# Patient Record
Sex: FEMALE | Race: WHITE | NOT HISPANIC OR LATINO | Employment: UNEMPLOYED | URBAN - METROPOLITAN AREA
[De-identification: names, ages, dates, MRNs, and addresses within clinical notes are randomized per-mention and may not be internally consistent; named-entity substitution may affect disease eponyms.]

---

## 2017-02-16 ENCOUNTER — HOSPITAL ENCOUNTER (EMERGENCY)
Facility: HOSPITAL | Age: 52
Discharge: HOME | End: 2017-02-16
Attending: EMERGENCY MEDICINE
Payer: MEDICAID

## 2017-02-16 VITALS
RESPIRATION RATE: 16 BRPM | WEIGHT: 224 LBS | HEART RATE: 97 BPM | HEIGHT: 61 IN | DIASTOLIC BLOOD PRESSURE: 70 MMHG | SYSTOLIC BLOOD PRESSURE: 141 MMHG | OXYGEN SATURATION: 99 % | TEMPERATURE: 98 F | BODY MASS INDEX: 42.29 KG/M2

## 2017-02-16 DIAGNOSIS — B18.2 CHRONIC HEPATITIS C WITH CIRRHOSIS: Primary | ICD-10-CM

## 2017-02-16 DIAGNOSIS — K76.9 LIVER DISEASE: ICD-10-CM

## 2017-02-16 DIAGNOSIS — K74.60 CHRONIC HEPATITIS C WITH CIRRHOSIS: Primary | ICD-10-CM

## 2017-02-16 DIAGNOSIS — I10 ESSENTIAL HYPERTENSION: ICD-10-CM

## 2017-02-16 PROBLEM — E66.01 MORBID OBESITY: Status: ACTIVE | Noted: 2017-02-16

## 2017-02-16 LAB
ALBUMIN SERPL BCP-MCNC: 2.5 G/DL
ALP SERPL-CCNC: 190 U/L
ALT SERPL W/O P-5'-P-CCNC: 22 U/L
AMORPH CRY URNS QL MICRO: ABNORMAL
ANION GAP SERPL CALC-SCNC: 10 MMOL/L
APTT BLDCRRT: 26.4 SEC
AST SERPL-CCNC: 44 U/L
BACTERIA #/AREA URNS HPF: ABNORMAL /HPF
BASOPHILS # BLD AUTO: 0 K/UL
BASOPHILS NFR BLD: 0.6 %
BILIRUB SERPL-MCNC: 1.3 MG/DL
BILIRUB UR QL STRIP: ABNORMAL
BNP SERPL-MCNC: 39 PG/ML
BUN SERPL-MCNC: 8 MG/DL
CALCIUM SERPL-MCNC: 7.9 MG/DL
CHLORIDE SERPL-SCNC: 104 MMOL/L
CLARITY UR: ABNORMAL
CO2 SERPL-SCNC: 23 MMOL/L
COLOR UR: YELLOW
CREAT SERPL-MCNC: 0.8 MG/DL
DIFFERENTIAL METHOD: ABNORMAL
EOSINOPHIL # BLD AUTO: 0 K/UL
EOSINOPHIL NFR BLD: 0.5 %
ERYTHROCYTE [DISTWIDTH] IN BLOOD BY AUTOMATED COUNT: 17.5 %
EST. GFR  (AFRICAN AMERICAN): >60 ML/MIN/1.73 M^2
EST. GFR  (NON AFRICAN AMERICAN): >60 ML/MIN/1.73 M^2
GLUCOSE SERPL-MCNC: 124 MG/DL
GLUCOSE UR QL STRIP: NEGATIVE
HCT VFR BLD AUTO: 28.4 %
HGB BLD-MCNC: 9 G/DL
HGB UR QL STRIP: ABNORMAL
HYALINE CASTS #/AREA URNS LPF: 0 /LPF
INR PPP: 1.2
KETONES UR QL STRIP: NEGATIVE
LEUKOCYTE ESTERASE UR QL STRIP: NEGATIVE
LIPASE SERPL-CCNC: 60 U/L
LYMPHOCYTES # BLD AUTO: 0.7 K/UL
LYMPHOCYTES NFR BLD: 10.8 %
MCH RBC QN AUTO: 30.3 PG
MCHC RBC AUTO-ENTMCNC: 31.6 %
MCV RBC AUTO: 96 FL
MICROSCOPIC COMMENT: ABNORMAL
MONOCYTES # BLD AUTO: 0.4 K/UL
MONOCYTES NFR BLD: 7.2 %
NEUTROPHILS # BLD AUTO: 5 K/UL
NEUTROPHILS NFR BLD: 80.9 %
NITRITE UR QL STRIP: NEGATIVE
PH UR STRIP: 6 [PH] (ref 5–8)
PLATELET # BLD AUTO: 96 K/UL
PMV BLD AUTO: 9.2 FL
POTASSIUM SERPL-SCNC: 3.2 MMOL/L
PROT SERPL-MCNC: 6.1 G/DL
PROT UR QL STRIP: ABNORMAL
PROTHROMBIN TIME: 12.2 SEC
RBC # BLD AUTO: 2.95 M/UL
RBC #/AREA URNS HPF: 10 /HPF (ref 0–4)
SODIUM SERPL-SCNC: 137 MMOL/L
SP GR UR STRIP: >=1.03 (ref 1–1.03)
SQUAMOUS #/AREA URNS HPF: 5 /HPF
URN SPEC COLLECT METH UR: ABNORMAL
UROBILINOGEN UR STRIP-ACNC: NEGATIVE EU/DL
WBC # BLD AUTO: 6.2 K/UL
WBC #/AREA URNS HPF: 0 /HPF (ref 0–5)

## 2017-02-16 PROCEDURE — 85610 PROTHROMBIN TIME: CPT

## 2017-02-16 PROCEDURE — 36415 COLL VENOUS BLD VENIPUNCTURE: CPT

## 2017-02-16 PROCEDURE — 25000003 PHARM REV CODE 250: Performed by: EMERGENCY MEDICINE

## 2017-02-16 PROCEDURE — 99285 EMERGENCY DEPT VISIT HI MDM: CPT | Mod: 25

## 2017-02-16 PROCEDURE — 85025 COMPLETE CBC W/AUTO DIFF WBC: CPT

## 2017-02-16 PROCEDURE — 83880 ASSAY OF NATRIURETIC PEPTIDE: CPT

## 2017-02-16 PROCEDURE — 96374 THER/PROPH/DIAG INJ IV PUSH: CPT

## 2017-02-16 PROCEDURE — 85730 THROMBOPLASTIN TIME PARTIAL: CPT

## 2017-02-16 PROCEDURE — 63600175 PHARM REV CODE 636 W HCPCS: Performed by: EMERGENCY MEDICINE

## 2017-02-16 PROCEDURE — 83690 ASSAY OF LIPASE: CPT

## 2017-02-16 PROCEDURE — 80053 COMPREHEN METABOLIC PANEL: CPT

## 2017-02-16 PROCEDURE — 81000 URINALYSIS NONAUTO W/SCOPE: CPT

## 2017-02-16 RX ORDER — SPIRONOLACTONE 50 MG/1
50 TABLET, FILM COATED ORAL DAILY
Qty: 20 TABLET | Refills: 0 | Status: SHIPPED | OUTPATIENT
Start: 2017-02-16 | End: 2017-04-19 | Stop reason: DRUGHIGH

## 2017-02-16 RX ORDER — MORPHINE SULFATE 4 MG/ML
4 INJECTION, SOLUTION INTRAMUSCULAR; INTRAVENOUS
Status: COMPLETED | OUTPATIENT
Start: 2017-02-16 | End: 2017-02-16

## 2017-02-16 RX ORDER — PANTOPRAZOLE SODIUM 40 MG/1
40 TABLET, DELAYED RELEASE ORAL DAILY
Qty: 30 TABLET | Refills: 0 | Status: SHIPPED | OUTPATIENT
Start: 2017-02-16 | End: 2017-07-19

## 2017-02-16 RX ORDER — ALUMINUM HYDROXIDE, MAGNESIUM HYDROXIDE, AND SIMETHICONE 2400; 240; 2400 MG/30ML; MG/30ML; MG/30ML
15 SUSPENSION ORAL EVERY 6 HOURS PRN
Qty: 335 ML | Refills: 0 | Status: SHIPPED | OUTPATIENT
Start: 2017-02-16 | End: 2018-02-16

## 2017-02-16 RX ORDER — POTASSIUM CHLORIDE 20 MEQ/1
40 TABLET, EXTENDED RELEASE ORAL
Status: COMPLETED | OUTPATIENT
Start: 2017-02-16 | End: 2017-02-16

## 2017-02-16 RX ADMIN — POTASSIUM CHLORIDE 40 MEQ: 20 TABLET, EXTENDED RELEASE ORAL at 06:02

## 2017-02-16 RX ADMIN — MORPHINE SULFATE 4 MG: 4 INJECTION INTRAVENOUS at 06:02

## 2017-02-16 NOTE — ED NOTES
"S/P meds - "feeling some better...they just gave me some morphine". Stable. Will continue to monitor  "

## 2017-02-16 NOTE — DISCHARGE INSTRUCTIONS
Understanding Hepatitis C (HCV)  Hepatitis means inflammation of the liver. There are many kinds of hepatitis. Some can be spread. Others are not. Hepatitis C virus (HCV) can be spread to others. It can lead to lifelong liver disease. This includes chronic hepatitis, cirrhosis, liver failure, and liver cancer.  Symptoms of hepatitis C  Most people notice no problems until they develop liver disease years later. Symptoms include the following:  · Flulike problems (fatigue, nausea, vomiting, diarrhea, and sore muscles and joints)  · Tenderness in the upper right abdomen  · Jaundice (yellowing skin)  · Swelling in the belly  · Itching  · Dark urine  How HCV spreads  HCV spreads through exposure to an infected persons blood. This is most likely to happen if:  · You used an infected needle (IV drug needles, tattoos, acupuncture needles, and body piercing)  · You had a needlestick injury in the hospital  · You shared personal care items such as razors  · You had sex without a condom with an infected person (a less common cause)  · You had a blood transfusion several years ago (blood is now screened for HCV)  Many people do not know how they were exposed to HCV.  Prevent the spread  No vaccine can prevent the spread of HCV and hepatitis C. Its up to you to keep others safe.  Do:  · Cover all skin breaks and sores yourself. If you need help, the person treating you should wear latex gloves.  · Use condoms during sex.  Dont:  · Dont donate blood, plasma, body organs, other body tissue, or sperm.  · Dont share needles.  · Dont share razors, toothbrushes, manicure tools, or other personal items.   Date Last Reviewed: 7/1/2016 © 2000-2016 [employer]. [address] [place] [address], [place] [address]. All rights reserved. This information is not intended as a substitute for professional medical care. Always follow your healthcare professional's instructions.

## 2017-02-16 NOTE — ED PROVIDER NOTES
Encounter Date: 2/16/2017       History     Chief Complaint   Patient presents with    Constipation    Fluid retention     Review of patient's allergies indicates:  No Known Allergies  HPI Comments: This patient is a 51-year-old female with a past history of GERD and hepatitis C presenting to the emergency department with complaints of worsening abdominal distention over the past week.  She additionally reports some difficulty urinating and constipation over the last few days as well.  She denies ever seeing a gastroenterologist or DrMckenna regarding her liver disease or a past history of ascitic fluid accumulation.  She currently denies associated chest pain, abdominal rigidity, fever, exquisite tenderness, nausea, vomiting, new rashes, past history of SBO or ileus.    The history is provided by the patient.     Past Medical History   Diagnosis Date    Acid reflux     Hepatitis C     Hiatal hernia      No past medical history pertinent negatives.  Past Surgical History   Procedure Laterality Date    Appendectomy      Ankle surgery       History reviewed. No pertinent family history.  Social History   Substance Use Topics    Smoking status: Current Some Day Smoker     Types: Cigarettes    Smokeless tobacco: None    Alcohol use No     Review of Systems   Constitutional: Negative for fever.   HENT: Negative for congestion.    Respiratory: Negative for shortness of breath.    Cardiovascular: Negative for chest pain.   Gastrointestinal: Positive for abdominal distention.   Genitourinary: Positive for difficulty urinating.   Musculoskeletal: Negative for joint swelling.   Skin: Negative for rash.   Neurological: Negative for weakness.   Hematological: Bruises/bleeds easily.   Psychiatric/Behavioral: Negative for confusion.       Physical Exam   Initial Vitals   BP Pulse Resp Temp SpO2   02/16/17 0408 02/16/17 0408 02/16/17 0408 02/16/17 0408 02/16/17 0408   148/70 92 20 97.5 °F (36.4 °C) 100 %     Physical  Exam    Nursing note and vitals reviewed.  Constitutional: She appears well-developed and well-nourished. No distress.   HENT:   Head: Atraumatic.   Mouth/Throat: Oropharynx is clear and moist.   Eyes: Conjunctivae and EOM are normal.   Neck: Normal range of motion. Neck supple. No JVD present.   Cardiovascular: Normal rate and regular rhythm.   No murmur heard.  Pulmonary/Chest: Breath sounds normal. No respiratory distress. She has no wheezes. She has no rales.   Abdominal: Soft. She exhibits distension. There is no tenderness. There is no rebound.   Abdomen nonrigid, nondistended, positive fluid wave, normal bowel sounds   Musculoskeletal: Normal range of motion. She exhibits no edema or tenderness.   Neurological: She is alert and oriented to person, place, and time. No cranial nerve deficit.   Skin: Skin is warm and dry. No rash noted.   Psychiatric: She has a normal mood and affect. Her behavior is normal. Thought content normal.         ED Course   Procedures  Labs Reviewed - No data to display          Medical Decision Making:   Initial Assessment:   Patient and examined found to be in no acute distress. No signs or symptoms of progressing infection, inflammation. Worsening  abdominal distension most probably related to new accumulation of ascites from chronic liver failure. Us and labs obtained. Anemia, thrombocytopenia, mild hypokalemia, hyperbilirubinemia.    Differential Diagnosis:   DDx include, but are not limited to, ascites, SBO, ileus, constipation, UTi, ARF, cirrhosis, bowel perforation  ED Management:  Patient warrants therapeutic and diagnostic paracentesis. She was educated on the POC. Case d/w Dr. Oh who recommended in house procedure with DC and follow up afterward. Case was additionally d/w Dr. Boyd at sign out.                    ED Course   Comment By Time   Case discussed with Dr. contreras of radiology.  Not enough fluid.  Paracentesis canceled. Justin Boyd MD 02/16 4918   Seen  in the emergency room by Dr. Oh of [facility] medicine.  All follow-up and discharge instructions and medications according to them. Justin Boyd MD 02/16 1023     Clinical Impression:   The primary encounter diagnosis was Chronic hepatitis C with cirrhosis. Diagnoses of Essential hypertension and Liver disease were also pertinent to this visit.    Disposition:   Disposition: Discharged  Condition: Stable       Remy Gonzales MD  02/16/17 9197

## 2017-02-16 NOTE — PROVIDER PROGRESS NOTES - EMERGENCY DEPT.
Encounter Date: 2/16/2017    ED Physician Progress Notes        Physician Note:   Signed over from Dr. Gonzales pending radiology ultrasound guided paracentesis.  Case discussed with Dr. Schwartz of [facility] medicine who will see the patient in the emergency department.    Discharged after examined by Dr Schwartz in ED. Prescriptions and f/u per dr schwartz

## 2017-02-16 NOTE — CONSULTS
Consult Note  Hospital Medicine    Consult Requested By: Justin Boyd MD    Reason for Consult: Med management    SUBJECTIVE:     History of Present Illness: This patient is a 51-year-old female with a past history of GERD and hepatitis C presenting to the emergency department with complaints of worsening abdominal distention over the past week. She additionally reports some difficulty urinating and constipation over the last few days as well. She denies ever seeing a gastroenterologist or Dr. regarding her liver disease or a past history of ascitic fluid accumulation. She currently denies associated chest pain, abdominal rigidity, fever, exquisite tenderness, nausea, vomiting, new rashes, past history of SBO or ileus.    Patient was thought to have ascites but was taken to IR and found to have no fluids in her abdomen- not enough to do paracentesis.       Past Medical History   Diagnosis Date    Acid reflux     Hepatitis C     Hiatal hernia      Past Surgical History   Procedure Laterality Date    Appendectomy      Ankle surgery       History reviewed. No pertinent family history.  Social History   Substance Use Topics    Smoking status: Current Some Day Smoker     Types: Cigarettes    Smokeless tobacco: None    Alcohol use No       Review of patient's allergies indicates:  No Known Allergies     Review of Systems   Constitutional: Negative for chills and fever.   HENT: Negative for tinnitus.    Eyes: Negative for blurred vision and photophobia.   Respiratory: Negative for cough and sputum production.    Cardiovascular: Negative for chest pain and orthopnea.   Gastrointestinal: Positive for abdominal pain, nausea and vomiting. Negative for diarrhea.   Genitourinary: Negative for frequency and urgency.   Musculoskeletal: Negative for myalgias and neck pain.   Skin: Negative for itching and rash.   Neurological: Positive for weakness. Negative for tingling and headaches.   Endo/Heme/Allergies: Negative  for environmental allergies.           OBJECTIVE:     Vital Signs Range (Last 24H):  Temp:  [97.5 °F (36.4 °C)]   Pulse:  [90-99]   Resp:  [16-20]   BP: (147-188)/(59-91)   SpO2:  [97 %-100 %]     Physical Exam   Constitutional: She is oriented to person, place, and time and well-developed, well-nourished, and in no distress. No distress.   HENT:   Head: Normocephalic and atraumatic.   Mouth/Throat: No oropharyngeal exudate.   Eyes: Pupils are equal, round, and reactive to light. Right eye exhibits no discharge. Left eye exhibits no discharge. No scleral icterus.   Neck: Normal range of motion.   Cardiovascular: Normal rate, regular rhythm and intact distal pulses.  Exam reveals no gallop and no friction rub.    No murmur heard.  Pulmonary/Chest: Effort normal and breath sounds normal. No stridor. No respiratory distress. She has no wheezes.   Abdominal: Soft. She exhibits distension. She exhibits no mass. There is tenderness. There is no rebound and no guarding.   Diminished BS   Musculoskeletal: Normal range of motion. She exhibits edema. She exhibits no tenderness.   Lymphadenopathy:     She has no cervical adenopathy.   Neurological: She is alert and oriented to person, place, and time. She has normal reflexes. No cranial nerve deficit. She exhibits normal muscle tone. Gait normal.   Skin: No rash noted. She is not diaphoretic. No erythema. No pallor.   Psychiatric: Affect and judgment normal.   Nursing note and vitals reviewed.      Body mass index is 42.32 kg/(m^2).    Laboratory:  CBC:   Recent Labs  Lab 02/16/17  0506   WBC 6.20   RBC 2.95*   HGB 9.0*   HCT 28.4*   PLT 96*   MCV 96   MCH 30.3   MCHC 31.6*     CMP:   Recent Labs  Lab 02/16/17  0506   *   CALCIUM 7.9*   ALBUMIN 2.5*   PROT 6.1      K 3.2*   CO2 23      BUN 8   CREATININE 0.8   ALKPHOS 190*   ALT 22   AST 44*   BILITOT 1.3*       Diagnostic Results:  Labs: Reviewed  ECG: Reviewed  US: Reviewed    EKG: NSR    ASSESSMENT/PLAN:      Active Hospital Problems    Diagnosis  POA    *Chronic hepatitis C with cirrhosis [B18.2, K74.60]-  Patient with cirrhosis, + edema likely d/t volume overload and hypoalbuminemia. Will give aldactone. She needs F/U with Hepatology and PCP as outpatient- discussed with case management.  Yes    Morbid obesity [E66.01]- Body mass index is 42.32 kg/(m^2). Morbid obesity complicates all aspects of disease management from diagnostic modalities to treatment. Weight loss encouraged and health benefits explained to patient.  Yes    Portal gastropathy-  Likely cause of her abdominal pain and nausea. Patient started on maalox and PPI and told to F/U with PCP as outpatient. Encourage fluids. Avoid NSAIDS.  Yes      Resolved Hospital Problems    Diagnosis Date Resolved POA   No resolved problems to display.     Thank you for allowing us to participate in the care of your patient. We will follow the patient and provide recommendations as needed.      Time spent seeing patient( greater than 1/2 spent in direct contact) : 38 min

## 2017-02-16 NOTE — PROCEDURE NOTE ADDENDUM
1020am Dr. Oh asked CM to speak to patient regarding followup appts with primary care and Hepatatologist in [place], [place]. After speaking with patient and verifying her address, patient stated that she is staying in [place] and plans to move here. She verified her current address as [address] [place] [address] and phone as [phone].  I explained to her that I could get an appointment in [place] sooner than [place] at the [facility] and she was fine with that.     1030am Medicaid counselor to speak to patient and file medicaid application.     1037am Appointment made at [facility] [address], [place], [place]   Phone [phone] on Tuesday 2/21 @ 930 am.    1050 CM left message for [facility] [phone] to call back regarding appointment.     1100am Patient has already left and I was not able to inform her of her appt date and time. I tried to call patient and no answer at her number or relatives number. Dr. Oh notified. I will continue to try to reach patient to inform her of appt dates, time and location.     1123am Received call from Janell at [facility] [phone]. She asked for patient information and also requested that information be faxed to [phone]. They will call patient and set up appt. Patient FS faxed to [phone]. I also spoke with Karenalejandro and she said that the medicaid [place] was filed prior to patient leaving.     740pm  CM was able to reach patient @ [phone] and informed her of location , time and date of appointment to see primary care MD @ [facility].  CM instructed patient to bring proof of income, picture ID, current meds, any government assistance income documentation,  bank statement or letter from person supporting her if she does not have proof of income.  Patient able to verbalize understanding of information given.

## 2017-02-16 NOTE — NURSING
Received call from Dr. Contreras that a paracentesis was ordered on pt in ER to be done as soon as possible. Labs today PTT 26.4, PT/INR 12.2/1.2, PLT 96, no H/P available, only meds listed are ER meds given. Called spoke to Hans no specimen orders entered, per Hans GIMENEZ notified. 0900 AM discussed patient with Dr. contreras and not a large fluid collection and he will discuss case with ER MD. Received call back from Dr. Contreras paracentesis will be cancelled on this patient will notify if any other testing is warranted. AR

## 2017-02-16 NOTE — ED AVS SNAPSHOT
[facility] [address]  [place] [address]               Karen Villarreal   2017  4:07 AM   ED    Description:  Female : 1965   Department:  [facility]           Your Care was Coordinated By:     Provider Role From To    Remy Gonzales MD Attending Provider 17 0416 17 0801    Justin Boyd MD Attending Provider 17 0801 17 1026    Justin Boyd MD ED Temporary Attending 17 0732 --      Reason for Visit     Constipation     Fluid retention           Diagnoses this Visit        Comments    Chronic hepatitis C with cirrhosis    -  Primary     Essential hypertension         Liver disease           ED Disposition     ED Disposition Condition Comment    Discharge             To Do List           Follow-up Information     Follow up with [facility].    Why:  Follow-up with primary care and hepatology.  Case management will assist you with this.    Contact information:    [address] [place] [address] [phone]         These Medications        Disp Refills Start End    aluminum & magnesium hydroxide-simethicone (MAALOX MAXIMUM STRENGTH) 400-400-40 mg/5 mL suspension 335 mL 0 2017    Take 15 mLs by mouth every 6 (six) hours as needed for Indigestion. - Oral    pantoprazole (PROTONIX) 40 MG tablet 30 tablet 0 2017    Take 1 tablet (40 mg total) by mouth once daily. - Oral    spironolactone (ALDACTONE) 50 MG tablet 20 tablet 0 2017    Take 1 tablet (50 mg total) by mouth once daily. - Oral      [facility]     [facility] On Call Nurse Care Line -  Assistance  Registered nurses in the [facility] provide clinical advisement, health education, appointment booking, and other advisory services.  Call for this free service at [phone].             Medications           START taking these NEW  "medications        Refills    aluminum & magnesium hydroxide-simethicone (MAALOX MAXIMUM STRENGTH) 400-400-40 mg/5 mL suspension 0    Sig: Take 15 mLs by mouth every 6 (six) hours as needed for Indigestion.    Class: Print    Route: Oral    pantoprazole (PROTONIX) 40 MG tablet 0    Sig: Take 1 tablet (40 mg total) by mouth once daily.    Class: Print    Route: Oral    spironolactone (ALDACTONE) 50 MG tablet 0    Sig: Take 1 tablet (50 mg total) by mouth once daily.    Class: Print    Route: Oral      These medications were administered today        Dose Freq    potassium chloride SA CR tablet 40 mEq 40 mEq ED 1 Time    Sig: Take 2 tablets (40 mEq total) by mouth ED 1 Time.    Class: Normal    Route: Oral    morphine injection 4 mg 4 mg ED 1 Time    Sig: Inject 1 mL (4 mg total) into the vein ED 1 Time.    Class: Normal    Route: Intravenous           Verify that the below list of medications is an accurate representation of the medications you are currently taking.  If none reported, the list may be blank. If incorrect, please contact your healthcare provider. Carry this list with you in case of emergency.           Current Medications     aluminum & magnesium hydroxide-simethicone (MAALOX MAXIMUM STRENGTH) 400-400-40 mg/5 mL suspension Take 15 mLs by mouth every 6 (six) hours as needed for Indigestion.    pantoprazole (PROTONIX) 40 MG tablet Take 1 tablet (40 mg total) by mouth once daily.    spironolactone (ALDACTONE) 50 MG tablet Take 1 tablet (50 mg total) by mouth once daily.           Clinical Reference Information           Your Vitals Were     BP Pulse Temp Resp Height Weight    141/70 97 97.5 °F (36.4 °C) ([place]) 16 5' 1" (1.549 m) 101.6 kg (224 lb)    SpO2 BMI             99% 42.32 kg/m2         Allergies as of 2/16/2017     No Known Allergies      Immunizations Administered on Date of Encounter - 2/16/2017     None      ED Micro, Lab, POCT     Start Ordered       Status Ordering Provider    02/16/17 0753 " 02/16/17 0752    STAT,   Status:  Canceled      Canceled     02/16/17 0753 02/16/17 0752    STAT,   Status:  Canceled      Canceled     02/16/17 0425 02/16/17 0424  Brain natriuretic peptide  STAT      Final result     02/16/17 0424 02/16/17 0424  CBC W/ AUTO DIFFERENTIAL  Once      Final result     02/16/17 0424 02/16/17 0424  Comp. Metabolic Panel  STAT      Final result     02/16/17 0424 02/16/17 0424  Lipase  STAT      Final result     02/16/17 0424 02/16/17 0424  Urinalysis - Clean Catch  STAT      Final result     02/16/17 0424 02/16/17 0424  Protime-INR  STAT      Final result     02/16/17 0424 02/16/17 0424  PTT  STAT      Final result     02/16/17 0424 02/16/17 0424  Urinalysis Microscopic  Once      Final result       ED Imaging Orders     Start Ordered       Status Ordering Provider    02/16/17 0730 02/16/17 0730  US Guided Paracentesis  1 time imaging      In process     02/16/17 0424 02/16/17 0424  X-ray chest PA and lateral  1 time imaging     Comments:  Abdominal pain    Final result     02/16/17 0424 02/16/17 0424  X-Ray Abdomen Flat And Erect  1 time imaging      Final result     02/16/17 0424 02/16/17 0424  US Abdomen Complete  1 time imaging      Final result         Discharge Instructions         Understanding Hepatitis C (HCV)  Hepatitis means inflammation of the liver. There are many kinds of hepatitis. Some can be spread. Others are not. Hepatitis C virus (HCV) can be spread to others. It can lead to lifelong liver disease. This includes chronic hepatitis, cirrhosis, liver failure, and liver cancer.  Symptoms of hepatitis C  Most people notice no problems until they develop liver disease years later. Symptoms include the following:  · Flulike problems (fatigue, nausea, vomiting, diarrhea, and sore muscles and joints)  · Tenderness in the upper right abdomen  · Jaundice (yellowing skin)  · Swelling in the belly  · Itching  · Dark urine  How HCV spreads  HCV spreads through exposure to an  infected persons blood. This is most likely to happen if:  · You used an infected needle (IV drug needles, tattoos, acupuncture needles, and body piercing)  · You had a needlestick injury in the hospital  · You shared personal care items such as razors  · You had sex without a condom with an infected person (a less common cause)  · You had a blood transfusion several years ago (blood is now screened for HCV)  Many people do not know how they were exposed to HCV.  Prevent the spread  No vaccine can prevent the spread of HCV and hepatitis C. Its up to you to keep others safe.  Do:  · Cover all skin breaks and sores yourself. If you need help, the person treating you should wear latex gloves.  · Use condoms during sex.  Dont:  · Dont donate blood, plasma, body organs, other body tissue, or sperm.  · Dont share needles.  · Dont share razors, toothbrushes, manicure tools, or other personal items.   Date Last Reviewed: 7/1/2016  © 3441-7234 [employer]. [address] [place] [address]. All rights reserved. This information is not intended as a substitute for professional medical care. Always follow your healthcare professional's instructions.          Discharge References/Attachments     HEPATITIS C (HCV), TREATING (ENGLISH)    HEPATITIS C (HCV), UNDERSTANDING (ENGLISH)    CIRRHOSIS OF THE LIVER, DISCHARGE INSTRUCTIONS FOR (ENGLISH)    CIRRHOSIS, TREATING (ENGLISH)      [facility] Sign-Up     Activating your [facility] account is as easy as 1-2-3!     1) Visit [email or web address], select Sign Up Now, enter this activation code and your date of birth, then select Next.  [email or web address]  Expires: 4/2/2017 10:52 AM      2) Create a username and password to use when you visit [facility] in the future and select a security question in case you lose your password and select Next.    3) Enter your e-mail address and click Sign Up!    Additional Information  If you have questions, please e-mail  [email or web address] or call [phone] to talk to our [facility] staff. Remember, [facility] is NOT to be used for urgent needs. For medical emergencies, dial 911.         Smoking Cessation     If you would like to quit smoking:   You may be eligible for free services if you are a [place] resident and started smoking cigarettes before September 1, 1988.  Call the Smoking Cessation Trust (SCT) toll free at (840) 166-6350 or (437) 124-2657.   Call 1-800-QUIT-NOW if you do not meet the above criteria.             [facility] complies with applicable Federal civil rights laws and does not discriminate on the basis of race, color, national origin, age, disability, or sex.        Language Assistance Services     ATTENTION: Language assistance services are available, free of charge. Please call [phone].      ATENCIÓN: Si habla español, tiene a grant disposición servicios gratuitos de asistencia lingüística. Llame al [phone].     CHÚ Ý: N?u b?n nói Ti?ng Vi?t, có các d?ch v? h? tr? ngôn ng? mi?n phí dành cho b?n. G?i s? [phone].

## 2017-02-17 NOTE — PROGRESS NOTES
1050am 2/17/2017  CM received call from [facility] from [facility] requesting clinicals on patient. Clinicals faxed to [facility] @ [phone] per her request.

## 2017-03-28 ENCOUNTER — TELEPHONE (OUTPATIENT)
Dept: HEPATOLOGY | Facility: CLINIC | Age: 52
End: 2017-03-28

## 2017-03-28 NOTE — TELEPHONE ENCOUNTER
----- Message from iLsbeth Cooney sent at 3/28/2017  7:30 AM CDT -----  Contact: self  Patient states has family emergency unable to make appointment schedule today.    Patient would like to reschedule.  please call pt at [phone]

## 2017-04-19 ENCOUNTER — OFFICE VISIT (OUTPATIENT)
Dept: HEPATOLOGY | Facility: CLINIC | Age: 52
End: 2017-04-19
Payer: MEDICAID

## 2017-04-19 ENCOUNTER — LAB VISIT (OUTPATIENT)
Dept: LAB | Facility: HOSPITAL | Age: 52
End: 2017-04-19
Payer: MEDICAID

## 2017-04-19 VITALS
SYSTOLIC BLOOD PRESSURE: 137 MMHG | TEMPERATURE: 98 F | HEART RATE: 112 BPM | RESPIRATION RATE: 18 BRPM | DIASTOLIC BLOOD PRESSURE: 66 MMHG | HEIGHT: 61 IN | OXYGEN SATURATION: 98 % | WEIGHT: 234.56 LBS | BODY MASS INDEX: 44.28 KG/M2

## 2017-04-19 DIAGNOSIS — K74.60 CHRONIC HEPATITIS C WITH CIRRHOSIS: Primary | ICD-10-CM

## 2017-04-19 DIAGNOSIS — K74.60 CHRONIC HEPATITIS C WITH CIRRHOSIS: ICD-10-CM

## 2017-04-19 DIAGNOSIS — R18.8 OTHER ASCITES: ICD-10-CM

## 2017-04-19 DIAGNOSIS — B18.2 CHRONIC HEPATITIS C WITH CIRRHOSIS: ICD-10-CM

## 2017-04-19 DIAGNOSIS — B18.2 CHRONIC HEPATITIS C WITH CIRRHOSIS: Primary | ICD-10-CM

## 2017-04-19 LAB
AFP SERPL-MCNC: 4.7 NG/ML
CERULOPLASMIN SERPL-MCNC: 26 MG/DL
FERRITIN SERPL-MCNC: 21 NG/ML
HBV CORE AB SERPL QL IA: NEGATIVE
HBV SURFACE AB SER-ACNC: NEGATIVE M[IU]/ML
HBV SURFACE AG SERPL QL IA: NEGATIVE
HEPATITIS A ANTIBODY, IGG: POSITIVE

## 2017-04-19 PROCEDURE — 99999 PR PBB SHADOW E&M-EST. PATIENT-LVL III: CPT | Mod: PBBFAC,,, | Performed by: NURSE PRACTITIONER

## 2017-04-19 PROCEDURE — 82390 ASSAY OF CERULOPLASMIN: CPT

## 2017-04-19 PROCEDURE — 86706 HEP B SURFACE ANTIBODY: CPT

## 2017-04-19 PROCEDURE — 82104 ALPHA-1-ANTITRYPSIN PHENO: CPT

## 2017-04-19 PROCEDURE — 87340 HEPATITIS B SURFACE AG IA: CPT

## 2017-04-19 PROCEDURE — 86790 VIRUS ANTIBODY NOS: CPT

## 2017-04-19 PROCEDURE — 86704 HEP B CORE ANTIBODY TOTAL: CPT

## 2017-04-19 PROCEDURE — 82728 ASSAY OF FERRITIN: CPT

## 2017-04-19 PROCEDURE — 99205 OFFICE O/P NEW HI 60 MIN: CPT | Mod: S$PBB,,, | Performed by: NURSE PRACTITIONER

## 2017-04-19 PROCEDURE — 82105 ALPHA-FETOPROTEIN SERUM: CPT

## 2017-04-19 PROCEDURE — 36415 COLL VENOUS BLD VENIPUNCTURE: CPT

## 2017-04-19 RX ORDER — SPIRONOLACTONE 100 MG/1
150 TABLET, FILM COATED ORAL DAILY
Refills: 3 | COMMUNITY
Start: 2017-04-01 | End: 2019-01-01

## 2017-04-19 RX ORDER — FUROSEMIDE 40 MG/1
60 TABLET ORAL DAILY
Refills: 3 | COMMUNITY
Start: 2017-04-01 | End: 2019-01-01

## 2017-04-19 NOTE — MR AVS SNAPSHOT
"    [facility] - Hepatology  [address]  [facility] [address]  Phone: [phone]  Fax: [phone]                  Karen Villarreal   2017 10:40 AM   Office Visit    Description:  Female : 1965   Provider:  Suzette Ruiz NP   Department:  [facility] - Hepatology           Reason for Visit     Cirrhosis           Diagnoses this Visit        Comments    Chronic hepatitis C with cirrhosis    -  Primary     Other ascites                To Do List           Goals (5 Years of Data)     None      [facility]     [facility] Nurse Care Line -  Assistance  Unless otherwise directed by your provider, please contact [facility], our nurse care line that is available for  assistance.     Registered nurses in the [facility] Center provide: appointment scheduling, clinical advisement, health education, and other advisory services.  Call: [phone] (toll free)               Medications                Verify that the below list of medications is an accurate representation of the medications you are currently taking.  If none reported, the list may be blank. If incorrect, please contact your healthcare provider. Carry this list with you in case of emergency.           Current Medications     furosemide (LASIX) 40 MG tablet Take 40 mg by mouth once daily.    spironolactone (ALDACTONE) 100 MG tablet Take 100 mg by mouth once daily.    aluminum & magnesium hydroxide-simethicone (MAALOX MAXIMUM STRENGTH) 400-400-40 mg/5 mL suspension Take 15 mLs by mouth every 6 (six) hours as needed for Indigestion.    pantoprazole (PROTONIX) 40 MG tablet Take 1 tablet (40 mg total) by mouth once daily.           Clinical Reference Information           Your Vitals Were     BP Pulse Temp Resp Height Weight    137/66 (BP Location: Left arm, Patient Position: Sitting) 112 97.8 °F (36.6 °C) (Oral) 18 5' 1" (1.549 m) 106.4 kg (234 lb 9.1 oz)    SpO2 BMI             98% 44.32 kg/m2       "   Blood Pressure          Most Recent Value    BP  137/66      Allergies as of 4/19/2017     No Known Allergies      Immunizations Administered on Date of Encounter - 4/19/2017     None      Orders Placed During Today's Visit      Normal Orders This Visit    Case request GI: ESOPHAGOGASTRODUODENOSCOPY (EGD)     Future Labs/Procedures Expected by Expires    Alpha 1 Antitrypsin Phenotype  4/19/2017 6/18/2018    Ceruloplasmin  4/19/2017 6/18/2018    Ferritin  4/19/2017 6/18/2018    Hepatitis A antibody, IgG  4/19/2017 6/18/2018    Hepatitis B core antibody, total  4/19/2017 4/19/2018    Hepatitis B surface antibody  4/19/2017 6/18/2018    Hepatitis B surface antigen  4/19/2017 6/18/2018    Recurring Lab Work Interval Expires    AFP tumor marker   4/20/2018    CBC auto differential   4/20/2018    Comprehensive metabolic panel   4/20/2018    Protime-INR   4/20/2018    US Abdomen Complete   4/20/2018      [facility] Sign-Up     Activating your [facility] account is as easy as 1-2-3!     1) Visit [email or web address], select Sign Up Now, enter this activation code and your date of birth, then select Next.  [email or web address]  Expires: 6/3/2017 11:18 AM      2) Create a username and password to use when you visit [facility] in the future and select a security question in case you lose your password and select Next.    3) Enter your e-mail address and click Sign Up!    Additional Information  If you have questions, please e-mail [email or web address] or call [phone] to talk to our [facility] staff. Remember, [facility] is NOT to be used for urgent needs. For medical emergencies, dial 911.         Smoking Cessation     If you would like to quit smoking:   You may be eligible for free services if you are a [place] resident and started smoking cigarettes before September 1, 1988.  Call the Smoking Cessation Trust (SCT) toll free at (063) 418-4619 or (824) 311-2447.   Call 9-800-QUIT-NOW if you do not meet the above  criteria.   Contact us via email: [email or web address]   View our website for more information: [email or web address]        Language Assistance Services     ATTENTION: Language assistance services are available, free of charge. Please call [phone].      ATENCIÓN: Si habla josue, tiene a grant disposición servicios gratuitos de asistencia lingüística. Llame al [phone].     CHÚ Ý: N?u b?n nói Ti?ng Vi?t, có các d?ch v? h? tr? ngôn ng? mi?n phí dành cho b?n. G?i s? [phone].         [facility] complies with applicable Federal civil rights laws and does not discriminate on the basis of race, color, national origin, age, disability, or sex.

## 2017-04-19 NOTE — PROGRESS NOTES
"HEPATOLOGY CONSULTATION    Referring Physician:Shama Mccoy MD   Current Corresponding Physician: Shama Mcocy MD     Reason for Consultation: Consultation for evaluation of Cirrhosis    History of Present Illness: Karen Villarreal is a 51 y.o. femalewho presents for evaluation of   Chief Complaint   Patient presents with    Cirrhosis     Per patient dx w/ HCV in 1999, d/t lack of insurance did not have proper f/u.  Treatment experience, INF/RBV, no response  HCV quant ~6900 (outside 3/2017)   Source is unknown. Denies IVDU, intranasal drug use, tattoos, blood transfusions  Presented with vomiting "black liquid" and abdominal distension to the ER ~ 2 months ago.  Paracentesis was considered but not performed.  Ascites improved w/ diuretics which were started at that time 2 months ago w/ at least 20# weight loss  No c/o jaundice, confusion or slowed mentation, hematemesis, melena  Only complaint is of fatigue and fluid retention.  Currently on furosemide 40 mg qd and spironolactone 100 mg qd    Alcohol use, "a few beers" a couple of times per week,   Denies family hx of liver disease     Renal cyst seen on initial imaging , per patient had f/u CT renal study in [place] no remarkable findings    Review of available records reveal:  Mildly elevated LFTs w/ thrombocytopenia, albuminemia    3/2017  Normal Ferritin   HCV quant 6900, GT 1a    US 2/16: 1.  Cirrhosis.  2.  Mild splenomegaly.  3.  Small volume ascites.  4.  Cholelithiasis.  5.  Small complex cyst left kidney    Other noted hx:  Past Medical History:   Diagnosis Date    Acid reflux     Hepatitis C     Hiatal hernia      Outpatient Encounter Prescriptions as of 4/19/2017   Medication Sig Dispense Refill    furosemide (LASIX) 40 MG tablet Take 40 mg by mouth once daily.  3    spironolactone (ALDACTONE) 100 MG tablet Take 100 mg by mouth once daily.  3    aluminum & magnesium hydroxide-simethicone (MAALOX MAXIMUM STRENGTH) " 400-400-40 mg/5 mL suspension Take 15 mLs by mouth every 6 (six) hours as needed for Indigestion. 335 mL 0    pantoprazole (PROTONIX) 40 MG tablet Take 1 tablet (40 mg total) by mouth once daily. 30 tablet 0    [DISCONTINUED] spironolactone (ALDACTONE) 50 MG tablet Take 1 tablet (50 mg total) by mouth once daily. 20 tablet 0     No facility-administered encounter medications on file as of 4/19/2017.      Review of patient's allergies indicates:  No Known Allergies  No family history on file.    Social History     Social History    Marital status: Single     Spouse name: N/A    Number of children: N/A    Years of education: N/A     Occupational History    Not on file.     Social History Main Topics    Smoking status: Current Some Day Smoker     Types: Cigarettes    Smokeless tobacco: Not on file    Alcohol use No    Drug use: No    Sexual activity: Not on file     Other Topics Concern    Not on file     Social History Narrative    No narrative on file     Review of Systems   Constitutional: Negative for activity change, appetite change, chills, diaphoresis, fatigue, fever and unexpected weight change.   HENT: Negative for facial swelling and nosebleeds.    Respiratory: Negative for cough, chest tightness and shortness of breath.    Cardiovascular: Positive for leg swelling. Negative for chest pain and palpitations.   Gastrointestinal: Positive for abdominal distention. Negative for abdominal pain, blood in stool, constipation, diarrhea, nausea and vomiting.   Musculoskeletal: Negative for neck pain and neck stiffness.   Skin: Negative for color change, pallor and rash.   Neurological: Negative for dizziness, tremors, syncope, weakness, light-headedness and headaches.   Hematological: Negative for adenopathy. Does not bruise/bleed easily.   Psychiatric/Behavioral: Negative for agitation, behavioral problems, confusion and decreased concentration.     Vitals:    04/19/17 1029   BP: 137/66   Pulse: (!) 112    Resp: 18   Temp: 97.8 °F (36.6 °C)       Physical Exam   Constitutional: She is oriented to person, place, and time. She appears well-developed and well-nourished. No distress.   HENT:   Head: Normocephalic and atraumatic.   Eyes: Conjunctivae are normal. Pupils are equal, round, and reactive to light. No scleral icterus.   Neck: Normal range of motion. Neck supple.   Cardiovascular: Normal rate.    Pulmonary/Chest: Effort normal.   Abdominal: Soft. She exhibits distension. She exhibits no mass. There is no tenderness. There is no rebound and no guarding.   Musculoskeletal: Normal range of motion.   2+ pitting edema   Neurological: She is alert and oriented to person, place, and time. No cranial nerve deficit. She exhibits normal muscle tone. Coordination normal.   No asterixis   Skin: Skin is warm and dry. No rash noted. She is not diaphoretic. No erythema. No pallor.   Psychiatric: She has a normal mood and affect. Her behavior is normal. Judgment and thought content normal.       MELD-Na score: 9 at 2/16/2017  5:06 AM  MELD score: 9 at 2/16/2017  5:06 AM  Calculated from:  Serum Creatinine: 0.8 mg/dL (Rounded to 1) at 2/16/2017  5:06 AM  Serum Sodium: 137 mmol/L at 2/16/2017  5:06 AM  Total Bilirubin: 1.3 mg/dL at 2/16/2017  5:06 AM  INR(ratio): 1.2 at 2/16/2017  5:06 AM  Age: 51 years    Lab Results   Component Value Date     (H) 02/16/2017    BUN 8 02/16/2017    CREATININE 0.8 02/16/2017    CALCIUM 7.9 (L) 02/16/2017     02/16/2017    K 3.2 (L) 02/16/2017     02/16/2017    PROT 6.1 02/16/2017    CO2 23 02/16/2017    ANIONGAP 10 02/16/2017    WBC 6.20 02/16/2017    RBC 2.95 (L) 02/16/2017    HGB 9.0 (L) 02/16/2017    HCT 28.4 (L) 02/16/2017    MCV 96 02/16/2017    MCH 30.3 02/16/2017    MCHC 31.6 (L) 02/16/2017     Lab Results   Component Value Date    RDW 17.5 (H) 02/16/2017    PLT 96 (L) 02/16/2017    MPV 9.2 02/16/2017    GRAN 5.0 02/16/2017    GRAN 80.9 (H) 02/16/2017    LYMPH 0.7 (L)  02/16/2017    LYMPH 10.8 (L) 02/16/2017    MONO 0.4 02/16/2017    MONO 7.2 02/16/2017    EOSINOPHIL 0.5 02/16/2017    BASOPHIL 0.6 02/16/2017    EOS 0.0 02/16/2017    BASO 0.00 02/16/2017    APTT 26.4 02/16/2017    BNP 39 02/16/2017    ALBUMIN 2.5 (L) 02/16/2017    AST 44 (H) 02/16/2017    ALT 22 02/16/2017    ALKPHOS 190 (H) 02/16/2017    LABPROT 12.2 02/16/2017    INR 1.2 02/16/2017       Assessment and Plan:  Patient seen in collaboration with Dr. Webster  Cirrhosis : likely 2/2 chronic hepatitis C  -mildly decompensated , MELD 9 which does not pose any immediate survival benefit to transplant  -splenomegaly w/ thrombocytopenia, ascites  Chronic HCV: treatment experienced, non responder.    -Treatment will be on hold for now in the event that patient progresses and need for transplant arises.  -recommend Hep A/B vaccine, pending labs  EV screeing: will schedule EGD in [place]  HCC surveillance: AFP today and then repeat AFP/US in October  Ascites: increase lasix and spironolactone to bid  -instructed low Na + diet, handout provided    Total duration of visit = 40 min, with > 50% spent counseling  RTC 4-6 weeks w/ pre visit labs    Thank you for allowing me participate in this patient's care.     Patient Active Problem List   Diagnosis    Morbid obesity    Chronic hepatitis C with cirrhosis    Essential hypertension    Ascites     Karenyaron Villarreal is a 51 y.o. female withCirrhosis

## 2017-04-19 NOTE — PROGRESS NOTES
I have personally performed a face to face diagnostic evaluation on this patient. I have reviewed and agree with today's findings and the care plan outlined by Suzette Ruiz NP      My findings are as follows:  Patient presents with mildly decompensated HCV- ascites    - failure to respond to Peg IFN    MELD-Na score: 9 at 2/16/2017  5:06 AM  MELD score: 9 at 2/16/2017  5:06 AM  Calculated from:  Serum Creatinine: 0.8 mg/dL (Rounded to 1) at 2/16/2017  5:06 AM  Serum Sodium: 137 mmol/L at 2/16/2017  5:06 AM  Total Bilirubin: 1.3 mg/dL at 2/16/2017  5:06 AM  INR(ratio): 1.2 at 2/16/2017  5:06 AM  Age: 51 years     Needs:  EGD  CT renal protocol  Increase diuretics      she will return to Suzette Ruiz NP for follow-up.

## 2017-04-19 NOTE — LETTER
April 19, 2017      Shama Mccoy MD  [address]  [address]  [facility] [address]           [facility]  [address]  [facility] [address]  Phone: [phone]  Fax: [phone]          Patient: Karen Villarreal   MR Number: [id number]   YOB: 1965   Date of Visit: 4/19/2017       Dear Dr. Shama Mccoy:    Thank you for referring Karen Villarreal to me for evaluation. Attached you will find relevant portions of my assessment and plan of care.    If you have questions, please do not hesitate to call me. I look forward to following Karen Villarreal along with you.    Sincerely,    Suzette Ruiz, MICKY    Enclosure  CC:  No Recipients    If you would like to receive this communication electronically, please contact [email or web address] or [phone] to request more information on [employer] Link access.    For providers and/or their staff who would like to refer a patient to [facility], please contact us through our one-stop-shop provider referral line, [facility] [place], at [phone].    If you feel you have received this communication in error or would no longer like to receive these types of communications, please e-mail [email or web address]

## 2017-04-21 LAB
A1AT PHENOTYP SERPL-IMP: NORMAL BANDS
A1AT SERPL NEPH-MCNC: 153 MG/DL

## 2017-04-26 ENCOUNTER — TELEPHONE (OUTPATIENT)
Dept: HEPATOLOGY | Facility: CLINIC | Age: 52
End: 2017-04-26

## 2017-04-26 NOTE — TELEPHONE ENCOUNTER
----- Message from Suzette Ruiz NP sent at 4/26/2017  8:03 AM CDT -----  Notify labs look fine, can discuss in more detail at next clinic visit

## 2017-07-19 ENCOUNTER — OFFICE VISIT (OUTPATIENT)
Dept: HEPATOLOGY | Facility: CLINIC | Age: 52
End: 2017-07-19
Payer: MEDICAID

## 2017-07-19 ENCOUNTER — LAB VISIT (OUTPATIENT)
Dept: LAB | Facility: HOSPITAL | Age: 52
End: 2017-07-19
Payer: MEDICAID

## 2017-07-19 VITALS
RESPIRATION RATE: 18 BRPM | HEIGHT: 61 IN | WEIGHT: 224.88 LBS | SYSTOLIC BLOOD PRESSURE: 147 MMHG | DIASTOLIC BLOOD PRESSURE: 70 MMHG | HEART RATE: 111 BPM | BODY MASS INDEX: 42.46 KG/M2 | TEMPERATURE: 98 F | OXYGEN SATURATION: 97 %

## 2017-07-19 DIAGNOSIS — R18.8 OTHER ASCITES: ICD-10-CM

## 2017-07-19 DIAGNOSIS — K21.9 GASTROESOPHAGEAL REFLUX DISEASE WITHOUT ESOPHAGITIS: ICD-10-CM

## 2017-07-19 DIAGNOSIS — K74.60 CHRONIC HEPATITIS C WITH CIRRHOSIS: ICD-10-CM

## 2017-07-19 DIAGNOSIS — B18.2 CHRONIC HEPATITIS C WITH CIRRHOSIS: Primary | ICD-10-CM

## 2017-07-19 DIAGNOSIS — K74.60 CHRONIC HEPATITIS C WITH CIRRHOSIS: Primary | ICD-10-CM

## 2017-07-19 DIAGNOSIS — B18.2 CHRONIC HEPATITIS C WITH CIRRHOSIS: ICD-10-CM

## 2017-07-19 LAB
AFP SERPL-MCNC: 5.1 NG/ML
ALBUMIN SERPL BCP-MCNC: 2.8 G/DL
ALP SERPL-CCNC: 190 U/L
ALT SERPL W/O P-5'-P-CCNC: 20 U/L
ANION GAP SERPL CALC-SCNC: 5 MMOL/L
AST SERPL-CCNC: 34 U/L
BILIRUB SERPL-MCNC: 1.3 MG/DL
BUN SERPL-MCNC: 6 MG/DL
CALCIUM SERPL-MCNC: 8.6 MG/DL
CHLORIDE SERPL-SCNC: 106 MMOL/L
CO2 SERPL-SCNC: 24 MMOL/L
CREAT SERPL-MCNC: 0.8 MG/DL
EST. GFR  (AFRICAN AMERICAN): >60 ML/MIN/1.73 M^2
EST. GFR  (NON AFRICAN AMERICAN): >60 ML/MIN/1.73 M^2
GLUCOSE SERPL-MCNC: 120 MG/DL
INR PPP: 1.1
POTASSIUM SERPL-SCNC: 3.5 MMOL/L
PROT SERPL-MCNC: 6.4 G/DL
PROTHROMBIN TIME: 11.3 SEC
SODIUM SERPL-SCNC: 135 MMOL/L

## 2017-07-19 PROCEDURE — 82105 ALPHA-FETOPROTEIN SERUM: CPT

## 2017-07-19 PROCEDURE — 99213 OFFICE O/P EST LOW 20 MIN: CPT | Mod: S$PBB,,, | Performed by: NURSE PRACTITIONER

## 2017-07-19 PROCEDURE — 80053 COMPREHEN METABOLIC PANEL: CPT

## 2017-07-19 PROCEDURE — 87902 NFCT AGT GNTYP ALYS HEP C: CPT

## 2017-07-19 PROCEDURE — 99999 PR PBB SHADOW E&M-EST. PATIENT-LVL IV: CPT | Mod: PBBFAC,,, | Performed by: NURSE PRACTITIONER

## 2017-07-19 PROCEDURE — 36415 COLL VENOUS BLD VENIPUNCTURE: CPT

## 2017-07-19 PROCEDURE — 85610 PROTHROMBIN TIME: CPT

## 2017-07-19 PROCEDURE — 87522 HEPATITIS C REVRS TRNSCRPJ: CPT

## 2017-07-19 RX ORDER — PANTOPRAZOLE SODIUM 40 MG/1
40 TABLET, DELAYED RELEASE ORAL DAILY
Qty: 30 TABLET | Refills: 2 | Status: SHIPPED | OUTPATIENT
Start: 2017-07-19 | End: 2017-07-24 | Stop reason: SDUPTHER

## 2017-07-21 LAB
HCV GENTYP SERPL NAA+PROBE: ABNORMAL
HCV QUALITATIVE RESULT: DETECTED
HCV QUANTITATIVE LOG: 3.37 LOG (10) IU/ML
HCV RNA SPEC NAA+PROBE-ACNC: ABNORMAL IU/ML

## 2017-07-24 ENCOUNTER — HOSPITAL ENCOUNTER (OUTPATIENT)
Dept: RADIOLOGY | Facility: CLINIC | Age: 52
Discharge: HOME | End: 2017-07-24
Attending: NURSE PRACTITIONER
Payer: MEDICAID

## 2017-07-24 ENCOUNTER — TELEPHONE (OUTPATIENT)
Dept: HEPATOLOGY | Facility: CLINIC | Age: 52
End: 2017-07-24

## 2017-07-24 DIAGNOSIS — K21.9 GASTROESOPHAGEAL REFLUX DISEASE WITHOUT ESOPHAGITIS: ICD-10-CM

## 2017-07-24 DIAGNOSIS — B18.2 CHRONIC HEPATITIS C WITH CIRRHOSIS: ICD-10-CM

## 2017-07-24 DIAGNOSIS — K74.60 CHRONIC HEPATITIS C WITH CIRRHOSIS: ICD-10-CM

## 2017-07-24 PROCEDURE — 76705 ECHO EXAM OF ABDOMEN: CPT | Mod: 26,,, | Performed by: RADIOLOGY

## 2017-07-24 PROCEDURE — 76705 ECHO EXAM OF ABDOMEN: CPT | Mod: TC,PO

## 2017-07-24 RX ORDER — PANTOPRAZOLE SODIUM 40 MG/1
40 TABLET, DELAYED RELEASE ORAL DAILY
Qty: 30 TABLET | Refills: 2 | Status: ON HOLD | OUTPATIENT
Start: 2017-07-24 | End: 2019-02-07 | Stop reason: HOSPADM

## 2017-07-24 NOTE — TELEPHONE ENCOUNTER
Received call from patient, patient says she did not receive a prescription for Protonix in clinic. And would like to have prescription called into [facility] in  [place].  Informed patient that I will send a message to MICKY Garcia to inform her of patient's request.  Patient verbalizes understanding.

## 2017-07-24 NOTE — TELEPHONE ENCOUNTER
Attempted to return patient's call regarding her Protonix prescription,to inform her that in clinic she was provided a written prescription to bring to pharmacy. Per JoseNP NOTE ON 7/19/17. Spoke with patient's mother Soraya, message given. Soraya verbalizes understanding says she will give her daughter the message.    Said she saw Suzette last week and she ordered pantoprazole (PROTONIX) 40 MG tablet for her but her pharm said they did not get it [facility] in [place] please call pt @ # [phone].

## 2017-07-24 NOTE — TELEPHONE ENCOUNTER
----- Message from Lupe Camara sent at 7/24/2017 10:28 AM CDT -----  Contact: pt  Said she saw Suzette last week and she ordered pantoprazole (PROTONIX) 40 MG tablet for her but her pharm said they did not get it [facility] in [place] please call pt @ # [phone].

## 2017-07-26 ENCOUNTER — TELEPHONE (OUTPATIENT)
Dept: HEPATOLOGY | Facility: CLINIC | Age: 52
End: 2017-07-26

## 2017-07-26 NOTE — TELEPHONE ENCOUNTER
Pt returned call. States that she prefers to have her EGD done and f/u with PCP locally before returning for HCV clinic appt. Pt will call back once she is ready to schedule.

## 2017-07-26 NOTE — TELEPHONE ENCOUNTER
----- Message from Suzette Ruiz NP sent at 7/26/2017  8:02 AM CDT -----  Notify patient will be referred to Hep C clinic for evaluation

## 2017-07-26 NOTE — TELEPHONE ENCOUNTER
----- Message from Cindy Garcia RN sent at 7/26/2017  9:14 AM CDT -----      ----- Message -----  From: Suzette Ruiz NP  Sent: 7/26/2017   8:02 AM  To: [facility] Hepatitis C Staff    Medicaid  HCV w/ cirrhosis well compensated  Treatment experienced

## 2017-07-26 NOTE — TELEPHONE ENCOUNTER
Chart reviewed. HCV RNA and genotype 1a noted along with recent labs and imaging. Pt has cirrhosis. Last seen by Suzette on 7/19. Needs appt in HCV clinic.    Attempted to speak with pt. Left message asking that she call back to schedule appt. Given direct number.

## 2017-07-26 NOTE — TELEPHONE ENCOUNTER
----- Message from Suzette Ruiz NP sent at 7/26/2017  8:53 AM CDT -----  Notify no cancer seen in imaging

## 2017-11-07 ENCOUNTER — HOSPITAL ENCOUNTER (EMERGENCY)
Facility: HOSPITAL | Age: 52
Discharge: HOME | End: 2017-11-07
Attending: EMERGENCY MEDICINE
Payer: MEDICAID

## 2017-11-07 VITALS
SYSTOLIC BLOOD PRESSURE: 161 MMHG | DIASTOLIC BLOOD PRESSURE: 76 MMHG | HEIGHT: 61 IN | OXYGEN SATURATION: 97 % | BODY MASS INDEX: 45.31 KG/M2 | WEIGHT: 240 LBS | TEMPERATURE: 99 F | HEART RATE: 114 BPM | RESPIRATION RATE: 16 BRPM

## 2017-11-07 DIAGNOSIS — K74.60 CIRRHOSIS OF LIVER WITHOUT ASCITES, UNSPECIFIED HEPATIC CIRRHOSIS TYPE: ICD-10-CM

## 2017-11-07 DIAGNOSIS — D69.6 THROMBOCYTOPENIA: Primary | ICD-10-CM

## 2017-11-07 DIAGNOSIS — R16.1 SPLENOMEGALY: ICD-10-CM

## 2017-11-07 LAB
ALBUMIN SERPL BCP-MCNC: 3 G/DL
ALP SERPL-CCNC: 199 U/L
ALT SERPL W/O P-5'-P-CCNC: 18 U/L
ANION GAP SERPL CALC-SCNC: 9 MMOL/L
APTT BLDCRRT: 28.2 SEC
AST SERPL-CCNC: 36 U/L
BASOPHILS # BLD AUTO: 0 K/UL
BASOPHILS NFR BLD: 0.3 %
BILIRUB SERPL-MCNC: 1.7 MG/DL
BUN SERPL-MCNC: 6 MG/DL
CALCIUM SERPL-MCNC: 9 MG/DL
CHLORIDE SERPL-SCNC: 102 MMOL/L
CO2 SERPL-SCNC: 28 MMOL/L
CREAT SERPL-MCNC: 0.7 MG/DL
DIFFERENTIAL METHOD: ABNORMAL
EOSINOPHIL # BLD AUTO: 0.1 K/UL
EOSINOPHIL NFR BLD: 2.2 %
ERYTHROCYTE [DISTWIDTH] IN BLOOD BY AUTOMATED COUNT: 18.7 %
EST. GFR  (AFRICAN AMERICAN): >60 ML/MIN/1.73 M^2
EST. GFR  (NON AFRICAN AMERICAN): >60 ML/MIN/1.73 M^2
FIBRINOGEN PPP-MCNC: 327 MG/DL
GLUCOSE SERPL-MCNC: 83 MG/DL
HCT VFR BLD AUTO: 40.5 %
HGB BLD-MCNC: 13.5 G/DL
INR PPP: 1.1
LDH SERPL L TO P-CCNC: 208 U/L
LYMPHOCYTES # BLD AUTO: 1.1 K/UL
LYMPHOCYTES NFR BLD: 18.1 %
MCH RBC QN AUTO: 31.5 PG
MCHC RBC AUTO-ENTMCNC: 33.5 G/DL
MCV RBC AUTO: 94 FL
MONOCYTES # BLD AUTO: 0.4 K/UL
MONOCYTES NFR BLD: 6.6 %
NEUTROPHILS # BLD AUTO: 4.3 K/UL
NEUTROPHILS NFR BLD: 72.8 %
PLATELET # BLD AUTO: 53 K/UL
PMV BLD AUTO: 9.5 FL
POTASSIUM SERPL-SCNC: 3.8 MMOL/L
PROT SERPL-MCNC: 7.4 G/DL
PROTHROMBIN TIME: 11.2 SEC
RBC # BLD AUTO: 4.3 M/UL
SODIUM SERPL-SCNC: 139 MMOL/L
WBC # BLD AUTO: 5.9 K/UL

## 2017-11-07 PROCEDURE — 85730 THROMBOPLASTIN TIME PARTIAL: CPT

## 2017-11-07 PROCEDURE — 80053 COMPREHEN METABOLIC PANEL: CPT

## 2017-11-07 PROCEDURE — 85384 FIBRINOGEN ACTIVITY: CPT

## 2017-11-07 PROCEDURE — 83615 LACTATE (LD) (LDH) ENZYME: CPT

## 2017-11-07 PROCEDURE — 99284 EMERGENCY DEPT VISIT MOD MDM: CPT

## 2017-11-07 PROCEDURE — 85610 PROTHROMBIN TIME: CPT

## 2017-11-07 PROCEDURE — 36415 COLL VENOUS BLD VENIPUNCTURE: CPT

## 2017-11-07 PROCEDURE — 85025 COMPLETE CBC W/AUTO DIFF WBC: CPT

## 2017-11-07 RX ORDER — GABAPENTIN 300 MG/1
300 CAPSULE ORAL 3 TIMES DAILY
COMMUNITY

## 2017-11-07 RX ORDER — FERROUS SULFATE 325(65) MG
325 TABLET, DELAYED RELEASE (ENTERIC COATED) ORAL DAILY
COMMUNITY

## 2017-11-08 NOTE — ED PROVIDER NOTES
Encounter Date: 11/7/2017    SCRIBE #1 NOTE: I, Drea Jacobson, am scribing for, and in the presence of, Dr. Gentile.       History     Chief Complaint   Patient presents with    Abnormal Lab     low platelets       11/07/2017 7:40 PM     Chief complaint: Abnormal lab      Karen Villarreal is a 52 y.o. female with PMHx of cirrhosis who presents to the ED per instructions from her PCP when labs revealed yesterday that she has a platelet count of 51. She states that her platelets have never been low before, and were within normal range when she had blood work done a few weeks ago. She has not started taking any new medications recently. The patient denies fever, rash, melena, gingival bleeding, and all other symptoms at this time. There is no pertinent SHx noted.       The history is provided by the patient.     Review of patient's allergies indicates:  No Known Allergies  Past Medical History:   Diagnosis Date    Acid reflux     Cirrhosis     Hepatitis C     Hiatal hernia      Past Surgical History:   Procedure Laterality Date    ANKLE SURGERY      APPENDECTOMY       History reviewed. No pertinent family history.  Social History   Substance Use Topics    Smoking status: Current Some Day Smoker     Types: Cigarettes    Smokeless tobacco: Not on file    Alcohol use No     Review of Systems   Constitutional: Negative for fever.   HENT: Negative for sore throat.         Negative for gingival bleeding   Respiratory: Negative for shortness of breath.    Cardiovascular: Negative for chest pain.   Gastrointestinal: Negative for nausea.        Negative for melena   Genitourinary: Negative for dysuria.   Musculoskeletal: Negative for back pain.   Skin: Negative for rash.   Neurological: Negative for weakness.   Hematological: Does not bruise/bleed easily.       Physical Exam     Initial Vitals [11/07/17 1808]   BP Pulse Resp Temp SpO2   (!) 161/76 (!) 114 16 98.8 °F (37.1 °C) 97 %      MAP       104.33         Physical  Exam    Constitutional: She appears well-developed and well-nourished. She is Obese .  Non-toxic appearance. No distress.   HENT:   Head: Normocephalic and atraumatic.   No gingival bleeding. No epistaxis.   Eyes: EOM are normal. Pupils are equal, round, and reactive to light.   Neck: Normal range of motion. Neck supple. No neck rigidity. No JVD present.   Cardiovascular: Normal rate, regular rhythm, normal heart sounds and intact distal pulses.   Abdominal: Soft. Bowel sounds are normal. She exhibits no distension. There is no tenderness. There is no rigidity, no rebound and no guarding.   Abdomen obese but non-tender.  Unable to palpate liver or spleen due to obesity.   Musculoskeletal: Normal range of motion.   Bilateral LE edema.   Neurological: She is alert and oriented to person, place, and time. She has normal strength and normal reflexes. No cranial nerve deficit or sensory deficit. She exhibits normal muscle tone. Coordination normal. GCS eye subscore is 4. GCS verbal subscore is 5. GCS motor subscore is 6.   Skin: Skin is warm and dry.   Scattered spider nevi on chest. No bruising. No petechiae.    Psychiatric: She has a normal mood and affect. Her speech is normal and behavior is normal. She is not actively hallucinating.         ED Course   Procedures  Labs Reviewed   CBC W/ AUTO DIFFERENTIAL - Abnormal; Notable for the following:        Result Value    MCH 31.5 (*)     RDW 18.7 (*)     Platelets 53 (*)     All other components within normal limits   COMPREHENSIVE METABOLIC PANEL - Abnormal; Notable for the following:     Albumin 3.0 (*)     Total Bilirubin 1.7 (*)     Alkaline Phosphatase 199 (*)     All other components within normal limits   PROTIME-INR   APTT   FIBRINOGEN   LACTATE DEHYDROGENASE             Medical Decision Making:   History:   Old Medical Records: I decided to obtain old medical records.  Initial Assessment:   Patient is a very pleasant 52-year-old woman with a history of hepatitis  C with cirrhosis who presents the ED for evaluation of thrombocytopenia.  She had outpatient labs drawn by her primary care physician and was told to come to the ER for evaluation of low platelets.  She denies any bleeding.  No epistaxis, no bleeding from her gums, no hematochezia or melena.  She does not exhibit any petechia.  Denies starting any new medications.  No fever.  She feels well and has no complaints.  I doubt sepsis or DIC.  CBC performed and she has no evidence of anemia.  Her platelet count is 53K.  I am unable to obtain a peripheral smear at this facility. her fibrinogen is 327 and LDH is 208.  I believe she likely has low platelets secondary to splenic sequestration given her history of hepatitis C with cirrhosis.  CT of her abdomen and pelvis was obtained since I could not properly identify hepatosplenomegaly secondary to obesity.  This did show a cirrhotic liver and enlarged spleen.  ITP is also a possibility given her history of hepatitis C.  I do not think the patient has TTP OR HUS.  She currently has moderate thrombocytopenia without bleeding.  I do not think that she warrants inpatient admission at this time.  I believe she can be followed up by hematology on an outpatient basis for further evaluation of her thrombocytopenia.  She can also follow up with her primary care physician for surveillance.  Return precautions and bleeding precautions discussed.  She is provided with follow-up with Dr. Valencia. She is discharged in no acute distress.  Clinical Tests:   Lab Tests: Ordered and Reviewed            Scribe Attestation:   Scribe #1: I performed the above scribed service and the documentation accurately describes the services I performed. I attest to the accuracy of the note.    I, Seferino Caceres, personally performed the services described in this documentation. All medical record entries made by the scribe were at my direction and in my presence.  I have reviewed the chart and agree  that the record reflects my personal performance and is accurate and complete. Wood Gentile MD.  1:44 AM 11/08/2017          ED Course      Clinical Impression:   The primary encounter diagnosis was Thrombocytopenia. Diagnoses of Splenomegaly and Cirrhosis of liver without ascites, unspecified hepatic cirrhosis type were also pertinent to this visit.    Disposition:   Disposition: Discharged  Condition: Stable                        Wood Gentile MD  11/08/17 0144

## 2017-11-08 NOTE — ED NOTES
Pt presents with complaints of low platelet count. Pt reports having blood work done on Friday and got a call today telling here to come to ED due to platelet count in the 50's. Pt denies any bloody emesis or bloody stool. Pt has no complaints. Alert and oriented. Moves all extremities well. History of Hepatitis C and cirrhosis.

## 2017-11-10 ENCOUNTER — TELEPHONE (OUTPATIENT)
Dept: HEMATOLOGY/ONCOLOGY | Facility: CLINIC | Age: 52
End: 2017-11-10

## 2017-11-10 NOTE — TELEPHONE ENCOUNTER
----- Message from Lucia Garrett sent at 11/10/2017  2:33 PM CST -----  Contact: Dr. Cathi Prather  Good afternoon, Dr. Prather would like to refer the following patient to Dr. Valencia in the hematology department. The patients diagnosis is thrombocytopenic disorder. I have scanned the patients referral . If there are any further questions in regards to the patient, please contact Dr. Prather's office at, [phone]. Also, my extension is [phone].   Please let me know if I can help schedule in any way.  Thank you,   Lucia

## 2017-11-17 ENCOUNTER — OFFICE VISIT (OUTPATIENT)
Dept: HEMATOLOGY/ONCOLOGY | Facility: CLINIC | Age: 52
End: 2017-11-17
Payer: MEDICAID

## 2017-11-17 DIAGNOSIS — K74.60 CIRRHOSIS OF LIVER WITHOUT ASCITES, UNSPECIFIED HEPATIC CIRRHOSIS TYPE: ICD-10-CM

## 2017-11-17 DIAGNOSIS — D69.6 THROMBOCYTOPENIA: Primary | ICD-10-CM

## 2017-11-17 DIAGNOSIS — K74.60 CHRONIC HEPATITIS C WITH CIRRHOSIS: ICD-10-CM

## 2017-11-17 DIAGNOSIS — B18.2 CHRONIC HEPATITIS C WITH CIRRHOSIS: ICD-10-CM

## 2017-11-17 PROCEDURE — 99212 OFFICE O/P EST SF 10 MIN: CPT | Mod: PBBFAC,PO | Performed by: INTERNAL MEDICINE

## 2017-11-17 PROCEDURE — 99999 PR PBB SHADOW E&M-EST. PATIENT-LVL II: CPT | Mod: PBBFAC,,, | Performed by: INTERNAL MEDICINE

## 2017-11-17 PROCEDURE — 99204 OFFICE O/P NEW MOD 45 MIN: CPT | Mod: S$PBB,,, | Performed by: INTERNAL MEDICINE

## 2017-11-17 RX ORDER — CYCLOBENZAPRINE HCL 10 MG
TABLET ORAL
COMMUNITY
End: 2019-02-14 | Stop reason: CLARIF

## 2017-11-17 NOTE — LETTER
November 26, 2017      Cathi Prather MD  [address] [facility]  [address]  [facility] [address]           [facility]  [address], [address]  [facility] [address]  Phone: [phone]          Patient: Karen Villarreal   MR Number: [id number]   YOB: 1965   Date of Visit: 11/17/2017       Dear Dr. Cathi Prather:    Thank you for referring Karen Villarreal to me for evaluation. Attached you will find relevant portions of my assessment and plan of care.    If you have questions, please do not hesitate to call me. I look forward to following Karen Villarreal along with you.    Sincerely,    Elizabet Valencia MD    Enclosure  CC:  No Recipients    If you would like to receive this communication electronically, please contact [email or web address] or [phone] to request more information on [employer] Link access.    For providers and/or their staff who would like to refer a patient to [facility], please contact us through our one-stop-shop provider referral line, [facility] , at [phone].    If you feel you have received this communication in error or would no longer like to receive these types of communications, please e-mail [email or web address]

## 2017-11-17 NOTE — PROGRESS NOTES
Consult (Dr. Zavala (Thrombocytopenic Disorder))      Karen Villarreal is a 52 y.o.  Pt is here for evaluation of thrombocytopenia  SHe has documented cirrhosis and splenomegaly per imaging studies. She was anemia 9 months ago when she was treated for ascites. SHe has been recently found to have hepatitis C   Pt is due to have cataract surgery early December      CT Abdomen Pelvis  Without Contrast   Order: [id number]   Status:  Final result   Visible to patient:  Yes (Patient Portal) Next appt:  None   Details     Reading Physician Reading Date Result Priority   Jim Boone Jr., MD 11/8/2017    Narrative     Axial images are obtained from the dome of the diaphragm to the floor of the pelvis.  The patient did not receive oral or IV contrast.    The liver is small and has a nodular contour suggesting cirrhosis. There is mild splenomegaly. Ascites is not seen but there is increased density of the mesenteric fat possibly due to edema. The gallbladder is of normal size and contains multiple gallstones. The pancreas is of normal contour and CT density without mass, edema or pseudocyst.        The adrenal glands are not enlarged.  The kidneys are of normal size, contour and CT density for a noncontrast study.  No mass, cyst or hydronephrosis is noted. No stones are identified.     The abdominal aorta and inferior vena cava are of normal caliber.  No sushila-aortic or retroperitoneal adenopathy or masses are seen. The gastrointestinal organs appear within normal limits without dilation, air-fluid levels or masses seen.A surgical clip in the right lower quadrant is consistent with prior appendectomy.    Within the pelvis, the bladder is without mass or asymmetry.The uterus is not enlarged.    The patient is seen to have a very small umbilical hernia containing fluid and an even smaller hernia just above the umbilicus at the midline containing fluid. Bowel loops in the hernia are not seen      Impression      Cirrhosis  and mild splenomegaly without ascites seen today. Small amount of mesenteric edema. Small umbilical hernia and smaller supra-umbilical hernia containing fluid at the midline. Prior appendectomy. Cholelithiasis.      Electronically signed by: Jim Boone MD  Date: 11/08/17  Time: 09:03                Past Medical History:   Diagnosis Date    Acid reflux     Ascites 03/08/2017    Cirrhosis     Hepatitis C     Hiatal hernia     Renal cyst 03/08/2017    Thrombocytopenia      Past Surgical History:   Procedure Laterality Date    ANKLE SURGERY      APPENDECTOMY         Current Outpatient Prescriptions:     aluminum & magnesium hydroxide-simethicone (MAALOX MAXIMUM STRENGTH) 400-400-40 mg/5 mL suspension, Take 15 mLs by mouth every 6 (six) hours as needed for Indigestion., Disp: 335 mL, Rfl: 0    cyclobenzaprine (FLEXERIL) 10 MG tablet, Take 1 tablet(s) 3 TIMES A DAY by oral route as needed for muscle spasm. May make you drowsy. Do not drive while on medication., Disp: , Rfl:     ferrous sulfate 325 (65 FE) MG EC tablet, Take 325 mg by mouth 3 (three) times daily with meals., Disp: , Rfl:     furosemide (LASIX) 40 MG tablet, Take 40 mg by mouth once daily., Disp: , Rfl: 3    gabapentin (NEURONTIN) 300 MG capsule, Take 300 mg by mouth 3 (three) times daily., Disp: , Rfl:     pantoprazole (PROTONIX) 40 MG tablet, Take 1 tablet (40 mg total) by mouth once daily., Disp: 30 tablet, Rfl: 2    spironolactone (ALDACTONE) 100 MG tablet, Take 100 mg by mouth once daily., Disp: , Rfl: 3  Review of patient's allergies indicates:  No Known Allergies  Social History   Substance Use Topics    Smoking status: Current Some Day Smoker     Types: Cigarettes    Smokeless tobacco: Not on file    Alcohol use No     Family History   Problem Relation Age of Onset    Heart failure Mother     COPD Mother     Arthritis Mother     Diabetes Father     Cancer Father        CONSTITUTIONAL: No fevers, chills, night sweats, wt.  loss, appetite changes  SKIN: no rashes or itching  ENT: No headaches, head trauma, vision changes, or eye pain  LYMPH NODES: None noticed   CV: No chest pain, palpitations.   RESP: No dyspnea on exertion, cough, wheezing, or hemoptysis  GI: No nausea, emesis, diarrhea, constipation, melena, hematochezia, pain.   : No dysuria, hematuria, urgency, or frequency   HEME: No easy bruising, bleeding problems  PSYCHIATRIC: No depression, anxiety, psychosis, hallucinations.  NEURO: No seizures, memory loss, dizziness or difficulty sleeping  MSK: No arthralgias or joint swelling         LMP 08/06/2017 (Approximate)   Gen: NAD, A and O x3,   Psych: pleasant affect, normal thought process  Eyes: Pupils round and non dilated, EOM intact  Nose: Nares patent  OP clear, mucosa patent  Neck: suppple, no JVD, trachea midline, no palpable mass, no adenopathy  Lungs: CTAB, no wheezes, no use of accessory muscles  CV: S1S2 with RRR, No mrg  Abd: soft, NTND, + BS, No HSM, no ascites  Extr: No CCE, SHANT, strength normal, good capillary refill  Neuro: steady gait, CNs grossly intact  Skin: intact, no lesions noted  Rheum: No joint swelling    Lab Results   Component Value Date    WBC 5.90 11/07/2017    HGB 13.5 11/07/2017    HCT 40.5 11/07/2017    MCV 94 11/07/2017    PLT 53 (L) 11/07/2017     CMP  Sodium   Date Value Ref Range Status   11/07/2017 139 136 - 145 mmol/L Final     Potassium   Date Value Ref Range Status   11/07/2017 3.8 3.5 - 5.1 mmol/L Final     Chloride   Date Value Ref Range Status   11/07/2017 102 95 - 110 mmol/L Final     CO2   Date Value Ref Range Status   11/07/2017 28 23 - 29 mmol/L Final     Glucose   Date Value Ref Range Status   11/07/2017 83 70 - 110 mg/dL Final     BUN, Bld   Date Value Ref Range Status   11/07/2017 6 6 - 20 mg/dL Final     Creatinine   Date Value Ref Range Status   11/07/2017 0.7 0.5 - 1.4 mg/dL Final     Calcium   Date Value Ref Range Status   11/07/2017 9.0 8.7 - 10.5 mg/dL Final     Total  Protein   Date Value Ref Range Status   11/07/2017 7.4 6.0 - 8.4 g/dL Final     Albumin   Date Value Ref Range Status   11/07/2017 3.0 (L) 3.5 - 5.2 g/dL Final     Total Bilirubin   Date Value Ref Range Status   11/07/2017 1.7 (H) 0.1 - 1.0 mg/dL Final     Comment:     For infants and newborns, interpretation of results should be based  on gestational age, weight and in agreement with clinical  observations.  Premature Infant recommended reference ranges:  Up to 24 hours.............<8.0 mg/dL  Up to 48 hours............<12.0 mg/dL  3-5 days..................<15.0 mg/dL  6-29 days.................<15.0 mg/dL       Alkaline Phosphatase   Date Value Ref Range Status   11/07/2017 199 (H) 55 - 135 U/L Final     AST   Date Value Ref Range Status   11/07/2017 36 10 - 40 U/L Final     ALT   Date Value Ref Range Status   11/07/2017 18 10 - 44 U/L Final     Anion Gap   Date Value Ref Range Status   11/07/2017 9 8 - 16 mmol/L Final     eGFR if    Date Value Ref Range Status   11/07/2017 >60 >60 mL/min/1.73 m^2 Final     eGFR if non    Date Value Ref Range Status   11/07/2017 >60 >60 mL/min/1.73 m^2 Final     Comment:     Calculation used to obtain the estimated glomerular filtration  rate (eGFR) is the CKD-EPI equation.          Thrombocytopenia    Cirrhosis of liver without ascites, unspecified hepatic cirrhosis type    Chronic hepatitis C with cirrhosis        monitor platelet count for now  No intervention needed  Platelets are low due to cirrhosis and splenomegaly   Transfusion is not necessary at this time  She is due for cataract surgery December 1  Would recommend rechecking her counts a few days before surgery  Eye care [facility]  RTC in a few months with repeat cbc   To DR Paul for eval and treatment recs for Hep C    Thank you for allowing me to evaluate and participate in the care of this pleasant patient. Please fell free to call me with any questions or concerns.    Elizabet Goel  MD Erik    This note was dictated with Irison and briefly proofread. Please excuse any sentences that may be unclear or words misspelled

## 2017-11-22 NOTE — ED NOTES
"Pt presents to ED via EMS with c/o fluid retention and constipation that began a few days ago. Pt reports that her abdomen is "more swollen than usual."  Pt is AAOx4. Skin warm, dry to touch. Respirations even, nonlabored. NAD noted. VSS. Pt reports that her LBM was a few days ago.  " Recommended observation follow as scheduled with DWS.

## 2017-11-27 ENCOUNTER — TELEPHONE (OUTPATIENT)
Dept: HEMATOLOGY/ONCOLOGY | Facility: CLINIC | Age: 52
End: 2017-11-27

## 2017-11-27 NOTE — TELEPHONE ENCOUNTER
----- Message from Avril Sheikh sent at 11/27/2017  9:45 AM CST -----  Contact: self   Patient is needing a call back to check on to see if office called her eye dr   Needing some clarification   Please call back [phone]

## 2017-11-29 ENCOUNTER — TELEPHONE (OUTPATIENT)
Dept: HEMATOLOGY/ONCOLOGY | Facility: CLINIC | Age: 52
End: 2017-11-29

## 2017-11-29 NOTE — TELEPHONE ENCOUNTER
----- Message from Jacob Sharma sent at 11/29/2017  2:40 PM CST -----  Contact: patient  Patient called requesting medical clearance for eye surgery be sent to  office fax# [phone],ph# [phone].if any questions call back at [phone]. Thanks,

## 2017-11-30 ENCOUNTER — ANESTHESIA EVENT (OUTPATIENT)
Dept: SURGERY | Facility: AMBULARY SURGERY CENTER | Age: 52
End: 2017-11-30
Payer: MEDICAID

## 2017-12-01 ENCOUNTER — ANESTHESIA (OUTPATIENT)
Dept: SURGERY | Facility: AMBULARY SURGERY CENTER | Age: 52
End: 2017-12-01
Payer: MEDICAID

## 2017-12-01 ENCOUNTER — HOSPITAL ENCOUNTER (OUTPATIENT)
Facility: AMBULARY SURGERY CENTER | Age: 52
Discharge: HOME | End: 2017-12-01
Attending: OPHTHALMOLOGY | Admitting: OPHTHALMOLOGY
Payer: MEDICAID

## 2017-12-01 VITALS
TEMPERATURE: 98 F | DIASTOLIC BLOOD PRESSURE: 67 MMHG | RESPIRATION RATE: 20 BRPM | OXYGEN SATURATION: 98 % | HEIGHT: 61 IN | SYSTOLIC BLOOD PRESSURE: 143 MMHG | WEIGHT: 224 LBS | BODY MASS INDEX: 42.29 KG/M2 | HEART RATE: 88 BPM

## 2017-12-01 DIAGNOSIS — H26.9 CATARACT, LEFT EYE: ICD-10-CM

## 2017-12-01 PROCEDURE — D9220A PRA ANESTHESIA: Mod: CRNA,,, | Performed by: NURSE ANESTHETIST, CERTIFIED REGISTERED

## 2017-12-01 PROCEDURE — 66982 XCAPSL CTRC RMVL CPLX WO ECP: CPT | Performed by: OPHTHALMOLOGY

## 2017-12-01 PROCEDURE — 66984 XCAPSL CTRC RMVL W/O ECP: CPT | Performed by: OPHTHALMOLOGY

## 2017-12-01 PROCEDURE — D9220A PRA ANESTHESIA: Mod: ANES,,, | Performed by: ANESTHESIOLOGY

## 2017-12-01 DEVICE — LENS 21.5 ACRYSOF: Type: IMPLANTABLE DEVICE | Site: EYE | Status: FUNCTIONAL

## 2017-12-01 RX ORDER — MIDAZOLAM HYDROCHLORIDE 1 MG/ML
INJECTION INTRAMUSCULAR; INTRAVENOUS
Status: COMPLETED
Start: 2017-12-01 | End: 2017-12-01

## 2017-12-01 RX ORDER — ONDANSETRON 2 MG/ML
INJECTION INTRAMUSCULAR; INTRAVENOUS
Status: COMPLETED
Start: 2017-12-01 | End: 2017-12-01

## 2017-12-01 RX ORDER — WATER FOR INJECTION,STERILE
VIAL (ML) INJECTION
Status: DISCONTINUED | OUTPATIENT
Start: 2017-12-01 | End: 2017-12-01 | Stop reason: HOSPADM

## 2017-12-01 RX ORDER — PHENYLEPHRINE HYDROCHLORIDE 25 MG/ML
1 SOLUTION/ DROPS OPHTHALMIC
Status: DISCONTINUED | OUTPATIENT
Start: 2017-12-01 | End: 2017-12-01 | Stop reason: HOSPADM

## 2017-12-01 RX ORDER — LIDOCAINE HYDROCHLORIDE 10 MG/ML
1 INJECTION, SOLUTION EPIDURAL; INFILTRATION; INTRACAUDAL; PERINEURAL ONCE
Status: COMPLETED | OUTPATIENT
Start: 2017-12-01 | End: 2017-12-01

## 2017-12-01 RX ORDER — EPINEPHRINE 1 MG/ML
INJECTION, SOLUTION INTRACARDIAC; INTRAMUSCULAR; INTRAVENOUS; SUBCUTANEOUS
Status: DISCONTINUED
Start: 2017-12-01 | End: 2017-12-01 | Stop reason: HOSPADM

## 2017-12-01 RX ORDER — EPINEPHRINE 1 MG/ML
INJECTION, SOLUTION INTRACARDIAC; INTRAMUSCULAR; INTRAVENOUS; SUBCUTANEOUS
Status: DISCONTINUED | OUTPATIENT
Start: 2017-12-01 | End: 2017-12-01 | Stop reason: HOSPADM

## 2017-12-01 RX ORDER — SODIUM CHLORIDE, SODIUM LACTATE, POTASSIUM CHLORIDE, CALCIUM CHLORIDE 600; 310; 30; 20 MG/100ML; MG/100ML; MG/100ML; MG/100ML
INJECTION, SOLUTION INTRAVENOUS CONTINUOUS
Status: DISCONTINUED | OUTPATIENT
Start: 2017-12-01 | End: 2017-12-01 | Stop reason: HOSPADM

## 2017-12-01 RX ORDER — SODIUM CHLORIDE, SODIUM LACTATE, POTASSIUM CHLORIDE, CALCIUM CHLORIDE 600; 310; 30; 20 MG/100ML; MG/100ML; MG/100ML; MG/100ML
125 INJECTION, SOLUTION INTRAVENOUS CONTINUOUS
Status: DISCONTINUED | OUTPATIENT
Start: 2017-12-01 | End: 2017-12-01 | Stop reason: HOSPADM

## 2017-12-01 RX ORDER — CYCLOPENTOLATE HYDROCHLORIDE 10 MG/ML
1 SOLUTION/ DROPS OPHTHALMIC
Status: DISCONTINUED | OUTPATIENT
Start: 2017-12-01 | End: 2017-12-01 | Stop reason: HOSPADM

## 2017-12-01 RX ORDER — SODIUM CHLORIDE 0.9 % (FLUSH) 0.9 %
3 SYRINGE (ML) INJECTION
Status: DISCONTINUED | OUTPATIENT
Start: 2017-12-01 | End: 2017-12-01 | Stop reason: HOSPADM

## 2017-12-01 RX ORDER — LIDOCAINE HYDROCHLORIDE 40 MG/ML
INJECTION, SOLUTION RETROBULBAR
Status: DISCONTINUED | OUTPATIENT
Start: 2017-12-01 | End: 2017-12-01 | Stop reason: HOSPADM

## 2017-12-01 RX ORDER — PROPARACAINE HYDROCHLORIDE 5 MG/ML
SOLUTION/ DROPS OPHTHALMIC
Status: DISCONTINUED | OUTPATIENT
Start: 2017-12-01 | End: 2017-12-01 | Stop reason: HOSPADM

## 2017-12-01 RX ORDER — CYCLOPENTOLATE HYDROCHLORIDE 10 MG/ML
1 SOLUTION/ DROPS OPHTHALMIC
Status: COMPLETED | OUTPATIENT
Start: 2017-12-01 | End: 2017-12-01

## 2017-12-01 RX ORDER — MOXIFLOXACIN 5 MG/ML
SOLUTION/ DROPS OPHTHALMIC
Status: DISCONTINUED | OUTPATIENT
Start: 2017-12-01 | End: 2017-12-01 | Stop reason: HOSPADM

## 2017-12-01 RX ORDER — MIDAZOLAM HYDROCHLORIDE 1 MG/ML
INJECTION, SOLUTION INTRAMUSCULAR; INTRAVENOUS
Status: DISCONTINUED | OUTPATIENT
Start: 2017-12-01 | End: 2017-12-01

## 2017-12-01 RX ORDER — VANCOMYCIN HYDROCHLORIDE 1 G/20ML
INJECTION, POWDER, LYOPHILIZED, FOR SOLUTION INTRAVENOUS
Status: DISCONTINUED | OUTPATIENT
Start: 2017-12-01 | End: 2017-12-01 | Stop reason: HOSPADM

## 2017-12-01 RX ORDER — PHENYLEPHRINE HYDROCHLORIDE 25 MG/ML
1 SOLUTION/ DROPS OPHTHALMIC
Status: COMPLETED | OUTPATIENT
Start: 2017-12-01 | End: 2017-12-01

## 2017-12-01 RX ORDER — PROPARACAINE HYDROCHLORIDE 5 MG/ML
1 SOLUTION/ DROPS OPHTHALMIC
Status: COMPLETED | OUTPATIENT
Start: 2017-12-01 | End: 2017-12-01

## 2017-12-01 RX ORDER — ONDANSETRON 2 MG/ML
INJECTION INTRAMUSCULAR; INTRAVENOUS
Status: DISCONTINUED | OUTPATIENT
Start: 2017-12-01 | End: 2017-12-01

## 2017-12-01 RX ADMIN — CYCLOPENTOLATE HYDROCHLORIDE 1 DROP: 10 SOLUTION/ DROPS OPHTHALMIC at 07:12

## 2017-12-01 RX ADMIN — PROPARACAINE HYDROCHLORIDE 1 DROP: 5 SOLUTION/ DROPS OPHTHALMIC at 06:12

## 2017-12-01 RX ADMIN — ONDANSETRON 4 MG: 2 INJECTION INTRAMUSCULAR; INTRAVENOUS at 07:12

## 2017-12-01 RX ADMIN — PROPARACAINE HYDROCHLORIDE 1 DROP: 5 SOLUTION/ DROPS OPHTHALMIC at 07:12

## 2017-12-01 RX ADMIN — MIDAZOLAM HYDROCHLORIDE 1 MG: 1 INJECTION, SOLUTION INTRAMUSCULAR; INTRAVENOUS at 07:12

## 2017-12-01 RX ADMIN — CYCLOPENTOLATE HYDROCHLORIDE 1 DROP: 10 SOLUTION/ DROPS OPHTHALMIC at 06:12

## 2017-12-01 RX ADMIN — PHENYLEPHRINE HYDROCHLORIDE 1 DROP: 25 SOLUTION/ DROPS OPHTHALMIC at 07:12

## 2017-12-01 RX ADMIN — PHENYLEPHRINE HYDROCHLORIDE 1 DROP: 25 SOLUTION/ DROPS OPHTHALMIC at 06:12

## 2017-12-01 RX ADMIN — LIDOCAINE HYDROCHLORIDE 0.2 ML: 10 INJECTION, SOLUTION EPIDURAL; INFILTRATION; INTRACAUDAL; PERINEURAL at 07:12

## 2017-12-01 RX ADMIN — SODIUM CHLORIDE, SODIUM LACTATE, POTASSIUM CHLORIDE, CALCIUM CHLORIDE: 600; 310; 30; 20 INJECTION, SOLUTION INTRAVENOUS at 07:12

## 2017-12-01 RX ADMIN — MIDAZOLAM HYDROCHLORIDE 2 MG: 1 INJECTION, SOLUTION INTRAMUSCULAR; INTRAVENOUS at 07:12

## 2017-12-01 NOTE — DISCHARGE SUMMARY
Surgeon:  Dg Garcia M.D.    Pre-op Diagnosis: Nuclear Cataract Left Eye    Post-op Diagnosis: Nuclear Cataract Left Eye    Procedure: Cataract Extraction Left Eye    Op Note:     Patient was taken to the operating room and prepped and draped in a sterile fashion. A lid speculum was placed in the eye. Lidocaine gel was place on the cornea. A clear corneal wound was created with a keratome blade at the temporal quadrant. A side-port was created with a #15 blade 2 clock hours temporal to the corneal entrance incision. Viscoelastic was placed in the anterior chamber. Four self-sealing corneal incisions, made with a #15 blade,  at the limbus were placed to allow the placement of iris hooks to expand the pupil due to poor dilation and limited view. Once placed the vie of the cataract was improved.  A sharp bent needle was used to create a capsular opening and forceps completed a circular capsulorhexis. Balanced saline solution was placed under the anterior lip of the open capsule and hydro-disection and hydro-dilineation was performed. Phakoemulsification was used to remove the cataract without complication. Irrigation and aspiration (I/A) was used to remove cortical material without complication. The capsule bag was then filled with viscoelastic and the intra-ocular lens implant was inserted into the capsular bag. Viscoelastic was removed with I/A and the corneal wound was closed with hydration. The wound was tested and found to be watertight. The lid speculum was removed and the patient left the operating room in good condition.

## 2017-12-01 NOTE — ANESTHESIA PREPROCEDURE EVALUATION
12/01/2017  Karen Villarreal is a 52 y.o., female.    Anesthesia Evaluation    I have reviewed the Patient Summary Reports.    I have reviewed the Nursing Notes.   I have reviewed the Medications.     Review of Systems  Social:  Smoker    Hematology/Oncology:        Hematology Comments: Thrombocytopenia 2/2 splenic sequestration. Last PLT count: 51. Asymptomatic.  Followed closely by heme/onc     Cardiovascular:   Hypertension    Hepatic/GI:   Hiatal Hernia, GERD Liver Disease, Hepatitis, C Cirrhosis and mild splenomegaly   Neurological:  Neurology Normal        Physical Exam  General:  Morbid Obesity    Airway/Jaw/Neck:  Airway Findings: Mouth Opening: Small, but > 3cm General Airway Assessment: Possible difficult intubation, Possible difficult mask airway  Mallampati: IV  Improves to III with phonation.  TM Distance: 4 - 6 cm  Jaw/Neck Findings:  Neck ROM: Extension Decreased, Mild       Chest/Lungs:  Chest/Lungs Findings: Clear to auscultation, Normal Respiratory Rate         Mental Status:  Mental Status Findings:  Cooperative, Alert and Oriented         Anesthesia Plan  Type of Anesthesia, risks & benefits discussed:  Anesthesia Type:  MAC  Patient's Preference:   Intra-op Monitoring Plan: standard ASA monitors  Intra-op Monitoring Plan Comments:   Post Op Pain Control Plan:   Post Op Pain Control Plan Comments:   Induction:   IV  Beta Blocker:  Patient is not currently on a Beta-Blocker (No further documentation required).       Informed Consent: Patient understands risks and agrees with Anesthesia plan.  Questions answered. Anesthesia consent signed with patient.  ASA Score: 3     Day of Surgery Review of History & Physical: I have interviewed and examined the patient. I have reviewed the patient's H&P dated:  There are no significant changes.          Ready For Surgery From Anesthesia Perspective.

## 2017-12-01 NOTE — BRIEF OP NOTE
[facility]  Brief Operative Note     SUMMARY     Surgery Date: 12/1/2017     Surgeon(s) and Role:     * Dg Garcia MD - Primary    Assisting Surgeon: None    Pre-op Diagnosis:  Age-related nuclear cataract of left eye [H25.12]    Post-op Diagnosis:  Post-Op Diagnosis Codes:     * Age-related nuclear cataract of left eye [H25.12]    Procedure(s) (LRB):  EXTRACTION-CATARACT-IOL (Left)    Anesthesia: Monitor Anesthesia Care    Description of the findings of the procedure: Cataract Left Eye    Findings/Key Components: Cataract Left Eye    Estimated Blood Loss: * No values recorded between 12/1/2017  7:41 AM and 12/1/2017  7:57 AM *         Specimens:   Specimen (12h ago through future)    None          Discharge Note    SUMMARY     Admit Date: 12/1/2017    Discharge Date and Time: No discharge date for patient encounter.    Hospital Course (synopsis of major diagnoses, care, treatment, and services provided during the course of the hospital stay): Uncomplicated     Final Diagnosis: Post-Op Diagnosis Codes:     * Age-related nuclear cataract of left eye [H25.12]    Disposition: Home or Self Care    Follow Up/Patient Instructions:     Medications:  Reconciled Home Medications:   Current Discharge Medication List      CONTINUE these medications which have NOT CHANGED    Details   aluminum & magnesium hydroxide-simethicone (MAALOX MAXIMUM STRENGTH) 400-400-40 mg/5 mL suspension Take 15 mLs by mouth every 6 (six) hours as needed for Indigestion.  Qty: 335 mL, Refills: 0      cyclobenzaprine (FLEXERIL) 10 MG tablet Take 1 tablet(s) 3 TIMES A DAY by oral route as needed for muscle spasm. May make you drowsy. Do not drive while on medication.      ferrous sulfate 325 (65 FE) MG EC tablet Take 325 mg by mouth 3 (three) times daily with meals.      furosemide (LASIX) 40 MG tablet Take 40 mg by mouth once daily.  Refills: 3      gabapentin (NEURONTIN) 300 MG capsule Take 300 mg by mouth 3 (three)  times daily.      pantoprazole (PROTONIX) 40 MG tablet Take 1 tablet (40 mg total) by mouth once daily.  Qty: 30 tablet, Refills: 2    Associated Diagnoses: Gastroesophageal reflux disease without esophagitis      spironolactone (ALDACTONE) 100 MG tablet Take 100 mg by mouth once daily.  Refills: 3           No discharge procedures on file.     Patient may resume normal diet.  Patient is limited to light activity without heavy lifting (less than 10 pounds) and no bending past heart level for 1 week.   Follow up today (day of surgery) at clinic.  BHARATI

## 2017-12-01 NOTE — PLAN OF CARE
Stable, states ready to go home, jovon po fluids, denies pain, ambulated to car with RN and sister

## 2017-12-01 NOTE — ANESTHESIA POSTPROCEDURE EVALUATION
"Anesthesia Post Evaluation    Patient: Karen Villarreal    Procedure(s) Performed: Procedure(s) (LRB):  EXTRACTION-CATARACT-IOL (Left)    Final Anesthesia Type: MAC  Patient location during evaluation: PACU  Patient participation: Yes- Able to Participate  Level of consciousness: awake and alert and oriented  Post-procedure vital signs: reviewed and stable  Pain management: adequate  Airway patency: patent  PONV status at discharge: No PONV  Anesthetic complications: no      Cardiovascular status: hemodynamically stable  Respiratory status: unassisted, spontaneous ventilation and room air  Hydration status: euvolemic  Follow-up not needed.        Visit Vitals  /64   Pulse 96   Temp 36.7 °C (98 °F) (Oral)   Resp 20   Ht 5' 1" (1.549 m)   Wt 101.6 kg (224 lb)   LMP 08/06/2017 (Approximate)   SpO2 99%   Breastfeeding? No   BMI 42.32 kg/m²       Pain/Kang Score: Pain Assessment Performed: Yes (12/1/2017  8:01 AM)  Presence of Pain: denies (12/1/2017  8:01 AM)  Kang Score: 10 (12/1/2017  8:01 AM)      "

## 2017-12-01 NOTE — DISCHARGE INSTRUCTIONS
Anesthesia information    Anesthesia Safety      You have been given medicine  to sedate you during your procedure today. This may have included both a pain medicine and sleeping medicine. Most of the effects have worn off; however, you may continue to have some drowsiness for the next  24 hours. Anesthesia and pain medicines can cause nausea, sleepiness, dizziness and  constipation.    HOME CARE:  1) For the next EIGHT HOURS, you should be watched by a responsible adult to look for any worsening of your condition.  2) DO NOT DRINK any ALCOHOL for the next 24 HOURS.  3) DO NOT DRIVE or operate dangerous machinery during the next 24 HOURS.  FOLLOW UP with your doctor or this facility if you are not alert and back to your usual level of activity within 24 hrs.  GET PROMPT MEDICAL ATTENTION if any of the following occur:  -- Increased drowsiness  -- Increased weakness or dizziness  -- Repeated vomiting  -- If you cannot be awakened    Wear the eye shield today.   Begin eye drops in the surgical eye: about noon  Ofloxacin one drop four times a day  Prednisolone Acetate 1% one drop four times a day  Wait 5 minutes between each drop.  Sleep with the eye shield on.  Bring all eye drops and post op kit with you to every visit.

## 2017-12-01 NOTE — TRANSFER OF CARE
"Anesthesia Transfer of Care Note    Patient: Karen Villarreal    Procedure(s) Performed: Procedure(s) (LRB):  EXTRACTION-CATARACT-IOL (Left)    Patient location: PACU    Anesthesia Type: MAC    Transport from OR: Transported from OR on room air with adequate spontaneous ventilation    Post pain: adequate analgesia    Post assessment: no apparent anesthetic complications and tolerated procedure well    Post vital signs: stable    Level of consciousness: awake, alert and oriented    Nausea/Vomiting: no nausea/vomiting    Complications: none    Transfer of care protocol was followed      Last vitals:   Visit Vitals  /64 (BP Location: Right arm, Patient Position: Lying)   Pulse 98   Temp 36.7 °C (98 °F) (Oral)   Resp 18   Ht 5' 1" (1.549 m)   Wt 101.6 kg (224 lb)   LMP 08/06/2017 (Approximate)   SpO2 (!) 94%   Breastfeeding? No   BMI 42.32 kg/m²     "

## 2018-01-04 ENCOUNTER — ANESTHESIA EVENT (OUTPATIENT)
Dept: SURGERY | Facility: AMBULARY SURGERY CENTER | Age: 53
End: 2018-01-04
Payer: MEDICAID

## 2018-01-05 ENCOUNTER — SURGERY (OUTPATIENT)
Age: 53
End: 2018-01-05

## 2018-01-05 ENCOUNTER — HOSPITAL ENCOUNTER (OUTPATIENT)
Facility: AMBULARY SURGERY CENTER | Age: 53
Discharge: HOME | End: 2018-01-05
Attending: OPHTHALMOLOGY | Admitting: OPHTHALMOLOGY
Payer: MEDICAID

## 2018-01-05 ENCOUNTER — ANESTHESIA (OUTPATIENT)
Dept: SURGERY | Facility: AMBULARY SURGERY CENTER | Age: 53
End: 2018-01-05
Payer: MEDICAID

## 2018-01-05 DIAGNOSIS — H25.11 AGE-RELATED NUCLEAR CATARACT, RIGHT EYE: Primary | ICD-10-CM

## 2018-01-05 PROCEDURE — 66984 XCAPSL CTRC RMVL W/O ECP: CPT | Performed by: OPHTHALMOLOGY

## 2018-01-05 PROCEDURE — D9220A PRA ANESTHESIA: Mod: ANES,,, | Performed by: ANESTHESIOLOGY

## 2018-01-05 PROCEDURE — D9220A PRA ANESTHESIA: Mod: CRNA,,,

## 2018-01-05 DEVICE — LENS 22.5 ACRYSOF: Type: IMPLANTABLE DEVICE | Site: EYE | Status: FUNCTIONAL

## 2018-01-05 RX ORDER — VANCOMYCIN HYDROCHLORIDE 1 G/20ML
INJECTION, POWDER, LYOPHILIZED, FOR SOLUTION INTRAVENOUS
Status: DISCONTINUED | OUTPATIENT
Start: 2018-01-05 | End: 2018-01-05 | Stop reason: HOSPADM

## 2018-01-05 RX ORDER — PHENYLEPHRINE HYDROCHLORIDE 25 MG/ML
1 SOLUTION/ DROPS OPHTHALMIC
Status: COMPLETED | OUTPATIENT
Start: 2018-01-05 | End: 2018-01-05

## 2018-01-05 RX ORDER — LIDOCAINE HYDROCHLORIDE 10 MG/ML
0.5 INJECTION, SOLUTION EPIDURAL; INFILTRATION; INTRACAUDAL; PERINEURAL ONCE
Status: COMPLETED | OUTPATIENT
Start: 2018-01-05 | End: 2018-01-05

## 2018-01-05 RX ORDER — MOXIFLOXACIN 5 MG/ML
SOLUTION/ DROPS OPHTHALMIC
Status: DISCONTINUED
Start: 2018-01-05 | End: 2018-01-05 | Stop reason: HOSPADM

## 2018-01-05 RX ORDER — CYCLOPENTOLATE HYDROCHLORIDE 10 MG/ML
1 SOLUTION/ DROPS OPHTHALMIC
Status: COMPLETED | OUTPATIENT
Start: 2018-01-05 | End: 2018-01-05

## 2018-01-05 RX ORDER — LIDOCAINE HYDROCHLORIDE 40 MG/ML
INJECTION, SOLUTION RETROBULBAR
Status: DISCONTINUED | OUTPATIENT
Start: 2018-01-05 | End: 2018-01-05 | Stop reason: HOSPADM

## 2018-01-05 RX ORDER — LIDOCAINE HYDROCHLORIDE 40 MG/ML
INJECTION, SOLUTION RETROBULBAR
Status: DISCONTINUED
Start: 2018-01-05 | End: 2018-01-05 | Stop reason: HOSPADM

## 2018-01-05 RX ORDER — WATER FOR INJECTION,STERILE
VIAL (ML) INJECTION
Status: DISCONTINUED | OUTPATIENT
Start: 2018-01-05 | End: 2018-01-05 | Stop reason: HOSPADM

## 2018-01-05 RX ORDER — MOXIFLOXACIN 5 MG/ML
SOLUTION/ DROPS OPHTHALMIC
Status: DISCONTINUED | OUTPATIENT
Start: 2018-01-05 | End: 2018-01-05 | Stop reason: HOSPADM

## 2018-01-05 RX ORDER — PROPARACAINE HYDROCHLORIDE 5 MG/ML
1 SOLUTION/ DROPS OPHTHALMIC
Status: COMPLETED | OUTPATIENT
Start: 2018-01-05 | End: 2018-01-05

## 2018-01-05 RX ORDER — SCOLOPAMINE TRANSDERMAL SYSTEM 1 MG/1
1 PATCH, EXTENDED RELEASE TRANSDERMAL
Status: DISCONTINUED | OUTPATIENT
Start: 2018-01-05 | End: 2018-01-05 | Stop reason: HOSPADM

## 2018-01-05 RX ORDER — MIDAZOLAM HYDROCHLORIDE 1 MG/ML
INJECTION INTRAMUSCULAR; INTRAVENOUS
Status: COMPLETED
Start: 2018-01-05 | End: 2018-01-05

## 2018-01-05 RX ORDER — PROPARACAINE HYDROCHLORIDE 5 MG/ML
SOLUTION/ DROPS OPHTHALMIC
Status: DISCONTINUED | OUTPATIENT
Start: 2018-01-05 | End: 2018-01-05 | Stop reason: HOSPADM

## 2018-01-05 RX ORDER — EPINEPHRINE 1 MG/ML
INJECTION, SOLUTION INTRACARDIAC; INTRAMUSCULAR; INTRAVENOUS; SUBCUTANEOUS
Status: DISCONTINUED
Start: 2018-01-05 | End: 2018-01-05 | Stop reason: HOSPADM

## 2018-01-05 RX ORDER — MIDAZOLAM HYDROCHLORIDE 1 MG/ML
INJECTION, SOLUTION INTRAMUSCULAR; INTRAVENOUS
Status: DISCONTINUED | OUTPATIENT
Start: 2018-01-05 | End: 2018-01-05

## 2018-01-05 RX ORDER — VANCOMYCIN HYDROCHLORIDE 1 G/20ML
INJECTION, POWDER, LYOPHILIZED, FOR SOLUTION INTRAVENOUS
Status: DISCONTINUED
Start: 2018-01-05 | End: 2018-01-05 | Stop reason: HOSPADM

## 2018-01-05 RX ORDER — ONDANSETRON 2 MG/ML
INJECTION INTRAMUSCULAR; INTRAVENOUS
Status: COMPLETED
Start: 2018-01-05 | End: 2018-01-05

## 2018-01-05 RX ORDER — SODIUM CHLORIDE 0.9 % (FLUSH) 0.9 %
3 SYRINGE (ML) INJECTION
Status: DISCONTINUED | OUTPATIENT
Start: 2018-01-05 | End: 2018-01-05 | Stop reason: HOSPADM

## 2018-01-05 RX ORDER — SODIUM CHLORIDE, SODIUM LACTATE, POTASSIUM CHLORIDE, CALCIUM CHLORIDE 600; 310; 30; 20 MG/100ML; MG/100ML; MG/100ML; MG/100ML
INJECTION, SOLUTION INTRAVENOUS CONTINUOUS
Status: DISCONTINUED | OUTPATIENT
Start: 2018-01-05 | End: 2018-01-05 | Stop reason: HOSPADM

## 2018-01-05 RX ORDER — EPINEPHRINE 1 MG/ML
INJECTION, SOLUTION INTRACARDIAC; INTRAMUSCULAR; INTRAVENOUS; SUBCUTANEOUS
Status: DISCONTINUED | OUTPATIENT
Start: 2018-01-05 | End: 2018-01-05 | Stop reason: HOSPADM

## 2018-01-05 RX ORDER — PROPARACAINE HYDROCHLORIDE 5 MG/ML
SOLUTION/ DROPS OPHTHALMIC
Status: DISCONTINUED
Start: 2018-01-05 | End: 2018-01-05 | Stop reason: HOSPADM

## 2018-01-05 RX ORDER — ONDANSETRON 2 MG/ML
INJECTION INTRAMUSCULAR; INTRAVENOUS
Status: DISCONTINUED | OUTPATIENT
Start: 2018-01-05 | End: 2018-01-05

## 2018-01-05 RX ORDER — ONDANSETRON 2 MG/ML
4 INJECTION INTRAMUSCULAR; INTRAVENOUS ONCE AS NEEDED
Status: DISCONTINUED | OUTPATIENT
Start: 2018-01-05 | End: 2018-01-05 | Stop reason: HOSPADM

## 2018-01-05 RX ADMIN — LIDOCAINE HYDROCHLORIDE 5 MG: 10 INJECTION, SOLUTION EPIDURAL; INFILTRATION; INTRACAUDAL; PERINEURAL at 07:01

## 2018-01-05 RX ADMIN — MIDAZOLAM HYDROCHLORIDE 2 MG: 1 INJECTION, SOLUTION INTRAMUSCULAR; INTRAVENOUS at 07:01

## 2018-01-05 RX ADMIN — EPINEPHRINE 0.3 MG: 1 INJECTION, SOLUTION INTRACARDIAC; INTRAMUSCULAR; INTRAVENOUS; SUBCUTANEOUS at 07:01

## 2018-01-05 RX ADMIN — PROPARACAINE HYDROCHLORIDE 1 DROP: 5 SOLUTION/ DROPS OPHTHALMIC at 06:01

## 2018-01-05 RX ADMIN — EPINEPHRINE 1 ML: 1 INJECTION, SOLUTION INTRACARDIAC; INTRAMUSCULAR; INTRAVENOUS; SUBCUTANEOUS at 07:01

## 2018-01-05 RX ADMIN — VANCOMYCIN HYDROCHLORIDE 10 MG: 1 INJECTION, POWDER, LYOPHILIZED, FOR SOLUTION INTRAVENOUS at 07:01

## 2018-01-05 RX ADMIN — CYCLOPENTOLATE HYDROCHLORIDE 1 DROP: 10 SOLUTION/ DROPS OPHTHALMIC at 06:01

## 2018-01-05 RX ADMIN — CYCLOPENTOLATE HYDROCHLORIDE 1 DROP: 10 SOLUTION/ DROPS OPHTHALMIC at 07:01

## 2018-01-05 RX ADMIN — SODIUM CHLORIDE, SODIUM LACTATE, POTASSIUM CHLORIDE, CALCIUM CHLORIDE: 600; 310; 30; 20 INJECTION, SOLUTION INTRAVENOUS at 07:01

## 2018-01-05 RX ADMIN — Medication 20 ML: at 07:01

## 2018-01-05 RX ADMIN — PROPARACAINE HYDROCHLORIDE 1 DROP: 5 SOLUTION/ DROPS OPHTHALMIC at 07:01

## 2018-01-05 RX ADMIN — ONDANSETRON 4 MG: 2 INJECTION INTRAMUSCULAR; INTRAVENOUS at 07:01

## 2018-01-05 RX ADMIN — LIDOCAINE HYDROCHLORIDE 1 ML: 40 INJECTION, SOLUTION RETROBULBAR at 07:01

## 2018-01-05 RX ADMIN — MOXIFLOXACIN 1 DROP: 5 SOLUTION/ DROPS OPHTHALMIC at 07:01

## 2018-01-05 RX ADMIN — PHENYLEPHRINE HYDROCHLORIDE 1 DROP: 25 SOLUTION/ DROPS OPHTHALMIC at 06:01

## 2018-01-05 RX ADMIN — PROPARACAINE HYDROCHLORIDE 2 DROP: 5 SOLUTION/ DROPS OPHTHALMIC at 07:01

## 2018-01-05 RX ADMIN — PHENYLEPHRINE HYDROCHLORIDE 1 DROP: 25 SOLUTION/ DROPS OPHTHALMIC at 07:01

## 2018-01-05 NOTE — OP NOTE
Surgeon:  Dg Gacria M.D.    Pre-op Diagnosis: Nuclear Cataract Right Eye    Post-op Diagnosis: Nuclear Cataract Right Eye    Procedure: Cataract Extraction Right Eye    Op Note:     Patient was taken to the operating room and prepped and draped in a sterile fashion. A lid speculum was placed in the eye. Lidocaine gel was place on the cornea. A clear corneal wound was created with a keratome blade at the temporal quadrant. A side-port was created with a #15 blade 2 clock hours temporal to the corneal entrance incision. Viscoelastic was placed in the anterior chamber. A sharp bent needle was used to create a capsular opening and forceps completed a circular capsulorhexis. Balanced saline solution was placed under the anterior lip of the open capsule and hydro-disection and hydro-dilineation was performed. Phakoemulsification was used to remove the cataract without complication. Irrigation and aspiration (I/A) was used to remove cortical material without complication. The capsule bag was then filled with viscoelastic and the intra-ocular lens implant was inserted into the capsular bag. Viscoelastic was removed with I/A and the corneal wound was closed with hydration. The wound was tested and found to be watertight. The lid speculum was removed and the patient left the operating room in good condition.

## 2018-01-05 NOTE — ANESTHESIA POSTPROCEDURE EVALUATION
"Anesthesia Post Evaluation    Patient: Karen Villarreal    Procedure(s) Performed: Procedure(s) (LRB):  EXTRACTION-CATARACT-IOL (Right)    Final Anesthesia Type: general  Patient location during evaluation: PACU  Patient participation: Yes- Able to Participate  Level of consciousness: awake and alert and oriented  Post-procedure vital signs: reviewed and stable  Pain management: adequate  Airway patency: patent  PONV status at discharge: No PONV  Anesthetic complications: no      Cardiovascular status: blood pressure returned to baseline  Respiratory status: unassisted, spontaneous ventilation and room air  Hydration status: euvolemic  Follow-up not needed.        Visit Vitals  BP (!) 140/75   Pulse 95   Temp 36.8 °C (98.2 °F) (Oral)   Resp 20   Ht 5' 1" (1.549 m)   Wt 101.6 kg (224 lb)   SpO2 99%   Breastfeeding? No   BMI 42.32 kg/m²       Pain/Kang Score: Pain Assessment Performed: Yes (1/5/2018  6:45 AM)  Presence of Pain: denies (1/5/2018  6:45 AM)      "

## 2018-01-05 NOTE — DISCHARGE INSTRUCTIONS
Anesthesia information    Anesthesia Safety      You have been given medicine  to sedate you during your procedure today. This may have included both a pain medicine and sleeping medicine. Most of the effects have worn off; however, you may continue to have some drowsiness for the next  24 hours. Anesthesia and pain medicines can cause nausea, sleepiness, dizziness and  constipation.    HOME CARE:  1) For the next EIGHT HOURS, you should be watched by a responsible adult to look for any worsening of your condition.  2) DO NOT DRINK any ALCOHOL for the next 24 HOURS.  3) DO NOT DRIVE or operate dangerous machinery during the next 24 HOURS.  FOLLOW UP with your doctor or this facility if you are not alert and back to your usual level of activity within 24 hrs.  GET PROMPT MEDICAL ATTENTION if any of the following occur:  -- Increased drowsiness  -- Increased weakness or dizziness  -- Repeated vomiting  -- If you cannot be awakened    After Surgery Instructions    Wear the eye shield today. Sleep with the eye shield on.   Begin eye drops in the surgical eye:  Ofloxacin one drop four times a day  Prednisolone Acetate 1% one drop four times a day  Ilevro one drop one time a day  Wait 5 minutes between each drop.  Bring all eye drops and post op kit with you to every visit.  Take Tylenol for mild discomfort and call MD for increasing pain.

## 2018-01-05 NOTE — TRANSFER OF CARE
"Anesthesia Transfer of Care Note    Patient: Karen Villarreal    Procedure(s) Performed: Procedure(s) (LRB):  EXTRACTION-CATARACT-IOL (Right)    Patient location: PACU    Anesthesia Type: general    Transport from OR: Transported from OR on room air with adequate spontaneous ventilation    Post pain: adequate analgesia    Post assessment: no apparent anesthetic complications    Post vital signs: stable    Level of consciousness: awake    Nausea/Vomiting: no nausea/vomiting    Complications: none    Transfer of care protocol was followed      Last vitals:   Visit Vitals  BP (!) 144/66 (BP Location: Right arm, Patient Position: Lying)   Pulse 100   Temp 36.8 °C (98.2 °F) (Oral)   Resp 20   Ht 5' 1" (1.549 m)   Wt 101.6 kg (224 lb)   SpO2 (!) 94%   Breastfeeding? No   BMI 42.32 kg/m²     "

## 2018-01-05 NOTE — BRIEF OP NOTE
[facility]  Brief Operative Note     SUMMARY     Surgery Date: 1/5/2018     Surgeon(s) and Role:     * Dg Garcia MD - Primary    Assisting Surgeon: None    Pre-op Diagnosis:  Age-related nuclear cataract of right eye [H25.11]    Post-op Diagnosis:  Post-Op Diagnosis Codes:     * Age-related nuclear cataract of right eye [H25.11]    Procedure(s) (LRB):  EXTRACTION-CATARACT-IOL (Right)    Anesthesia: Monitor Anesthesia Care    Description of the findings of the procedure: Nuclear Cataract Right Eye    Findings/Key Components: Nuclear Cataract Right Eye    Estimated Blood Loss: 0 mL         Specimens:   Specimen (12h ago through future)    None          Discharge Note    SUMMARY     Admit Date: 1/5/2018    Discharge Date and Time:  01/05/2018 8:06 AM    Hospital Course (synopsis of major diagnoses, care, treatment, and services provided during the course of the hospital stay): Uneventful and uncomplicated     Final Diagnosis: Post-Op Diagnosis Codes:     * Age-related nuclear cataract of right eye [H25.11]    Disposition: Home or Self Care    Follow Up/Patient Instructions:     Medications:  Reconciled Home Medications:   Current Discharge Medication List      CONTINUE these medications which have NOT CHANGED    Details   pantoprazole (PROTONIX) 40 MG tablet Take 1 tablet (40 mg total) by mouth once daily.  Qty: 30 tablet, Refills: 2    Associated Diagnoses: Gastroesophageal reflux disease without esophagitis      aluminum & magnesium hydroxide-simethicone (MAALOX MAXIMUM STRENGTH) 400-400-40 mg/5 mL suspension Take 15 mLs by mouth every 6 (six) hours as needed for Indigestion.  Qty: 335 mL, Refills: 0      cyclobenzaprine (FLEXERIL) 10 MG tablet Take 1 tablet(s) 3 TIMES A DAY by oral route as needed for muscle spasm. May make you drowsy. Do not drive while on medication.      ferrous sulfate 325 (65 FE) MG EC tablet Take 325 mg by mouth 3 (three) times daily with meals.      furosemide  (LASIX) 40 MG tablet Take 40 mg by mouth once daily.  Refills: 3      gabapentin (NEURONTIN) 300 MG capsule Take 300 mg by mouth 3 (three) times daily.      spironolactone (ALDACTONE) 100 MG tablet Take 100 mg by mouth once daily.  Refills: 3             Discharge Procedure Orders  Leave dressing on - Keep it clean, dry, and intact until clinic visit       Follow-up Information     Dg Garcia MD Today.    Specialty:  Ophthalmology  Contact information:  [address] [facility] [address]  [phone]

## 2018-01-05 NOTE — ANESTHESIA PREPROCEDURE EVALUATION
01/05/2018  Karen Villarreal is a 52 y.o., female.    Pre-op Assessment    I have reviewed the Patient Summary Reports.     I have reviewed the Nursing Notes.   I have reviewed the Medications.     Review of Systems  Anesthesia Hx:  No problems with previous Anesthesia    Social:  Smoker    Hematology/Oncology:        Hematology Comments: Thrombocytopenia 2/2 splenic sequestration. Last PLT count: 51. Asymptomatic.  Followed closely by heme/onc     Cardiovascular:   Hypertension    Renal/:   Denies Chronic Renal Disease.  Renal cyst   Hepatic/GI:   Hiatal Hernia, GERD, poorly controlled Liver Disease, Hepatitis, C Cirrhosis and mild splenomegaly   Neurological:   Neuromuscular Disease,        Physical Exam  General:  Morbid Obesity    Airway/Jaw/Neck:  Airway Findings: Mouth Opening: Small, but > 3cm General Airway Assessment: Possible difficult intubation, Possible difficult mask airway  Mallampati: IV  Improves to III with phonation.  TM Distance: 4 - 6 cm  Jaw/Neck Findings:  Neck ROM: Extension Decreased, Mild       Chest/Lungs:  Chest/Lungs Findings: Clear to auscultation, Normal Respiratory Rate         Mental Status:  Mental Status Findings:  Cooperative, Alert and Oriented         Anesthesia Plan  Type of Anesthesia, risks & benefits discussed:  Anesthesia Type:  MAC  Patient's Preference:   Intra-op Monitoring Plan: standard ASA monitors  Intra-op Monitoring Plan Comments:   Post Op Pain Control Plan:   Post Op Pain Control Plan Comments:   Induction:   IV  Beta Blocker:  Patient is not currently on a Beta-Blocker (No further documentation required).       Informed Consent: Patient understands risks and agrees with Anesthesia plan.  Questions answered. Anesthesia consent signed with patient.  ASA Score: 3     Day of Surgery Review of History & Physical: I have interviewed and examined the  patient. I have reviewed the patient's H&P dated:  There are no significant changes.          Ready For Surgery From Anesthesia Perspective.

## 2018-01-10 VITALS
HEIGHT: 61 IN | DIASTOLIC BLOOD PRESSURE: 56 MMHG | WEIGHT: 224 LBS | HEART RATE: 91 BPM | RESPIRATION RATE: 20 BRPM | TEMPERATURE: 98 F | OXYGEN SATURATION: 98 % | BODY MASS INDEX: 42.29 KG/M2 | SYSTOLIC BLOOD PRESSURE: 119 MMHG

## 2018-01-17 ENCOUNTER — PATIENT MESSAGE (OUTPATIENT)
Dept: HEMATOLOGY/ONCOLOGY | Facility: CLINIC | Age: 53
End: 2018-01-17

## 2018-01-22 ENCOUNTER — TELEPHONE (OUTPATIENT)
Dept: HEMATOLOGY/ONCOLOGY | Facility: CLINIC | Age: 53
End: 2018-01-22

## 2018-01-22 DIAGNOSIS — D69.6 THROMBOCYTOPENIA: Primary | ICD-10-CM

## 2018-01-22 NOTE — TELEPHONE ENCOUNTER
----- Message from Sola Mayfield sent at 1/22/2018  1:36 PM CST -----  Contact: pt  Pt calling needs to reschedule her 1/17 labs and appointment with the Dr ,please...[phone] (home)

## 2018-01-26 ENCOUNTER — LAB VISIT (OUTPATIENT)
Dept: LAB | Facility: HOSPITAL | Age: 53
End: 2018-01-26
Attending: INTERNAL MEDICINE
Payer: MEDICAID

## 2018-01-26 ENCOUNTER — OFFICE VISIT (OUTPATIENT)
Dept: HEMATOLOGY/ONCOLOGY | Facility: CLINIC | Age: 53
End: 2018-01-26
Payer: MEDICAID

## 2018-01-26 VITALS
WEIGHT: 253.75 LBS | SYSTOLIC BLOOD PRESSURE: 138 MMHG | HEART RATE: 109 BPM | BODY MASS INDEX: 47.91 KG/M2 | DIASTOLIC BLOOD PRESSURE: 63 MMHG | RESPIRATION RATE: 20 BRPM | HEIGHT: 61 IN | TEMPERATURE: 98 F

## 2018-01-26 DIAGNOSIS — B18.2 CHRONIC HEPATITIS C WITH CIRRHOSIS: ICD-10-CM

## 2018-01-26 DIAGNOSIS — K92.1 BLOOD IN STOOL: Primary | ICD-10-CM

## 2018-01-26 DIAGNOSIS — D69.6 THROMBOCYTOPENIA: Primary | ICD-10-CM

## 2018-01-26 DIAGNOSIS — D69.6 THROMBOCYTOPENIA: ICD-10-CM

## 2018-01-26 DIAGNOSIS — D50.0 IRON DEFICIENCY ANEMIA DUE TO CHRONIC BLOOD LOSS: ICD-10-CM

## 2018-01-26 DIAGNOSIS — K74.60 CHRONIC HEPATITIS C WITH CIRRHOSIS: ICD-10-CM

## 2018-01-26 DIAGNOSIS — D64.9 ANEMIA, UNSPECIFIED TYPE: ICD-10-CM

## 2018-01-26 DIAGNOSIS — K92.1 MELENA: ICD-10-CM

## 2018-01-26 PROBLEM — D50.9 IRON DEFICIENCY ANEMIA: Status: ACTIVE | Noted: 2018-01-26

## 2018-01-26 LAB
BASOPHILS # BLD AUTO: 0 K/UL
BASOPHILS NFR BLD: 0 %
DIFFERENTIAL METHOD: ABNORMAL
EOSINOPHIL # BLD AUTO: 0.2 K/UL
EOSINOPHIL NFR BLD: 2.5 %
ERYTHROCYTE [DISTWIDTH] IN BLOOD BY AUTOMATED COUNT: 17.3 %
HCT VFR BLD AUTO: 27.2 %
HGB BLD-MCNC: 8.9 G/DL
LYMPHOCYTES # BLD AUTO: 1.1 K/UL
LYMPHOCYTES NFR BLD: 15.1 %
MCH RBC QN AUTO: 29.5 PG
MCHC RBC AUTO-ENTMCNC: 32.7 G/DL
MCV RBC AUTO: 90 FL
MONOCYTES # BLD AUTO: 0.8 K/UL
MONOCYTES NFR BLD: 10.6 %
NEUTROPHILS # BLD AUTO: 5.2 K/UL
NEUTROPHILS NFR BLD: 71.8 %
PLATELET # BLD AUTO: 77 K/UL
PMV BLD AUTO: 9.4 FL
RBC # BLD AUTO: 3.02 M/UL
WBC # BLD AUTO: 7.3 K/UL

## 2018-01-26 PROCEDURE — 36415 COLL VENOUS BLD VENIPUNCTURE: CPT

## 2018-01-26 PROCEDURE — 99999 PR PBB SHADOW E&M-EST. PATIENT-LVL III: CPT | Mod: PBBFAC,,, | Performed by: INTERNAL MEDICINE

## 2018-01-26 PROCEDURE — 85025 COMPLETE CBC W/AUTO DIFF WBC: CPT

## 2018-01-26 PROCEDURE — 99213 OFFICE O/P EST LOW 20 MIN: CPT | Mod: PBBFAC,PO | Performed by: INTERNAL MEDICINE

## 2018-01-26 PROCEDURE — 3008F BODY MASS INDEX DOCD: CPT | Mod: ,,, | Performed by: INTERNAL MEDICINE

## 2018-01-26 PROCEDURE — 99215 OFFICE O/P EST HI 40 MIN: CPT | Mod: S$PBB,,, | Performed by: INTERNAL MEDICINE

## 2018-01-26 RX ORDER — OFLOXACIN 3 MG/ML
SOLUTION/ DROPS OPHTHALMIC
COMMUNITY
Start: 2018-01-03 | End: 2018-03-23

## 2018-01-26 RX ORDER — SODIUM CHLORIDE 9 MG/ML
INJECTION, SOLUTION INTRAVENOUS CONTINUOUS
Status: CANCELLED | OUTPATIENT
Start: 2018-01-31

## 2018-01-26 RX ORDER — HEPARIN 100 UNIT/ML
5 SYRINGE INTRAVENOUS
Status: CANCELLED | OUTPATIENT
Start: 2018-01-31

## 2018-01-26 RX ORDER — SODIUM CHLORIDE 0.9 % (FLUSH) 0.9 %
10 SYRINGE (ML) INJECTION
Status: CANCELLED | OUTPATIENT
Start: 2018-01-31

## 2018-01-26 RX ORDER — PREDNISOLONE ACETATE 10 MG/ML
SUSPENSION/ DROPS OPHTHALMIC
COMMUNITY
Start: 2018-01-03 | End: 2018-03-23

## 2018-01-26 NOTE — PROGRESS NOTES
Follow-up (2 month follow up with labs)      Karen Villarreal is a 52 y.o.  Pt is here for evaluation of thrombocytopenia  SHe has documented cirrhosis and splenomegaly per imaging studies. She was anemia 9 months ago when she was treated for ascites. SHe has been recently found to have hepatitis C   Her Hb has decreased by about 4 units. She was having dark sticky stool and did not go to hospital   Pt had cataract surgery and had no complications  Pt is not having worsening SOB and no extreme fatigue    CT Abdomen Pelvis  Without Contrast   Order: [id number]   Status:  Final result   Visible to patient:  Yes (Patient Portal) Next appt:  None   Details     Reading Physician Reading Date Result Priority   Jim Boone Jr., MD 11/8/2017    Narrative     Axial images are obtained from the dome of the diaphragm to the floor of the pelvis.  The patient did not receive oral or IV contrast.    The liver is small and has a nodular contour suggesting cirrhosis. There is mild splenomegaly. Ascites is not seen but there is increased density of the mesenteric fat possibly due to edema. The gallbladder is of normal size and contains multiple gallstones. The pancreas is of normal contour and CT density without mass, edema or pseudocyst.        The adrenal glands are not enlarged.  The kidneys are of normal size, contour and CT density for a noncontrast study.  No mass, cyst or hydronephrosis is noted. No stones are identified.     The abdominal aorta and inferior vena cava are of normal caliber.  No sushila-aortic or retroperitoneal adenopathy or masses are seen. The gastrointestinal organs appear within normal limits without dilation, air-fluid levels or masses seen.A surgical clip in the right lower quadrant is consistent with prior appendectomy.    Within the pelvis, the bladder is without mass or asymmetry.The uterus is not enlarged.    The patient is seen to have a very small umbilical hernia containing fluid and an even  smaller hernia just above the umbilicus at the midline containing fluid. Bowel loops in the hernia are not seen      Impression      Cirrhosis and mild splenomegaly without ascites seen today. Small amount of mesenteric edema. Small umbilical hernia and smaller supra-umbilical hernia containing fluid at the midline. Prior appendectomy. Cholelithiasis.      Electronically signed by: Jim Boone MD  Date: 11/08/17  Time: 09:03                Past Medical History:   Diagnosis Date    Acid reflux     Ascites 03/08/2017    Cirrhosis     Hepatitis C     Hiatal hernia     Renal cyst 03/08/2017    Thrombocytopenia      Past Surgical History:   Procedure Laterality Date    ANKLE SURGERY      APPENDECTOMY      CATARACT EXTRACTION         Current Outpatient Prescriptions:     aluminum & magnesium hydroxide-simethicone (MAALOX MAXIMUM STRENGTH) 400-400-40 mg/5 mL suspension, Take 15 mLs by mouth every 6 (six) hours as needed for Indigestion., Disp: 335 mL, Rfl: 0    cyclobenzaprine (FLEXERIL) 10 MG tablet, Take 1 tablet(s) 3 TIMES A DAY by oral route as needed for muscle spasm. May make you drowsy. Do not drive while on medication., Disp: , Rfl:     ferrous sulfate 325 (65 FE) MG EC tablet, Take 325 mg by mouth 3 (three) times daily with meals., Disp: , Rfl:     furosemide (LASIX) 40 MG tablet, Take 40 mg by mouth once daily., Disp: , Rfl: 3    gabapentin (NEURONTIN) 300 MG capsule, Take 300 mg by mouth 3 (three) times daily., Disp: , Rfl:     ofloxacin (OCUFLOX) 0.3 % ophthalmic solution, , Disp: , Rfl:     pantoprazole (PROTONIX) 40 MG tablet, Take 1 tablet (40 mg total) by mouth once daily., Disp: 30 tablet, Rfl: 2    prednisoLONE acetate (PRED FORTE) 1 % DrpS, , Disp: , Rfl:     spironolactone (ALDACTONE) 100 MG tablet, Take 100 mg by mouth once daily., Disp: , Rfl: 3  Review of patient's allergies indicates:  No Known Allergies  Social History   Substance Use Topics    Smoking status: Current Some  "Day Smoker     Packs/day: 0.25     Years: 33.00     Types: Cigarettes    Smokeless tobacco: Never Used    Alcohol use No         CONSTITUTIONAL: No fevers, chills, night sweats, wt. loss, appetite changes  SKIN: no rashes or itching  ENT: No headaches, head trauma, vision changes, or eye pain  LYMPH NODES: None noticed   CV: No chest pain, palpitations.   RESP: No dyspnea on exertion, cough, wheezing, or hemoptysis  GI: No nausea, emesis, diarrhea, constipation, melena, hematochezia, pain.   : No dysuria, hematuria, urgency, or frequency   HEME: No easy bruising, bleeding problems  PSYCHIATRIC: No depression, anxiety, psychosis, hallucinations.  NEURO: No seizures, memory loss, dizziness or difficulty sleeping  MSK: No arthralgias or joint swelling         /63 (BP Location: Right arm, Patient Position: Sitting, BP Method: Medium (Automatic))   Pulse 109   Temp 98.3 °F (36.8 °C) (Oral)   Resp 20   Ht 5' 1" (1.549 m)   Wt 115.1 kg (253 lb 12 oz)   BMI 47.95 kg/m²   Gen: NAD, A and O x3,   Psych: pleasant affect, normal thought process  Eyes: Pupils round and non dilated, EOM intact  Nose: Nares patent  OP clear, mucosa patent  Neck: suppple, no JVD, trachea midline, no palpable mass, no adenopathy  Lungs: CTAB, no wheezes, no use of accessory muscles  CV: S1S2 with RRR, No mrg  Abd: soft, NTND, + BS, No HSM, no ascites  Extr: No CCE, SHANT, strength normal, good capillary refill  Neuro: steady gait, CNs grossly intact  Skin: intact, no lesions noted  Rheum: No joint swelling    Lab Results   Component Value Date    WBC 7.30 01/26/2018    HGB 8.9 (L) 01/26/2018    HCT 27.2 (L) 01/26/2018    MCV 90 01/26/2018    PLT 77 (L) 01/26/2018     CMP  Sodium   Date Value Ref Range Status   11/07/2017 139 136 - 145 mmol/L Final     Potassium   Date Value Ref Range Status   11/07/2017 3.8 3.5 - 5.1 mmol/L Final     Chloride   Date Value Ref Range Status   11/07/2017 102 95 - 110 mmol/L Final     CO2   Date Value Ref " Range Status   11/07/2017 28 23 - 29 mmol/L Final     Glucose   Date Value Ref Range Status   11/07/2017 83 70 - 110 mg/dL Final     BUN, Bld   Date Value Ref Range Status   11/07/2017 6 6 - 20 mg/dL Final     Creatinine   Date Value Ref Range Status   11/07/2017 0.7 0.5 - 1.4 mg/dL Final     Calcium   Date Value Ref Range Status   11/07/2017 9.0 8.7 - 10.5 mg/dL Final     Total Protein   Date Value Ref Range Status   11/07/2017 7.4 6.0 - 8.4 g/dL Final     Albumin   Date Value Ref Range Status   11/07/2017 3.0 (L) 3.5 - 5.2 g/dL Final     Total Bilirubin   Date Value Ref Range Status   11/07/2017 1.7 (H) 0.1 - 1.0 mg/dL Final     Comment:     For infants and newborns, interpretation of results should be based  on gestational age, weight and in agreement with clinical  observations.  Premature Infant recommended reference ranges:  Up to 24 hours.............<8.0 mg/dL  Up to 48 hours............<12.0 mg/dL  3-5 days..................<15.0 mg/dL  6-29 days.................<15.0 mg/dL       Alkaline Phosphatase   Date Value Ref Range Status   11/07/2017 199 (H) 55 - 135 U/L Final     AST   Date Value Ref Range Status   11/07/2017 36 10 - 40 U/L Final     ALT   Date Value Ref Range Status   11/07/2017 18 10 - 44 U/L Final     Anion Gap   Date Value Ref Range Status   11/07/2017 9 8 - 16 mmol/L Final     eGFR if    Date Value Ref Range Status   11/07/2017 >60 >60 mL/min/1.73 m^2 Final     eGFR if non    Date Value Ref Range Status   11/07/2017 >60 >60 mL/min/1.73 m^2 Final     Comment:     Calculation used to obtain the estimated glomerular filtration  rate (eGFR) is the CKD-EPI equation.          Thrombocytopenia    Anemia, unspecified type  -     POCT Hemocult Stool X3  -     Iron and TIBC; Future; Expected date: 01/26/2018  -     FERRITIN; Future; Expected date: 01/26/2018    Chronic hepatitis C with cirrhosis    Melena  -     POCT Hemocult Stool X3  -     Iron and TIBC; Future;  Expected date: 01/26/2018  -     FERRITIN; Future; Expected date: 01/26/2018    Iron deficiency anemia due to chronic blood loss    Other orders  -     ferric carboxymaltose (INJECTAFER) 750 mg in sodium chloride 0.9% 250 mL infusion; Inject 750 mg into the vein once.  -     0.9%  NaCl infusion; Inject into the vein continuous.  -     sodium chloride 0.9% flush 10 mL; Inject 10 mLs into the vein as needed for Line Care.  -     heparin, porcine (PF) 100 unit/mL injection flush 500 Units; Inject 5 mLs (500 Units total) into the vein as needed (Flush lines as needed).  -     sodium chloride 0.9% 100 mL flush bag; Inject into the vein as needed for flush bag.      History of interferon use   monitor platelet count for now  Does need intervention for anemia at the moment  Check iron stores   Check stool for blood to make sure she is not bleeding   Will call her with iron studies and likely set her up for iron  Platelets are low due to cirrhosis and splenomegaly   Transfusion is not necessary at this time  Will seek treatment for hep C recurrence  Explained type 1 a Hep c and likely to benefit from harvoni  Refer to GI or ID for treatment      Thank you for allowing me to evaluate and participate in the care of this pleasant patient. Please fell free to call me with any questions or concerns.    Warmly,  Elizabet Valencia MD    This note was dictated with Dragon and briefly proofread. Please excuse any sentences that may be unclear or words misspelled    ADDENDUM    Start iV iron times 2

## 2018-01-30 DIAGNOSIS — B19.20 HEPATITIS C VIRUS INFECTION WITHOUT HEPATIC COMA, UNSPECIFIED CHRONICITY: Primary | ICD-10-CM

## 2018-02-02 ENCOUNTER — TELEPHONE (OUTPATIENT)
Dept: HEMATOLOGY/ONCOLOGY | Facility: CLINIC | Age: 53
End: 2018-02-02

## 2018-02-07 ENCOUNTER — TELEPHONE (OUTPATIENT)
Dept: HEMATOLOGY/ONCOLOGY | Facility: CLINIC | Age: 53
End: 2018-02-07

## 2018-02-07 DIAGNOSIS — D50.9 IRON DEFICIENCY ANEMIA, UNSPECIFIED IRON DEFICIENCY ANEMIA TYPE: Primary | ICD-10-CM

## 2018-02-07 DIAGNOSIS — D64.9 ANEMIA, UNSPECIFIED TYPE: Primary | ICD-10-CM

## 2018-02-07 NOTE — TELEPHONE ENCOUNTER
----- Message from João Park sent at 2/7/2018 12:15 PM CST -----  Contact: Nely  Wants to discuss referral and stool sample. Please call [phone] (home)   thanks

## 2018-02-07 NOTE — TELEPHONE ENCOUNTER
Returned pts call and explained the stool specimen procedure.  Also, explained that I left the supplies and orders at the  for her to .  Pt verbalizes understanding.

## 2018-02-14 ENCOUNTER — TELEPHONE (OUTPATIENT)
Dept: HEMATOLOGY/ONCOLOGY | Facility: CLINIC | Age: 53
End: 2018-02-14

## 2018-02-14 NOTE — TELEPHONE ENCOUNTER
Message left instructing patient to call [phone] ext [phone] to r/s f/u appointment after Injectafer infusions.

## 2018-02-21 ENCOUNTER — TELEPHONE (OUTPATIENT)
Dept: HEMATOLOGY/ONCOLOGY | Facility: CLINIC | Age: 53
End: 2018-02-21

## 2018-02-21 NOTE — TELEPHONE ENCOUNTER
----- Message from Bonny Herbert sent at 2/21/2018 11:07 AM CST -----  Patient is calling to talk to office concerning stool samples. She needs some more kits. Please call to advise at [phone].

## 2018-02-23 RX ORDER — SODIUM CHLORIDE 0.9 % (FLUSH) 0.9 %
10 SYRINGE (ML) INJECTION
Status: CANCELLED | OUTPATIENT
Start: 2018-02-23

## 2018-02-23 RX ORDER — SODIUM CHLORIDE 9 MG/ML
INJECTION, SOLUTION INTRAVENOUS CONTINUOUS
Status: CANCELLED | OUTPATIENT
Start: 2018-02-23

## 2018-02-23 RX ORDER — HEPARIN 100 UNIT/ML
5 SYRINGE INTRAVENOUS
Status: CANCELLED | OUTPATIENT
Start: 2018-02-23

## 2018-03-02 RX ORDER — HEPARIN 100 UNIT/ML
5 SYRINGE INTRAVENOUS
Status: CANCELLED | OUTPATIENT
Start: 2018-03-02

## 2018-03-02 RX ORDER — SODIUM CHLORIDE 0.9 % (FLUSH) 0.9 %
10 SYRINGE (ML) INJECTION
Status: CANCELLED | OUTPATIENT
Start: 2018-03-02

## 2018-03-02 RX ORDER — SODIUM CHLORIDE 9 MG/ML
INJECTION, SOLUTION INTRAVENOUS CONTINUOUS
Status: CANCELLED | OUTPATIENT
Start: 2018-03-02

## 2018-03-23 ENCOUNTER — OFFICE VISIT (OUTPATIENT)
Dept: HEMATOLOGY/ONCOLOGY | Facility: CLINIC | Age: 53
End: 2018-03-23
Payer: MEDICAID

## 2018-03-23 ENCOUNTER — TELEPHONE (OUTPATIENT)
Dept: HEMATOLOGY/ONCOLOGY | Facility: CLINIC | Age: 53
End: 2018-03-23

## 2018-03-23 ENCOUNTER — LAB VISIT (OUTPATIENT)
Dept: LAB | Facility: HOSPITAL | Age: 53
End: 2018-03-23
Attending: INTERNAL MEDICINE
Payer: MEDICAID

## 2018-03-23 VITALS
BODY MASS INDEX: 48.57 KG/M2 | RESPIRATION RATE: 17 BRPM | HEIGHT: 61 IN | SYSTOLIC BLOOD PRESSURE: 122 MMHG | WEIGHT: 257.25 LBS | HEART RATE: 102 BPM | TEMPERATURE: 98 F | DIASTOLIC BLOOD PRESSURE: 62 MMHG

## 2018-03-23 DIAGNOSIS — D50.9 IRON DEFICIENCY ANEMIA, UNSPECIFIED IRON DEFICIENCY ANEMIA TYPE: ICD-10-CM

## 2018-03-23 DIAGNOSIS — Z86.2 HISTORY OF ANEMIA: Primary | ICD-10-CM

## 2018-03-23 DIAGNOSIS — Z86.39 HISTORY OF IRON DEFICIENCY: ICD-10-CM

## 2018-03-23 DIAGNOSIS — D69.6 THROMBOCYTOPENIA: ICD-10-CM

## 2018-03-23 LAB
ALBUMIN SERPL BCP-MCNC: 2.8 G/DL
ALP SERPL-CCNC: 210 U/L
ALT SERPL W/O P-5'-P-CCNC: 25 U/L
ANION GAP SERPL CALC-SCNC: 6 MMOL/L
ANISOCYTOSIS BLD QL SMEAR: SLIGHT
AST SERPL-CCNC: 36 U/L
BASOPHILS # BLD AUTO: 0 K/UL
BASOPHILS NFR BLD: 0 %
BILIRUB SERPL-MCNC: 1.6 MG/DL
BUN SERPL-MCNC: 4 MG/DL
CALCIUM SERPL-MCNC: 8.2 MG/DL
CHLORIDE SERPL-SCNC: 103 MMOL/L
CO2 SERPL-SCNC: 26 MMOL/L
CREAT SERPL-MCNC: 0.7 MG/DL
DIFFERENTIAL METHOD: ABNORMAL
EOSINOPHIL # BLD AUTO: 0.2 K/UL
EOSINOPHIL NFR BLD: 2.7 %
ERYTHROCYTE [DISTWIDTH] IN BLOOD BY AUTOMATED COUNT: 23.1 %
EST. GFR  (AFRICAN AMERICAN): >60 ML/MIN/1.73 M^2
EST. GFR  (NON AFRICAN AMERICAN): >60 ML/MIN/1.73 M^2
FERRITIN SERPL-MCNC: 325 NG/ML
GLUCOSE SERPL-MCNC: 171 MG/DL
HCT VFR BLD AUTO: 41.4 %
HGB BLD-MCNC: 13.4 G/DL
IRON SERPL-MCNC: 72 UG/DL
LYMPHOCYTES # BLD AUTO: 0.8 K/UL
LYMPHOCYTES NFR BLD: 11.3 %
MCH RBC QN AUTO: 30.1 PG
MCHC RBC AUTO-ENTMCNC: 32.4 G/DL
MCV RBC AUTO: 93 FL
MONOCYTES # BLD AUTO: 0.4 K/UL
MONOCYTES NFR BLD: 5.7 %
NEUTROPHILS # BLD AUTO: 5.5 K/UL
NEUTROPHILS NFR BLD: 80.3 %
PLATELET # BLD AUTO: 65 K/UL
PLATELET BLD QL SMEAR: ABNORMAL
PMV BLD AUTO: 9.2 FL
POTASSIUM SERPL-SCNC: 3.6 MMOL/L
PROT SERPL-MCNC: 6.5 G/DL
RBC # BLD AUTO: 4.46 M/UL
SATURATED IRON: 28 %
SODIUM SERPL-SCNC: 135 MMOL/L
TOTAL IRON BINDING CAPACITY: 260 UG/DL
TRANSFERRIN SERPL-MCNC: 176 MG/DL
WBC # BLD AUTO: 6.9 K/UL

## 2018-03-23 PROCEDURE — 99213 OFFICE O/P EST LOW 20 MIN: CPT | Mod: PBBFAC,PO | Performed by: INTERNAL MEDICINE

## 2018-03-23 PROCEDURE — 85025 COMPLETE CBC W/AUTO DIFF WBC: CPT

## 2018-03-23 PROCEDURE — 80053 COMPREHEN METABOLIC PANEL: CPT

## 2018-03-23 PROCEDURE — 99999 PR PBB SHADOW E&M-EST. PATIENT-LVL III: CPT | Mod: PBBFAC,,, | Performed by: INTERNAL MEDICINE

## 2018-03-23 PROCEDURE — 36415 COLL VENOUS BLD VENIPUNCTURE: CPT

## 2018-03-23 PROCEDURE — 99213 OFFICE O/P EST LOW 20 MIN: CPT | Mod: S$PBB,,, | Performed by: INTERNAL MEDICINE

## 2018-03-23 PROCEDURE — 83540 ASSAY OF IRON: CPT

## 2018-03-23 PROCEDURE — 82728 ASSAY OF FERRITIN: CPT

## 2018-03-23 NOTE — PROGRESS NOTES
Karen Villarreal is a 52 y.o.  Pt is here for evaluation of thrombocytopenia and iron deficiency   SHe has documented cirrhosis and splenomegaly per imaging studies.SHe has been recently found to have hepatitis C and has appt to see Dr Whitehead  Pt is not having worsening SOB and no extreme fatigue  Pt had 2 doses of IV iron and Hb 8.9 increase to above 13  Ferritin was 15 and now above 300     She submitted stool samples in past     Past Medical History:   Diagnosis Date    Acid reflux     Ascites 03/08/2017    Cirrhosis     Hepatitis C     Hiatal hernia     Renal cyst 03/08/2017    Thrombocytopenia        Current Outpatient Prescriptions:     cyclobenzaprine (FLEXERIL) 10 MG tablet, Take 1 tablet(s) 3 TIMES A DAY by oral route as needed for muscle spasm. May make you drowsy. Do not drive while on medication., Disp: , Rfl:     ferrous sulfate 325 (65 FE) MG EC tablet, Take 325 mg by mouth 3 (three) times daily with meals., Disp: , Rfl:     furosemide (LASIX) 40 MG tablet, Take 40 mg by mouth once daily., Disp: , Rfl: 3    gabapentin (NEURONTIN) 300 MG capsule, Take 300 mg by mouth 3 (three) times daily., Disp: , Rfl:     pantoprazole (PROTONIX) 40 MG tablet, Take 1 tablet (40 mg total) by mouth once daily., Disp: 30 tablet, Rfl: 2    spironolactone (ALDACTONE) 100 MG tablet, Take 100 mg by mouth once daily., Disp: , Rfl: 3    aluminum & magnesium hydroxide-simethicone (MAALOX MAXIMUM STRENGTH) 400-400-40 mg/5 mL suspension, Take 15 mLs by mouth every 6 (six) hours as needed for Indigestion., Disp: 335 mL, Rfl: 0    CONSTITUTIONAL: No fevers, chills, night sweats, wt. loss, appetite changes  SKIN: no rashes or itching  ENT: No headaches, head trauma, vision changes, or eye pain  LYMPH NODES: None noticed   CV: No chest pain, palpitations.   RESP: No dyspnea on exertion, cough, wheezing, or hemoptysis  GI: No nausea, emesis, diarrhea, constipation, melena, hematochezia, pain.   : No dysuria,  "hematuria, urgency, or frequency   HEME: No easy bruising, bleeding problems  PSYCHIATRIC: No depression, anxiety, psychosis, hallucinations.  NEURO: No seizures, memory loss, dizziness or difficulty sleeping  MSK: No arthralgias or joint swelling         /62   Pulse 102   Temp 98.3 °F (36.8 °C) (Oral)   Resp 17   Ht 5' 1" (1.549 m)   Wt 116.7 kg (257 lb 4.4 oz)   BMI 48.61 kg/m²   Gen: NAD, A and O x3,   Psych: pleasant affect, normal thought process  Eyes: Pupils round and non dilated, EOM intact  Nose: Nares patent  OP clear, mucosa patent  Neck: suppple, no JVD, trachea midline, no palpable mass, no adenopathy  Lungs: CTAB, no wheezes, no use of accessory muscles  CV: S1S2 with RRR, No mrg  Abd: soft, NTND, + BS, No HSM, no ascites  Extr: No CCE, SHANT, strength normal, good capillary refill  Neuro: steady gait, CNs grossly intact  Skin: intact, no lesions noted  Rheum: No joint swelling    Lab Results   Component Value Date    WBC 6.90 03/23/2018    HGB 13.4 03/23/2018    HCT 41.4 03/23/2018    MCV 93 03/23/2018    PLT 65 (L) 03/23/2018     CMP  Sodium   Date Value Ref Range Status   03/23/2018 135 (L) 136 - 145 mmol/L Final     Potassium   Date Value Ref Range Status   03/23/2018 3.6 3.5 - 5.1 mmol/L Final     Chloride   Date Value Ref Range Status   03/23/2018 103 95 - 110 mmol/L Final     CO2   Date Value Ref Range Status   03/23/2018 26 23 - 29 mmol/L Final     Glucose   Date Value Ref Range Status   03/23/2018 171 (H) 70 - 110 mg/dL Final     BUN, Bld   Date Value Ref Range Status   03/23/2018 4 (L) 6 - 20 mg/dL Final     Creatinine   Date Value Ref Range Status   03/23/2018 0.7 0.5 - 1.4 mg/dL Final     Calcium   Date Value Ref Range Status   03/23/2018 8.2 (L) 8.7 - 10.5 mg/dL Final     Total Protein   Date Value Ref Range Status   03/23/2018 6.5 6.0 - 8.4 g/dL Final     Albumin   Date Value Ref Range Status   03/23/2018 2.8 (L) 3.5 - 5.2 g/dL Final     Total Bilirubin   Date Value Ref Range " Status   03/23/2018 1.6 (H) 0.1 - 1.0 mg/dL Final     Comment:     For infants and newborns, interpretation of results should be based  on gestational age, weight and in agreement with clinical  observations.  Premature Infant recommended reference ranges:  Up to 24 hours.............<8.0 mg/dL  Up to 48 hours............<12.0 mg/dL  3-5 days..................<15.0 mg/dL  6-29 days.................<15.0 mg/dL       Alkaline Phosphatase   Date Value Ref Range Status   03/23/2018 210 (H) 55 - 135 U/L Final     AST   Date Value Ref Range Status   03/23/2018 36 10 - 40 U/L Final     ALT   Date Value Ref Range Status   03/23/2018 25 10 - 44 U/L Final     Anion Gap   Date Value Ref Range Status   03/23/2018 6 (L) 8 - 16 mmol/L Final     eGFR if    Date Value Ref Range Status   03/23/2018 >60 >60 mL/min/1.73 m^2 Final     eGFR if non    Date Value Ref Range Status   03/23/2018 >60 >60 mL/min/1.73 m^2 Final     Comment:     Calculation used to obtain the estimated glomerular filtration  rate (eGFR) is the CKD-EPI equation.      platelet  trend   96 to 53 to 77 to 65      1.  Cirrhosis  2.  Splenomegaly  3.  Hepatitis C to see GI soon  4.  Thrombocytopenia with progression of counts  5.  Granulocytosis with no evidence of infection  6.  Hyperbilirubinemia  7.  Hypoalbuminemia  8.  Hyperglycemia adhere to a low glucose diet  9.  Iron deficiency resolved after 2 doses of iron    Patient needs to decrease intake of sugar and should increase her protein  Return to clinic in 2 months just for routine evaluation of counts to make sure that her platelets have not dropped further and to make sure that she does not develop recurrent anemia      Thank you for allowing me to evaluate and participate in the care of this pleasant patient. Please fell free to call me with any questions or concerns.    Elizabet Goel MD    This note was dictated with Dragon and briefly proofread. Please excuse any  sentences that may be unclear or words misspelled    ADDENDUM    Start iV iron times 2

## 2018-03-23 NOTE — TELEPHONE ENCOUNTER
----- Message from Elizabet Valencia MD sent at 3/23/2018  1:27 PM CDT -----  plesae fax this manually   Her OV note to her PCP :)

## 2018-03-28 ENCOUNTER — PATIENT MESSAGE (OUTPATIENT)
Dept: ENDOSCOPY | Facility: HOSPITAL | Age: 53
End: 2018-03-28

## 2018-05-16 ENCOUNTER — TELEPHONE (OUTPATIENT)
Dept: HEMATOLOGY/ONCOLOGY | Facility: CLINIC | Age: 53
End: 2018-05-16

## 2018-05-16 ENCOUNTER — LAB VISIT (OUTPATIENT)
Dept: LAB | Facility: HOSPITAL | Age: 53
End: 2018-05-16
Attending: INTERNAL MEDICINE
Payer: MEDICAID

## 2018-05-16 DIAGNOSIS — D69.6 THROMBOCYTOPENIA: ICD-10-CM

## 2018-05-16 DIAGNOSIS — Z86.2 HISTORY OF ANEMIA: ICD-10-CM

## 2018-05-16 DIAGNOSIS — Z86.39 HISTORY OF IRON DEFICIENCY: ICD-10-CM

## 2018-05-16 LAB
ALBUMIN SERPL BCP-MCNC: 3 G/DL
ALP SERPL-CCNC: 209 U/L
ALT SERPL W/O P-5'-P-CCNC: 24 U/L
ANION GAP SERPL CALC-SCNC: 10 MMOL/L
AST SERPL-CCNC: 33 U/L
BASOPHILS # BLD AUTO: 0.1 K/UL
BASOPHILS NFR BLD: 0.8 %
BILIRUB SERPL-MCNC: 1.7 MG/DL
BUN SERPL-MCNC: 6 MG/DL
CALCIUM SERPL-MCNC: 8.7 MG/DL
CHLORIDE SERPL-SCNC: 104 MMOL/L
CO2 SERPL-SCNC: 23 MMOL/L
CREAT SERPL-MCNC: 0.8 MG/DL
DIFFERENTIAL METHOD: ABNORMAL
EOSINOPHIL # BLD AUTO: 0.4 K/UL
EOSINOPHIL NFR BLD: 5.1 %
ERYTHROCYTE [DISTWIDTH] IN BLOOD BY AUTOMATED COUNT: 15.5 %
EST. GFR  (AFRICAN AMERICAN): >60 ML/MIN/1.73 M^2
EST. GFR  (NON AFRICAN AMERICAN): >60 ML/MIN/1.73 M^2
FERRITIN SERPL-MCNC: 168 NG/ML
GLUCOSE SERPL-MCNC: 138 MG/DL
HCT VFR BLD AUTO: 43 %
HGB BLD-MCNC: 14.3 G/DL
IRON SERPL-MCNC: 55 UG/DL
LYMPHOCYTES # BLD AUTO: 1 K/UL
LYMPHOCYTES NFR BLD: 15 %
MCH RBC QN AUTO: 32 PG
MCHC RBC AUTO-ENTMCNC: 33.2 G/DL
MCV RBC AUTO: 96 FL
MONOCYTES # BLD AUTO: 0.4 K/UL
MONOCYTES NFR BLD: 6 %
NEUTROPHILS # BLD AUTO: 5.1 K/UL
NEUTROPHILS NFR BLD: 73.1 %
PLATELET # BLD AUTO: 68 K/UL
PMV BLD AUTO: 9.7 FL
POTASSIUM SERPL-SCNC: 3.7 MMOL/L
PROT SERPL-MCNC: 6.6 G/DL
RBC # BLD AUTO: 4.46 M/UL
SATURATED IRON: 19 %
SODIUM SERPL-SCNC: 137 MMOL/L
TOTAL IRON BINDING CAPACITY: 290 UG/DL
TRANSFERRIN SERPL-MCNC: 196 MG/DL
WBC # BLD AUTO: 6.9 K/UL

## 2018-05-16 PROCEDURE — 82728 ASSAY OF FERRITIN: CPT

## 2018-05-16 PROCEDURE — 85025 COMPLETE CBC W/AUTO DIFF WBC: CPT

## 2018-05-16 PROCEDURE — 80053 COMPREHEN METABOLIC PANEL: CPT

## 2018-05-16 PROCEDURE — 36415 COLL VENOUS BLD VENIPUNCTURE: CPT

## 2018-05-16 PROCEDURE — 83540 ASSAY OF IRON: CPT

## 2018-05-16 NOTE — TELEPHONE ENCOUNTER
Message left instructing patient to call [phone] ext [phone] or [phone] to r/s appointment on 5/23/18

## 2018-05-17 ENCOUNTER — OFFICE VISIT (OUTPATIENT)
Dept: GASTROENTEROLOGY | Facility: CLINIC | Age: 53
End: 2018-05-17
Payer: MEDICAID

## 2018-05-17 VITALS
SYSTOLIC BLOOD PRESSURE: 124 MMHG | HEART RATE: 102 BPM | TEMPERATURE: 98 F | DIASTOLIC BLOOD PRESSURE: 66 MMHG | WEIGHT: 258.63 LBS | HEIGHT: 61 IN | BODY MASS INDEX: 48.83 KG/M2

## 2018-05-17 DIAGNOSIS — E66.01 MORBID OBESITY: ICD-10-CM

## 2018-05-17 DIAGNOSIS — R18.8 CIRRHOSIS OF LIVER WITH ASCITES, UNSPECIFIED HEPATIC CIRRHOSIS TYPE: Primary | ICD-10-CM

## 2018-05-17 DIAGNOSIS — M19.90 ARTHRITIS: ICD-10-CM

## 2018-05-17 DIAGNOSIS — D50.0 IRON DEFICIENCY ANEMIA DUE TO CHRONIC BLOOD LOSS: ICD-10-CM

## 2018-05-17 DIAGNOSIS — K21.9 GASTROESOPHAGEAL REFLUX DISEASE, ESOPHAGITIS PRESENCE NOT SPECIFIED: ICD-10-CM

## 2018-05-17 DIAGNOSIS — B19.20 HEPATITIS C VIRUS INFECTION WITHOUT HEPATIC COMA, UNSPECIFIED CHRONICITY: ICD-10-CM

## 2018-05-17 DIAGNOSIS — K74.60 CIRRHOSIS OF LIVER WITH ASCITES, UNSPECIFIED HEPATIC CIRRHOSIS TYPE: Primary | ICD-10-CM

## 2018-05-17 DIAGNOSIS — H25.013 CORTICAL AGE-RELATED CATARACT OF BOTH EYES: ICD-10-CM

## 2018-05-17 PROCEDURE — 99205 OFFICE O/P NEW HI 60 MIN: CPT | Mod: ,,, | Performed by: INTERNAL MEDICINE

## 2018-05-17 RX ORDER — ALUMINUM HYDROXIDE, MAGNESIUM HYDROXIDE, AND SIMETHICONE 2400; 240; 2400 MG/30ML; MG/30ML; MG/30ML
SUSPENSION ORAL EVERY 6 HOURS PRN
COMMUNITY

## 2018-05-24 ENCOUNTER — OFFICE VISIT (OUTPATIENT)
Dept: HEMATOLOGY/ONCOLOGY | Facility: CLINIC | Age: 53
End: 2018-05-24
Payer: MEDICAID

## 2018-05-24 VITALS
HEART RATE: 111 BPM | TEMPERATURE: 98 F | BODY MASS INDEX: 48.32 KG/M2 | WEIGHT: 255.94 LBS | HEIGHT: 61 IN | RESPIRATION RATE: 20 BRPM | DIASTOLIC BLOOD PRESSURE: 83 MMHG | SYSTOLIC BLOOD PRESSURE: 126 MMHG

## 2018-05-24 DIAGNOSIS — D64.9 ANEMIA, UNSPECIFIED TYPE: Primary | ICD-10-CM

## 2018-05-24 PROCEDURE — 99213 OFFICE O/P EST LOW 20 MIN: CPT | Mod: PBBFAC,PO | Performed by: INTERNAL MEDICINE

## 2018-05-24 PROCEDURE — 99999 PR PBB SHADOW E&M-EST. PATIENT-LVL III: CPT | Mod: PBBFAC,,, | Performed by: INTERNAL MEDICINE

## 2018-05-24 PROCEDURE — 99213 OFFICE O/P EST LOW 20 MIN: CPT | Mod: S$PBB,,, | Performed by: INTERNAL MEDICINE

## 2018-05-24 NOTE — PROGRESS NOTES
I am having a period    Karen Villarreal is a 53 y.o.  Pt is here for evaluation of thrombocytopenia and iron deficiency   SHe has documented cirrhosis and splenomegaly per imaging studies.SHe has been recently found to have hepatitis C and has appt to see Dr Whitehead    Pt had 2 doses of IV iron and Hb 8.9 increase to above 13  Pt had not had a period in almost a year and now has profuse bleeding lasting 16 days  She has seen GYN and has an appt to follow up with such       Past Medical History:   Diagnosis Date    Acid reflux     Anemia     Arthritis     Ascites 03/08/2017    Cataract     Cirrhosis     Depression     Hepatitis C     Hiatal hernia     Renal cyst 03/08/2017    Thrombocytopenia        Current Outpatient Prescriptions:     aluminum & magnesium hydroxide-simethicone (MAALOX MAXIMUM STRENGTH) 400-400-40 mg/5 mL suspension, Take by mouth every 6 (six) hours as needed for Indigestion., Disp: , Rfl:     cyclobenzaprine (FLEXERIL) 10 MG tablet, Take 1 tablet(s) 3 TIMES A DAY by oral route as needed for muscle spasm. May make you drowsy. Do not drive while on medication., Disp: , Rfl:     ferrous sulfate 325 (65 FE) MG EC tablet, Take 325 mg by mouth once daily. , Disp: , Rfl:     furosemide (LASIX) 40 MG tablet, Take 40 mg by mouth once daily., Disp: , Rfl: 3    gabapentin (NEURONTIN) 300 MG capsule, Take 300 mg by mouth 3 (three) times daily., Disp: , Rfl:     pantoprazole (PROTONIX) 40 MG tablet, Take 1 tablet (40 mg total) by mouth once daily., Disp: 30 tablet, Rfl: 2    spironolactone (ALDACTONE) 100 MG tablet, Take 100 mg by mouth once daily., Disp: , Rfl: 3    CONSTITUTIONAL: No fevers, chills, night sweats, wt. loss, appetite changes  SKIN: no rashes or itching  ENT: No headaches, head trauma, vision changes, or eye pain  LYMPH NODES: None noticed   CV: No chest pain, palpitations.   RESP: No dyspnea on exertion, cough, wheezing, or hemoptysis  GI: No nausea, emesis, diarrhea,  "constipation, melena, hematochezia, pain.   : No dysuria, hematuria, urgency, or frequency   HEME: No easy bruising, bleeding problems  PSYCHIATRIC: No depression, anxiety, psychosis, hallucinations.  NEURO: No seizures, memory loss, dizziness or difficulty sleeping  MSK: No arthralgias or joint swelling         /83 (BP Location: Right arm, Patient Position: Sitting, BP Method: Medium (Automatic))   Pulse (!) 111   Temp 98.4 °F (36.9 °C) (Oral)   Resp 20   Ht 5' 1" (1.549 m)   Wt 116.1 kg (255 lb 15.3 oz)   BMI 48.36 kg/m²   Gen: NAD, A and O x3,   Psych: pleasant affect, normal thought process  Eyes: Pupils round and non dilated, EOM intact  Nose: Nares patent  OP clear, mucosa patent  Neck: suppple, no JVD, trachea midline, no palpable mass, no adenopathy  Lungs: CTAB, no wheezes, no use of accessory muscles  CV: S1S2 with RRR, No mrg  Abd: soft, NTND, + BS, No HSM, no ascites  Extr: No CCE, SHANT, strength normal, good capillary refill  Neuro: steady gait, CNs grossly intact  Skin: intact, no lesions noted  Rheum: No joint swelling    Lab Results   Component Value Date    WBC 6.90 05/16/2018    HGB 14.3 05/16/2018    HCT 43.0 05/16/2018    MCV 96 05/16/2018    PLT 68 (L) 05/16/2018     CMP  Sodium   Date Value Ref Range Status   05/16/2018 137 136 - 145 mmol/L Final     Potassium   Date Value Ref Range Status   05/16/2018 3.7 3.5 - 5.1 mmol/L Final     Chloride   Date Value Ref Range Status   05/16/2018 104 95 - 110 mmol/L Final     CO2   Date Value Ref Range Status   05/16/2018 23 23 - 29 mmol/L Final     Glucose   Date Value Ref Range Status   05/16/2018 138 (H) 70 - 110 mg/dL Final     BUN, Bld   Date Value Ref Range Status   05/16/2018 6 6 - 20 mg/dL Final     Creatinine   Date Value Ref Range Status   05/16/2018 0.8 0.5 - 1.4 mg/dL Final     Calcium   Date Value Ref Range Status   05/16/2018 8.7 8.7 - 10.5 mg/dL Final     Total Protein   Date Value Ref Range Status   05/16/2018 6.6 6.0 - 8.4 " g/dL Final     Albumin   Date Value Ref Range Status   05/16/2018 3.0 (L) 3.5 - 5.2 g/dL Final     Total Bilirubin   Date Value Ref Range Status   05/16/2018 1.7 (H) 0.1 - 1.0 mg/dL Final     Comment:     For infants and newborns, interpretation of results should be based  on gestational age, weight and in agreement with clinical  observations.  Premature Infant recommended reference ranges:  Up to 24 hours.............<8.0 mg/dL  Up to 48 hours............<12.0 mg/dL  3-5 days..................<15.0 mg/dL  6-29 days.................<15.0 mg/dL       Alkaline Phosphatase   Date Value Ref Range Status   05/16/2018 209 (H) 55 - 135 U/L Final     AST   Date Value Ref Range Status   05/16/2018 33 10 - 40 U/L Final     ALT   Date Value Ref Range Status   05/16/2018 24 10 - 44 U/L Final     Anion Gap   Date Value Ref Range Status   05/16/2018 10 8 - 16 mmol/L Final     eGFR if    Date Value Ref Range Status   05/16/2018 >60 >60 mL/min/1.73 m^2 Final     eGFR if non    Date Value Ref Range Status   05/16/2018 >60 >60 mL/min/1.73 m^2 Final     Comment:     Calculation used to obtain the estimated glomerular filtration  rate (eGFR) is the CKD-EPI equation.      platelet  trend   96 to 53 to 77 to 65      1.  Cirrhosis  2.  Splenomegaly  3.  Hepatitis C to see GI soon  4.  Thrombocytopenia with progression of counts  5.  Granulocytosis with no evidence of infection  6.  Hyperbilirubinemia  7.  Hypoalbuminemia  8.  Hyperglycemia adhere to a low glucose diet  9.  Iron deficiency resolved after 2 doses of iron    Diet discussed to intake platelets for over 15 minutes  If her period continues by Monday she will phone me and I will transfuse one unit of platelets   RTC ]with cbc and diff  Proceed with workup with dr Whitehead and start HCV treatment  She may need uterine ablation to prevent further bleeding from thrombocytopenia related to her cirrhosis           Thank you for allowing me to evaluate  and participate in the care of this pleasant patient. Please fell free to call me with any questions or concerns.    Warmly,  Elizabet Valencia MD    This note was dictated with Dragon and briefly proofread. Please excuse any sentences that may be unclear or words misspelled    ADDENDUM    Start iV iron times 2

## 2018-05-25 DIAGNOSIS — D50.9 IRON DEFICIENCY ANEMIA, UNSPECIFIED IRON DEFICIENCY ANEMIA TYPE: Primary | ICD-10-CM

## 2018-05-27 NOTE — PROGRESS NOTES
[facility] New Patient Visit    Subjective:           PCP: Shama Mccoy    Referring Provider: No ref. provider found    Chief Complaint: Hepatitis C       HPI:  Karen Villarreal is a 53 y.o. female here for her Hep C. Patient has morbid obesity, htn, age-related nuclear cataract, melena, miranda, chronic Hep C with cirrhosis, ascites, and thrombocytopenia. Lab work and X-Rays have been ordered. An US and CT scan have been ordered.     ROS:   Constitutional: No fevers, chills, weight loss  ENT: No allergies, sore throat, rhinorrhea  CV: No chest pain, palpitations, edema  Pulm: No cough, shortness of breath, wheezing  Ophtho: No vision changes  GI: No blood in stools, change in bowel habits, nausea/vomiting  Denies alternating diarrhea/constipation.   Derm: No rash  Heme: No easy bruising or lymphadenopathy  MSK: No arthralgias or myalgias  : No dysuria, hematuria, frequency, polyuria, or flank tenderness  Endo: No hot or cold intolerance  Neuro: No syncope or seizure, or dizziness  Psych: No hallucination, depression or suicidal ideation      Medical History:  has a past medical history of Acid reflux; Anemia; Arthritis; Ascites (03/08/2017); Cataract; Cirrhosis; Depression; Hepatitis C; Hiatal hernia; Renal cyst (03/08/2017); and Thrombocytopenia.      Surgical History:  has a past surgical history that includes Appendectomy; Ankle surgery; and Cataract extraction.    Family History:   Family History   Problem Relation Age of Onset    Heart failure Mother     COPD Mother     Arthritis Mother     Vision loss Mother     Diabetes Father     Cancer Father     Brain cancer Father     Diabetes type II Father        Social History:   Social History   Substance Use Topics    Smoking status: Current Some Day Smoker     Packs/day: 0.50     Years: 33.00     Types: Cigarettes    Smokeless tobacco: Never Used    Alcohol use Yes      Comment: several times a week (beer)       The patient's  "social and family histories were reviewed by me and updated in the appropriate section of the electronic medical record.    Allergies: Review of patient's allergies indicates:  No Known Allergies    Medications:   Current Outpatient Prescriptions   Medication Sig Dispense Refill    aluminum & magnesium hydroxide-simethicone (MAALOX MAXIMUM STRENGTH) 400-400-40 mg/5 mL suspension Take by mouth every 6 (six) hours as needed for Indigestion.      cyclobenzaprine (FLEXERIL) 10 MG tablet Take 1 tablet(s) 3 TIMES A DAY by oral route as needed for muscle spasm. May make you drowsy. Do not drive while on medication.      ferrous sulfate 325 (65 FE) MG EC tablet Take 325 mg by mouth once daily.       furosemide (LASIX) 40 MG tablet Take 40 mg by mouth once daily.  3    gabapentin (NEURONTIN) 300 MG capsule Take 300 mg by mouth 3 (three) times daily.      pantoprazole (PROTONIX) 40 MG tablet Take 1 tablet (40 mg total) by mouth once daily. 30 tablet 2    spironolactone (ALDACTONE) 100 MG tablet Take 100 mg by mouth once daily.  3     No current facility-administered medications for this visit.      All medications and past history have been reviewed.        Objective:        Vital Signs:  Blood pressure 124/66, pulse 102, temperature 97.9 °F (36.6 °C), height 5' 1" (1.549 m), weight 117.3 kg (258 lb 9.6 oz). Body mass index is 48.86 kg/m².    Physical Exam:   General Appearance: Well appearing in no acute distress, well developed, well                 nourished  Head: Normocephalic, without obvious abnormality  Eyes:  Pupils equal, round and reactive to light  Throat: Lips, mucosa, and tongue normal; teeth and gums normal  Lungs: CTA bilaterally in anterior and posterior fields, no wheezes, no crackles  Heart:  Regular rate and rhythm, no murmurs heard  Abdomen: Soft, non tender, non distended with positive bowel sounds in all four quadrants. No hepatosplenomegaly, ascites, or mass. Negative for succusion splash  : " female no hernia  Extremities: No cyanosis, edema or deformity  Skin: No rash  Neurologic: Normal exam      Labs/Imaging:  All lab results and imaging results have been reviewed and discussed with the patient.    Assessment/Plan:    Assessment:       1. Cirrhosis of liver with ascites, unspecified hepatic cirrhosis type    2. Hepatitis C virus infection without hepatic coma, unspecified chronicity    3. Arthritis    4. Iron deficiency anemia due to chronic blood loss    5. Gastroesophageal reflux disease, esophagitis presence not specified    6. Morbid obesity    7. Cortical age-related cataract of both eyes        Plan:       Karen was seen today for hepatitis c.    Diagnoses and all orders for this visit:    Cirrhosis of liver with ascites, unspecified hepatic cirrhosis type  -     Sedimentation rate, manual; Future  -     Magnesium; Future  -     Phosphorus; Future  -     C-reactive protein; Future  -     TSH; Future  -     T4, free; Future  -     T3, free; Future  -     Hepatitis B Surface Antibody, Qual/Quant; Future  -     Hepatitis C RNA, quantitative, PCR; Future  -     Hepatitis C genotype; Future  -     Hepatitis B core antibody, total; Future  -     Hepatitis A antibody, total; Future  -     CT Abdomen Pelvis With Contrast  -     US Abdomen Limited  -     CEA; Future  -     Cancer antigen 19-9; Future  -     AFP tumor marker; Future  -     Sedimentation rate, manual  -     Magnesium  -     Phosphorus  -     C-reactive protein  -     TSH  -     T4, free  -     T3, free  -     Hepatitis B Surface Antibody, Qual/Quant  -     Hepatitis C RNA, quantitative, PCR  -     Hepatitis C genotype  -     Hepatitis B core antibody, total  -     Hepatitis A antibody, total  -     CEA  -     Cancer antigen 19-9  -     AFP tumor marker    Hepatitis C virus infection without hepatic coma, unspecified chronicity  -     Sedimentation rate, manual; Future  -     Magnesium; Future  -     Phosphorus; Future  -     C-reactive  protein; Future  -     TSH; Future  -     T4, free; Future  -     T3, free; Future  -     Hepatitis B Surface Antibody, Qual/Quant; Future  -     Hepatitis C RNA, quantitative, PCR; Future  -     Hepatitis C genotype; Future  -     Hepatitis B core antibody, total; Future  -     Hepatitis A antibody, total; Future  -     CT Abdomen Pelvis With Contrast  -     US Abdomen Limited  -     CEA; Future  -     Cancer antigen 19-9; Future  -     AFP tumor marker; Future  -     Sedimentation rate, manual  -     Magnesium  -     Phosphorus  -     C-reactive protein  -     TSH  -     T4, free  -     T3, free  -     Hepatitis B Surface Antibody, Qual/Quant  -     Hepatitis C RNA, quantitative, PCR  -     Hepatitis C genotype  -     Hepatitis B core antibody, total  -     Hepatitis A antibody, total  -     CEA  -     Cancer antigen 19-9  -     AFP tumor marker    Arthritis  -     Sedimentation rate, manual; Future  -     Magnesium; Future  -     Phosphorus; Future  -     C-reactive protein; Future  -     TSH; Future  -     T4, free; Future  -     T3, free; Future  -     Hepatitis B Surface Antibody, Qual/Quant; Future  -     Hepatitis C RNA, quantitative, PCR; Future  -     Hepatitis C genotype; Future  -     Hepatitis B core antibody, total; Future  -     Hepatitis A antibody, total; Future  -     CT Abdomen Pelvis With Contrast  -     US Abdomen Limited  -     CEA; Future  -     Cancer antigen 19-9; Future  -     AFP tumor marker; Future  -     Sedimentation rate, manual  -     Magnesium  -     Phosphorus  -     C-reactive protein  -     TSH  -     T4, free  -     T3, free  -     Hepatitis B Surface Antibody, Qual/Quant  -     Hepatitis C RNA, quantitative, PCR  -     Hepatitis C genotype  -     Hepatitis B core antibody, total  -     Hepatitis A antibody, total  -     CEA  -     Cancer antigen 19-9  -     AFP tumor marker    Iron deficiency anemia due to chronic blood loss  -     Sedimentation rate, manual; Future  -      Magnesium; Future  -     Phosphorus; Future  -     C-reactive protein; Future  -     TSH; Future  -     T4, free; Future  -     T3, free; Future  -     Hepatitis B Surface Antibody, Qual/Quant; Future  -     Hepatitis C RNA, quantitative, PCR; Future  -     Hepatitis C genotype; Future  -     Hepatitis B core antibody, total; Future  -     Hepatitis A antibody, total; Future  -     CT Abdomen Pelvis With Contrast  -     US Abdomen Limited  -     CEA; Future  -     Cancer antigen 19-9; Future  -     AFP tumor marker; Future  -     Sedimentation rate, manual  -     Magnesium  -     Phosphorus  -     C-reactive protein  -     TSH  -     T4, free  -     T3, free  -     Hepatitis B Surface Antibody, Qual/Quant  -     Hepatitis C RNA, quantitative, PCR  -     Hepatitis C genotype  -     Hepatitis B core antibody, total  -     Hepatitis A antibody, total  -     CEA  -     Cancer antigen 19-9  -     AFP tumor marker    Gastroesophageal reflux disease, esophagitis presence not specified  -     Sedimentation rate, manual; Future  -     Magnesium; Future  -     Phosphorus; Future  -     C-reactive protein; Future  -     TSH; Future  -     T4, free; Future  -     T3, free; Future  -     Hepatitis B Surface Antibody, Qual/Quant; Future  -     Hepatitis C RNA, quantitative, PCR; Future  -     Hepatitis C genotype; Future  -     Hepatitis B core antibody, total; Future  -     Hepatitis A antibody, total; Future  -     CT Abdomen Pelvis With Contrast  -     US Abdomen Limited  -     CEA; Future  -     Cancer antigen 19-9; Future  -     AFP tumor marker; Future  -     Sedimentation rate, manual  -     Magnesium  -     Phosphorus  -     C-reactive protein  -     TSH  -     T4, free  -     T3, free  -     Hepatitis B Surface Antibody, Qual/Quant  -     Hepatitis C RNA, quantitative, PCR  -     Hepatitis C genotype  -     Hepatitis B core antibody, total  -     Hepatitis A antibody, total  -     CEA  -     Cancer antigen 19-9  -      AFP tumor marker    Morbid obesity  -     Sedimentation rate, manual; Future  -     Magnesium; Future  -     Phosphorus; Future  -     C-reactive protein; Future  -     TSH; Future  -     T4, free; Future  -     T3, free; Future  -     Hepatitis B Surface Antibody, Qual/Quant; Future  -     Hepatitis C RNA, quantitative, PCR; Future  -     Hepatitis C genotype; Future  -     Hepatitis B core antibody, total; Future  -     Hepatitis A antibody, total; Future  -     CT Abdomen Pelvis With Contrast  -     US Abdomen Limited  -     CEA; Future  -     Cancer antigen 19-9; Future  -     AFP tumor marker; Future  -     Sedimentation rate, manual  -     Magnesium  -     Phosphorus  -     C-reactive protein  -     TSH  -     T4, free  -     T3, free  -     Hepatitis B Surface Antibody, Qual/Quant  -     Hepatitis C RNA, quantitative, PCR  -     Hepatitis C genotype  -     Hepatitis B core antibody, total  -     Hepatitis A antibody, total  -     CEA  -     Cancer antigen 19-9  -     AFP tumor marker    Cortical age-related cataract of both eyes  -     Sedimentation rate, manual; Future  -     Magnesium; Future  -     Phosphorus; Future  -     C-reactive protein; Future  -     TSH; Future  -     T4, free; Future  -     T3, free; Future  -     Hepatitis B Surface Antibody, Qual/Quant; Future  -     Hepatitis C RNA, quantitative, PCR; Future  -     Hepatitis C genotype; Future  -     Hepatitis B core antibody, total; Future  -     Hepatitis A antibody, total; Future  -     CT Abdomen Pelvis With Contrast  -     US Abdomen Limited  -     CEA; Future  -     Cancer antigen 19-9; Future  -     AFP tumor marker; Future  -     Sedimentation rate, manual  -     Magnesium  -     Phosphorus  -     C-reactive protein  -     TSH  -     T4, free  -     T3, free  -     Hepatitis B Surface Antibody, Qual/Quant  -     Hepatitis C RNA, quantitative, PCR  -     Hepatitis C genotype  -     Hepatitis B core antibody, total  -     Hepatitis A  antibody, total  -     CEA  -     Cancer antigen 19-9  -     AFP tumor marker        See HPI    Follow-up in about 2 months (around 7/17/2018).    The plan was discussed with the patient and all questions/concerns have been answered to the patient's satisfaction.    CC: No ref. provider found    Electronically signed by Leigha Whitehead MD    This note was dictated using voice recognition software and may contain grammatical errors.

## 2018-05-30 ENCOUNTER — TELEPHONE (OUTPATIENT)
Dept: HEMATOLOGY/ONCOLOGY | Facility: CLINIC | Age: 53
End: 2018-05-30

## 2018-05-30 NOTE — TELEPHONE ENCOUNTER
----- Message from RT Marin sent at 5/30/2018  2:01 PM CDT -----  Contact: pt    pt , requesting a call back soon she is seeking medical advise for her vaginal bleeding, thanks.

## 2018-05-30 NOTE — TELEPHONE ENCOUNTER
Patient scheduled for next day appointment per Dr. Valencia. Patient verbalized understanding of place and time.

## 2018-05-31 ENCOUNTER — LAB VISIT (OUTPATIENT)
Dept: LAB | Facility: HOSPITAL | Age: 53
End: 2018-05-31
Attending: INTERNAL MEDICINE
Payer: MEDICAID

## 2018-05-31 ENCOUNTER — OFFICE VISIT (OUTPATIENT)
Dept: HEMATOLOGY/ONCOLOGY | Facility: CLINIC | Age: 53
End: 2018-05-31
Payer: MEDICAID

## 2018-05-31 VITALS
BODY MASS INDEX: 43.44 KG/M2 | RESPIRATION RATE: 19 BRPM | HEIGHT: 64 IN | WEIGHT: 254.44 LBS | DIASTOLIC BLOOD PRESSURE: 76 MMHG | SYSTOLIC BLOOD PRESSURE: 120 MMHG | HEART RATE: 111 BPM | TEMPERATURE: 99 F

## 2018-05-31 DIAGNOSIS — D64.9 ANEMIA, UNSPECIFIED TYPE: ICD-10-CM

## 2018-05-31 DIAGNOSIS — D64.9 ANEMIA, UNSPECIFIED TYPE: Primary | ICD-10-CM

## 2018-05-31 DIAGNOSIS — N93.9 VAGINA BLEEDING: Primary | ICD-10-CM

## 2018-05-31 LAB
ALBUMIN SERPL BCP-MCNC: 3 G/DL
ALP SERPL-CCNC: 211 U/L
ALT SERPL W/O P-5'-P-CCNC: 24 U/L
ANION GAP SERPL CALC-SCNC: 8 MMOL/L
AST SERPL-CCNC: 35 U/L
BASOPHILS # BLD AUTO: 0 K/UL
BASOPHILS NFR BLD: 0.6 %
BILIRUB SERPL-MCNC: 1.7 MG/DL
BUN SERPL-MCNC: 6 MG/DL
CALCIUM SERPL-MCNC: 8.8 MG/DL
CHLORIDE SERPL-SCNC: 104 MMOL/L
CO2 SERPL-SCNC: 23 MMOL/L
CREAT SERPL-MCNC: 0.8 MG/DL
DIFFERENTIAL METHOD: ABNORMAL
EOSINOPHIL # BLD AUTO: 0.2 K/UL
EOSINOPHIL NFR BLD: 2.7 %
ERYTHROCYTE [DISTWIDTH] IN BLOOD BY AUTOMATED COUNT: 15 %
EST. GFR  (AFRICAN AMERICAN): >60 ML/MIN/1.73 M^2
EST. GFR  (NON AFRICAN AMERICAN): >60 ML/MIN/1.73 M^2
GLUCOSE SERPL-MCNC: 136 MG/DL
HCT VFR BLD AUTO: 41.1 %
HGB BLD-MCNC: 13.8 G/DL
LYMPHOCYTES # BLD AUTO: 1 K/UL
LYMPHOCYTES NFR BLD: 13.8 %
MCH RBC QN AUTO: 32.3 PG
MCHC RBC AUTO-ENTMCNC: 33.7 G/DL
MCV RBC AUTO: 96 FL
MONOCYTES # BLD AUTO: 0.4 K/UL
MONOCYTES NFR BLD: 5.7 %
NEUTROPHILS # BLD AUTO: 5.4 K/UL
NEUTROPHILS NFR BLD: 77.2 %
PLATELET # BLD AUTO: 74 K/UL
PMV BLD AUTO: 9.4 FL
POTASSIUM SERPL-SCNC: 3.8 MMOL/L
PROT SERPL-MCNC: 6.9 G/DL
RBC # BLD AUTO: 4.29 M/UL
SODIUM SERPL-SCNC: 135 MMOL/L
WBC # BLD AUTO: 7 K/UL

## 2018-05-31 PROCEDURE — 99213 OFFICE O/P EST LOW 20 MIN: CPT | Mod: PBBFAC,PO | Performed by: INTERNAL MEDICINE

## 2018-05-31 PROCEDURE — 99214 OFFICE O/P EST MOD 30 MIN: CPT | Mod: S$PBB,,, | Performed by: INTERNAL MEDICINE

## 2018-05-31 PROCEDURE — 85025 COMPLETE CBC W/AUTO DIFF WBC: CPT

## 2018-05-31 PROCEDURE — 99999 PR PBB SHADOW E&M-EST. PATIENT-LVL III: CPT | Mod: PBBFAC,,, | Performed by: INTERNAL MEDICINE

## 2018-05-31 PROCEDURE — 36415 COLL VENOUS BLD VENIPUNCTURE: CPT

## 2018-05-31 PROCEDURE — 80053 COMPREHEN METABOLIC PANEL: CPT

## 2018-05-31 RX ORDER — ALBUTEROL SULFATE 90 UG/1
AEROSOL, METERED RESPIRATORY (INHALATION)
COMMUNITY

## 2018-05-31 NOTE — PROGRESS NOTES
I continue to bleed    Karen Villarreal is a 53 y.o.  Pt is here for evaluation of thrombocytopenia and iron deficiency with vaginal bleeding  SHe has documented cirrhosis and splenomegaly per imaging studies.SHe has been recently found to have hepatitis C and has appt to see Dr Whitehead    Pt continues to bleed vaginally and now  passing clots   Pt has required IV iron in past for symptomatic anemia  Pt had not had a period in almost a year and now has profuse bleeding lasting 16 days  Pt has not called gyn as instructed to last visit and her vaginal bleeding has increased     Her Cell phone [phone]    Past Medical History:   Diagnosis Date    Acid reflux     Anemia     Arthritis     Ascites 03/08/2017    Cataract     Cirrhosis     Depression     Hepatitis C     Hiatal hernia     Renal cyst 03/08/2017    Thrombocytopenia        Current Outpatient Prescriptions:     albuterol (VENTOLIN HFA) 90 mcg/actuation inhaler, Ventolin HFA 90 mcg/actuation aerosol inhaler  take 2 puffs every 4hrs as needed for cough, Disp: , Rfl:     aluminum & magnesium hydroxide-simethicone (MAALOX MAXIMUM STRENGTH) 400-400-40 mg/5 mL suspension, Take by mouth every 6 (six) hours as needed for Indigestion., Disp: , Rfl:     cyclobenzaprine (FLEXERIL) 10 MG tablet, Take 1 tablet(s) 3 TIMES A DAY by oral route as needed for muscle spasm. May make you drowsy. Do not drive while on medication., Disp: , Rfl:     ferrous sulfate 325 (65 FE) MG EC tablet, Take 325 mg by mouth once daily. , Disp: , Rfl:     furosemide (LASIX) 40 MG tablet, Take 40 mg by mouth once daily., Disp: , Rfl: 3    gabapentin (NEURONTIN) 300 MG capsule, Take 300 mg by mouth 3 (three) times daily., Disp: , Rfl:     pantoprazole (PROTONIX) 40 MG tablet, Take 1 tablet (40 mg total) by mouth once daily., Disp: 30 tablet, Rfl: 2    spironolactone (ALDACTONE) 100 MG tablet, Take 100 mg by mouth once daily., Disp: , Rfl: 3    CONSTITUTIONAL: No fevers,  "chills, night sweats, wt. loss, appetite changes  SKIN: no rashes or itching  ENT: No headaches, head trauma, vision changes, or eye pain  LYMPH NODES: None noticed   CV: No chest pain, palpitations.   RESP: No dyspnea on exertion, cough, wheezing, or hemoptysis  GI: No nausea, emesis, diarrhea, constipation, melena, hematochezia, pain.   : No dysuria, hematuria, urgency, or frequency   HEME: ++ easy bruising, ++ bleeding problems  PSYCHIATRIC: No depression, anxiety, psychosis, hallucinations.  NEURO: No seizures, memory loss, dizziness or difficulty sleeping  MSK: No arthralgias or joint swelling         /76   Pulse (!) 111   Temp 98.9 °F (37.2 °C) (Oral)   Resp 19   Ht 5' 4" (1.626 m)   Wt 115.4 kg (254 lb 6.6 oz)   BMI 43.67 kg/m²   Gen: NAD, A and O x3,   Psych: pleasant affect, normal thought process  Eyes: Pupils round and non dilated, EOM intact  Nose: Nares patent  OP clear, mucosa patent  Neck: suppple, no JVD, trachea midline, no palpable mass, no adenopathy  Lungs: CTAB, no wheezes, no use of accessory muscles  CV: S1S2 with RRR, No mrg  Abd: soft, NTND, + BS, No HSM, no ascites  Extr: No CCE, SHANT, strength normal, good capillary refill  Neuro: steady gait, CNs grossly intact  Skin: intact, no lesions noted  Rheum: No joint swelling    Lab Results   Component Value Date    WBC 6.90 05/16/2018    HGB 14.3 05/16/2018    HCT 43.0 05/16/2018    MCV 96 05/16/2018    PLT 68 (L) 05/16/2018     CMP  Sodium   Date Value Ref Range Status   05/16/2018 137 136 - 145 mmol/L Final     Potassium   Date Value Ref Range Status   05/16/2018 3.7 3.5 - 5.1 mmol/L Final     Chloride   Date Value Ref Range Status   05/16/2018 104 95 - 110 mmol/L Final     CO2   Date Value Ref Range Status   05/16/2018 23 23 - 29 mmol/L Final     Glucose   Date Value Ref Range Status   05/16/2018 138 (H) 70 - 110 mg/dL Final     BUN, Bld   Date Value Ref Range Status   05/16/2018 6 6 - 20 mg/dL Final     Creatinine   Date Value " Ref Range Status   05/16/2018 0.8 0.5 - 1.4 mg/dL Final     Calcium   Date Value Ref Range Status   05/16/2018 8.7 8.7 - 10.5 mg/dL Final     Total Protein   Date Value Ref Range Status   05/16/2018 6.6 6.0 - 8.4 g/dL Final     Albumin   Date Value Ref Range Status   05/16/2018 3.0 (L) 3.5 - 5.2 g/dL Final     Total Bilirubin   Date Value Ref Range Status   05/16/2018 1.7 (H) 0.1 - 1.0 mg/dL Final     Comment:     For infants and newborns, interpretation of results should be based  on gestational age, weight and in agreement with clinical  observations.  Premature Infant recommended reference ranges:  Up to 24 hours.............<8.0 mg/dL  Up to 48 hours............<12.0 mg/dL  3-5 days..................<15.0 mg/dL  6-29 days.................<15.0 mg/dL       Alkaline Phosphatase   Date Value Ref Range Status   05/16/2018 209 (H) 55 - 135 U/L Final     AST   Date Value Ref Range Status   05/16/2018 33 10 - 40 U/L Final     ALT   Date Value Ref Range Status   05/16/2018 24 10 - 44 U/L Final     Anion Gap   Date Value Ref Range Status   05/16/2018 10 8 - 16 mmol/L Final     eGFR if    Date Value Ref Range Status   05/16/2018 >60 >60 mL/min/1.73 m^2 Final     eGFR if non    Date Value Ref Range Status   05/16/2018 >60 >60 mL/min/1.73 m^2 Final     Comment:     Calculation used to obtain the estimated glomerular filtration  rate (eGFR) is the CKD-EPI equation.      platelet  trend   96 to 53 to 77 to 65      1.  Cirrhosis  2.  Splenomegaly  3.  Hepatitis C to see GI soon  4.  Thrombocytopenia with progression  5.  Bleeding ? Could have uterine/bladder lesion  6.  Hyperbilirubinemia  7.  Hypoalbuminemia  8.  Hyperglycemia adhere to a low glucose diet  9.  Iron deficiency resolved after 2 doses of iron    Diet discussed to intake platelets for over 15 minutes  If her period continues by Monday she will phone me and I will transfuse one unit of platelets   RTC ]with cbc and diff  Must see  gyn and possibly urology  Watching her counts closely  Explained that her hemoglobin level will not drop necessarily acutely when she is bleeding and this is worrisome.  For now she is not symptomatic with shortness of breath or chest pain she'll phone me if any symptoms develop.  Proceed with workup with dr Whitehead and start HCV treatment  She may need uterine ablation to prevent further bleeding from thrombocytopenia related to her cirrhosis       Thank you for allowing me to evaluate and participate in the care of this pleasant patient. Please fell free to call me with any questions or concerns.    Warmly,  Elizabet Valencia MD    This note was dictated with Dragon and briefly proofread. Please excuse any sentences that may be unclear or words misspelled

## 2018-06-01 ENCOUNTER — TELEPHONE (OUTPATIENT)
Dept: HEMATOLOGY/ONCOLOGY | Facility: CLINIC | Age: 53
End: 2018-06-01

## 2018-06-01 NOTE — TELEPHONE ENCOUNTER
----- Message from Carmen Joe sent at 6/1/2018  1:45 PM CDT -----  Contact: self [phone]  States that she is calling to speak to nurse regarding her labs. Please call back at [phone]//thank you acc

## 2018-06-07 LAB
CEA: 1.7 NG/ML
CRP SERPL-MCNC: 1.36 MG/DL (ref 0–1.4)
MAGNESIUM SERPL-MCNC: 1.9 MG/DL (ref 1.5–2.6)
PHOSPHATE FLD-MCNC: 2.7 MG/DL (ref 2.5–4.9)
T4 FREE SP9 P CHAL SERPL-SCNC: 0.63 NG/DL (ref 0.45–1.27)
TSH SERPL DL<=0.005 MIU/L-ACNC: 4.82 ULU/ML (ref 0.3–5.6)

## 2018-06-09 LAB
CANCER AG19-9 SERPL-ACNC: 33 U/ML (ref 0–35)
HEPATITIS B CORE AB TOTAL: NEGATIVE

## 2018-06-27 ENCOUNTER — OFFICE VISIT (OUTPATIENT)
Dept: GASTROENTEROLOGY | Facility: CLINIC | Age: 53
End: 2018-06-27
Payer: MEDICAID

## 2018-06-27 VITALS
HEIGHT: 64 IN | BODY MASS INDEX: 44.01 KG/M2 | HEART RATE: 110 BPM | RESPIRATION RATE: 16 BRPM | SYSTOLIC BLOOD PRESSURE: 142 MMHG | WEIGHT: 257.81 LBS | DIASTOLIC BLOOD PRESSURE: 72 MMHG | TEMPERATURE: 98 F

## 2018-06-27 DIAGNOSIS — B18.2 CHRONIC HEPATITIS C WITH CIRRHOSIS: Primary | ICD-10-CM

## 2018-06-27 DIAGNOSIS — B19.20 HEPATITIS C VIRUS INFECTION WITHOUT HEPATIC COMA, UNSPECIFIED CHRONICITY: ICD-10-CM

## 2018-06-27 DIAGNOSIS — K74.60 CHRONIC HEPATITIS C WITH CIRRHOSIS: Primary | ICD-10-CM

## 2018-06-27 DIAGNOSIS — D50.9 IRON DEFICIENCY ANEMIA, UNSPECIFIED IRON DEFICIENCY ANEMIA TYPE: ICD-10-CM

## 2018-06-27 DIAGNOSIS — K76.6 PORTAL HYPERTENSION: ICD-10-CM

## 2018-06-27 DIAGNOSIS — K74.60 HEPATIC CIRRHOSIS, UNSPECIFIED HEPATIC CIRRHOSIS TYPE, UNSPECIFIED WHETHER ASCITES PRESENT: ICD-10-CM

## 2018-06-27 DIAGNOSIS — E66.01 MORBID OBESITY: ICD-10-CM

## 2018-06-27 PROCEDURE — 90636 HEP A/HEP B VACC ADULT IM: CPT | Mod: ,,, | Performed by: INTERNAL MEDICINE

## 2018-06-27 PROCEDURE — 90471 IMMUNIZATION ADMIN: CPT | Mod: ,,, | Performed by: INTERNAL MEDICINE

## 2018-06-27 PROCEDURE — 99215 OFFICE O/P EST HI 40 MIN: CPT | Mod: 25,,, | Performed by: INTERNAL MEDICINE

## 2018-06-27 NOTE — PROGRESS NOTES
[facility] Established Patient Visit    Subjective:           PCP: Shama Mccoy    Chief Complaint: Follow-up (Lab results )       HPI:  Karen Villarreal is a 53 y.o. female here for f/u of her cirrhosis. Patient has morbid obesity, chronic hep c with cirrhosis, miranda, thrombocytopenia, and htn. The patient has diabetes. The patient has an abnotaml LFT. Labs and CT were reviewed in detail and explained to the patient. Patient's prognsis was also discussed. Medications and drug-drug interactions were discussed.       ROS:   Constitutional: No fevers, chills, weight loss  ENT: No allergies, sore throat, rhinorrhea  CV: No chest pain, palpitations, edema  Pulm: No cough, shortness of breath, wheezing  Ophtho: No vision changes  GI: No blood in stools, change in bowel habits, nausea/vomiting  Denies alternating diarrhea/constipation.   Derm: No rash  Heme: No easy bruising or lymphadenopathy  MSK: No arthralgias or myalgias  : No dysuria, hematuria, frequency, polyuria, or flank tenderness  Endo: No hot or cold intolerance  Neuro: No syncope or seizure, or dizziness  Psych: No hallucination, depression or suicidal ideation      Medical History:  has a past medical history of Acid reflux; Anemia; Arthritis; Ascites (03/08/2017); Cataract; Cirrhosis; Depression; Hepatitis C; Hiatal hernia; Renal cyst (03/08/2017); and Thrombocytopenia.      Surgical History:  has a past surgical history that includes Appendectomy; Ankle surgery; and Cataract extraction.    Family History:   Family History   Problem Relation Age of Onset    Heart failure Mother     COPD Mother     Arthritis Mother     Vision loss Mother     Diabetes Father     Cancer Father     Brain cancer Father     Diabetes type II Father        Social History:   Social History   Substance Use Topics    Smoking status: Current Some Day Smoker     Packs/day: 0.50     Years: 33.00     Types: Cigarettes    Smokeless tobacco: Never Used  "   Alcohol use Yes      Comment: several times a week (beer)       The patient's social and family histories were reviewed by me and updated in the appropriate section of the electronic medical record.    Allergies: Review of patient's allergies indicates:  No Known Allergies      Medications:   Current Outpatient Prescriptions   Medication Sig Dispense Refill    albuterol (VENTOLIN HFA) 90 mcg/actuation inhaler Ventolin HFA 90 mcg/actuation aerosol inhaler   take 2 puffs every 4hrs as needed for cough      aluminum & magnesium hydroxide-simethicone (MAALOX MAXIMUM STRENGTH) 400-400-40 mg/5 mL suspension Take by mouth every 6 (six) hours as needed for Indigestion.      cyclobenzaprine (FLEXERIL) 10 MG tablet Take 1 tablet(s) 3 TIMES A DAY by oral route as needed for muscle spasm. May make you drowsy. Do not drive while on medication.      ferrous sulfate 325 (65 FE) MG EC tablet Take 325 mg by mouth once daily.       furosemide (LASIX) 40 MG tablet Take 40 mg by mouth once daily.  3    gabapentin (NEURONTIN) 300 MG capsule Take 300 mg by mouth 3 (three) times daily.      pantoprazole (PROTONIX) 40 MG tablet Take 1 tablet (40 mg total) by mouth once daily. 30 tablet 2    spironolactone (ALDACTONE) 100 MG tablet Take 100 mg by mouth once daily.  3     No current facility-administered medications for this visit.      All medications and past history have been reviewed.        Objective:        Vital Signs:  Blood pressure (!) 142/72, pulse 110, temperature 97.9 °F (36.6 °C), resp. rate 16, height 5' 4" (1.626 m), weight 116.9 kg (257 lb 12.8 oz). Body mass index is 44.25 kg/m².    Physical Exam:   General Appearance: Well appearing in no acute distress, well developed, well                nourished  Head: Normocephalic, without obvious abnormality  Eyes:  Pupils equal, round and reactive to light  Throat: Lips, mucosa, and tongue normal; teeth and gums normal  Lungs: CTA bilaterally in anterior and posterior " fields, no wheezes, no crackles  Heart:  Regular rate and rhythm, no murmurs heard  Abdomen: Soft, non tender, non distended with positive bowel sounds in all four quadrants. No hepatosplenomegaly, ascites, or mass. Negative for succusion splash  : female   Extremities: No cyanosis, edema or deformity  Skin: No rash  Neurologic: Normal exam    Labs:  Lab Results   Component Value Date    WBC 7.00 05/31/2018    HGB 13.8 05/31/2018    HCT 41.1 05/31/2018    PLT 74 (L) 05/31/2018    ALT 24 05/31/2018    AST 35 05/31/2018     (L) 05/31/2018    K 3.8 05/31/2018     05/31/2018    CREATININE 0.8 05/31/2018    BUN 6 05/31/2018    CO2 23 05/31/2018    TSH 4.82 06/07/2018    INR 1.1 11/07/2017       Imaging:     All lab results and imaging results have been reviewed and discussed with the patient      Assessment:       No diagnosis found.    Plan:       There are no diagnoses linked to this encounter.    See HPI    No Follow-up on file.    The plan was discussed with the patient and all questions/concerns have been answered to the patient's satisfaction.        Electronically signed by Leigha Whitehead MD    This note was dictated using voice recognition software and may contain grammatical errors.

## 2018-07-05 ENCOUNTER — OFFICE VISIT (OUTPATIENT)
Dept: ORTHOPEDICS | Facility: CLINIC | Age: 53
End: 2018-07-05
Payer: MEDICAID

## 2018-07-05 VITALS
HEIGHT: 64 IN | HEART RATE: 103 BPM | DIASTOLIC BLOOD PRESSURE: 64 MMHG | SYSTOLIC BLOOD PRESSURE: 105 MMHG | BODY MASS INDEX: 44.66 KG/M2 | WEIGHT: 261.63 LBS

## 2018-07-05 DIAGNOSIS — M70.52 PES ANSERINUS BURSITIS OF LEFT KNEE: Primary | ICD-10-CM

## 2018-07-05 PROCEDURE — 99214 OFFICE O/P EST MOD 30 MIN: CPT | Mod: PBBFAC,PN | Performed by: ORTHOPAEDIC SURGERY

## 2018-07-05 PROCEDURE — 99204 OFFICE O/P NEW MOD 45 MIN: CPT | Mod: S$PBB,,, | Performed by: ORTHOPAEDIC SURGERY

## 2018-07-05 PROCEDURE — 99999 PR PBB SHADOW E&M-EST. PATIENT-LVL IV: CPT | Mod: PBBFAC,,, | Performed by: ORTHOPAEDIC SURGERY

## 2018-07-05 RX ORDER — MELOXICAM 15 MG/1
15 TABLET ORAL DAILY
Qty: 30 TABLET | Refills: 2 | Status: ON HOLD | OUTPATIENT
Start: 2018-07-05 | End: 2018-12-30 | Stop reason: HOSPADM

## 2018-07-05 NOTE — LETTER
July 10, 2018      Shama Mccoy MD  [address]  [address]  [facility] [address]           [facility]  [address], [facility] [address]  [place] [address]  Phone: [phone]  Fax: [phone]          Patient: Karen Villarreal   MR Number: [id number]   YOB: 1965   Date of Visit: 7/5/2018       Dear Dr. Shama Mccoy:    Thank you for referring Karen Villarreal to me for evaluation. Attached you will find relevant portions of my assessment and plan of care.    If you have questions, please do not hesitate to call me. I look forward to following Karen Villarreal along with you.    Sincerely,    Cait Choi  CC:  No Recipients    If you would like to receive this communication electronically, please contact [email or web address] or [phone] to request more information on [employer] Link access.    For providers and/or their staff who would like to refer a patient to [facility], please contact us through our one-stop-shop provider referral line, [facility] , at [phone].    If you feel you have received this communication in error or would no longer like to receive these types of communications, please e-mail [email or web address]

## 2018-07-11 NOTE — PROGRESS NOTES
Orthopaedic Surgery History and Physical     History of present illness:   Karen Villarreal is a 53 y.o. female who presents with LEFT knee pain.  Localizes pain with the medial knee.  Acutely sensitive to touch.      Allergies:   Review of patient's allergies indicates:  No Known Allergies    Past medical history:   Past Medical History:   Diagnosis Date    Acid reflux     Anemia     Arthritis     Ascites 03/08/2017    Cataract     Cirrhosis     Depression     Hepatitis C     Hiatal hernia     Renal cyst 03/08/2017    Thrombocytopenia        Past surgical history:  Past Surgical History:   Procedure Laterality Date    ANKLE SURGERY      APPENDECTOMY      CATARACT EXTRACTION         Social history:   Reviewed per EPIC history for tobacco or alcohol use     Medications:    Current Outpatient Prescriptions:     albuterol (VENTOLIN HFA) 90 mcg/actuation inhaler, Ventolin HFA 90 mcg/actuation aerosol inhaler  take 2 puffs every 4hrs as needed for cough, Disp: , Rfl:     aluminum & magnesium hydroxide-simethicone (MAALOX MAXIMUM STRENGTH) 400-400-40 mg/5 mL suspension, Take by mouth every 6 (six) hours as needed for Indigestion., Disp: , Rfl:     cyclobenzaprine (FLEXERIL) 10 MG tablet, Take 1 tablet(s) 3 TIMES A DAY by oral route as needed for muscle spasm. May make you drowsy. Do not drive while on medication., Disp: , Rfl:     ferrous sulfate 325 (65 FE) MG EC tablet, Take 325 mg by mouth once daily. , Disp: , Rfl:     furosemide (LASIX) 40 MG tablet, Take 40 mg by mouth once daily., Disp: , Rfl: 3    gabapentin (NEURONTIN) 300 MG capsule, Take 300 mg by mouth 3 (three) times daily., Disp: , Rfl:     pantoprazole (PROTONIX) 40 MG tablet, Take 1 tablet (40 mg total) by mouth once daily., Disp: 30 tablet, Rfl: 2    spironolactone (ALDACTONE) 100 MG tablet, Take 100 mg by mouth once daily., Disp: , Rfl: 3    meloxicam (MOBIC) 15 MG tablet, Take 1 tablet (15 mg total) by mouth once daily. Do not  "take with any other NSAIDs Take with a meal, Disp: 30 tablet, Rfl: 2    Review of systems:  Denies chest pain, palpitations, shortness of breath.   Denies excessive thirst, urination or heat or cold intolerance.   Denies nausea, vomiting, melena or hematochezia.    Denies fever, chills, night sweats, weight loss.    Denies dysuria or hematuria.   Denies history of anxiety or depression.   Denies any skin abnormalities or rash.   Denies upper or lower extremity paresthesias or lightheadedness.    Denies cough, shortness of breath or hemoptysis.     Physical Exam:   Vitals:    07/05/18 1331   BP: 105/64   Pulse: 103   Weight: 118.7 kg (261 lb 9.6 oz)   Height: 5' 4" (1.626 m)     @NKSKYWPLK21(GLUCOSE,Calcium, Albumin,PROT,NA,K,CO2,CL,BUN,Creatinine)@  @OTXBAEUEM01(WBC,RBC,HGB,HCT,PLT)@  @EBIAKARNK39(PT,INR,APTT)@    Awake/alert/oriented x3, No acute distress, Afebrile, Vital signs stable  Normocephalic, Atraumatic  Heart is beating at normal rate  Good inspiratory effort with unlaboured breathing  Abdomen soft/nondistended/nontender    Left lower extremity  Motor intact L2-S1  Sensation intact L2-S2  2+ popliteal/dorsalis pedis/posterior tibial pulses  <2s CR refill to digits  TTP pes anserinus        Imaging:  Narrative     EXAMINATION:  XR KNEE COMP 4 OR MORE VIEWS LEFT    CLINICAL HISTORY:  Pain in left knee    COMPARISON:  None    FINDINGS:  There is slight narrowing of the medial joint compartment.  There is no joint effusion or fracture.  A punctate calcification is seen in the prepatellar soft tissues.  This may represent a chip fracture.  A small joint effusion is noted.         Assessment:   53 y.o. female with LEFT pes bursitis    Plan:   NSAIDs  PT  Activity as tolerated  RTC in 3 months, consider injection    Nura Tim MD  [facility]    "

## 2018-07-26 ENCOUNTER — CLINICAL SUPPORT (OUTPATIENT)
Dept: GASTROENTEROLOGY | Facility: CLINIC | Age: 53
End: 2018-07-26
Payer: MEDICAID

## 2018-07-26 VITALS
DIASTOLIC BLOOD PRESSURE: 72 MMHG | WEIGHT: 258 LBS | TEMPERATURE: 98 F | RESPIRATION RATE: 18 BRPM | HEART RATE: 110 BPM | HEIGHT: 64 IN | BODY MASS INDEX: 44.05 KG/M2 | SYSTOLIC BLOOD PRESSURE: 130 MMHG

## 2018-07-26 DIAGNOSIS — B19.20 HEPATITIS C VIRUS INFECTION WITHOUT HEPATIC COMA, UNSPECIFIED CHRONICITY: Primary | ICD-10-CM

## 2018-07-26 PROCEDURE — 90636 HEP A/HEP B VACC ADULT IM: CPT | Mod: ,,, | Performed by: INTERNAL MEDICINE

## 2018-07-26 PROCEDURE — 90471 IMMUNIZATION ADMIN: CPT | Mod: ,,, | Performed by: INTERNAL MEDICINE

## 2018-08-07 ENCOUNTER — LAB VISIT (OUTPATIENT)
Dept: LAB | Facility: HOSPITAL | Age: 53
End: 2018-08-07
Attending: INTERNAL MEDICINE
Payer: MEDICAID

## 2018-08-07 DIAGNOSIS — D50.9 IRON DEFICIENCY ANEMIA, UNSPECIFIED IRON DEFICIENCY ANEMIA TYPE: ICD-10-CM

## 2018-08-07 LAB
BASOPHILS # BLD AUTO: 0 K/UL
BASOPHILS NFR BLD: 0.5 %
DIFFERENTIAL METHOD: ABNORMAL
EOSINOPHIL # BLD AUTO: 0.1 K/UL
EOSINOPHIL NFR BLD: 2.3 %
ERYTHROCYTE [DISTWIDTH] IN BLOOD BY AUTOMATED COUNT: 15.2 %
HCT VFR BLD AUTO: 42.2 %
HGB BLD-MCNC: 13.9 G/DL
LYMPHOCYTES # BLD AUTO: 0.9 K/UL
LYMPHOCYTES NFR BLD: 15.9 %
MCH RBC QN AUTO: 31.2 PG
MCHC RBC AUTO-ENTMCNC: 32.9 G/DL
MCV RBC AUTO: 95 FL
MONOCYTES # BLD AUTO: 0.3 K/UL
MONOCYTES NFR BLD: 5.3 %
NEUTROPHILS # BLD AUTO: 4.3 K/UL
NEUTROPHILS NFR BLD: 76 %
PLATELET # BLD AUTO: 61 K/UL
PMV BLD AUTO: 9.3 FL
RBC # BLD AUTO: 4.45 M/UL
WBC # BLD AUTO: 5.6 K/UL

## 2018-08-07 PROCEDURE — 85025 COMPLETE CBC W/AUTO DIFF WBC: CPT

## 2018-08-07 PROCEDURE — 36415 COLL VENOUS BLD VENIPUNCTURE: CPT

## 2018-08-08 ENCOUNTER — OFFICE VISIT (OUTPATIENT)
Dept: HEMATOLOGY/ONCOLOGY | Facility: CLINIC | Age: 53
End: 2018-08-08
Payer: MEDICAID

## 2018-08-08 VITALS
HEART RATE: 103 BPM | BODY MASS INDEX: 43.74 KG/M2 | HEIGHT: 64 IN | TEMPERATURE: 99 F | RESPIRATION RATE: 16 BRPM | WEIGHT: 256.19 LBS | SYSTOLIC BLOOD PRESSURE: 115 MMHG | DIASTOLIC BLOOD PRESSURE: 67 MMHG

## 2018-08-08 DIAGNOSIS — D69.9 BLEEDING DIATHESIS: ICD-10-CM

## 2018-08-08 DIAGNOSIS — R16.1 SPLENOMEGALY: ICD-10-CM

## 2018-08-08 DIAGNOSIS — D69.6 THROMBOCYTOPENIA: ICD-10-CM

## 2018-08-08 DIAGNOSIS — K74.60 HEPATIC CIRRHOSIS, UNSPECIFIED HEPATIC CIRRHOSIS TYPE, UNSPECIFIED WHETHER ASCITES PRESENT: Primary | ICD-10-CM

## 2018-08-08 PROCEDURE — 99215 OFFICE O/P EST HI 40 MIN: CPT | Mod: S$PBB,,, | Performed by: INTERNAL MEDICINE

## 2018-08-08 PROCEDURE — 99213 OFFICE O/P EST LOW 20 MIN: CPT | Mod: PBBFAC,PO | Performed by: INTERNAL MEDICINE

## 2018-08-08 PROCEDURE — 99999 PR PBB SHADOW E&M-EST. PATIENT-LVL III: CPT | Mod: PBBFAC,,, | Performed by: INTERNAL MEDICINE

## 2018-08-08 NOTE — PROGRESS NOTES
CC:I bled for 40 days     Karen Villarreal is a 53 y.o.  Pt is here for evaluation of thrombocytopenia and iron deficiency with vaginal bleeding. The patient has been having menometrorrhagia with low platelets.  She has a history of hepatitis C with documented cirrhosis and splenomegaly.  She is seeing a GI specialist and discussing treatment of such lesion is seen in her GYN physician reports discussing normal therapy to help her from bleeding further.  She reports that the bleeding has progressed since her last visit with me and she is now passing clots intermittently.  She is worried that she may need IV iron she is feeling more fatigue than usual.  She has not been taking over-the-counter nonsteroidal anti-inflammatories on a regular basis for pelvic discomfort associated with her menstrual bleeding. She has taken Mobic in the past for such.  She is taking Protonix to help with gastritis and remains on Aldactone for control of fluid retention    Past Medical History:   Diagnosis Date    Acid reflux     Anemia     Arthritis     Ascites 03/08/2017    Cataract     Cirrhosis     Depression     Hepatitis C     Hiatal hernia     Renal cyst 03/08/2017    Thrombocytopenia        Current Outpatient Prescriptions:     albuterol (VENTOLIN HFA) 90 mcg/actuation inhaler, Ventolin HFA 90 mcg/actuation aerosol inhaler  take 2 puffs every 4hrs as needed for cough, Disp: , Rfl:     aluminum & magnesium hydroxide-simethicone (MAALOX MAXIMUM STRENGTH) 400-400-40 mg/5 mL suspension, Take by mouth every 6 (six) hours as needed for Indigestion., Disp: , Rfl:     cyclobenzaprine (FLEXERIL) 10 MG tablet, Take 1 tablet(s) 3 TIMES A DAY by oral route as needed for muscle spasm. May make you drowsy. Do not drive while on medication., Disp: , Rfl:     ferrous sulfate 325 (65 FE) MG EC tablet, Take 325 mg by mouth once daily. , Disp: , Rfl:     furosemide (LASIX) 40 MG tablet, Take 40 mg by mouth once daily., Disp: ,  "Rfl: 3    gabapentin (NEURONTIN) 300 MG capsule, Take 300 mg by mouth 3 (three) times daily., Disp: , Rfl:     meloxicam (MOBIC) 15 MG tablet, Take 1 tablet (15 mg total) by mouth once daily. Do not take with any other NSAIDs Take with a meal, Disp: 30 tablet, Rfl: 2    spironolactone (ALDACTONE) 100 MG tablet, Take 100 mg by mouth once daily., Disp: , Rfl: 3    pantoprazole (PROTONIX) 40 MG tablet, Take 1 tablet (40 mg total) by mouth once daily., Disp: 30 tablet, Rfl: 2    CONSTITUTIONAL: No fevers, chills, night sweats, wt. loss, appetite changes  SKIN: no rashes or itching  ENT: No headaches, head trauma, vision changes, or eye pain  LYMPH NODES: None noticed   CV: No chest pain, palpitations.   RESP: No dyspnea on exertion, cough, wheezing, or hemoptysis  GI: No nausea, emesis, diarrhea, constipation, melena, hematochezia, pain.   : No dysuria, hematuria, urgency, or frequency   HEME: ++ easy bruising, ++ bleeding problems  PSYCHIATRIC: No depression, anxiety, psychosis, hallucinations.  NEURO: No seizures, memory loss, dizziness or difficulty sleeping  MSK: No arthralgias or joint swelling         /67   Pulse 103   Temp 99.2 °F (37.3 °C)   Resp 16   Ht 5' 4" (1.626 m)   Wt 116.2 kg (256 lb 2.8 oz)   BMI 43.97 kg/m²   Gen: NAD, A and O x3, tobin in appearance  Psych: pleasant affect, normal thought process  Eyes: Pupils round and non dilated, EOM intact  Nose: Nares patent  OP clear, mucosa patent  Neck: suppple, no JVD, trachea midline, no palpable mass, no adenopathy  Lungs: CTAB, no wheezes, no use of accessory muscles  CV: S1S2 with RR tachycardia, No mrg  Abd: soft, NTND, obese  Extr: No CC positive 1+ pitting edema of bilateral extr  extremities, SHANT, strength normal, good capillary refill  Neuro: steady gait, CNs grossly intact  Skin: intact, no lesions noted  Rheum: No joint swelling    Lab Results   Component Value Date    WBC 5.60 08/07/2018    HGB 13.9 08/07/2018    HCT 42.2 " 08/07/2018    MCV 95 08/07/2018    PLT 61 (L) 08/07/2018     CMP  platelet  trend   96 to 53 to 77 to 65 now 61       1.  Cirrhosis  2.  Splenomegaly  3.  Hepatitis C to see GI soon to start treatment   4.  Thrombocytopenia with progression  5.  Bleeding ? Due to low platelets from cirrhosis: rule out autoimmune hepatitis as well with antibody levels CORTES and anti smooth muscle antibodies  6.  Hyperbilirubinemia  7.  Hypoalbuminemia with lower extremity edema  8.  Hyperglycemia adhere to a low glucose diet  9.  Iron deficiency resolved after 2 doses of iron   10..  Tachycardia  11.  Tobacco use  RTC 10 days to see if she has autoimmuine hepatitis: if yes then will discuss steroid use for low platelets and call DR Acevedo Proceed with workup with dr Aecvedo and start HCV treatment. She may need uterine ablation to prevent further bleeding from thrombocytopenia related to her cirrhosis  I will call Dr Carroll to discuss.  Patient would also be a candidate for a hysterectomy to stop further blood loss. Administering platelets to the patient having peripheral destruction would  not be beneficial except in the short term.  She could receive a platelet transfusion if indeed she required surgery however eventually her platelets would be destroyed peripherally in her counts were to fall again..  In predispose her to clotting.  If her low platelet count is 100% due to cirrhosis and splenomegaly and this will not normalize due to this function of these organs.  Better hydration is indicated job with a tachycardia.  Weight loss is indicated to help with such as well. Exercise will help improve her overall well-being.  It is also recommended that she stop using any tobacco products to decrease her risk of cardiac disease and secondary malignancies.  At this time she should continue oral iron therapy to help prevent further recurrent episodes of anemia      Thank you for allowing me to evaluate and participate in the care of  this pleasant patient. Please fell free to call me with any questions or concerns.    Warmly,  Elizabet Valencia MD    This note was dictated with Dragon and briefly proofread. Please excuse any sentences that may be unclear or words misspelled

## 2018-08-13 ENCOUNTER — LAB VISIT (OUTPATIENT)
Dept: LAB | Facility: HOSPITAL | Age: 53
End: 2018-08-13
Attending: INTERNAL MEDICINE
Payer: MEDICAID

## 2018-08-13 DIAGNOSIS — K74.60 HEPATIC CIRRHOSIS, UNSPECIFIED HEPATIC CIRRHOSIS TYPE, UNSPECIFIED WHETHER ASCITES PRESENT: ICD-10-CM

## 2018-08-13 DIAGNOSIS — D69.6 THROMBOCYTOPENIA: ICD-10-CM

## 2018-08-13 PROCEDURE — 86235 NUCLEAR ANTIGEN ANTIBODY: CPT

## 2018-08-13 PROCEDURE — 36415 COLL VENOUS BLD VENIPUNCTURE: CPT

## 2018-08-13 PROCEDURE — 86256 FLUORESCENT ANTIBODY TITER: CPT

## 2018-08-13 PROCEDURE — 86376 MICROSOMAL ANTIBODY EACH: CPT

## 2018-08-13 PROCEDURE — 86235 NUCLEAR ANTIGEN ANTIBODY: CPT | Mod: 59

## 2018-08-13 PROCEDURE — 83520 IMMUNOASSAY QUANT NOS NONAB: CPT | Mod: 59

## 2018-08-13 PROCEDURE — 30000890 MAYO MISCELLANEOUS TEST (REFLEX)

## 2018-08-14 LAB
ANTI-SSA ANTIBODY: 1.48 EU
ANTI-SSA INTERPRETATION: NEGATIVE
ANTI-SSB ANTIBODY: 1.48 EU
ANTI-SSB INTERPRETATION: NEGATIVE
KAPPA LC SER QL IA: 4.18 MG/DL
KAPPA LC/LAMBDA SER IA: 1.27
LAMBDA LC SER QL IA: 3.28 MG/DL
SMOOTH MUSCLE AB TITR SER IF: NORMAL {TITER}

## 2018-08-15 LAB — MAYO MISCELLANEOUS RESULT (REF): NORMAL

## 2018-08-16 LAB — LKM AB SER QL: 1 UNITS

## 2018-08-21 ENCOUNTER — OFFICE VISIT (OUTPATIENT)
Dept: HEMATOLOGY/ONCOLOGY | Facility: CLINIC | Age: 53
End: 2018-08-21
Payer: MEDICAID

## 2018-08-21 VITALS
WEIGHT: 257.69 LBS | SYSTOLIC BLOOD PRESSURE: 121 MMHG | HEART RATE: 103 BPM | TEMPERATURE: 99 F | RESPIRATION RATE: 17 BRPM | BODY MASS INDEX: 43.99 KG/M2 | HEIGHT: 64 IN | DIASTOLIC BLOOD PRESSURE: 56 MMHG

## 2018-08-21 DIAGNOSIS — D69.6 THROMBOCYTOPENIA: Primary | ICD-10-CM

## 2018-08-21 DIAGNOSIS — R00.0 TACHYCARDIA: ICD-10-CM

## 2018-08-21 DIAGNOSIS — E66.01 MORBID OBESITY: ICD-10-CM

## 2018-08-21 DIAGNOSIS — R23.3 EASY BRUISING: ICD-10-CM

## 2018-08-21 DIAGNOSIS — K74.60 CHRONIC HEPATITIS C WITH CIRRHOSIS: ICD-10-CM

## 2018-08-21 DIAGNOSIS — R60.0 BILATERAL LEG EDEMA: ICD-10-CM

## 2018-08-21 DIAGNOSIS — F33.0 MILD EPISODE OF RECURRENT MAJOR DEPRESSIVE DISORDER: ICD-10-CM

## 2018-08-21 DIAGNOSIS — D50.9 IRON DEFICIENCY ANEMIA, UNSPECIFIED IRON DEFICIENCY ANEMIA TYPE: ICD-10-CM

## 2018-08-21 DIAGNOSIS — B18.2 CHRONIC HEPATITIS C WITH CIRRHOSIS: ICD-10-CM

## 2018-08-21 PROCEDURE — 99213 OFFICE O/P EST LOW 20 MIN: CPT | Mod: PBBFAC,PO,25 | Performed by: INTERNAL MEDICINE

## 2018-08-21 PROCEDURE — G0444 DEPRESSION SCREEN ANNUAL: HCPCS | Mod: PBBFAC,PO | Performed by: INTERNAL MEDICINE

## 2018-08-21 PROCEDURE — 99214 OFFICE O/P EST MOD 30 MIN: CPT | Mod: S$PBB,,, | Performed by: INTERNAL MEDICINE

## 2018-08-21 PROCEDURE — 99999 PR PBB SHADOW E&M-EST. PATIENT-LVL III: CPT | Mod: PBBFAC,,, | Performed by: INTERNAL MEDICINE

## 2018-08-21 NOTE — PROGRESS NOTES
CC:I feel depressed    Karen Villarreal is a 53 y.o.  Pt is here for evaluation of thrombocytopenia and iron deficiency with vaginal bleeding. The patient has been having menometrorrhagia with low platelets.  She has a history of hepatitis C with documented cirrhosis and splenomegaly.  She is seeing a GI specialist and discussing treatment of such lesion is seen in her GYN physician reports discussing normal therapy to help her from bleeding further.   She is exhausted.  She has not been taking over-the-counter nonsteroidal anti-inflammatories on a regular basis for pelvic discomfort associated with her menstrual bleeding. She has taken Mobic in the past for such.  She is taking Protonix to help with gastritis and remains on Aldactone for control of fluid retention. Currently she states she has no evidence of bleeding , she has noticed she is more tearful and feels that her depression is returning. SHe is unsire why but she stopped her prozac in the past.    Past Medical History:   Diagnosis Date    Acid reflux     Anemia     Arthritis     Ascites 03/08/2017    Cataract     Cirrhosis     Depression     Hepatitis C     Hiatal hernia     Renal cyst 03/08/2017    Thrombocytopenia        Current Outpatient Medications:     albuterol (VENTOLIN HFA) 90 mcg/actuation inhaler, Ventolin HFA 90 mcg/actuation aerosol inhaler  take 2 puffs every 4hrs as needed for cough, Disp: , Rfl:     aluminum & magnesium hydroxide-simethicone (MAALOX MAXIMUM STRENGTH) 400-400-40 mg/5 mL suspension, Take by mouth every 6 (six) hours as needed for Indigestion., Disp: , Rfl:     cyclobenzaprine (FLEXERIL) 10 MG tablet, Take 1 tablet(s) 3 TIMES A DAY by oral route as needed for muscle spasm. May make you drowsy. Do not drive while on medication., Disp: , Rfl:     ferrous sulfate 325 (65 FE) MG EC tablet, Take 325 mg by mouth once daily. , Disp: , Rfl:     furosemide (LASIX) 40 MG tablet, Take 40 mg by mouth once daily.,  "Disp: , Rfl: 3    gabapentin (NEURONTIN) 300 MG capsule, Take 300 mg by mouth 3 (three) times daily., Disp: , Rfl:     meloxicam (MOBIC) 15 MG tablet, Take 1 tablet (15 mg total) by mouth once daily. Do not take with any other NSAIDs Take with a meal, Disp: 30 tablet, Rfl: 2    pantoprazole (PROTONIX) 40 MG tablet, Take 1 tablet (40 mg total) by mouth once daily., Disp: 30 tablet, Rfl: 2    spironolactone (ALDACTONE) 100 MG tablet, Take 100 mg by mouth once daily., Disp: , Rfl: 3    CONSTITUTIONAL: No fevers, chills, night sweats, wt. loss, appetite changes  SKIN: no rashes or itching  ENT: No headaches, head trauma, vision changes, or eye pain  LYMPH NODES: None noticed   CV: No chest pain, palpitations.   RESP: No dyspnea on exertion, cough, wheezing, or hemoptysis  GI: No nausea, emesis, diarrhea, constipation, melena, hematochezia, pain.   : No dysuria, hematuria, urgency, or frequency   HEME: ++ easy bruising, ++ bleeding problems  PSYCHIATRIC: +depression, no anxiety, psychosis, hallucinations.  NEURO: No seizures, memory loss, dizziness or difficulty sleeping  MSK: No arthralgias or joint swelling  Recheck   No further bleeding   Cervical abnormality on pap smear followed by gyn        BP (!) 121/56   Pulse 103   Temp 98.6 °F (37 °C) (Oral)   Resp 17   Ht 5' 4" (1.626 m)   Wt 116.9 kg (257 lb 11.5 oz)   BMI 44.24 kg/m²   Gen: NAD, A and O x3, tobin in appearance continues  Psych: pleasant affect, normal thought process  Eyes: Pupils round and non dilated, EOM intact  Nose: Nares patent  OP clear, mucosa patent  Neck: suppple, no JVD, trachea midline, no palpable mass, no adenopathy  Lungs: CTAB, no wheezes, no use of accessory muscles  CV: S1S2 with RR tachycardia, No mrg  Abd: soft, NTND, obese  Extr: No CC positive 1+ pitting edema of bilateral extr  extremities, SHANT, strength normal, good capillary refill  Neuro: steady gait, CNs grossly intact  Skin: intact, no lesions noted  Rheum: No " joint swelling    Lab Results   Component Value Date    WBC 5.60 08/07/2018    HGB 13.9 08/07/2018    HCT 42.2 08/07/2018    MCV 95 08/07/2018    PLT 61 (L) 08/07/2018         Thrombocytopenia    Iron deficiency anemia, unspecified iron deficiency anemia type    Morbid obesity    Chronic hepatitis C with cirrhosis    Easy bruising    Tachycardia    Bilateral leg edema    Mild episode of recurrent major depressive disorder        1.  Cirrhosis  2.  Splenomegaly  3.  Hepatitis C to see GI soon to start treatment   4.  Thrombocytopenia with progression  5.  Bleeding ? Due to low platelets from cirrhosis: rule out autoimmune hepatitis as well with antibody levels CORTES and anti smooth muscle antibodies  6.  Hyperbilirubinemia  7.  Hypoalbuminemia with lower extremity edema  8.  Hyperglycemia adhere to a low glucose diet  9.  Iron deficiency resolved after 2 doses of iron   10..  Tachycardia  11.  Tobacco use  NO evidence of autoimmune hepatitis , no need for steroids   No further evidence of bleeding hence no need for transfusion at this time, obs alone indicated   She will discuss resuming prozac with her pcpc and Dr Whitehead  Must start therapy for hepatitis      Better hydration is indicated job with a tachycardia.       Thank you for allowing me to evaluate and participate in the care of this pleasant patient. Please fell free to call me with any questions or concerns.    Warmly,  Elizabet Valencia MD    This note was dictated with Dragon and briefly proofread. Please excuse any sentences that may be unclear or words misspelled

## 2018-08-28 ENCOUNTER — CLINICAL SUPPORT (OUTPATIENT)
Dept: GASTROENTEROLOGY | Facility: CLINIC | Age: 53
End: 2018-08-28
Payer: MEDICAID

## 2018-08-28 VITALS
RESPIRATION RATE: 16 BRPM | HEART RATE: 98 BPM | TEMPERATURE: 99 F | HEIGHT: 64 IN | WEIGHT: 260.81 LBS | DIASTOLIC BLOOD PRESSURE: 72 MMHG | SYSTOLIC BLOOD PRESSURE: 118 MMHG | BODY MASS INDEX: 44.53 KG/M2

## 2018-08-28 DIAGNOSIS — B19.20 HEPATITIS C VIRUS INFECTION WITHOUT HEPATIC COMA, UNSPECIFIED CHRONICITY: Primary | ICD-10-CM

## 2018-08-28 PROCEDURE — 90636 HEP A/HEP B VACC ADULT IM: CPT | Mod: ,,, | Performed by: INTERNAL MEDICINE

## 2018-08-28 PROCEDURE — 90471 IMMUNIZATION ADMIN: CPT | Mod: ,,, | Performed by: INTERNAL MEDICINE

## 2018-09-19 ENCOUNTER — OFFICE VISIT (OUTPATIENT)
Dept: GASTROENTEROLOGY | Facility: CLINIC | Age: 53
End: 2018-09-19
Payer: MEDICAID

## 2018-09-19 VITALS
HEART RATE: 96 BPM | RESPIRATION RATE: 18 BRPM | DIASTOLIC BLOOD PRESSURE: 70 MMHG | TEMPERATURE: 98 F | BODY MASS INDEX: 45.21 KG/M2 | SYSTOLIC BLOOD PRESSURE: 116 MMHG | HEIGHT: 64 IN | WEIGHT: 264.81 LBS

## 2018-09-19 DIAGNOSIS — D50.9 IRON DEFICIENCY ANEMIA, UNSPECIFIED IRON DEFICIENCY ANEMIA TYPE: ICD-10-CM

## 2018-09-19 DIAGNOSIS — K74.60 CIRRHOSIS OF LIVER WITH ASCITES, UNSPECIFIED HEPATIC CIRRHOSIS TYPE: ICD-10-CM

## 2018-09-19 DIAGNOSIS — E66.01 MORBID OBESITY: ICD-10-CM

## 2018-09-19 DIAGNOSIS — D69.6 THROMBOCYTOPENIA: ICD-10-CM

## 2018-09-19 DIAGNOSIS — M19.90 ARTHRITIS: ICD-10-CM

## 2018-09-19 DIAGNOSIS — K21.9 GASTROESOPHAGEAL REFLUX DISEASE, ESOPHAGITIS PRESENCE NOT SPECIFIED: ICD-10-CM

## 2018-09-19 DIAGNOSIS — K76.6 PORTAL HYPERTENSION: ICD-10-CM

## 2018-09-19 DIAGNOSIS — B19.20 HEPATITIS C VIRUS INFECTION WITHOUT HEPATIC COMA, UNSPECIFIED CHRONICITY: Primary | ICD-10-CM

## 2018-09-19 DIAGNOSIS — R18.8 CIRRHOSIS OF LIVER WITH ASCITES, UNSPECIFIED HEPATIC CIRRHOSIS TYPE: ICD-10-CM

## 2018-09-19 DIAGNOSIS — K75.9 HEPATITIS: ICD-10-CM

## 2018-09-19 DIAGNOSIS — R18.8 OTHER ASCITES: ICD-10-CM

## 2018-09-19 PROCEDURE — 99215 OFFICE O/P EST HI 40 MIN: CPT | Mod: ,,, | Performed by: INTERNAL MEDICINE

## 2018-09-19 RX ORDER — MELOXICAM 15 MG/1
1 TABLET ORAL DAILY
COMMUNITY
End: 2018-09-19

## 2018-09-19 RX ORDER — SERTRALINE HYDROCHLORIDE 25 MG/1
1 TABLET, FILM COATED ORAL DAILY
Refills: 1 | COMMUNITY
Start: 2018-09-12

## 2018-09-23 NOTE — PROGRESS NOTES
[facility] Established Patient Visit    Subjective:           PCP: Shaam Mccoy    Chief Complaint: Follow-up and Hepatitis C       HPI:  Karen Villarreal is a 53 y.o. female here for follow up and Hep C. Patient has decompensated cirrhosis of liver, ascites, chronic Hep C, and thrombocytopenia. Patient has abnormal LFT. Labs and CT were discussed in detail. Her father had cancer and diabetes. Her mother had COPD and heart failure. Medications were reviewed in detail. Her BMI is 45.45. Physical exam shows hepatosplenomegaly.       ROS:   Constitutional: No fevers, chills, weight loss  ENT: No allergies, sore throat, rhinorrhea  CV: No chest pain, palpitations, edema  Pulm: No cough, shortness of breath, wheezing  Ophtho: No vision changes  GI: No blood in stools, change in bowel habits, nausea/vomiting  Denies alternating diarrhea/constipation.   Derm: No rash  Heme: No easy bruising or lymphadenopathy  MSK: No arthralgias or myalgias  : No dysuria, hematuria, frequency, polyuria, or flank tenderness  Endo: No hot or cold intolerance  Neuro: No syncope or seizure, or dizziness  Psych: No hallucination, depression or suicidal ideation      Medical History:  has a past medical history of Acid reflux, Anemia, Arthritis, Ascites (03/08/2017), Cataract, Cirrhosis, Depression, Hepatitis C, Hiatal hernia, Renal cyst (03/08/2017), and Thrombocytopenia.      Surgical History:  has a past surgical history that includes Appendectomy; Ankle surgery; Cataract extraction; EXTRACTION-CATARACT-IOL (Right, 1/5/2018); and EXTRACTION-CATARACT-IOL (Left, 12/1/2017).    Family History:   Family History   Problem Relation Age of Onset    Heart failure Mother     COPD Mother     Arthritis Mother     Vision loss Mother     Diabetes Father     Cancer Father     Brain cancer Father     Diabetes type II Father        Social History:   Social History     Tobacco Use    Smoking status: Current Some Day  "Smoker     Packs/day: 0.50     Years: 33.00     Pack years: 16.50     Types: Cigarettes    Smokeless tobacco: Never Used   Substance Use Topics    Alcohol use: Yes     Comment: several times a week (beer)    Drug use: No       The patient's social and family histories were reviewed by me and updated in the appropriate section of the electronic medical record.    Allergies: Review of patient's allergies indicates:  No Known Allergies      Medications:   Current Outpatient Medications   Medication Sig Dispense Refill    albuterol (VENTOLIN HFA) 90 mcg/actuation inhaler Ventolin HFA 90 mcg/actuation aerosol inhaler   take 2 puffs every 4hrs as needed for cough      aluminum & magnesium hydroxide-simethicone (MAALOX MAXIMUM STRENGTH) 400-400-40 mg/5 mL suspension Take by mouth every 6 (six) hours as needed for Indigestion.      cyclobenzaprine (FLEXERIL) 10 MG tablet Take 1 tablet(s) 3 TIMES A DAY by oral route as needed for muscle spasm. May make you drowsy. Do not drive while on medication.      ferrous sulfate 325 (65 FE) MG EC tablet Take 325 mg by mouth once daily.       furosemide (LASIX) 40 MG tablet Take 40 mg by mouth once daily.  3    gabapentin (NEURONTIN) 300 MG capsule Take 300 mg by mouth 3 (three) times daily.      meloxicam (MOBIC) 15 MG tablet Take 1 tablet (15 mg total) by mouth once daily. Do not take with any other NSAIDs  Take with a meal 30 tablet 2    sertraline (ZOLOFT) 25 MG tablet Take 1 tablet by mouth once daily.  1    spironolactone (ALDACTONE) 100 MG tablet Take 100 mg by mouth once daily.  3    pantoprazole (PROTONIX) 40 MG tablet Take 1 tablet (40 mg total) by mouth once daily. 30 tablet 2     No current facility-administered medications for this visit.      All medications and past history have been reviewed.        Objective:        Vital Signs:  Blood pressure 116/70, pulse 96, temperature 98.3 °F (36.8 °C), resp. rate 18, height 5' 4" (1.626 m), weight 120.1 kg (264 lb " 12.8 oz). Body mass index is 45.45 kg/m².    Physical Exam:   General Appearance: Well appearing in no acute distress, well developed, well                nourished  Head: Normocephalic, without obvious abnormality  Eyes:  Pupils equal, round and reactive to light  Throat: Lips, mucosa, and tongue normal; teeth and gums normal  Lungs: CTA bilaterally in anterior and posterior fields, no wheezes, no crackles  Heart:  Regular rate and rhythm, no murmurs heard  Abdomen: Soft, non tender, non distended with positive bowel sounds in all four quadrants. No hepatosplenomegaly, ascites, or mass. Negative for succusion splash  : female   Extremities: No cyanosis, edema or deformity  Skin: No rash  Neurologic: Normal exam    Labs:  Lab Results   Component Value Date    WBC 5.60 08/07/2018    HGB 13.9 08/07/2018    HCT 42.2 08/07/2018    PLT 61 (L) 08/07/2018    ALT 24 05/31/2018    AST 35 05/31/2018     (L) 05/31/2018    K 3.8 05/31/2018     05/31/2018    CREATININE 0.8 05/31/2018    BUN 6 05/31/2018    CO2 23 05/31/2018    TSH 4.82 06/07/2018    INR 1.1 11/07/2017       Imaging:     All lab results and imaging results have been reviewed and discussed with the patient      Assessment:       1. Hepatitis C virus infection without hepatic coma, unspecified chronicity    2. Hepatitis        Plan:       Karen was seen today for follow-up and hepatitis c.    Diagnoses and all orders for this visit:    Hepatitis C virus infection without hepatic coma, unspecified chronicity  -     HCV FibroSURE; Future  -     HCV FibroSURE    Hepatitis  -     Hepatitis A antibody, total; Future  -     Hepatitis B Surface Antibody, Qual/Quant; Future  -     Hepatitis A antibody, total  -     Hepatitis B Surface Antibody, Qual/Quant        See HPI    Follow-up in about 8 weeks (around 11/14/2018).    The plan was discussed with the patient and all questions/concerns have been answered to the patient's  satisfaction.        Electronically signed by Leigha Whitehead MD    This note was dictated using voice recognition software and may contain grammatical errors.

## 2018-09-24 ENCOUNTER — LAB VISIT (OUTPATIENT)
Dept: LAB | Facility: HOSPITAL | Age: 53
End: 2018-09-24
Attending: INTERNAL MEDICINE
Payer: MEDICAID

## 2018-09-24 DIAGNOSIS — B19.20 UNSPECIFIED VIRAL HEPATITIS C WITHOUT HEPATIC COMA: Primary | ICD-10-CM

## 2018-09-24 PROCEDURE — 82977 ASSAY OF GGT: CPT

## 2018-09-24 PROCEDURE — 36415 COLL VENOUS BLD VENIPUNCTURE: CPT

## 2018-09-24 PROCEDURE — 82247 BILIRUBIN TOTAL: CPT

## 2018-09-25 LAB
A2 MACROGLOB SERPL-MCNC: 219 MG/DL
ALT SERPL W P-5'-P-CCNC: 27 U/L (ref 7–45)
APO A-I SERPL-MCNC: 131 MG/DL
BILIRUB SERPL-MCNC: 1.6 MG/DL
BIOPREDICTIVE SERIAL NUMBER: ABNORMAL
FIBROSIS STAGE SERPL QL: ABNORMAL
FIBROTEST INTERPRETATION: ABNORMAL
FIBROTEST-ACTITEST COMMENT: ABNORMAL
GGT SERPL-CCNC: 29 U/L
HAPTOGLOB SERPL-MCNC: 55 MG/DL
LIVER FIBR SCORE SERPL CALC.FIBROSURE: 0.56
NECROINFLAMMAT INTERP: ABNORMAL
NECROINFLAMMATORY ACT GRADE SERPL QL: ABNORMAL
NECROINFLAMMATORY ACT SCORE SERPL: 0.18

## 2018-10-09 ENCOUNTER — TELEPHONE (OUTPATIENT)
Dept: GASTROENTEROLOGY | Facility: CLINIC | Age: 53
End: 2018-10-09

## 2018-10-09 DIAGNOSIS — B19.20 HEPATITIS C VIRUS INFECTION WITHOUT HEPATIC COMA, UNSPECIFIED CHRONICITY: Primary | ICD-10-CM

## 2018-10-18 ENCOUNTER — TELEPHONE (OUTPATIENT)
Dept: HEMATOLOGY/ONCOLOGY | Facility: CLINIC | Age: 53
End: 2018-10-18

## 2018-10-18 LAB
BASOPHILS NFR BLD: 0 K/UL (ref 0–0.2)
BASOPHILS NFR BLD: 0.8 %
EOSINOPHIL NFR BLD: 0.2 K/UL (ref 0–0.7)
EOSINOPHIL NFR BLD: 2.9 %
ERYTHROCYTE [DISTWIDTH] IN BLOOD BY AUTOMATED COUNT: 15.2 % (ref 11.7–14.9)
GRAN #: 3.8 K/UL (ref 1.4–6.5)
GRAN%: 72.8 %
HCT VFR BLD AUTO: 36.6 % (ref 36–48)
HGB BLD-MCNC: 12.1 G/DL (ref 12–15)
IMMATURE GRANS (ABS): 0 K/UL (ref 0–1)
IMMATURE GRANULOCYTES: 0.4 %
LYMPH #: 0.8 K/UL (ref 1.2–3.4)
LYMPH%: 15.4 %
MCH RBC QN AUTO: 31.6 PG (ref 25–35)
MCHC RBC AUTO-ENTMCNC: 33.1 G/DL (ref 31–36)
MCV RBC AUTO: 95.6 FL (ref 79–98)
MONO #: 0.4 K/UL (ref 0.1–0.6)
MONO%: 7.7 %
NUCLEATED RBCS: 0 %
PLATELET # BLD AUTO: 58 K/UL (ref 140–440)
PMV BLD AUTO: 11.5 FL (ref 8.8–12.7)
RBC # BLD AUTO: 3.83 M/UL (ref 3.5–5.5)
WBC # BLD AUTO: 5.2 K/UL (ref 5–10)

## 2018-10-18 NOTE — TELEPHONE ENCOUNTER
----- Message from Rosana Mcgarry sent at 10/18/2018  9:22 AM CDT -----  Contact: self  Patient [phone] or [phone] has blood work for Dr Valencia this Monday 10 22 18 before her appt with Dr Valencia on 10 23 18/she has to go to [facility] this morning for fasting blood work for another doctor and is asking if she could go ahead and have her blood work for Dr Valencia done today instead of Monday/[facility] was not able to get to Dr Valencia's blood lab order though and patient is asking if this could be changed/

## 2018-10-18 NOTE — TELEPHONE ENCOUNTER
Patient notified that she could get  Her labs done at [facility] today and that it looks like it was already done. Patient instructed to call [phone] with any other questions or concerns.

## 2018-10-23 ENCOUNTER — OFFICE VISIT (OUTPATIENT)
Dept: HEMATOLOGY/ONCOLOGY | Facility: CLINIC | Age: 53
End: 2018-10-23
Payer: MEDICAID

## 2018-10-23 VITALS
DIASTOLIC BLOOD PRESSURE: 56 MMHG | TEMPERATURE: 98 F | BODY MASS INDEX: 44.72 KG/M2 | SYSTOLIC BLOOD PRESSURE: 119 MMHG | RESPIRATION RATE: 20 BRPM | HEART RATE: 97 BPM | HEIGHT: 64 IN | WEIGHT: 261.94 LBS

## 2018-10-23 DIAGNOSIS — K74.60 HEPATIC CIRRHOSIS, UNSPECIFIED HEPATIC CIRRHOSIS TYPE, UNSPECIFIED WHETHER ASCITES PRESENT: ICD-10-CM

## 2018-10-23 DIAGNOSIS — B18.2 CHRONIC HEPATITIS C WITHOUT HEPATIC COMA: ICD-10-CM

## 2018-10-23 DIAGNOSIS — R76.8 ELEVATED SERUM IMMUNOGLOBULIN FREE LIGHT CHAIN LEVEL: ICD-10-CM

## 2018-10-23 DIAGNOSIS — D69.6 THROMBOCYTOPENIA: Primary | ICD-10-CM

## 2018-10-23 PROCEDURE — 99214 OFFICE O/P EST MOD 30 MIN: CPT | Mod: PBBFAC,PO | Performed by: INTERNAL MEDICINE

## 2018-10-23 PROCEDURE — 99999 PR PBB SHADOW E&M-EST. PATIENT-LVL IV: CPT | Mod: PBBFAC,,, | Performed by: INTERNAL MEDICINE

## 2018-10-23 PROCEDURE — 99214 OFFICE O/P EST MOD 30 MIN: CPT | Mod: S$PBB,,, | Performed by: INTERNAL MEDICINE

## 2018-10-23 NOTE — PROGRESS NOTES
CC:I haven't received my meds for Hep C yet     Karen Villarreal is a 53 y.o.  Pt is here for evaluation of thrombocytopenia and iron deficiency with intermittent vaginal bleeding. The patient has been having menometrorrhagia with low platelets.  She has a history of hepatitis C with documented cirrhosis and splenomegaly.  She is seeing a GI specialist and discussing treatment of such .  She is also seeing her GYN physician to discuss any available therapies   to help her from bleeding further.   She is exhausted.  She has not been taking over-the-counter nonsteroidal anti-inflammatories on a regular basis for pelvic discomfort associated with her menstrual bleeding. She has taken Mobic in the past for such.  She is taking Protonix to help with gastritis and remains on Aldactone for control of fluid retention. Currently she states she has no evidence of bleeding    Depressive screening : positive   Started zoloft and this is being managed by primary care physician    Past Medical History:   Diagnosis Date    Acid reflux     Anemia     Arthritis     Ascites 03/08/2017    Cataract     Cirrhosis     Depression     Hepatitis C     Hiatal hernia     Renal cyst 03/08/2017    Thrombocytopenia        Current Outpatient Medications:     albuterol (VENTOLIN HFA) 90 mcg/actuation inhaler, Ventolin HFA 90 mcg/actuation aerosol inhaler  take 2 puffs every 4hrs as needed for cough, Disp: , Rfl:     aluminum & magnesium hydroxide-simethicone (MAALOX MAXIMUM STRENGTH) 400-400-40 mg/5 mL suspension, Take by mouth every 6 (six) hours as needed for Indigestion., Disp: , Rfl:     cyclobenzaprine (FLEXERIL) 10 MG tablet, Take 1 tablet(s) 3 TIMES A DAY by oral route as needed for muscle spasm. May make you drowsy. Do not drive while on medication., Disp: , Rfl:     ferrous sulfate 325 (65 FE) MG EC tablet, Take 325 mg by mouth once daily. , Disp: , Rfl:     furosemide (LASIX) 40 MG tablet, Take 40 mg by mouth once  "daily., Disp: , Rfl: 3    gabapentin (NEURONTIN) 300 MG capsule, Take 300 mg by mouth 3 (three) times daily., Disp: , Rfl:     meloxicam (MOBIC) 15 MG tablet, Take 1 tablet (15 mg total) by mouth once daily. Do not take with any other NSAIDs Take with a meal, Disp: 30 tablet, Rfl: 2    sertraline (ZOLOFT) 25 MG tablet, Take 1 tablet by mouth once daily., Disp: , Rfl: 1    spironolactone (ALDACTONE) 100 MG tablet, Take 100 mg by mouth once daily., Disp: , Rfl: 3    pantoprazole (PROTONIX) 40 MG tablet, Take 1 tablet (40 mg total) by mouth once daily., Disp: 30 tablet, Rfl: 2    CONSTITUTIONAL: No fevers, chills, night sweats, wt. loss, appetite changes  SKIN: no rashes or itching  ENT: No headaches, head trauma, vision changes, or eye pain  LYMPH NODES: None noticed   CV: No chest pain, palpitations.   RESP: No dyspnea on exertion, cough, wheezing, or hemoptysis  GI: No nausea, emesis, diarrhea, constipation, melena, hematochezia, pain.   : No dysuria, hematuria, urgency, or frequency   HEME: ++ easy bruising, history of bleeding problems  PSYCHIATRIC: +depression, no anxiety, psychosis, hallucinations.  NEURO: No seizures, memory loss, dizziness or difficulty sleeping  MSK: No arthralgias or joint swelling  Recheck   No further bleeding   Cervical abnormality on pap smear followed by gyn        BP (!) 119/56   Pulse 97   Temp 98.2 °F (36.8 °C)   Resp 20   Ht 5' 4" (1.626 m)   Wt 118.8 kg (261 lb 14.5 oz)   BMI 44.96 kg/m²   Gen: NAD, A and O x3, tobin in appearance continues  Psych: pleasant yet somewhat flat affect, normal thought process  Eyes: Pupils round and non dilated, EOM intact  Nose: Nares patent  OP clear, mucosa patent  Neck: suppple, no JVD, trachea midline, no palpable mass, no adenopathy  Lungs: CTAB, no wheezes, no use of accessory muscles  CV: S1S2 with RR tachycardia, No mrg  Abd: soft, NTND, obese  Extr: No CC positive 1+ pitting edema of bilateral extr  extremities, SHANT, " strength normal, good capillary refill  Neuro: steady gait, CNs grossly intact  Skin: intact, no lesions noted  Rheum: No joint swelling  CMP      Lab Results   Component Value Date    WBC 5.2 10/18/2018    HGB 12.1 10/18/2018    HCT 36.6 10/18/2018    MCV 95.6 10/18/2018    PLT 58 (L) 10/18/2018       Thrombocytopenia  -     CBC auto differential; Future; Expected date: 10/23/2018  -     Methylmalonic acid, serum; Future; Expected date: 10/23/2018  -     Folate; Future; Expected date: 10/23/2018    Elevated serum immunoglobulin free light chain level  -     CBC auto differential; Future; Expected date: 10/23/2018  -     Methylmalonic acid, serum; Future; Expected date: 10/23/2018  -     Folate; Future; Expected date: 10/23/2018  -     FREE LT CHAIN ANAL; Future; Expected date: 10/23/2018  -     CMP; Future; Expected date: 10/23/2018    Chronic hepatitis C without hepatic coma  -     CBC auto differential; Future; Expected date: 10/23/2018  -     Methylmalonic acid, serum; Future; Expected date: 10/23/2018  -     Folate; Future; Expected date: 10/23/2018    Hepatic cirrhosis, unspecified hepatic cirrhosis type, unspecified whether ascites present  -     CBC auto differential; Future; Expected date: 10/23/2018  -     Methylmalonic acid, serum; Future; Expected date: 10/23/2018  -     Folate; Future; Expected date: 10/23/2018        1.  Cirrhosis  2.  Splenomegaly  3.  Hepatitis C to see GI soon to start treatment   4.  Thrombocytopenia with progression slow progression   5.  NO bleeding Due to low platelets from cirrhosis: no evidence of  autoimmune hepatitis  6.  Iron deficiency resolved after 2 doses of iron   7.  Tobacco use    No further evidence of bleeding hence no need for transfusion at this time, obs alone indicated   Must start therapy for hepatitis  C, she will phone me if she develops any further bleeding. Once she started treatment for her hepatitis C her lab should be monitored closely as this treatment  can symptoms worsen bone marrow suppression and cause progression of cytopenias which could then lead to increased risk of spontaneous bleeding nutritional levels of methylmalonic acid and folate will be checked make sure that she does not need any stimulation of her bone marrow to help her counts recover.  Again I explained peripheral destruction of her cells due to splenomegaly and the reason why she would not benefit from further platelet transfusions at this time.  She is to continue her PPI for intermittent GERD and she is not to stop taking Zoloft without letting her physician know.  This medication must be weaned in a slower fashion.  She will follow with her primary care physician to discuss the efficacy of this medicine  RTC 2 months for continued surveillance       Thank you for allowing me to evaluate and participate in the care of this pleasant patient. Please fell free to call me with any questions or concerns.    Warmly,  Elizabet Valencia MD    This note was dictated with Dragon and briefly proofread. Please excuse any sentences that may be unclear or words misspelled

## 2018-11-15 ENCOUNTER — TELEPHONE (OUTPATIENT)
Dept: ORTHOPEDICS | Facility: CLINIC | Age: 53
End: 2018-11-15

## 2018-11-15 NOTE — TELEPHONE ENCOUNTER
----- Message from Caitlyn Alvarez sent at 11/15/2018  1:03 PM CST -----  Contact: Patient  Type:  RX Refill Request    Who Called:  Karen, patient  Refill or New Rx:  Refill  RX Name and Strength:  meloxicam (MOBIC) 15 MG tablet  How is the patient currently taking it? (ex. 1XDay):  Take 1 tablet (15 mg total) by mouth once daily. Do not take with any other NSAIDs   Take with a meal  Is this a 30 day or 90 day RX:  30  Preferred Pharmacy with phone number:    [id number] - [place] - [address] [facility] [address]  [address] [address]  Phone: [phone] Fax: [phone]  Local or Mail Order:  Local  Ordering Provider:  Dr Glenroy Faustin Call Back Number:  [phone]  Additional Information:  Please advise. Thanks.

## 2018-11-15 NOTE — TELEPHONE ENCOUNTER
Spoke with patient regarding her medication refill request. Informed patient that a message was sent to Dr Tim regarding this matter. Patient indicated understanding of the information provided to her.

## 2018-11-16 NOTE — TELEPHONE ENCOUNTER
Refill was called to the pharmacy. Patient has been notified. Indicated understanding of the information provided to her. No further issues discussed.

## 2018-12-20 ENCOUNTER — LAB VISIT (OUTPATIENT)
Dept: LAB | Facility: HOSPITAL | Age: 53
End: 2018-12-20
Attending: INTERNAL MEDICINE
Payer: MEDICAID

## 2018-12-20 DIAGNOSIS — B18.2 CHRONIC HEPATITIS C WITHOUT HEPATIC COMA: ICD-10-CM

## 2018-12-20 DIAGNOSIS — R76.8 ELEVATED SERUM IMMUNOGLOBULIN FREE LIGHT CHAIN LEVEL: ICD-10-CM

## 2018-12-20 DIAGNOSIS — D69.6 THROMBOCYTOPENIA: ICD-10-CM

## 2018-12-20 DIAGNOSIS — K74.60 HEPATIC CIRRHOSIS, UNSPECIFIED HEPATIC CIRRHOSIS TYPE, UNSPECIFIED WHETHER ASCITES PRESENT: ICD-10-CM

## 2018-12-20 LAB
ALBUMIN SERPL BCP-MCNC: 2.7 G/DL
ALP SERPL-CCNC: 176 U/L
ALT SERPL W/O P-5'-P-CCNC: 16 U/L
ANION GAP SERPL CALC-SCNC: 8 MMOL/L
AST SERPL-CCNC: 28 U/L
BASOPHILS # BLD AUTO: 0 K/UL
BASOPHILS NFR BLD: 0.6 %
BILIRUB SERPL-MCNC: 1.4 MG/DL
BUN SERPL-MCNC: 4 MG/DL
CALCIUM SERPL-MCNC: 8.5 MG/DL
CHLORIDE SERPL-SCNC: 105 MMOL/L
CO2 SERPL-SCNC: 25 MMOL/L
CREAT SERPL-MCNC: 0.8 MG/DL
DIFFERENTIAL METHOD: ABNORMAL
EOSINOPHIL # BLD AUTO: 0.1 K/UL
EOSINOPHIL NFR BLD: 2.7 %
ERYTHROCYTE [DISTWIDTH] IN BLOOD BY AUTOMATED COUNT: 17.1 %
EST. GFR  (AFRICAN AMERICAN): >60 ML/MIN/1.73 M^2
EST. GFR  (NON AFRICAN AMERICAN): >60 ML/MIN/1.73 M^2
FOLATE SERPL-MCNC: 8.9 NG/ML
GLUCOSE SERPL-MCNC: 99 MG/DL
HCT VFR BLD AUTO: 25.4 %
HGB BLD-MCNC: 7.9 G/DL
LYMPHOCYTES # BLD AUTO: 0.8 K/UL
LYMPHOCYTES NFR BLD: 16.5 %
MCH RBC QN AUTO: 27.5 PG
MCHC RBC AUTO-ENTMCNC: 31.3 G/DL
MCV RBC AUTO: 88 FL
MONOCYTES # BLD AUTO: 0.6 K/UL
MONOCYTES NFR BLD: 10.9 %
NEUTROPHILS # BLD AUTO: 3.5 K/UL
NEUTROPHILS NFR BLD: 69.3 %
PLATELET # BLD AUTO: 75 K/UL
PMV BLD AUTO: 9.5 FL
POTASSIUM SERPL-SCNC: 3.6 MMOL/L
PROT SERPL-MCNC: 6.2 G/DL
RBC # BLD AUTO: 2.89 M/UL
SODIUM SERPL-SCNC: 138 MMOL/L
WBC # BLD AUTO: 5.1 K/UL

## 2018-12-20 PROCEDURE — 36415 COLL VENOUS BLD VENIPUNCTURE: CPT

## 2018-12-20 PROCEDURE — 85025 COMPLETE CBC W/AUTO DIFF WBC: CPT

## 2018-12-20 PROCEDURE — 80053 COMPREHEN METABOLIC PANEL: CPT

## 2018-12-20 PROCEDURE — 82746 ASSAY OF FOLIC ACID SERUM: CPT

## 2018-12-20 PROCEDURE — 83520 IMMUNOASSAY QUANT NOS NONAB: CPT

## 2018-12-20 PROCEDURE — 83921 ORGANIC ACID SINGLE QUANT: CPT

## 2018-12-21 LAB
KAPPA LC SER QL IA: 4.67 MG/DL
KAPPA LC/LAMBDA SER IA: 1.28
LAMBDA LC SER QL IA: 3.66 MG/DL

## 2018-12-26 LAB — METHYLMALONATE SERPL-SCNC: 0.2 UMOL/L

## 2018-12-27 ENCOUNTER — HOSPITAL ENCOUNTER (INPATIENT)
Facility: HOSPITAL | Age: 53
LOS: 3 days | Discharge: HOME | DRG: 812 | End: 2018-12-30
Attending: EMERGENCY MEDICINE | Admitting: INTERNAL MEDICINE
Payer: MEDICAID

## 2018-12-27 ENCOUNTER — OFFICE VISIT (OUTPATIENT)
Dept: HEMATOLOGY/ONCOLOGY | Facility: CLINIC | Age: 53
End: 2018-12-27
Payer: MEDICAID

## 2018-12-27 VITALS
SYSTOLIC BLOOD PRESSURE: 120 MMHG | WEIGHT: 285.94 LBS | DIASTOLIC BLOOD PRESSURE: 58 MMHG | BODY MASS INDEX: 48.82 KG/M2 | HEIGHT: 64 IN | TEMPERATURE: 99 F | RESPIRATION RATE: 16 BRPM | HEART RATE: 108 BPM

## 2018-12-27 DIAGNOSIS — B18.2 CHRONIC HEPATITIS C WITH CIRRHOSIS: ICD-10-CM

## 2018-12-27 DIAGNOSIS — R18.8 ASCITES: ICD-10-CM

## 2018-12-27 DIAGNOSIS — E87.6 HYPOKALEMIA: ICD-10-CM

## 2018-12-27 DIAGNOSIS — R06.02 SOB (SHORTNESS OF BREATH): ICD-10-CM

## 2018-12-27 DIAGNOSIS — K74.60 CHRONIC HEPATITIS C WITH CIRRHOSIS: ICD-10-CM

## 2018-12-27 DIAGNOSIS — D64.9 SYMPTOMATIC ANEMIA: ICD-10-CM

## 2018-12-27 DIAGNOSIS — D64.9 ANEMIA, UNSPECIFIED TYPE: Primary | ICD-10-CM

## 2018-12-27 DIAGNOSIS — D69.6 THROMBOCYTOPENIA: ICD-10-CM

## 2018-12-27 DIAGNOSIS — Z86.39 HISTORY OF IRON DEFICIENCY: Primary | ICD-10-CM

## 2018-12-27 LAB
ABO + RH BLD: NORMAL
ALBUMIN SERPL BCP-MCNC: 2.5 G/DL
ALP SERPL-CCNC: 156 U/L
ALT SERPL W/O P-5'-P-CCNC: 11 U/L
ANION GAP SERPL CALC-SCNC: 8 MMOL/L
APTT BLDCRRT: 25.7 SEC
AST SERPL-CCNC: 25 U/L
BASOPHILS # BLD AUTO: 0 K/UL
BASOPHILS NFR BLD: 0.1 %
BILIRUB SERPL-MCNC: 1.2 MG/DL
BLD GP AB SCN CELLS X3 SERPL QL: NORMAL
BNP SERPL-MCNC: 38 PG/ML
BUN SERPL-MCNC: 5 MG/DL
CALCIUM SERPL-MCNC: 8 MG/DL
CHLORIDE SERPL-SCNC: 105 MMOL/L
CO2 SERPL-SCNC: 24 MMOL/L
CREAT SERPL-MCNC: 0.8 MG/DL
DIFFERENTIAL METHOD: ABNORMAL
EOSINOPHIL # BLD AUTO: 0.1 K/UL
EOSINOPHIL NFR BLD: 2.6 %
ERYTHROCYTE [DISTWIDTH] IN BLOOD BY AUTOMATED COUNT: 18.1 %
EST. GFR  (AFRICAN AMERICAN): >60 ML/MIN/1.73 M^2
EST. GFR  (NON AFRICAN AMERICAN): >60 ML/MIN/1.73 M^2
GLUCOSE SERPL-MCNC: 111 MG/DL
HCT VFR BLD AUTO: 22.3 %
HGB BLD-MCNC: 6.9 G/DL
INR PPP: 1.1
LYMPHOCYTES # BLD AUTO: 0.8 K/UL
LYMPHOCYTES NFR BLD: 17.6 %
MAGNESIUM SERPL-MCNC: 1.8 MG/DL
MCH RBC QN AUTO: 26.6 PG
MCHC RBC AUTO-ENTMCNC: 31.1 G/DL
MCV RBC AUTO: 86 FL
MONOCYTES # BLD AUTO: 0.5 K/UL
MONOCYTES NFR BLD: 10.7 %
NEUTROPHILS # BLD AUTO: 3.2 K/UL
NEUTROPHILS NFR BLD: 69 %
PLATELET # BLD AUTO: 89 K/UL
PMV BLD AUTO: 9.7 FL
POTASSIUM SERPL-SCNC: 3.2 MMOL/L
PROT SERPL-MCNC: 5.7 G/DL
PROTHROMBIN TIME: 11.8 SEC
RBC # BLD AUTO: 2.61 M/UL
SODIUM SERPL-SCNC: 137 MMOL/L
WBC # BLD AUTO: 4.7 K/UL

## 2018-12-27 PROCEDURE — 99285 EMERGENCY DEPT VISIT HI MDM: CPT | Mod: 27

## 2018-12-27 PROCEDURE — 85730 THROMBOPLASTIN TIME PARTIAL: CPT

## 2018-12-27 PROCEDURE — 36430 TRANSFUSION BLD/BLD COMPNT: CPT

## 2018-12-27 PROCEDURE — 83880 ASSAY OF NATRIURETIC PEPTIDE: CPT

## 2018-12-27 PROCEDURE — 25000003 PHARM REV CODE 250: Performed by: EMERGENCY MEDICINE

## 2018-12-27 PROCEDURE — 99999 PR PBB SHADOW E&M-EST. PATIENT-LVL III: CPT | Mod: PBBFAC,,, | Performed by: INTERNAL MEDICINE

## 2018-12-27 PROCEDURE — 83735 ASSAY OF MAGNESIUM: CPT

## 2018-12-27 PROCEDURE — 80053 COMPREHEN METABOLIC PANEL: CPT

## 2018-12-27 PROCEDURE — 99214 OFFICE O/P EST MOD 30 MIN: CPT | Mod: S$PBB,,, | Performed by: INTERNAL MEDICINE

## 2018-12-27 PROCEDURE — 86901 BLOOD TYPING SEROLOGIC RH(D): CPT

## 2018-12-27 PROCEDURE — 85025 COMPLETE CBC W/AUTO DIFF WBC: CPT

## 2018-12-27 PROCEDURE — 99222 1ST HOSP IP/OBS MODERATE 55: CPT | Mod: ,,, | Performed by: INTERNAL MEDICINE

## 2018-12-27 PROCEDURE — 86920 COMPATIBILITY TEST SPIN: CPT

## 2018-12-27 PROCEDURE — 85610 PROTHROMBIN TIME: CPT

## 2018-12-27 PROCEDURE — 99213 OFFICE O/P EST LOW 20 MIN: CPT | Mod: PBBFAC,PO,25 | Performed by: INTERNAL MEDICINE

## 2018-12-27 PROCEDURE — 12000002 HC ACUTE/MED SURGE SEMI-PRIVATE ROOM

## 2018-12-27 PROCEDURE — 36415 COLL VENOUS BLD VENIPUNCTURE: CPT

## 2018-12-27 RX ORDER — FUROSEMIDE 10 MG/ML
40 INJECTION INTRAMUSCULAR; INTRAVENOUS 2 TIMES DAILY
Status: DISCONTINUED | OUTPATIENT
Start: 2018-12-27 | End: 2018-12-29

## 2018-12-27 RX ORDER — PANTOPRAZOLE SODIUM 40 MG/1
40 TABLET, DELAYED RELEASE ORAL DAILY
Status: DISCONTINUED | OUTPATIENT
Start: 2018-12-28 | End: 2018-12-27

## 2018-12-27 RX ORDER — POTASSIUM CHLORIDE 20 MEQ/1
40 TABLET, EXTENDED RELEASE ORAL
Status: COMPLETED | OUTPATIENT
Start: 2018-12-27 | End: 2018-12-27

## 2018-12-27 RX ORDER — SPIRONOLACTONE 25 MG/1
100 TABLET ORAL DAILY
Status: DISCONTINUED | OUTPATIENT
Start: 2018-12-28 | End: 2018-12-30 | Stop reason: HOSPADM

## 2018-12-27 RX ORDER — ONDANSETRON 2 MG/ML
4 INJECTION INTRAMUSCULAR; INTRAVENOUS EVERY 8 HOURS PRN
Status: DISCONTINUED | OUTPATIENT
Start: 2018-12-27 | End: 2018-12-30 | Stop reason: HOSPADM

## 2018-12-27 RX ORDER — SERTRALINE HYDROCHLORIDE 25 MG/1
25 TABLET, FILM COATED ORAL DAILY
Status: DISCONTINUED | OUTPATIENT
Start: 2018-12-28 | End: 2018-12-30 | Stop reason: HOSPADM

## 2018-12-27 RX ORDER — FUROSEMIDE 10 MG/ML
40 INJECTION INTRAMUSCULAR; INTRAVENOUS
Status: DISCONTINUED | OUTPATIENT
Start: 2018-12-27 | End: 2018-12-27

## 2018-12-27 RX ORDER — GABAPENTIN 300 MG/1
300 CAPSULE ORAL 3 TIMES DAILY
Status: DISCONTINUED | OUTPATIENT
Start: 2018-12-27 | End: 2018-12-30 | Stop reason: HOSPADM

## 2018-12-27 RX ORDER — ACETAMINOPHEN 325 MG/1
650 TABLET ORAL EVERY 6 HOURS PRN
Status: DISCONTINUED | OUTPATIENT
Start: 2018-12-27 | End: 2018-12-30 | Stop reason: HOSPADM

## 2018-12-27 RX ORDER — PANTOPRAZOLE SODIUM 40 MG/1
40 TABLET, DELAYED RELEASE ORAL DAILY
Status: DISCONTINUED | OUTPATIENT
Start: 2018-12-28 | End: 2018-12-30 | Stop reason: HOSPADM

## 2018-12-27 RX ORDER — POTASSIUM CHLORIDE 20 MEQ/1
20 TABLET, EXTENDED RELEASE ORAL 2 TIMES DAILY
Status: DISCONTINUED | OUTPATIENT
Start: 2018-12-27 | End: 2018-12-30 | Stop reason: HOSPADM

## 2018-12-27 RX ORDER — FUROSEMIDE 40 MG/1
40 TABLET ORAL DAILY
Status: DISCONTINUED | OUTPATIENT
Start: 2018-12-28 | End: 2018-12-27

## 2018-12-27 RX ORDER — FERROUS SULFATE 325(65) MG
325 TABLET, DELAYED RELEASE (ENTERIC COATED) ORAL DAILY
Status: DISCONTINUED | OUTPATIENT
Start: 2018-12-28 | End: 2018-12-30 | Stop reason: HOSPADM

## 2018-12-27 RX ADMIN — POTASSIUM CHLORIDE 40 MEQ: 20 TABLET, EXTENDED RELEASE ORAL at 06:12

## 2018-12-27 RX ADMIN — POTASSIUM CHLORIDE 20 MEQ: 1500 TABLET, EXTENDED RELEASE ORAL at 09:12

## 2018-12-27 RX ADMIN — GABAPENTIN 300 MG: 300 CAPSULE ORAL at 09:12

## 2018-12-27 NOTE — PROGRESS NOTES
CC:ADOLFO Jones Cailin Villarreal is a 53 y.o.  Pt is here for evaluation of thrombocytopenia and iron deficiency with intermittent vaginal bleeding. The patient has been having menometrorrhagia with low platelets.  She has a history of hepatitis C with documented cirrhosis and splenomegaly.  She is seeing a GI specialist Dr Whitehead   No further GYN bleeding episodes he is exhausted.  She is taking Protonix to help with gastritis and remains on Aldactone for control of fluid retention.     Pt is swollen today. SHe states her abdomen became distended at the beginning of last week. She is having SOB and her legs are hurting   Her pcp tried to increase her fluid pills but she could not fill the prescription due to quantity limits   Depressive screening : negative today   Started zoloft and this is being managed by primary care physician    Past Medical History:   Diagnosis Date    Acid reflux     Anemia     Arthritis     Ascites 03/08/2017    Cataract     Cirrhosis     Depression     Hepatitis C     Hiatal hernia     Renal cyst 03/08/2017    Thrombocytopenia        Current Outpatient Medications:     albuterol (VENTOLIN HFA) 90 mcg/actuation inhaler, Ventolin HFA 90 mcg/actuation aerosol inhaler  take 2 puffs every 4hrs as needed for cough, Disp: , Rfl:     aluminum & magnesium hydroxide-simethicone (MAALOX MAXIMUM STRENGTH) 400-400-40 mg/5 mL suspension, Take by mouth every 6 (six) hours as needed for Indigestion., Disp: , Rfl:     cyclobenzaprine (FLEXERIL) 10 MG tablet, Take 1 tablet(s) 3 TIMES A DAY by oral route as needed for muscle spasm. May make you drowsy. Do not drive while on medication., Disp: , Rfl:     ferrous sulfate 325 (65 FE) MG EC tablet, Take 325 mg by mouth once daily. , Disp: , Rfl:     furosemide (LASIX) 40 MG tablet, Take 40 mg by mouth once daily., Disp: , Rfl: 3    gabapentin (NEURONTIN) 300 MG capsule, Take 300 mg by mouth 3 (three) times daily., Disp: , Rfl:      "meloxicam (MOBIC) 15 MG tablet, Take 1 tablet (15 mg total) by mouth once daily. Do not take with any other NSAIDs Take with a meal, Disp: 30 tablet, Rfl: 2    pantoprazole (PROTONIX) 40 MG tablet, Take 1 tablet (40 mg total) by mouth once daily., Disp: 30 tablet, Rfl: 2    sertraline (ZOLOFT) 25 MG tablet, Take 1 tablet by mouth once daily., Disp: , Rfl: 1    spironolactone (ALDACTONE) 100 MG tablet, Take 100 mg by mouth once daily., Disp: , Rfl: 3    CONSTITUTIONAL: No fevers, chills, night sweats, wt. loss, appetite changes  SKIN: no rashes or itching  ENT: No headaches, head trauma, vision changes, or eye pain  LYMPH NODES: None noticed   CV: No chest pain, palpitations.   RESP: + sob and dyspnea on exertion, no cough, wheezing, or hemoptysis  GI: No nausea, emesis, diarrhea, constipation, melena, hematochezia, pain.   : No dysuria, hematuria, urgency, or frequency   HEME: ++ easy bruising, history of bleeding problems  PSYCHIATRIC: +depression, no anxiety, psychosis, hallucinations.  NEURO: No seizures, memory loss, dizziness or difficulty sleeping  MSK: No arthralgias or joint swelling  Recheck   No further bleeding   Cervical abnormality on pap smear followed by gyn        BP (!) 120/58   Pulse 108   Temp 99.1 °F (37.3 °C)   Resp 16   Ht 5' 4" (1.626 m)   Wt 129.7 kg (285 lb 15 oz)   LMP 08/06/2017 (Approximate)   BMI 49.08 kg/m²   Gen: NAD, A and O x3, facial swelling   Psych: pleasant yet somewhat flat affect, normal thought process  Eyes: Pupils round and non dilated, EOM intact  Nose: Nares patent  OP clear, mucosa patent  Neck: suppple, no JVD, trachea midline, no palpable mass, no adenopathy  Lungs: CTAB, no wheezes, no use of accessory muscles  CV: S1S2 with RR tachycardia, No mrg  Abd: soft, NTND, obese + ascites   Extr: No CC positive  3 + pitting edema of bilateral extr  extremities, SHANT  Neuro: steady gait, CNs grossly intact  Skin: intact, no lesions noted  Rheum: No joint " swelling  CMP      Lab Results   Component Value Date    WBC 5.10 12/20/2018    HGB 7.9 (L) 12/20/2018    HCT 25.4 (L) 12/20/2018    MCV 88 12/20/2018    PLT 75 (L) 12/20/2018   genotype 1 a HCV    History of iron deficiency    Chronic hepatitis C with cirrhosis    Thrombocytopenia    SOB (shortness of breath)    Symptomatic anemia        1.  Cirrhosis and splenomegaly   2.  SOB due to anemia and ascites  3.  Hepatitis C to start treatment    4.  Thrombocytopenia with no bleeding   5.  Anemia with progression : declined 4 units PRBCs  6.  anasarca  7.  Tobacco use    To ER for evaluation   Anasarca ? Related to hepatitis C   I am concerned that she may have a hemoglobin even lower and likely to need paracentesis but may need transfusion of platelets or blood first   Must start therapy for hepatitis  C, she will phone me if she develops any further bleeding.   Again I explained peripheral destruction of her cells due to splenomegaly and the reason why she would not benefit from further platelet transfusions at this time.  She is to continue her PPI for intermittent GERD and she is not to stop taking Zoloft without letting her physician know.   Will have her folow up in clinic depending on ER workup       Thank you for allowing me to evaluate and participate in the care of this pleasant patient. Please fell free to call me with any questions or concerns.    Warmly,  Elizabet Valencia MD    This note was dictated with Dragon and briefly proofread. Please excuse any sentences that may be unclear or words misspelled

## 2018-12-27 NOTE — ED PROVIDER NOTES
"Encounter Date: 12/27/2018    SCRIBE #1 NOTE: IErika, am scribing for, and in the presence of, Dr. Lizandro Teran MD.       History     Chief Complaint   Patient presents with    fluid retention       Time seen by provider: 3:55 PM on 12/27/2018    Karen Villarreal is a 53 y.o. female who presents to the ED with complaints of bilateral leg swelling, abdominal swelling, and SOB that started x1 week ago. Patient states she visited her hematologist today and was told she has a "low blood count". Patient denies having a history of heart problems including CHF. She denies onset of any other new symptoms currently. Patient has no other medical concerns or complaints at this moment. PMHx of Hepatitis C, cirrhosis, thrombocytopenia, renal cyst, and ascites. NKDA noted.       The history is provided by the patient.     Review of patient's allergies indicates:  No Known Allergies  Past Medical History:   Diagnosis Date    Acid reflux     Anemia     Arthritis     Ascites 03/08/2017    Cataract     Cirrhosis     Depression     Hepatitis C     Hiatal hernia     Renal cyst 03/08/2017    Thrombocytopenia      Past Surgical History:   Procedure Laterality Date    ANKLE SURGERY      APPENDECTOMY      CATARACT EXTRACTION      EXTRACTION-CATARACT-IOL Right 1/5/2018    [facility]    EXTRACTION-CATARACT-IOL Left 12/1/2017    [facility]     Family History   Problem Relation Age of Onset    Heart failure Mother     COPD Mother     Arthritis Mother     Vision loss Mother     Diabetes Father     Cancer Father     Brain cancer Father     Diabetes type II Father      Social History     Tobacco Use    Smoking status: Current Some Day Smoker     Packs/day: 0.50     Years: 33.00     Pack years: 16.50     Types: Cigarettes    Smokeless tobacco: Never Used   Substance Use Topics    Alcohol use: Yes     Comment: several times a week " (beer)    Drug use: No     Review of Systems   Constitutional: Negative for activity change, appetite change, chills, fatigue and fever.   HENT: Negative for facial swelling and sore throat.    Respiratory: Positive for shortness of breath. Negative for apnea.    Cardiovascular: Positive for leg swelling (bilateral). Negative for chest pain and palpitations.   Gastrointestinal: Positive for abdominal distention. Negative for abdominal pain and nausea.   Genitourinary: Negative for difficulty urinating.   Musculoskeletal: Negative for back pain and neck pain.   Skin: Negative for pallor and rash.   Neurological: Negative for weakness, numbness and headaches.   Hematological: Does not bruise/bleed easily.   Psychiatric/Behavioral: Negative for agitation.       Physical Exam     Initial Vitals [12/27/18 1521]   BP Pulse Resp Temp SpO2   (!) 149/63 (!) 112 20 99 °F (37.2 °C) 99 %      MAP       --         Physical Exam    Nursing note and vitals reviewed.  Constitutional: She appears well-developed and well-nourished. She is not diaphoretic. No distress.   HENT:   Head: Normocephalic and atraumatic.   Eyes: Conjunctivae are normal.   Neck: Normal range of motion. Neck supple.   Cardiovascular: Normal rate, regular rhythm and normal heart sounds. Exam reveals no gallop and no friction rub.    No murmur heard.  Pulmonary/Chest: Effort normal and breath sounds normal. No respiratory distress. She has no wheezes. She has no rhonchi. She has no rales.   Abdominal: Soft. She exhibits distension and fluid wave. There is no tenderness.   Musculoskeletal: Normal range of motion. She exhibits edema (2+ pitting edema with hyperpigmentation).   Neurological: She is alert and oriented to person, place, and time.   Skin: Skin is warm and dry. No erythema.   Psychiatric: She has a normal mood and affect.         ED Course   Procedures  Labs Reviewed   APTT   PROTIME-INR   COMPREHENSIVE METABOLIC PANEL   CBC W/ AUTO DIFFERENTIAL    B-TYPE NATRIURETIC PEPTIDE          Imaging Results    None          Medical Decision Making:   History:   Old Medical Records: I decided to obtain old medical records.  Clinical Tests:   Lab Tests: Reviewed and Ordered  Radiological Study: Reviewed and Ordered  ED Management:  53-year-old female presents with shortness of breath with associated lower extremity swelling with abdominal swelling which is increased over the past week.  She has a history of cirrhosis and has required a therapeutic paracentesis in the past for ascites.  She denies any fever.  Chest exam and chest x-ray failed demonstrate any evidence of CHF with a normal BNP.  She is sent for therapeutic paracentesis; however, there is not significant fluid for removal.  She has a worsening anemia with an H&H of 6 and 22 which most likely accounts for her symptoms.  She has normocytic normochromic anemia with heme-negative stools.  She will be transfused.  Other:   I have discussed this case with another health care provider.       <> Summary of the Discussion: Discussed with Dr. Rod who will admit the patient       APC / Resident Notes:   I, Dr. Lizandro Teran III, personally performed the services described in this documentation. All medical record entries made by the scribe were at my direction and in my presence.  I have reviewed the chart and agree that the record reflects my personal performance and is accurate and complete       Scribe Attestation:   Scribe #1: I performed the above scribed service and the documentation accurately describes the services I performed. I attest to the accuracy of the note.               Clinical Impression:   The primary encounter diagnosis was Anemia, unspecified type. Diagnoses of SOB (shortness of breath) and Hypokalemia were also pertinent to this visit.                             Lizandro Teran III, MD  12/27/18 1922

## 2018-12-28 LAB
ALBUMIN SERPL BCP-MCNC: 2.7 G/DL
ALP SERPL-CCNC: 176 U/L
ALT SERPL W/O P-5'-P-CCNC: 13 U/L
ANION GAP SERPL CALC-SCNC: 6 MMOL/L
AST SERPL-CCNC: 25 U/L
BASOPHILS # BLD AUTO: 0 K/UL
BASOPHILS NFR BLD: 0.3 %
BILIRUB SERPL-MCNC: 1.2 MG/DL
BLD PROD TYP BPU: NORMAL
BLD PROD TYP BPU: NORMAL
BLOOD UNIT EXPIRATION DATE: NORMAL
BLOOD UNIT EXPIRATION DATE: NORMAL
BLOOD UNIT TYPE CODE: 5100
BLOOD UNIT TYPE CODE: 5100
BLOOD UNIT TYPE: NORMAL
BLOOD UNIT TYPE: NORMAL
BUN SERPL-MCNC: 6 MG/DL
CALCIUM SERPL-MCNC: 8.2 MG/DL
CHLORIDE SERPL-SCNC: 106 MMOL/L
CO2 SERPL-SCNC: 24 MMOL/L
CODING SYSTEM: NORMAL
CODING SYSTEM: NORMAL
CREAT SERPL-MCNC: 0.8 MG/DL
DIFFERENTIAL METHOD: ABNORMAL
DISPENSE STATUS: NORMAL
DISPENSE STATUS: NORMAL
EOSINOPHIL # BLD AUTO: 0.1 K/UL
EOSINOPHIL NFR BLD: 2.9 %
ERYTHROCYTE [DISTWIDTH] IN BLOOD BY AUTOMATED COUNT: 18.8 %
EST. GFR  (AFRICAN AMERICAN): >60 ML/MIN/1.73 M^2
EST. GFR  (NON AFRICAN AMERICAN): >60 ML/MIN/1.73 M^2
GLUCOSE SERPL-MCNC: 99 MG/DL
HCT VFR BLD AUTO: 23.9 %
HGB BLD-MCNC: 7.5 G/DL
INR PPP: 1.1
LYMPHOCYTES # BLD AUTO: 0.8 K/UL
LYMPHOCYTES NFR BLD: 16.8 %
MCH RBC QN AUTO: 26.6 PG
MCHC RBC AUTO-ENTMCNC: 31.3 G/DL
MCV RBC AUTO: 85 FL
MONOCYTES # BLD AUTO: 0.4 K/UL
MONOCYTES NFR BLD: 7.4 %
NEUTROPHILS # BLD AUTO: 3.6 K/UL
NEUTROPHILS NFR BLD: 72.6 %
NRBC BLD-RTO: 0 /100 WBC
NUM UNITS TRANS PACKED RBC: NORMAL
NUM UNITS TRANS PACKED RBC: NORMAL
PLATELET # BLD AUTO: 111 K/UL
PLATELET BLD QL SMEAR: ABNORMAL
PMV BLD AUTO: 9.8 FL
POTASSIUM SERPL-SCNC: 4.2 MMOL/L
PROT SERPL-MCNC: 6 G/DL
PROTHROMBIN TIME: 11.6 SEC
RBC # BLD AUTO: 2.81 M/UL
SODIUM SERPL-SCNC: 136 MMOL/L
WBC # BLD AUTO: 5 K/UL

## 2018-12-28 PROCEDURE — 85025 COMPLETE CBC W/AUTO DIFF WBC: CPT

## 2018-12-28 PROCEDURE — P9016 RBC LEUKOCYTES REDUCED: HCPCS

## 2018-12-28 PROCEDURE — 80053 COMPREHEN METABOLIC PANEL: CPT

## 2018-12-28 PROCEDURE — 25000003 PHARM REV CODE 250: Performed by: NURSE PRACTITIONER

## 2018-12-28 PROCEDURE — 85610 PROTHROMBIN TIME: CPT

## 2018-12-28 PROCEDURE — 36430 TRANSFUSION BLD/BLD COMPNT: CPT

## 2018-12-28 PROCEDURE — 12000002 HC ACUTE/MED SURGE SEMI-PRIVATE ROOM

## 2018-12-28 PROCEDURE — 25000003 PHARM REV CODE 250: Performed by: EMERGENCY MEDICINE

## 2018-12-28 PROCEDURE — 36415 COLL VENOUS BLD VENIPUNCTURE: CPT

## 2018-12-28 PROCEDURE — 63600175 PHARM REV CODE 636 W HCPCS: Performed by: NURSE PRACTITIONER

## 2018-12-28 PROCEDURE — 99232 SBSQ HOSP IP/OBS MODERATE 35: CPT | Mod: ,,, | Performed by: INTERNAL MEDICINE

## 2018-12-28 PROCEDURE — 25500020 PHARM REV CODE 255: Performed by: SPECIALIST

## 2018-12-28 RX ADMIN — SPIRONOLACTONE 100 MG: 25 TABLET, FILM COATED ORAL at 09:12

## 2018-12-28 RX ADMIN — FERROUS SULFATE TAB EC 325 MG (65 MG FE EQUIVALENT) 325 MG: 325 (65 FE) TABLET DELAYED RESPONSE at 09:12

## 2018-12-28 RX ADMIN — FUROSEMIDE 40 MG: 10 INJECTION, SOLUTION INTRAVENOUS at 02:12

## 2018-12-28 RX ADMIN — GABAPENTIN 300 MG: 300 CAPSULE ORAL at 09:12

## 2018-12-28 RX ADMIN — POTASSIUM CHLORIDE 20 MEQ: 1500 TABLET, EXTENDED RELEASE ORAL at 09:12

## 2018-12-28 RX ADMIN — SERTRALINE 25 MG: 25 TABLET, FILM COATED ORAL at 09:12

## 2018-12-28 RX ADMIN — FUROSEMIDE 40 MG: 10 INJECTION, SOLUTION INTRAVENOUS at 06:12

## 2018-12-28 RX ADMIN — GABAPENTIN 300 MG: 300 CAPSULE ORAL at 02:12

## 2018-12-28 RX ADMIN — PANTOPRAZOLE SODIUM 40 MG: 40 TABLET, DELAYED RELEASE ORAL at 09:12

## 2018-12-28 RX ADMIN — SULFUR HEXAFLUORIDE 2.4 ML: KIT at 04:12

## 2018-12-28 NOTE — ASSESSMENT & PLAN NOTE
Abdominal ultrasound performed with results of mild ascites - insufficient volume for paracentesis. Serial abdominal exams.  Repeat U/S if warranted.  Diuresis with IV Lasix.

## 2018-12-28 NOTE — SUBJECTIVE & OBJECTIVE
Past Medical History:   Diagnosis Date    Acid reflux     Anemia     Arthritis     Ascites 03/08/2017    Cataract     Cirrhosis     Depression     Hepatitis C     Hiatal hernia     Renal cyst 03/08/2017    Thrombocytopenia        Past Surgical History:   Procedure Laterality Date    ANKLE SURGERY      APPENDECTOMY      CATARACT EXTRACTION      EXTRACTION-CATARACT-IOL Right 1/5/2018    [facility]    EXTRACTION-CATARACT-IOL Left 12/1/2017    Per[facility] at [facility]       Review of patient's allergies indicates:  No Known Allergies    No current facility-administered medications on file prior to encounter.      Current Outpatient Medications on File Prior to Encounter   Medication Sig    albuterol (VENTOLIN HFA) 90 mcg/actuation inhaler Ventolin HFA 90 mcg/actuation aerosol inhaler   take 2 puffs every 4hrs as needed for cough    aluminum & magnesium hydroxide-simethicone (MAALOX MAXIMUM STRENGTH) 400-400-40 mg/5 mL suspension Take by mouth every 6 (six) hours as needed for Indigestion.    cyclobenzaprine (FLEXERIL) 10 MG tablet Take 1 tablet(s) 2 TIMES A DAY by oral route as needed for muscle spasm. May make you drowsy. Do not drive while on medication.    ferrous sulfate 325 (65 FE) MG EC tablet Take 325 mg by mouth once daily.     furosemide (LASIX) 40 MG tablet Take 40 mg by mouth once daily.    gabapentin (NEURONTIN) 300 MG capsule Take 300 mg by mouth 3 (three) times daily.    meloxicam (MOBIC) 15 MG tablet Take 1 tablet (15 mg total) by mouth once daily. Do not take with any other NSAIDs  Take with a meal    pantoprazole (PROTONIX) 40 MG tablet Take 1 tablet (40 mg total) by mouth once daily.    sertraline (ZOLOFT) 25 MG tablet Take 1 tablet by mouth once daily.    spironolactone (ALDACTONE) 100 MG tablet Take 100 mg by mouth once daily.     Family History     Problem Relation (Age of Onset)    Arthritis Mother    Brain cancer  Father    COPD Mother    Cancer Father    Diabetes Father    Diabetes type II Father    Heart failure Mother    Vision loss Mother        Tobacco Use    Smoking status: Current Some Day Smoker     Packs/day: 0.50     Years: 33.00     Pack years: 16.50     Types: Cigarettes    Smokeless tobacco: Never Used   Substance and Sexual Activity    Alcohol use: Yes     Comment: several times a week (beer)    Drug use: No    Sexual activity: Yes     Review of Systems   Constitutional: Positive for fatigue. Negative for appetite change, chills and fever.   HENT: Negative for hearing loss, postnasal drip, sore throat and trouble swallowing.    Eyes: Negative for photophobia and visual disturbance.   Respiratory: Positive for shortness of breath. Negative for cough and wheezing.    Cardiovascular: Positive for leg swelling. Negative for chest pain and palpitations.   Gastrointestinal: Positive for abdominal distention. Negative for abdominal pain, diarrhea, nausea and vomiting.   Endocrine: Negative for polydipsia, polyphagia and polyuria.   Genitourinary: Negative for dysuria, frequency and urgency.   Musculoskeletal: Negative for gait problem, neck pain and neck stiffness.   Skin: Negative for rash and wound.   Neurological: Negative for dizziness, syncope, weakness, numbness and headaches.   Hematological: Does not bruise/bleed easily.   Psychiatric/Behavioral: Negative for confusion. The patient is not nervous/anxious.      Objective:     Vital Signs (Most Recent):  Temp: 97.1 °F (36.2 °C) (12/27/18 2050)  Pulse: 94 (12/27/18 2050)  Resp: 18 (12/27/18 2050)  BP: 134/60 (12/27/18 2050)  SpO2: 100 % (12/27/18 2050) Vital Signs (24h Range):  Temp:  [97.1 °F (36.2 °C)-99.1 °F (37.3 °C)] 97.1 °F (36.2 °C)  Pulse:  [] 94  Resp:  [16-20] 18  SpO2:  [99 %-100 %] 100 %  BP: (120-158)/(58-70) 134/60     Weight: 129.5 kg (285 lb 7.9 oz)  Body mass index is 49.01 kg/m².    Physical Exam   Constitutional: She is oriented to  person, place, and time. She appears well-developed and well-nourished. No distress.   Obese.   HENT:   Head: Normocephalic and atraumatic.   Mouth/Throat: Oropharynx is clear and moist.   Eyes: Conjunctivae and EOM are normal. Pupils are equal, round, and reactive to light. No scleral icterus.   Neck: Normal range of motion. Neck supple. No JVD present. No tracheal deviation present. No thyromegaly present.   Cardiovascular: Normal rate, regular rhythm, normal heart sounds and intact distal pulses. Exam reveals no gallop and no friction rub.   No murmur heard.  Pulses:       Dorsalis pedis pulses are 2+ on the right side, and 2+ on the left side.   Pulmonary/Chest: Effort normal and breath sounds normal. No accessory muscle usage. No respiratory distress. She has no wheezes. She has no rhonchi. She has no rales.   Abdominal: Bowel sounds are normal. She exhibits distension. She exhibits no fluid wave and no mass. There is no tenderness. There is no rebound and no guarding.   Musculoskeletal: Normal range of motion. She exhibits edema (Severe BLE from knees down 2+ pitting.). She exhibits no tenderness or deformity.   Neurological: She is alert and oriented to person, place, and time. She has normal strength. She exhibits normal muscle tone. Coordination normal.   Skin: Skin is warm, dry and intact. Capillary refill takes less than 2 seconds. No rash noted. She is not diaphoretic. No erythema.   Psychiatric: She has a normal mood and affect. Her speech is normal and behavior is normal. Judgment and thought content normal.   Vitals reviewed.        CRANIAL NERVES     CN III, IV, VI   Pupils are equal, round, and reactive to light.  Extraocular motions are normal.        Significant Labs:   CBC:   Recent Labs   Lab 12/27/18  1627   WBC 4.70   HGB 6.9*   HCT 22.3*   PLT 89*     CMP:   Recent Labs   Lab 12/27/18  1626      K 3.2*      CO2 24   *   BUN 5*   CREATININE 0.8   CALCIUM 8.0*   PROT 5.7*    ALBUMIN 2.5*   BILITOT 1.2*   ALKPHOS 156*   AST 25   ALT 11   ANIONGAP 8   EGFRNONAA >60     Cardiac Markers:   Recent Labs   Lab 12/27/18  1627   BNP 38     Coagulation:   Recent Labs   Lab 12/27/18  1626   INR 1.1   APTT 25.7     Magnesium:   Recent Labs   Lab 12/27/18  1627   MG 1.8       Significant Imaging:  Ultrasound abdomen:   FINDINGS:  Small volume ascites is present insufficient for paracentesis.      Impression       Mild ascites.     CXR:Impression   No acute process.  No significant change.

## 2018-12-28 NOTE — ASSESSMENT & PLAN NOTE
Chronic, stable.    Continue antihypertensives per home regimen, monitor BP closely, and titrate medication for sustained BP control.

## 2018-12-28 NOTE — PLAN OF CARE
The pt lives alone and is independent in ADL's. She denies any HH and DME. She uses [facility]. Verified PCP as Dr. Carrillo and insurance as medicaid. She is also followed by Dr. Valencia. Her discharge plan is home with no needs. Zuly RONI White LMSW     12/28/18 1002   Discharge Assessment   Assessment Type Discharge Planning Assessment   Confirmed/corrected address and phone number on facesheet? Yes   Assessment information obtained from? Patient   Communicated expected length of stay with patient/caregiver no   Prior to hospitilization cognitive status: Alert/Oriented   Prior to hospitalization functional status: Independent   Current cognitive status: Alert/Oriented   Current Functional Status: Independent   Lives With parent(s)   Able to Return to Prior Arrangements yes   Is patient able to care for self after discharge? Yes   Who are your caregiver(s) and their phone number(s)? Sister- Soraya Villarreal [phone]   Readmission Within the Last 30 Days no previous admission in last 30 days   Patient currently being followed by outpatient case management? No   Patient currently receives any other outside agency services? No   Equipment Currently Used at Home none   Do you have any problems affording any of your prescribed medications? No   Is the patient taking medications as prescribed? yes   Does the patient have transportation home? Yes   Transportation Anticipated family or friend will provide   Does the patient receive services at the [facility]mad[facility]? No   Discharge Plan A Home   Discharge Plan B Home with family   Patient/Family in Agreement with Plan no   Does the patient have transportation to healthcare appointments? Yes

## 2018-12-28 NOTE — ASSESSMENT & PLAN NOTE
Presented with shortness of breath x 1 week.  H/H upon admission 6.9/22.3.  No overt bleeding.  Has history of iron deficiency anemia.  Will check stool for occult blood.  Type and screen and transfuse 2 units PRBCs.  Trend CBC closely and transfuse prn.  Continue iron supplementation.

## 2018-12-28 NOTE — ED NOTES
Upon transfer to room 315, patient is AAOx4, no cardiac or respiratory complications at this time. No needs or questions at this time from patient.

## 2018-12-28 NOTE — PROGRESS NOTES
Progress Note  Hospital Medicine  Patient Name:Karen Villarreal  MRN:  [id number]  Patient Class: IP- Inpatient  Admit Date: 12/27/2018  Length of Stay: 1 days  Expected Discharge Date:   Attending Physician: Ely Rod MD  Primary Care Provider:  Shama Mccoy MD    SUBJECTIVE:     Principal Problem: Symptomatic anemia  Initial history of present illness: Karen Villarreal is a 52 yo female with PMHx significant of HTN, MATIAS, HepC with cirrhosis, and Thrombocytopenia. She presented to the ED after being prompted by her Hematologist Dr. Valencia with a one week onset of worsening shortness of breath, leg swelling and abdominal swelling.  Patient reported that her abdomen feels bloated and that her legs are more swollen than normal.  She denied fever, chills, cough, chest pain, abdominal pain, n/v/d and dysuria.    PMH/PSH/SH/FH/Meds: reviewed.    Symptoms/Review of Systems:  Getting 2nd PRBC. Still feel bloated. No shortness of breath, cough, chest pain or headache, fever or abdominal pain.     Diet:  Adequate intake.    Activity level: Normal.    Pain:  Patient reports no pain.       OBJECTIVE:   Vital Signs (Most Recent):      Temp: 98.3 °F (36.8 °C) (12/28/18 1031)  Pulse: 95 (12/28/18 1031)  Resp: 18 (12/28/18 1031)  BP: 127/61 (12/28/18 1031)  SpO2: 95 % (12/28/18 1031)       Vital Signs Range (Last 24H):  Temp:  [97 °F (36.1 °C)-99.1 °F (37.3 °C)]   Pulse:  []   Resp:  [16-20]   BP: (106-158)/(56-70)   SpO2:  [94 %-100 %]     Weight: 129.4 kg (285 lb 4.4 oz)  Body mass index is 53.9 kg/m².    Intake/Output Summary (Last 24 hours) at 12/28/2018 1110  Last data filed at 12/28/2018 1016  Gross per 24 hour   Intake 950 ml   Output --   Net 950 ml     Physical Examination:  Constitutional: She is oriented to person, place, and time. She appears well-developed and well-nourished. No distress.   Obese.   HENT:   Head: Normocephalic and atraumatic.   Mouth/Throat: Oropharynx is clear and moist.    Eyes: Conjunctivae and EOM are normal. Pupils are equal, round, and reactive to light. No scleral icterus.   Neck: Normal range of motion. Neck supple. No JVD present. No tracheal deviation present. No thyromegaly present.   Cardiovascular: Normal rate, regular rhythm, normal heart sounds and intact distal pulses. Exam reveals no gallop and no friction rub.   No murmur heard.  Pulses:       Dorsalis pedis pulses are 2+ on the right side, and 2+ on the left side.   Pulmonary/Chest: Effort normal and breath sounds normal. No accessory muscle usage. No respiratory distress. She has no wheezes. She has no rhonchi. She has no rales.   Abdominal: Bowel sounds are normal. She exhibits distension. She exhibits no fluid wave and no mass. There is no tenderness. There is no rebound and no guarding.   Musculoskeletal: Normal range of motion. She exhibits edema (Severe BLE from knees down 2+ pitting.). She exhibits no tenderness or deformity.   Neurological: She is alert and oriented to person, place, and time. She has normal strength. She exhibits normal muscle tone. Coordination normal.   Skin: Skin is warm, dry and intact. Capillary refill takes less than 2 seconds. No rash noted. She is not diaphoretic. No erythema.   Psychiatric: She has a normal mood and affect. Her speech is normal and behavior is normal. Judgment and thought content normal.   Vitals reviewed.  CRANIAL NERVES   CN III, IV, VI   Pupils are equal, round, and reactive to light.  Extraocular motions are normal.     CBC:  Recent Labs   Lab 12/27/18 1627 12/28/18  0446   WBC 4.70 5.00   RBC 2.61* 2.81*   HGB 6.9* 7.5*   HCT 22.3* 23.9*   PLT 89* 111*   MCV 86 85   MCH 26.6* 26.6*   MCHC 31.1* 31.3*   BMP  Recent Labs   Lab 12/27/18  1626 12/27/18 1627 12/28/18  0446   *  --  99     --  136   K 3.2*  --  4.2     --  106   CO2 24  --  24   BUN 5*  --  6   CREATININE 0.8  --  0.8   CALCIUM 8.0*  --  8.2*   MG  --  1.8  --       Diagnostic  Results:  Microbiology Results (last 7 days)     ** No results found for the last 168 hours. **         US abdomen: Mild ascites.    CXR: No acute process.  No significant change.    Assessment/Plan:          * Symptomatic anemia s/p 1 PRBC     Trend CBC closely and transfuse another PRBC.  Continue iron supplementation.     Ascites     Abdominal ultrasound performed with results of mild ascites - insufficient volume for paracentesis. Serial abdominal exams.  Repeat U/S if warranted.  Diuresis with IV Lasix. Monitor renal panel and UOP.      Chronic hepatitis C with cirrhosis     Complaints of shortness of breath with leg swelling.  Abdominal ultrasound results mild acsites - insufficient volume for paracentesis.  Monitor LFT's.  Check PT/INR. Avoid non-essential hepatotoxins.  Obtain 2 D ECHO for evaluation of right sided pressures.      Thrombocytopenia     Chronic problem.  Follows with Dr. Valencia.   History of hepC with cirrhosis - thromocytopenia likely from splenic sequestration.  Avoid heparin, ASA or Lovenox - Antithrombins.  Consult hematologist prn.     Essential hypertension     Chronic, stable.    Continue antihypertensives per home regimen, monitor BP closely, and titrate medication for sustained BP control.      Morbid obesity     Body mass index is 49.01 kg/m². Morbid obesity complicates all aspects of disease management from diagnostic modalities to treatment. Weight loss encouraged and health benefits explained to patient.     VTE Risk Mitigation (From admission, onward)        Ordered     IP VTE HIGH RISK PATIENT  Once      12/27/18 2220     Reason for No Pharmacological VTE Prophylaxis  Once      12/27/18 2220     Place NICOLE hose  Until discontinued      12/27/18 2220     Place sequential compression device  Until discontinued      12/27/18 2220        Ely Rod MD  Department of Hospital Medicine   [facility]

## 2018-12-28 NOTE — ASSESSMENT & PLAN NOTE
Chronic problem.  Follows with Dr. Valencia.   History of hepC with cirrhosis - thromocytopenia likely from splenic sequestration.  Avoid heparin, ASA or Lovenox - Antithrombins.  Consult hematologist prn.

## 2018-12-28 NOTE — HPI
Karen Villarreal is a 54 yo female with PMHx significant of HTN, MATIAS, HepC with cirrhosis, and Thrombocytopenia. She presented to the ED after being prompted by her Hematologist Dr. Valencia with a one week onset of worsening shortness of breath, leg swelling and abdominal swelling.  Patient reported that her abdomen feels bloated and that her legs are more swollen than normal.  She denied fever, chills, cough, chest pain, abdominal pain, n/v/d and dysuria.

## 2018-12-28 NOTE — ASSESSMENT & PLAN NOTE
Body mass index is 49.01 kg/m². Morbid obesity complicates all aspects of disease management from diagnostic modalities to treatment. Weight loss encouraged and health benefits explained to patient.

## 2018-12-28 NOTE — ED NOTES
Presents to the ER with c/o bilateral lowe extremity swelling that started one week ago. Associated complaint are abdominal swelling and SOB. 2+ edema to bilateral lower extremity. Mucous membranes are pink and moist. Skin is warm, dry and intact. Lungs are clear bilaterally, respirations are regular and unlabored. Denies cough, congestion, rhinorrhea or SOB. BS active x4, no tenderness with palpation, abd is soft and + for distention.  Denies any appetite or activity change. S1S2, capillary refill is < 2 seconds. Denies dysuria, difficulty urinating, frequency, numbness, tingling or weakness. ARETHA MACES

## 2018-12-28 NOTE — ASSESSMENT & PLAN NOTE
Complaints of shortness of breath with leg swelling.  Abdominal ultrasound results mild acsites - insufficient volume for paracentesis.  Monitor LFT's.  Check PT/INR. Avoid non-essential hepatotoxins.

## 2018-12-28 NOTE — PLAN OF CARE
Problem: Adult Inpatient Plan of Care  Goal: Plan of Care Review  Outcome: Ongoing (interventions implemented as appropriate)  Pt rested well during the night. Ambulates independently to the restroom. Monitoring labs, waiting for blood bank to prepare blood will transfuse x2 units of RBCs when ready. K 3.2 given 60mEq of K, see MAR. Will continue to monitor.

## 2018-12-28 NOTE — H&P
[facility] Hospital Medicine  History & Physical    Patient Name: Karen Villarreal  MRN: [id number]  Admission Date: 12/27/2018  Attending Physician: Ely Rod MD   Primary Care Provider: Shama Mccoy MD         Patient information was obtained from patient, past medical records and ER records.     Subjective:     Principal Problem:Symptomatic anemia    Chief Complaint:   Chief Complaint   Patient presents with    fluid retention        HPI: Karen Villarreal is a 52 yo female with PMHx significant of HTN, MATIAS, HepC with cirrhosis, and Thrombocytopenia. She presented to the ED after being prompted by her Hematologist Dr. Valencia with a one week onset of worsening shortness of breath, leg swelling and abdominal swelling.  Patient reported that her abdomen feels bloated and that her legs are more swollen than normal.  She denied fever, chills, cough, chest pain, abdominal pain, n/v/d and dysuria.      Past Medical History:   Diagnosis Date    Acid reflux     Anemia     Arthritis     Ascites 03/08/2017    Cataract     Cirrhosis     Depression     Hepatitis C     Hiatal hernia     Renal cyst 03/08/2017    Thrombocytopenia        Past Surgical History:   Procedure Laterality Date    ANKLE SURGERY      APPENDECTOMY      CATARACT EXTRACTION      EXTRACTION-CATARACT-IOL Right 1/5/2018    Performed by Dg [facility] at [facility]    EXTRACTION-CATARACT-IOL Left 12/1/2017    Performed by Dg Garcia MD at [facility] OR       Review of patient's allergies indicates:  No Known Allergies    No current facility-administered medications on file prior to encounter.      Current Outpatient Medications on File Prior to Encounter   Medication Sig    albuterol (VENTOLIN HFA) 90 mcg/actuation inhaler Ventolin HFA 90 mcg/actuation aerosol inhaler   take 2 puffs every 4hrs as needed for cough    aluminum & magnesium hydroxide-simethicone (MAALOX MAXIMUM STRENGTH) 400-400-40  mg/5 mL suspension Take by mouth every 6 (six) hours as needed for Indigestion.    cyclobenzaprine (FLEXERIL) 10 MG tablet Take 1 tablet(s) 2 TIMES A DAY by oral route as needed for muscle spasm. May make you drowsy. Do not drive while on medication.    ferrous sulfate 325 (65 FE) MG EC tablet Take 325 mg by mouth once daily.     furosemide (LASIX) 40 MG tablet Take 40 mg by mouth once daily.    gabapentin (NEURONTIN) 300 MG capsule Take 300 mg by mouth 3 (three) times daily.    meloxicam (MOBIC) 15 MG tablet Take 1 tablet (15 mg total) by mouth once daily. Do not take with any other NSAIDs  Take with a meal    pantoprazole (PROTONIX) 40 MG tablet Take 1 tablet (40 mg total) by mouth once daily.    sertraline (ZOLOFT) 25 MG tablet Take 1 tablet by mouth once daily.    spironolactone (ALDACTONE) 100 MG tablet Take 100 mg by mouth once daily.     Family History     Problem Relation (Age of Onset)    Arthritis Mother    Brain cancer Father    COPD Mother    Cancer Father    Diabetes Father    Diabetes type II Father    Heart failure Mother    Vision loss Mother        Tobacco Use    Smoking status: Current Some Day Smoker     Packs/day: 0.50     Years: 33.00     Pack years: 16.50     Types: Cigarettes    Smokeless tobacco: Never Used   Substance and Sexual Activity    Alcohol use: Yes     Comment: several times a week (beer)    Drug use: No    Sexual activity: Yes     Review of Systems   Constitutional: Positive for fatigue. Negative for appetite change, chills and fever.   HENT: Negative for hearing loss, postnasal drip, sore throat and trouble swallowing.    Eyes: Negative for photophobia and visual disturbance.   Respiratory: Positive for shortness of breath. Negative for cough and wheezing.    Cardiovascular: Positive for leg swelling. Negative for chest pain and palpitations.   Gastrointestinal: Positive for abdominal distention. Negative for abdominal pain, diarrhea, nausea and vomiting.   Endocrine:  Negative for polydipsia, polyphagia and polyuria.   Genitourinary: Negative for dysuria, frequency and urgency.   Musculoskeletal: Negative for gait problem, neck pain and neck stiffness.   Skin: Negative for rash and wound.   Neurological: Negative for dizziness, syncope, weakness, numbness and headaches.   Hematological: Does not bruise/bleed easily.   Psychiatric/Behavioral: Negative for confusion. The patient is not nervous/anxious.      Objective:     Vital Signs (Most Recent):  Temp: 97.1 °F (36.2 °C) (12/27/18 2050)  Pulse: 94 (12/27/18 2050)  Resp: 18 (12/27/18 2050)  BP: 134/60 (12/27/18 2050)  SpO2: 100 % (12/27/18 2050) Vital Signs (24h Range):  Temp:  [97.1 °F (36.2 °C)-99.1 °F (37.3 °C)] 97.1 °F (36.2 °C)  Pulse:  [] 94  Resp:  [16-20] 18  SpO2:  [99 %-100 %] 100 %  BP: (120-158)/(58-70) 134/60     Weight: 129.5 kg (285 lb 7.9 oz)  Body mass index is 49.01 kg/m².    Physical Exam   Constitutional: She is oriented to person, place, and time. She appears well-developed and well-nourished. No distress.   Obese.   HENT:   Head: Normocephalic and atraumatic.   Mouth/Throat: Oropharynx is clear and moist.   Eyes: Conjunctivae and EOM are normal. Pupils are equal, round, and reactive to light. No scleral icterus.   Neck: Normal range of motion. Neck supple. No JVD present. No tracheal deviation present. No thyromegaly present.   Cardiovascular: Normal rate, regular rhythm, normal heart sounds and intact distal pulses. Exam reveals no gallop and no friction rub.   No murmur heard.  Pulses:       Dorsalis pedis pulses are 2+ on the right side, and 2+ on the left side.   Pulmonary/Chest: Effort normal and breath sounds normal. No accessory muscle usage. No respiratory distress. She has no wheezes. She has no rhonchi. She has no rales.   Abdominal: Bowel sounds are normal. She exhibits distension. She exhibits no fluid wave and no mass. There is no tenderness. There is no rebound and no guarding.    Musculoskeletal: Normal range of motion. She exhibits edema (Severe BLE from knees down 2+ pitting.). She exhibits no tenderness or deformity.   Neurological: She is alert and oriented to person, place, and time. She has normal strength. She exhibits normal muscle tone. Coordination normal.   Skin: Skin is warm, dry and intact. Capillary refill takes less than 2 seconds. No rash noted. She is not diaphoretic. No erythema.   Psychiatric: She has a normal mood and affect. Her speech is normal and behavior is normal. Judgment and thought content normal.   Vitals reviewed.        CRANIAL NERVES     CN III, IV, VI   Pupils are equal, round, and reactive to light.  Extraocular motions are normal.        Significant Labs:   CBC:   Recent Labs   Lab 12/27/18  1627   WBC 4.70   HGB 6.9*   HCT 22.3*   PLT 89*     CMP:   Recent Labs   Lab 12/27/18  1626      K 3.2*      CO2 24   *   BUN 5*   CREATININE 0.8   CALCIUM 8.0*   PROT 5.7*   ALBUMIN 2.5*   BILITOT 1.2*   ALKPHOS 156*   AST 25   ALT 11   ANIONGAP 8   EGFRNONAA >60     Cardiac Markers:   Recent Labs   Lab 12/27/18  1627   BNP 38     Coagulation:   Recent Labs   Lab 12/27/18  1626   INR 1.1   APTT 25.7     Magnesium:   Recent Labs   Lab 12/27/18  1627   MG 1.8       Significant Imaging:  Ultrasound abdomen:   FINDINGS:  Small volume ascites is present insufficient for paracentesis.      Impression       Mild ascites.     CXR:Impression   No acute process.  No significant change.       Assessment/Plan:     * Symptomatic anemia    Presented with shortness of breath x 1 week.  H/H upon admission 6.9/22.3.  No overt bleeding.  Has history of iron deficiency anemia.  Will check stool for occult blood.  Type and screen and transfuse 2 units PRBCs.  Trend CBC closely and transfuse prn.  Continue iron supplementation.         Ascites    Abdominal ultrasound performed with results of mild ascites - insufficient volume for paracentesis. Serial abdominal  exams.  Repeat U/S if warranted.  Diuresis with IV Lasix.     Chronic hepatitis C with cirrhosis    Complaints of shortness of breath with leg swelling.  Abdominal ultrasound results mild acsites - insufficient volume for paracentesis.  Monitor LFT's.  Check PT/INR. Avoid non-essential hepatotoxins.     Thrombocytopenia    Chronic problem.  Follows with Dr. Valencia.   History of hepC with cirrhosis - thromocytopenia likely from splenic sequestration.  Avoid heparin, ASA or Lovenox - Antithrombins.  Consult hematologist prn.       Essential hypertension    Chronic, stable.    Continue antihypertensives per home regimen, monitor BP closely, and titrate medication for sustained BP control.     Morbid obesity    Body mass index is 49.01 kg/m². Morbid obesity complicates all aspects of disease management from diagnostic modalities to treatment. Weight loss encouraged and health benefits explained to patient.             VTE Risk Mitigation (From admission, onward)        Ordered     IP VTE HIGH RISK PATIENT  Once      12/27/18 2220     Reason for No Pharmacological VTE Prophylaxis  Once      12/27/18 2220     Place NICOLE hose  Until discontinued      12/27/18 2220     Place sequential compression device  Until discontinued      12/27/18 2220             Mary Lou Charles NP  Department of Hospital Medicine   [facility]

## 2018-12-29 LAB
ALBUMIN SERPL BCP-MCNC: 2.6 G/DL
ALP SERPL-CCNC: 190 U/L
ALT SERPL W/O P-5'-P-CCNC: 15 U/L
ANION GAP SERPL CALC-SCNC: 6 MMOL/L
AORTIC ROOT ANNULUS: 2.89 CM
AORTIC VALVE CUSP SEPERATION: 2.26 CM
AST SERPL-CCNC: 25 U/L
AV INDEX (PROSTH): 0.85
AV MEAN GRADIENT: 8.03 MMHG
AV PEAK GRADIENT: 15.05 MMHG
AV VALVE AREA: 2.93 CM2
BASOPHILS # BLD AUTO: 0 K/UL
BASOPHILS NFR BLD: 0 %
BILIRUB SERPL-MCNC: 1.9 MG/DL
BSA FOR ECHO PROCEDURE: 2.36 M2
BUN SERPL-MCNC: 7 MG/DL
CALCIUM SERPL-MCNC: 8.4 MG/DL
CHLORIDE SERPL-SCNC: 106 MMOL/L
CO2 SERPL-SCNC: 25 MMOL/L
CREAT SERPL-MCNC: 0.8 MG/DL
CV ECHO LV RWT: 0.29 CM
DIFFERENTIAL METHOD: ABNORMAL
DOP CALC AO PEAK VEL: 1.94 M/S
DOP CALC AO VTI: 28.02 CM
DOP CALC LVOT AREA: 3.46 CM2
DOP CALC LVOT DIAMETER: 2.1 CM
DOP CALC LVOT STROKE VOLUME: 82.05 CM3
DOP CALCLVOT PEAK VEL VTI: 23.7 CM
E WAVE DECELERATION TIME: 189.3 MSEC
E/A RATIO: 1.12
E/E' RATIO: 10.5
ECHO LV POSTERIOR WALL: 0.76 CM (ref 0.6–1.1)
EOSINOPHIL # BLD AUTO: 0.2 K/UL
EOSINOPHIL NFR BLD: 4 %
ERYTHROCYTE [DISTWIDTH] IN BLOOD BY AUTOMATED COUNT: 19.4 %
EST. GFR  (AFRICAN AMERICAN): >60 ML/MIN/1.73 M^2
EST. GFR  (NON AFRICAN AMERICAN): >60 ML/MIN/1.73 M^2
FRACTIONAL SHORTENING: 26 % (ref 28–44)
GLUCOSE SERPL-MCNC: 100 MG/DL
HCT VFR BLD AUTO: 28.3 %
HGB BLD-MCNC: 8.9 G/DL
INR PPP: 1.1
INTERVENTRICULAR SEPTUM: 0.82 CM (ref 0.6–1.1)
IVRT: 0.09 MSEC
LEFT ATRIUM SIZE: 3.05 CM
LEFT INTERNAL DIMENSION IN SYSTOLE: 3.95 CM (ref 2.1–4)
LEFT VENTRICLE MASS INDEX: 67.9 G/M2
LEFT VENTRICULAR INTERNAL DIMENSION IN DIASTOLE: 5.33 CM (ref 3.5–6)
LEFT VENTRICULAR MASS: 149.13 G
LV LATERAL E/E' RATIO: 9.69
LV SEPTAL E/E' RATIO: 11.45
LYMPHOCYTES # BLD AUTO: 0.6 K/UL
LYMPHOCYTES NFR BLD: 13.3 %
MCH RBC QN AUTO: 26.8 PG
MCHC RBC AUTO-ENTMCNC: 31.5 G/DL
MCV RBC AUTO: 85 FL
MONOCYTES # BLD AUTO: 0.5 K/UL
MONOCYTES NFR BLD: 9.9 %
MV PEAK A VEL: 1.13 M/S
MV PEAK E VEL: 1.26 M/S
NEUTROPHILS # BLD AUTO: 3.5 K/UL
NEUTROPHILS NFR BLD: 72.8 %
PISA TR MAX VEL: 2.41 M/S
PLATELET # BLD AUTO: 92 K/UL
PMV BLD AUTO: 9.8 FL
POTASSIUM SERPL-SCNC: 4.2 MMOL/L
PROT SERPL-MCNC: 6.1 G/DL
PROTHROMBIN TIME: 11.8 SEC
PV PEAK VELOCITY: 1.34 CM/S
RA PRESSURE: 8 MMHG
RBC # BLD AUTO: 3.33 M/UL
RIGHT VENTRICULAR END-DIASTOLIC DIMENSION: 2.67 CM
SODIUM SERPL-SCNC: 137 MMOL/L
TDI LATERAL: 0.13
TDI SEPTAL: 0.11
TDI: 0.12
TR MAX PG: 23.23 MMHG
TRICUSPID ANNULAR PLANE SYSTOLIC EXCURSION: 2.96 CM
TV REST PULMONARY ARTERY PRESSURE: 31 MMHG
WBC # BLD AUTO: 4.8 K/UL

## 2018-12-29 PROCEDURE — 85610 PROTHROMBIN TIME: CPT

## 2018-12-29 PROCEDURE — 25000003 PHARM REV CODE 250: Performed by: NURSE PRACTITIONER

## 2018-12-29 PROCEDURE — 80053 COMPREHEN METABOLIC PANEL: CPT

## 2018-12-29 PROCEDURE — 63600175 PHARM REV CODE 636 W HCPCS: Performed by: INTERNAL MEDICINE

## 2018-12-29 PROCEDURE — 12000002 HC ACUTE/MED SURGE SEMI-PRIVATE ROOM

## 2018-12-29 PROCEDURE — 85025 COMPLETE CBC W/AUTO DIFF WBC: CPT

## 2018-12-29 PROCEDURE — 36415 COLL VENOUS BLD VENIPUNCTURE: CPT

## 2018-12-29 PROCEDURE — 25000003 PHARM REV CODE 250: Performed by: EMERGENCY MEDICINE

## 2018-12-29 PROCEDURE — 63600175 PHARM REV CODE 636 W HCPCS: Performed by: NURSE PRACTITIONER

## 2018-12-29 RX ORDER — FUROSEMIDE 10 MG/ML
40 INJECTION INTRAMUSCULAR; INTRAVENOUS 2 TIMES DAILY
Status: DISCONTINUED | OUTPATIENT
Start: 2018-12-29 | End: 2018-12-30 | Stop reason: HOSPADM

## 2018-12-29 RX ORDER — CYCLOBENZAPRINE HCL 5 MG
10 TABLET ORAL 3 TIMES DAILY PRN
Status: DISCONTINUED | OUTPATIENT
Start: 2018-12-29 | End: 2018-12-30 | Stop reason: HOSPADM

## 2018-12-29 RX ORDER — FUROSEMIDE 40 MG/1
40 TABLET ORAL DAILY
Status: DISCONTINUED | OUTPATIENT
Start: 2018-12-29 | End: 2018-12-29

## 2018-12-29 RX ADMIN — PANTOPRAZOLE SODIUM 40 MG: 40 TABLET, DELAYED RELEASE ORAL at 08:12

## 2018-12-29 RX ADMIN — SERTRALINE 25 MG: 25 TABLET, FILM COATED ORAL at 08:12

## 2018-12-29 RX ADMIN — GABAPENTIN 300 MG: 300 CAPSULE ORAL at 08:12

## 2018-12-29 RX ADMIN — SPIRONOLACTONE 100 MG: 25 TABLET, FILM COATED ORAL at 08:12

## 2018-12-29 RX ADMIN — FUROSEMIDE 40 MG: 10 INJECTION, SOLUTION INTRAMUSCULAR; INTRAVENOUS at 05:12

## 2018-12-29 RX ADMIN — POTASSIUM CHLORIDE 20 MEQ: 1500 TABLET, EXTENDED RELEASE ORAL at 08:12

## 2018-12-29 RX ADMIN — GABAPENTIN 300 MG: 300 CAPSULE ORAL at 03:12

## 2018-12-29 RX ADMIN — CYCLOBENZAPRINE HYDROCHLORIDE 10 MG: 5 TABLET, FILM COATED ORAL at 09:12

## 2018-12-29 RX ADMIN — FUROSEMIDE 40 MG: 10 INJECTION, SOLUTION INTRAVENOUS at 08:12

## 2018-12-29 RX ADMIN — FERROUS SULFATE TAB EC 325 MG (65 MG FE EQUIVALENT) 325 MG: 325 (65 FE) TABLET DELAYED RESPONSE at 08:12

## 2018-12-29 NOTE — PROGRESS NOTES
Progress Note  Hospital Medicine  Patient Name:Karen Villarreal  MRN:  [id number]  Patient Class: IP- Inpatient  Admit Date: 12/27/2018  Length of Stay: 2 days  Expected Discharge Date:   Attending Physician: Ely Rod MD  Primary Care Provider:  Shama Mccoy MD    SUBJECTIVE:     Principal Problem: Symptomatic anemia  Initial history of present illness: Karen Villarreal is a 52 yo female with PMHx significant of HTN, MTAIAS, HepC with cirrhosis, and Thrombocytopenia. She presented to the ED after being prompted by her Hematologist Dr. Valencia with a one week onset of worsening shortness of breath, leg swelling and abdominal swelling.  Patient reported that her abdomen feels bloated and that her legs are more swollen than normal.  She denied fever, chills, cough, chest pain, abdominal pain, n/v/d and dysuria.    PMH/PSH/SH/FH/Meds: reviewed.    Symptoms/Review of Systems:  Patient seen and examined. No overt bleeding or acute events.   Diet:  Adequate intake.    Activity level: Normal.    Pain:  Patient reports no pain.       OBJECTIVE:   Vital Signs (Most Recent):      Temp: 98.2 °F (36.8 °C) (12/29/18 0810)  Pulse: 92 (12/29/18 0810)  Resp: 18 (12/29/18 0810)  BP: 118/72 (12/29/18 0810)  SpO2: 95 % (12/29/18 0810)       Vital Signs Range (Last 24H):  Temp:  [97.3 °F (36.3 °C)-98.4 °F (36.9 °C)]   Pulse:  []   Resp:  [17-20]   BP: (112-140)/(55-72)   SpO2:  [95 %-100 %]     Weight: 129.3 kg (285 lb)  Body mass index is 53.85 kg/m².    Intake/Output Summary (Last 24 hours) at 12/29/2018 0951  Last data filed at 12/29/2018 0800  Gross per 24 hour   Intake 350 ml   Output 3500 ml   Net -3150 ml     Physical Examination:  Constitutional: She is oriented to person, place, and time. She appears well-developed and well-nourished. No distress.   Obese.   HENT:   Head: Normocephalic and atraumatic.   Mouth/Throat: Oropharynx is clear and moist.   Eyes: Conjunctivae and EOM are normal. Pupils are equal,  round, and reactive to light. No scleral icterus.   Neck: Normal range of motion. Neck supple. No JVD present. No tracheal deviation present. No thyromegaly present.   Cardiovascular: Normal rate, regular rhythm, normal heart sounds and intact distal pulses. Exam reveals no gallop and no friction rub.   No murmur heard.  Pulses:       Dorsalis pedis pulses are 2+ on the right side, and 2+ on the left side.   Pulmonary/Chest: Effort normal and breath sounds normal. No accessory muscle usage. No respiratory distress. She has no wheezes. She has no rhonchi. She has no rales.   Abdominal: Bowel sounds are normal. She exhibits distension. She exhibits no fluid wave and no mass. There is no tenderness. There is no rebound and no guarding.   Musculoskeletal: Normal range of motion. She exhibits edema (Severe BLE from knees down 2+ pitting.). She exhibits no tenderness or deformity.   Neurological: She is alert and oriented to person, place, and time. She has normal strength. She exhibits normal muscle tone. Coordination normal.   Skin: Skin is warm, dry and intact. Capillary refill takes less than 2 seconds. No rash noted. She is not diaphoretic. No erythema.   Psychiatric: She has a normal mood and affect. Her speech is normal and behavior is normal. Judgment and thought content normal.   Vitals reviewed.  CRANIAL NERVES   CN III, IV, VI   Pupils are equal, round, and reactive to light.  Extraocular motions are normal.     CBC:  Recent Labs   Lab 12/27/18 1627 12/28/18 0446 12/29/18  0454   WBC 4.70 5.00 4.80   RBC 2.61* 2.81* 3.33*   HGB 6.9* 7.5* 8.9*   HCT 22.3* 23.9* 28.3*   PLT 89* 111* 92*   MCV 86 85 85   MCH 26.6* 26.6* 26.8*   MCHC 31.1* 31.3* 31.5*   BMP  Recent Labs   Lab 12/27/18  1626 12/27/18  1627 12/28/18 0446 12/29/18  0455   *  --  99 100     --  136 137   K 3.2*  --  4.2 4.2     --  106 106   CO2 24  --  24 25   BUN 5*  --  6 7   CREATININE 0.8  --  0.8 0.8   CALCIUM 8.0*  --  8.2*  8.4*   MG  --  1.8  --   --       Diagnostic Results:  Microbiology Results (last 7 days)     ** No results found for the last 168 hours. **         US abdomen: Mild ascites.    CXR: No acute process.  No significant change.    Assessment/Plan:          * Symptomatic anemia s/p 1 PRBC     Trend CBC closely and transfuse another PRBC.  Continue iron supplementation.  Needs EGD as outpatient     Ascites     Minimal. Do not think abdominal distension due to tense ascites.  Await echo  Continue Aldactone 100 and Lasix 40 on discharge      Chronic hepatitis C with cirrhosis     Complaints of shortness of breath with leg swelling.  Abdominal ultrasound results mild acsites - insufficient volume for paracentesis.  Monitor LFT's.  Check PT/INR. Avoid non-essential hepatotoxins.  Needs to avoid NSAIDS which she was previously on  Also needs to adhere to low na diet and counseled on this  I have messaged [facility] to arrange follow up      Thrombocytopenia     Chronic problem.  Follows with Dr. Valencia.   History of hepC with cirrhosis - thromocytopenia likely from splenic sequestration.  Avoid heparin, ASA or Lovenox - Antithrombins.  Consult hematologist prn.     Essential hypertension     Chronic, stable.    Continue antihypertensives per home regimen, monitor BP closely, and titrate medication for sustained BP control.      Morbid obesity     Body mass index is 49.01 kg/m². Morbid obesity complicates all aspects of disease management from diagnostic modalities to treatment. Weight loss encouraged and health benefits explained to patient.     VTE Risk Mitigation (From admission, onward)        Ordered     IP VTE HIGH RISK PATIENT  Once      12/27/18 2220     Reason for No Pharmacological VTE Prophylaxis  Once      12/27/18 2220     Place NICOLE hose  Until discontinued      12/27/18 2220     Place sequential compression device  Until discontinued      12/27/18 2220        Jimmie Garces MD  Department of Hospital  Medicine   [facility]

## 2018-12-29 NOTE — PLAN OF CARE
Problem: Adult Inpatient Plan of Care  Goal: Plan of Care Review  Medications reviewed and administered  Bed low/wheels locked  Call light within reach  I&O  Up ad chantale  SR up x 2  Telemetry  Low Sodium diet  Safety maintained

## 2018-12-29 NOTE — PLAN OF CARE
Problem: Adult Inpatient Plan of Care  Goal: Plan of Care Review  Outcome: Ongoing (interventions implemented as appropriate)  Patient alert and oriented resting in bed. NAD. Denies pain or SOB. VSS. Normal Sr. Up ad chantale. Room air  Plan of care reviewed with patient. Verbalizes understanding.Call light in reach. Pt free from fall or injury. Will monitor.

## 2018-12-30 VITALS
SYSTOLIC BLOOD PRESSURE: 134 MMHG | BODY MASS INDEX: 53.81 KG/M2 | HEIGHT: 61 IN | TEMPERATURE: 96 F | WEIGHT: 285 LBS | OXYGEN SATURATION: 98 % | HEART RATE: 96 BPM | RESPIRATION RATE: 20 BRPM | DIASTOLIC BLOOD PRESSURE: 67 MMHG

## 2018-12-30 LAB
ALBUMIN SERPL BCP-MCNC: 2.6 G/DL
ALP SERPL-CCNC: 182 U/L
ALT SERPL W/O P-5'-P-CCNC: 14 U/L
ANION GAP SERPL CALC-SCNC: 6 MMOL/L
AST SERPL-CCNC: 25 U/L
BASOPHILS # BLD AUTO: 0 K/UL
BASOPHILS NFR BLD: 0 %
BILIRUB SERPL-MCNC: 1.2 MG/DL
BUN SERPL-MCNC: 8 MG/DL
CALCIUM SERPL-MCNC: 8.5 MG/DL
CHLORIDE SERPL-SCNC: 104 MMOL/L
CO2 SERPL-SCNC: 26 MMOL/L
CREAT SERPL-MCNC: 0.8 MG/DL
DIFFERENTIAL METHOD: ABNORMAL
EOSINOPHIL # BLD AUTO: 0.1 K/UL
EOSINOPHIL NFR BLD: 2.7 %
ERYTHROCYTE [DISTWIDTH] IN BLOOD BY AUTOMATED COUNT: 19.4 %
EST. GFR  (AFRICAN AMERICAN): >60 ML/MIN/1.73 M^2
EST. GFR  (NON AFRICAN AMERICAN): >60 ML/MIN/1.73 M^2
GLUCOSE SERPL-MCNC: 125 MG/DL
HCT VFR BLD AUTO: 27.8 %
HGB BLD-MCNC: 8.8 G/DL
INR PPP: 1.1
LYMPHOCYTES # BLD AUTO: 0.9 K/UL
LYMPHOCYTES NFR BLD: 19.2 %
MCH RBC QN AUTO: 26.5 PG
MCHC RBC AUTO-ENTMCNC: 31.5 G/DL
MCV RBC AUTO: 84 FL
MONOCYTES # BLD AUTO: 0.4 K/UL
MONOCYTES NFR BLD: 7.9 %
NEUTROPHILS # BLD AUTO: 3.3 K/UL
NEUTROPHILS NFR BLD: 70.2 %
PLATELET # BLD AUTO: 89 K/UL
PMV BLD AUTO: 9.8 FL
POTASSIUM SERPL-SCNC: 4.5 MMOL/L
PROT SERPL-MCNC: 6 G/DL
PROTHROMBIN TIME: 11.6 SEC
RBC # BLD AUTO: 3.3 M/UL
SODIUM SERPL-SCNC: 136 MMOL/L
WBC # BLD AUTO: 4.7 K/UL

## 2018-12-30 PROCEDURE — 25000003 PHARM REV CODE 250: Performed by: NURSE PRACTITIONER

## 2018-12-30 PROCEDURE — 3E02340 INTRODUCTION OF INFLUENZA VACCINE INTO MUSCLE, PERCUTANEOUS APPROACH: ICD-10-PCS | Performed by: INTERNAL MEDICINE

## 2018-12-30 PROCEDURE — 80053 COMPREHEN METABOLIC PANEL: CPT

## 2018-12-30 PROCEDURE — 25000003 PHARM REV CODE 250: Performed by: EMERGENCY MEDICINE

## 2018-12-30 PROCEDURE — 63600175 PHARM REV CODE 636 W HCPCS: Performed by: INTERNAL MEDICINE

## 2018-12-30 PROCEDURE — 85610 PROTHROMBIN TIME: CPT

## 2018-12-30 PROCEDURE — 90471 IMMUNIZATION ADMIN: CPT | Performed by: INTERNAL MEDICINE

## 2018-12-30 PROCEDURE — 36415 COLL VENOUS BLD VENIPUNCTURE: CPT

## 2018-12-30 PROCEDURE — 99238 HOSP IP/OBS DSCHRG MGMT 30/<: CPT | Mod: ,,, | Performed by: INTERNAL MEDICINE

## 2018-12-30 PROCEDURE — 90686 IIV4 VACC NO PRSV 0.5 ML IM: CPT | Performed by: INTERNAL MEDICINE

## 2018-12-30 PROCEDURE — 85025 COMPLETE CBC W/AUTO DIFF WBC: CPT

## 2018-12-30 RX ADMIN — INFLUENZA A VIRUS A/MICHIGAN/45/2015 X-275 (H1N1) ANTIGEN (FORMALDEHYDE INACTIVATED), INFLUENZA A VIRUS A/SINGAPORE/INFIMH-16-0019/2016 IVR-186 (H3N2) ANTIGEN (FORMALDEHYDE INACTIVATED), INFLUENZA B VIRUS B/PHUKET/3073/2013 ANTIGEN (FORMALDEHYDE INACTIVATED), AND INFLUENZA B VIRUS B/MARYLAND/15/2016 BX-69A ANTIGEN (FORMALDEHYDE INACTIVATED) 0.5 ML: 15; 15; 15; 15 INJECTION, SUSPENSION INTRAMUSCULAR at 01:12

## 2018-12-30 RX ADMIN — PANTOPRAZOLE SODIUM 40 MG: 40 TABLET, DELAYED RELEASE ORAL at 09:12

## 2018-12-30 RX ADMIN — FUROSEMIDE 40 MG: 10 INJECTION, SOLUTION INTRAMUSCULAR; INTRAVENOUS at 09:12

## 2018-12-30 RX ADMIN — FERROUS SULFATE TAB EC 325 MG (65 MG FE EQUIVALENT) 325 MG: 325 (65 FE) TABLET DELAYED RESPONSE at 09:12

## 2018-12-30 RX ADMIN — GABAPENTIN 300 MG: 300 CAPSULE ORAL at 09:12

## 2018-12-30 RX ADMIN — SPIRONOLACTONE 100 MG: 25 TABLET, FILM COATED ORAL at 09:12

## 2018-12-30 RX ADMIN — POTASSIUM CHLORIDE 20 MEQ: 1500 TABLET, EXTENDED RELEASE ORAL at 09:12

## 2018-12-30 RX ADMIN — SERTRALINE 25 MG: 25 TABLET, FILM COATED ORAL at 09:12

## 2018-12-30 NOTE — PLAN OF CARE
Problem: Adult Inpatient Plan of Care  Goal: Plan of Care Review  Medications reviewed and administered  Call light within reach   Bed low/wheels locked  Cardiac diet  Telemetry  NAD noted  Safety maintained

## 2018-12-30 NOTE — DISCHARGE INSTRUCTIONS
Thank you for choosing [facility] for your medical care. The primary doctor who is taking care of you at the time of your discharge is Ely Rod MD.     You were admitted to the hospital with Symptomatic anemia.     Please note your discharge instructions, including diet/activity restrictions, follow-up appointments, and medication changes.  If you have any questions about your medical issues, prescriptions, or any other questions, please feel free to contact the [facility] Medicine Dept at [phone] and we will help.    If you are previously with Home health, outpatient PT/OT or under a therapy program, you are cleared to return to those programs.    Please direct all long term medication refills and follow up to your primary care provider, Shama Mccoy MD. Thank you again for letting us take care of your health care needs.    Please note the following discharge instructions per your discharging physician-  1. Continue Low sodium diet  2. Avoid NSAIDS  3. Follow up with PCP and [facility]

## 2018-12-30 NOTE — DISCHARGE SUMMARY
[facility] Medicine  Discharge Summary      Patient Name: Karen Villarreal  MRN: [id number]  Admission Date: 12/27/2018  Hospital Length of Stay: 3 days  Discharge Date and Time:  12/30/2018 10:13 AM  Attending Physician: Ely Rod MD   Discharging Provider: Jimmie Garces MD  Primary Care Provider: Shama Mccoy MD        HPI: Karen Villarreal is a 54 yo female with PMHx significant of HTN, MATIAS, HepC with cirrhosis, and Thrombocytopenia. She presented to the ED after being prompted by her Hematologist Dr. Valencia with a one week onset of worsening shortness of breath, leg swelling and abdominal swelling.  Patient reported that her abdomen feels bloated and that her legs are more swollen than normal.  She denied fever, chills, cough, chest pain, abdominal pain, n/v/d and dysuria.        * No surgery found *      Hospital Course: The patient was admitted and transfused with appropriate response.  There were no drainable ascites but IV diuresis was continued throughout her stay and she remained stable with no overt bleeding.  She was counseled on compliance with a low Na diet as well as avoidance of NSAIDS.  I personally messaged [facility] for follow up for this patient.  She would likely benefit from outpatient endoscopy to complete anemia workup. She will continue Lasix and Aldactone in the outpatient setting.   Physical Examination:  Constitutional: She is oriented to person, place, and time. She appears well-developed and well-nourished. No distress.   Obese.   HENT:   Head: Normocephalic and atraumatic.   Mouth/Throat: Oropharynx is clear and moist.   Eyes: Conjunctivae and EOM are normal. Pupils are equal, round, and reactive to light. No scleral icterus.   Neck: Normal range of motion. Neck supple. No JVD present. No tracheal deviation present. No thyromegaly present.   Cardiovascular: Normal rate, regular rhythm, normal heart sounds and intact distal  pulses. Exam reveals no gallop and no friction rub.   No murmur heard.  Pulses:       Dorsalis pedis pulses are 2+ on the right side, and 2+ on the left side.   Pulmonary/Chest: Effort normal and breath sounds normal. No accessory muscle usage. No respiratory distress. She has no wheezes. She has no rhonchi. She has no rales.   Abdominal: Bowel sounds are normal. She exhibits distension. She exhibits no fluid wave and no mass. There is no tenderness. There is no rebound and no guarding.   Musculoskeletal: Normal range of motion. She exhibits edema (Severe BLE from knees down 2+ pitting.). She exhibits no tenderness or deformity.   Neurological: She is alert and oriented to person, place, and time. She has normal strength. She exhibits normal muscle tone. Coordination normal.   Skin: Skin is warm, dry and intact. Capillary refill takes less than 2 seconds. No rash noted. She is not diaphoretic. No erythema.   Psychiatric: She has a normal mood and affect. Her speech is normal and behavior is normal. Judgment and thought content normal.          Consults:     Final Active Diagnoses:    Diagnosis Date Noted POA    PRINCIPAL PROBLEM:  Symptomatic anemia [D64.9] 12/27/2018 Yes    Thrombocytopenia [D69.6] 03/23/2018 Yes    Ascites [R18.8] 04/19/2017 Yes    Morbid obesity [E66.01] 02/16/2017 Yes    Chronic hepatitis C with cirrhosis [B18.2, K74.60] 02/16/2017 Yes    Essential hypertension [I10] 02/16/2017 Yes      Problems Resolved During this Admission:      Discharged Condition: stable    Disposition: Home or Self Care    Follow Up:  Follow-up Information     Shama Mccoy MD In 2 weeks.    Specialty:  Family Medicine  Contact information:  [address]  [address]  [facility]  [facility] [address]  [phone]             [facility].    Why:  Will be called with appt               Patient Instructions:      Diet renal     Diet Cardiac     Notify your health care provider  if you experience any of the following:  persistent nausea and vomiting or diarrhea     Notify your health care provider if you experience any of the following:  difficulty breathing or increased cough     Notify your health care provider if you experience any of the following:  increased confusion or weakness     Activity as tolerated     Medications:  Reconciled Home Medications:      Medication List      CONTINUE taking these medications    cyclobenzaprine 10 MG tablet  Commonly known as:  FLEXERIL  Take 1 tablet(s) 2 TIMES A DAY by oral route as needed for muscle spasm. May make you drowsy. Do not drive while on medication.     ferrous sulfate 325 (65 FE) MG EC tablet  Take 325 mg by mouth once daily.     furosemide 40 MG tablet  Commonly known as:  LASIX  Take 40 mg by mouth once daily.     gabapentin 300 MG capsule  Commonly known as:  NEURONTIN  Take 300 mg by mouth 3 (three) times daily.     MAALOX MAXIMUM STRENGTH 400-400-40 mg/5 mL suspension  Generic drug:  aluminum & magnesium hydroxide-simethicone  Take by mouth every 6 (six) hours as needed for Indigestion.     pantoprazole 40 MG tablet  Commonly known as:  PROTONIX  Take 1 tablet (40 mg total) by mouth once daily.     sertraline 25 MG tablet  Commonly known as:  ZOLOFT  Take 1 tablet by mouth once daily.     spironolactone 100 MG tablet  Commonly known as:  ALDACTONE  Take 100 mg by mouth once daily.     VENTOLIN HFA 90 mcg/actuation inhaler  Generic drug:  albuterol  Ventolin HFA 90 mcg/actuation aerosol inhaler   take 2 puffs every 4hrs as needed for cough        STOP taking these medications    meloxicam 15 MG tablet  Commonly known as:  MOBIC            Significant Diagnostic Studies: Labs:   CBC   Recent Labs   Lab 12/29/18  0454 12/30/18  0502   WBC 4.80 4.70   HGB 8.9* 8.8*   HCT 28.3* 27.8*   PLT 92* 89*       Pending Diagnostic Studies:     None        Indwelling Lines/Drains at time of discharge:   Lines/Drains/Airways          None           Time spent on the discharge of patient: 25 minutes  Patient was seen and examined on the date of discharge and determined to be suitable for discharge.         Jimmie Garces MD  Department of Hospital Medicine  [facility]

## 2018-12-30 NOTE — PLAN OF CARE
Problem: Adult Inpatient Plan of Care  Goal: Plan of Care Review  Outcome: Revised  Denies any shortness of breath or dizziness  Still with abdominal tightness; pt reports is a little better since admission  Monitor labs this am and report any critical values  Safety maintained

## 2018-12-30 NOTE — PLAN OF CARE
12/30/18 1538   Final Note   Assessment Type Final Discharge Note   Anticipated Discharge Disposition Home

## 2019-01-01 ENCOUNTER — TELEPHONE (OUTPATIENT)
Dept: HEPATOLOGY | Facility: CLINIC | Age: 54
End: 2019-01-01

## 2019-01-01 ENCOUNTER — HOSPITAL ENCOUNTER (OUTPATIENT)
Dept: RADIOLOGY | Facility: HOSPITAL | Age: 54
Discharge: HOME | End: 2019-11-22
Attending: PHYSICIAN ASSISTANT
Payer: MEDICAID

## 2019-01-01 ENCOUNTER — TELEPHONE (OUTPATIENT)
Dept: HEMATOLOGY/ONCOLOGY | Facility: CLINIC | Age: 54
End: 2019-01-01

## 2019-01-01 ENCOUNTER — LAB VISIT (OUTPATIENT)
Dept: LAB | Facility: HOSPITAL | Age: 54
End: 2019-01-01
Attending: PHYSICIAN ASSISTANT
Payer: MEDICAID

## 2019-01-01 ENCOUNTER — HOSPITAL ENCOUNTER (OUTPATIENT)
Dept: RADIOLOGY | Facility: HOSPITAL | Age: 54
Discharge: HOME | End: 2019-10-25
Attending: INTERNAL MEDICINE
Payer: MEDICAID

## 2019-01-01 ENCOUNTER — HOSPITAL ENCOUNTER (OUTPATIENT)
Dept: RADIOLOGY | Facility: HOSPITAL | Age: 54
Discharge: HOME | End: 2019-11-08
Attending: PHYSICIAN ASSISTANT
Payer: MEDICAID

## 2019-01-01 ENCOUNTER — TELEPHONE (OUTPATIENT)
Dept: GASTROENTEROLOGY | Facility: CLINIC | Age: 54
End: 2019-01-01

## 2019-01-01 ENCOUNTER — HOSPITAL ENCOUNTER (OUTPATIENT)
Dept: RADIOLOGY | Facility: HOSPITAL | Age: 54
Discharge: HOME | End: 2019-08-27
Attending: INTERNAL MEDICINE
Payer: MEDICAID

## 2019-01-01 ENCOUNTER — LAB VISIT (OUTPATIENT)
Dept: LAB | Facility: HOSPITAL | Age: 54
End: 2019-01-01
Payer: MEDICAID

## 2019-01-01 ENCOUNTER — HOSPITAL ENCOUNTER (OUTPATIENT)
Facility: HOSPITAL | Age: 54
Discharge: HOME | End: 2019-11-20
Attending: INTERNAL MEDICINE | Admitting: INTERNAL MEDICINE
Payer: MEDICAID

## 2019-01-01 ENCOUNTER — INITIAL CONSULT (OUTPATIENT)
Dept: HEPATOLOGY | Facility: CLINIC | Age: 54
End: 2019-01-01
Payer: MEDICAID

## 2019-01-01 ENCOUNTER — HOSPITAL ENCOUNTER (OUTPATIENT)
Dept: RADIOLOGY | Facility: HOSPITAL | Age: 54
Discharge: HOME | End: 2019-12-06
Attending: PHYSICIAN ASSISTANT
Payer: MEDICAID

## 2019-01-01 ENCOUNTER — HOSPITAL ENCOUNTER (OUTPATIENT)
Facility: HOSPITAL | Age: 54
Discharge: HOME | End: 2019-10-30
Attending: INTERNAL MEDICINE | Admitting: INTERNAL MEDICINE
Payer: MEDICAID

## 2019-01-01 ENCOUNTER — HOSPITAL ENCOUNTER (OUTPATIENT)
Dept: RADIOLOGY | Facility: HOSPITAL | Age: 54
Discharge: HOME | End: 2019-07-15
Attending: INTERNAL MEDICINE
Payer: MEDICAID

## 2019-01-01 ENCOUNTER — OFFICE VISIT (OUTPATIENT)
Dept: GASTROENTEROLOGY | Facility: CLINIC | Age: 54
End: 2019-01-01
Payer: MEDICAID

## 2019-01-01 ENCOUNTER — HOSPITAL ENCOUNTER (OUTPATIENT)
Dept: RADIOLOGY | Facility: HOSPITAL | Age: 54
Discharge: HOME | End: 2019-09-25
Attending: INTERNAL MEDICINE
Payer: MEDICAID

## 2019-01-01 ENCOUNTER — ANESTHESIA (OUTPATIENT)
Dept: ENDOSCOPY | Facility: HOSPITAL | Age: 54
End: 2019-01-01
Payer: MEDICAID

## 2019-01-01 ENCOUNTER — HOSPITAL ENCOUNTER (OUTPATIENT)
Dept: RADIOLOGY | Facility: HOSPITAL | Age: 54
Discharge: HOME | End: 2019-10-22
Attending: NURSE PRACTITIONER
Payer: MEDICAID

## 2019-01-01 ENCOUNTER — ANESTHESIA EVENT (OUTPATIENT)
Dept: ENDOSCOPY | Facility: HOSPITAL | Age: 54
End: 2019-01-01
Payer: MEDICAID

## 2019-01-01 ENCOUNTER — HOSPITAL ENCOUNTER (OUTPATIENT)
Facility: HOSPITAL | Age: 54
Discharge: HOME | End: 2019-12-17
Attending: INTERNAL MEDICINE | Admitting: INTERNAL MEDICINE
Payer: MEDICAID

## 2019-01-01 ENCOUNTER — OFFICE VISIT (OUTPATIENT)
Dept: HEPATOLOGY | Facility: CLINIC | Age: 54
End: 2019-01-01
Payer: MEDICAID

## 2019-01-01 ENCOUNTER — HOSPITAL ENCOUNTER (OUTPATIENT)
Dept: RADIOLOGY | Facility: HOSPITAL | Age: 54
Discharge: HOME | End: 2019-07-29
Attending: INTERNAL MEDICINE
Payer: MEDICAID

## 2019-01-01 ENCOUNTER — PATIENT MESSAGE (OUTPATIENT)
Dept: GASTROENTEROLOGY | Facility: CLINIC | Age: 54
End: 2019-01-01

## 2019-01-01 ENCOUNTER — OFFICE VISIT (OUTPATIENT)
Dept: HEMATOLOGY/ONCOLOGY | Facility: CLINIC | Age: 54
End: 2019-01-01
Payer: MEDICAID

## 2019-01-01 ENCOUNTER — LAB VISIT (OUTPATIENT)
Dept: LAB | Facility: HOSPITAL | Age: 54
End: 2019-01-01
Attending: INTERNAL MEDICINE
Payer: MEDICAID

## 2019-01-01 ENCOUNTER — HOSPITAL ENCOUNTER (OUTPATIENT)
Dept: RADIOLOGY | Facility: HOSPITAL | Age: 54
Discharge: HOME | End: 2019-08-13
Attending: INTERNAL MEDICINE
Payer: MEDICAID

## 2019-01-01 ENCOUNTER — HOSPITAL ENCOUNTER (OUTPATIENT)
Dept: RADIOLOGY | Facility: HOSPITAL | Age: 54
Discharge: HOME | End: 2019-12-20
Attending: PHYSICIAN ASSISTANT
Payer: MEDICAID

## 2019-01-01 ENCOUNTER — HOSPITAL ENCOUNTER (OUTPATIENT)
Dept: RADIOLOGY | Facility: HOSPITAL | Age: 54
Discharge: HOME | End: 2019-07-31
Attending: PHYSICIAN ASSISTANT
Payer: MEDICAID

## 2019-01-01 ENCOUNTER — HOSPITAL ENCOUNTER (OUTPATIENT)
Facility: HOSPITAL | Age: 54
Discharge: HOME | End: 2019-12-04
Attending: INTERNAL MEDICINE | Admitting: INTERNAL MEDICINE
Payer: MEDICAID

## 2019-01-01 ENCOUNTER — HOSPITAL ENCOUNTER (OUTPATIENT)
Dept: RADIOLOGY | Facility: HOSPITAL | Age: 54
Discharge: HOME | End: 2019-09-10
Attending: INTERNAL MEDICINE
Payer: MEDICAID

## 2019-01-01 ENCOUNTER — HOSPITAL ENCOUNTER (OUTPATIENT)
Dept: RADIOLOGY | Facility: HOSPITAL | Age: 54
Discharge: HOME | End: 2019-10-11
Attending: INTERNAL MEDICINE
Payer: MEDICAID

## 2019-01-01 VITALS — HEART RATE: 99 BPM | OXYGEN SATURATION: 99 % | DIASTOLIC BLOOD PRESSURE: 58 MMHG | SYSTOLIC BLOOD PRESSURE: 117 MMHG

## 2019-01-01 VITALS
OXYGEN SATURATION: 100 % | DIASTOLIC BLOOD PRESSURE: 59 MMHG | SYSTOLIC BLOOD PRESSURE: 119 MMHG | OXYGEN SATURATION: 100 % | SYSTOLIC BLOOD PRESSURE: 122 MMHG | HEART RATE: 94 BPM | HEART RATE: 100 BPM | DIASTOLIC BLOOD PRESSURE: 58 MMHG

## 2019-01-01 VITALS
BODY MASS INDEX: 45.16 KG/M2 | HEIGHT: 61 IN | SYSTOLIC BLOOD PRESSURE: 117 MMHG | SYSTOLIC BLOOD PRESSURE: 112 MMHG | HEART RATE: 97 BPM | RESPIRATION RATE: 16 BRPM | WEIGHT: 239.19 LBS | DIASTOLIC BLOOD PRESSURE: 55 MMHG | OXYGEN SATURATION: 96 % | OXYGEN SATURATION: 99 % | HEART RATE: 109 BPM | DIASTOLIC BLOOD PRESSURE: 59 MMHG | TEMPERATURE: 98 F

## 2019-01-01 VITALS
SYSTOLIC BLOOD PRESSURE: 125 MMHG | BODY MASS INDEX: 44.7 KG/M2 | WEIGHT: 236.75 LBS | HEART RATE: 101 BPM | DIASTOLIC BLOOD PRESSURE: 72 MMHG | HEIGHT: 61 IN

## 2019-01-01 VITALS
HEIGHT: 61 IN | OXYGEN SATURATION: 98 % | DIASTOLIC BLOOD PRESSURE: 62 MMHG | BODY MASS INDEX: 47.2 KG/M2 | SYSTOLIC BLOOD PRESSURE: 127 MMHG | HEART RATE: 102 BPM | WEIGHT: 250 LBS | DIASTOLIC BLOOD PRESSURE: 69 MMHG | HEART RATE: 97 BPM | SYSTOLIC BLOOD PRESSURE: 123 MMHG

## 2019-01-01 VITALS
WEIGHT: 237.63 LBS | SYSTOLIC BLOOD PRESSURE: 122 MMHG | OXYGEN SATURATION: 79 % | DIASTOLIC BLOOD PRESSURE: 61 MMHG | BODY MASS INDEX: 44.87 KG/M2 | TEMPERATURE: 99 F | RESPIRATION RATE: 20 BRPM | HEIGHT: 61 IN | HEART RATE: 102 BPM

## 2019-01-01 VITALS — HEART RATE: 104 BPM | DIASTOLIC BLOOD PRESSURE: 56 MMHG | OXYGEN SATURATION: 100 % | SYSTOLIC BLOOD PRESSURE: 104 MMHG

## 2019-01-01 VITALS
RESPIRATION RATE: 17 BRPM | HEART RATE: 92 BPM | SYSTOLIC BLOOD PRESSURE: 124 MMHG | HEIGHT: 61 IN | WEIGHT: 240 LBS | DIASTOLIC BLOOD PRESSURE: 71 MMHG | BODY MASS INDEX: 45.31 KG/M2 | TEMPERATURE: 98 F | OXYGEN SATURATION: 98 %

## 2019-01-01 VITALS
SYSTOLIC BLOOD PRESSURE: 140 MMHG | DIASTOLIC BLOOD PRESSURE: 71 MMHG | BODY MASS INDEX: 47.2 KG/M2 | TEMPERATURE: 98 F | OXYGEN SATURATION: 100 % | HEIGHT: 61 IN | HEART RATE: 95 BPM | WEIGHT: 250 LBS | RESPIRATION RATE: 16 BRPM

## 2019-01-01 VITALS — OXYGEN SATURATION: 100 % | HEART RATE: 99 BPM | DIASTOLIC BLOOD PRESSURE: 57 MMHG | SYSTOLIC BLOOD PRESSURE: 121 MMHG

## 2019-01-01 VITALS — DIASTOLIC BLOOD PRESSURE: 69 MMHG | HEART RATE: 96 BPM | SYSTOLIC BLOOD PRESSURE: 119 MMHG | OXYGEN SATURATION: 98 %

## 2019-01-01 VITALS
TEMPERATURE: 99 F | HEIGHT: 61 IN | BODY MASS INDEX: 49.09 KG/M2 | HEART RATE: 95 BPM | RESPIRATION RATE: 18 BRPM | OXYGEN SATURATION: 99 % | SYSTOLIC BLOOD PRESSURE: 183 MMHG | WEIGHT: 260 LBS | DIASTOLIC BLOOD PRESSURE: 85 MMHG

## 2019-01-01 VITALS — SYSTOLIC BLOOD PRESSURE: 126 MMHG | OXYGEN SATURATION: 99 % | DIASTOLIC BLOOD PRESSURE: 75 MMHG | HEART RATE: 98 BPM

## 2019-01-01 VITALS
DIASTOLIC BLOOD PRESSURE: 74 MMHG | RESPIRATION RATE: 19 BRPM | HEIGHT: 61 IN | OXYGEN SATURATION: 99 % | HEART RATE: 100 BPM | WEIGHT: 238 LBS | SYSTOLIC BLOOD PRESSURE: 161 MMHG | BODY MASS INDEX: 44.93 KG/M2 | TEMPERATURE: 99 F

## 2019-01-01 VITALS
WEIGHT: 241.88 LBS | TEMPERATURE: 98 F | HEIGHT: 61 IN | SYSTOLIC BLOOD PRESSURE: 133 MMHG | RESPIRATION RATE: 12 BRPM | DIASTOLIC BLOOD PRESSURE: 75 MMHG | BODY MASS INDEX: 45.66 KG/M2 | HEART RATE: 99 BPM | OXYGEN SATURATION: 99 %

## 2019-01-01 VITALS — OXYGEN SATURATION: 100 % | DIASTOLIC BLOOD PRESSURE: 58 MMHG | SYSTOLIC BLOOD PRESSURE: 124 MMHG | HEART RATE: 98 BPM

## 2019-01-01 VITALS — HEART RATE: 97 BPM | DIASTOLIC BLOOD PRESSURE: 63 MMHG | SYSTOLIC BLOOD PRESSURE: 137 MMHG | OXYGEN SATURATION: 97 %

## 2019-01-01 VITALS — DIASTOLIC BLOOD PRESSURE: 61 MMHG | SYSTOLIC BLOOD PRESSURE: 106 MMHG | HEART RATE: 95 BPM | OXYGEN SATURATION: 99 %

## 2019-01-01 VITALS
HEART RATE: 108 BPM | HEIGHT: 61 IN | BODY MASS INDEX: 49.74 KG/M2 | SYSTOLIC BLOOD PRESSURE: 124 MMHG | WEIGHT: 263.44 LBS | DIASTOLIC BLOOD PRESSURE: 58 MMHG | OXYGEN SATURATION: 98 %

## 2019-01-01 DIAGNOSIS — R18.8 CIRRHOSIS OF LIVER WITH ASCITES, UNSPECIFIED HEPATIC CIRRHOSIS TYPE: ICD-10-CM

## 2019-01-01 DIAGNOSIS — K74.60 HEPATIC CIRRHOSIS, UNSPECIFIED HEPATIC CIRRHOSIS TYPE, UNSPECIFIED WHETHER ASCITES PRESENT: ICD-10-CM

## 2019-01-01 DIAGNOSIS — K31.89 PORTAL HYPERTENSIVE GASTROPATHY: ICD-10-CM

## 2019-01-01 DIAGNOSIS — B18.2 CHRONIC HEPATITIS C WITHOUT HEPATIC COMA: ICD-10-CM

## 2019-01-01 DIAGNOSIS — R18.8 OTHER ASCITES: ICD-10-CM

## 2019-01-01 DIAGNOSIS — K29.70 GASTRITIS, PRESENCE OF BLEEDING UNSPECIFIED, UNSPECIFIED CHRONICITY, UNSPECIFIED GASTRITIS TYPE: Primary | ICD-10-CM

## 2019-01-01 DIAGNOSIS — K74.60 CIRRHOSIS OF LIVER WITH ASCITES, UNSPECIFIED HEPATIC CIRRHOSIS TYPE: ICD-10-CM

## 2019-01-01 DIAGNOSIS — Z86.19 HISTORY OF HEPATITIS C: Primary | ICD-10-CM

## 2019-01-01 DIAGNOSIS — K74.60 HEPATIC CIRRHOSIS, UNSPECIFIED HEPATIC CIRRHOSIS TYPE, UNSPECIFIED WHETHER ASCITES PRESENT: Primary | ICD-10-CM

## 2019-01-01 DIAGNOSIS — K74.60 CIRRHOSIS OF LIVER WITH ASCITES, UNSPECIFIED HEPATIC CIRRHOSIS TYPE: Primary | ICD-10-CM

## 2019-01-01 DIAGNOSIS — K64.8 INTERNAL HEMORRHOIDS: ICD-10-CM

## 2019-01-01 DIAGNOSIS — K74.60 CHRONIC HEPATITIS C WITH CIRRHOSIS: ICD-10-CM

## 2019-01-01 DIAGNOSIS — D64.9 SYMPTOMATIC ANEMIA: ICD-10-CM

## 2019-01-01 DIAGNOSIS — Z86.39 HISTORY OF IRON DEFICIENCY: ICD-10-CM

## 2019-01-01 DIAGNOSIS — K29.70 GASTRITIS, PRESENCE OF BLEEDING UNSPECIFIED, UNSPECIFIED CHRONICITY, UNSPECIFIED GASTRITIS TYPE: ICD-10-CM

## 2019-01-01 DIAGNOSIS — F32.9 MAJOR DEPRESSIVE DISORDER WITH CURRENT ACTIVE EPISODE, UNSPECIFIED DEPRESSION EPISODE SEVERITY, UNSPECIFIED WHETHER RECURRENT: ICD-10-CM

## 2019-01-01 DIAGNOSIS — K76.6 PORTAL VENOUS HYPERTENSION: ICD-10-CM

## 2019-01-01 DIAGNOSIS — D64.9 ANEMIA, UNSPECIFIED TYPE: ICD-10-CM

## 2019-01-01 DIAGNOSIS — I87.2 VENOUS STASIS DERMATITIS OF LEFT LOWER EXTREMITY: ICD-10-CM

## 2019-01-01 DIAGNOSIS — K64.8 INTERNAL HEMORRHOIDS: Primary | ICD-10-CM

## 2019-01-01 DIAGNOSIS — I85.00 ESOPHAGEAL VARICES WITHOUT BLEEDING, UNSPECIFIED ESOPHAGEAL VARICES TYPE: Primary | ICD-10-CM

## 2019-01-01 DIAGNOSIS — R18.8 CIRRHOSIS OF LIVER WITH ASCITES, UNSPECIFIED HEPATIC CIRRHOSIS TYPE: Primary | ICD-10-CM

## 2019-01-01 DIAGNOSIS — Z86.39 HISTORY OF IRON DEFICIENCY: Primary | ICD-10-CM

## 2019-01-01 DIAGNOSIS — D75.9 CYTOPENIA: ICD-10-CM

## 2019-01-01 DIAGNOSIS — K63.5 POLYP OF COLON, UNSPECIFIED PART OF COLON, UNSPECIFIED TYPE: ICD-10-CM

## 2019-01-01 DIAGNOSIS — I85.10 SECONDARY ESOPHAGEAL VARICES WITHOUT BLEEDING: ICD-10-CM

## 2019-01-01 DIAGNOSIS — I85.10 SECONDARY ESOPHAGEAL VARICES WITHOUT BLEEDING: Primary | ICD-10-CM

## 2019-01-01 DIAGNOSIS — R21 BUTTERFLY RASH: Primary | ICD-10-CM

## 2019-01-01 DIAGNOSIS — Z12.12 SCREENING FOR COLORECTAL CANCER: ICD-10-CM

## 2019-01-01 DIAGNOSIS — Z12.11 SCREENING FOR COLON CANCER: ICD-10-CM

## 2019-01-01 DIAGNOSIS — B18.2 CHRONIC HEPATITIS C WITH CIRRHOSIS: ICD-10-CM

## 2019-01-01 DIAGNOSIS — I85.00 ESOPHAGEAL VARICES WITHOUT BLEEDING, UNSPECIFIED ESOPHAGEAL VARICES TYPE: ICD-10-CM

## 2019-01-01 DIAGNOSIS — Z86.19 HISTORY OF HEPATITIS C: ICD-10-CM

## 2019-01-01 DIAGNOSIS — B18.2 CHRONIC HEPATITIS C WITHOUT HEPATIC COMA: Primary | ICD-10-CM

## 2019-01-01 DIAGNOSIS — I85.00 VARICES, ESOPHAGEAL: ICD-10-CM

## 2019-01-01 DIAGNOSIS — R21 BUTTERFLY RASH: ICD-10-CM

## 2019-01-01 DIAGNOSIS — K74.60 CIRRHOSIS: ICD-10-CM

## 2019-01-01 DIAGNOSIS — K76.6 PORTAL HYPERTENSIVE GASTROPATHY: ICD-10-CM

## 2019-01-01 DIAGNOSIS — Z87.19 HX OF ESOPHAGEAL VARICES: ICD-10-CM

## 2019-01-01 DIAGNOSIS — R60.0 LOCALIZED EDEMA: ICD-10-CM

## 2019-01-01 DIAGNOSIS — R60.0 LOCALIZED EDEMA: Primary | ICD-10-CM

## 2019-01-01 DIAGNOSIS — M79.10 MYALGIA: Primary | ICD-10-CM

## 2019-01-01 DIAGNOSIS — B19.20 UNSPECIFIED VIRAL HEPATITIS C WITHOUT HEPATIC COMA: Primary | ICD-10-CM

## 2019-01-01 DIAGNOSIS — Z12.11 SCREENING FOR COLORECTAL CANCER: ICD-10-CM

## 2019-01-01 LAB
AFP SERPL-MCNC: 4 NG/ML (ref 0–8.4)
AFP SERPL-MCNC: 4.3 NG/ML (ref 0–8.4)
ALBUMIN FLD-MCNC: 0.3 G/DL
ALBUMIN SERPL BCP-MCNC: 2.7 G/DL (ref 3.5–5.2)
ALBUMIN SERPL BCP-MCNC: 2.8 G/DL (ref 3.5–5.2)
ALBUMIN SERPL BCP-MCNC: 2.8 G/DL (ref 3.5–5.2)
ALBUMIN SERPL BCP-MCNC: 3.3 G/DL (ref 3.5–5.2)
ALP SERPL-CCNC: 181 U/L (ref 55–135)
ALP SERPL-CCNC: 242 U/L (ref 55–135)
ALP SERPL-CCNC: 243 U/L (ref 55–135)
ALP SERPL-CCNC: 253 U/L (ref 55–135)
ALT SERPL W/O P-5'-P-CCNC: 12 U/L (ref 10–44)
ALT SERPL W/O P-5'-P-CCNC: 14 U/L (ref 10–44)
ALT SERPL W/O P-5'-P-CCNC: 14 U/L (ref 10–44)
ALT SERPL W/O P-5'-P-CCNC: 16 U/L (ref 10–44)
ANA SER QL IF: NORMAL
ANION GAP SERPL CALC-SCNC: 5 MMOL/L (ref 8–16)
ANION GAP SERPL CALC-SCNC: 5 MMOL/L (ref 8–16)
ANION GAP SERPL CALC-SCNC: 6 MMOL/L (ref 8–16)
ANION GAP SERPL CALC-SCNC: 7 MMOL/L (ref 8–16)
ANION GAP SERPL CALC-SCNC: 7 MMOL/L (ref 8–16)
ANION GAP SERPL CALC-SCNC: 8 MMOL/L (ref 8–16)
ANION GAP SERPL CALC-SCNC: 9 MMOL/L (ref 8–16)
ANTI SM/RNP ANTIBODY: 1.37 EU (ref 0–19.99)
ANTI SM/RNP ANTIBODY: 1.37 EU (ref 0–19.99)
ANTI-PM/SCL AB: <20 UNITS
ANTI-SM/RNP INTERPRETATION: NEGATIVE
ANTI-SM/RNP INTERPRETATION: NEGATIVE
ANTI-SS-A 52 KD AB, IGG: 22 UNITS
APPEARANCE FLD: CLEAR
AST SERPL-CCNC: 27 U/L (ref 10–40)
AST SERPL-CCNC: 28 U/L (ref 10–40)
AST SERPL-CCNC: 29 U/L (ref 10–40)
AST SERPL-CCNC: 31 U/L (ref 10–40)
BACTERIA SPEC AEROBE CULT: NO GROWTH
BACTERIA SPEC ANAEROBE CULT: NORMAL
BASOPHILS # BLD AUTO: 0.03 K/UL (ref 0–0.2)
BASOPHILS # BLD AUTO: 0.04 K/UL (ref 0–0.2)
BASOPHILS # BLD AUTO: 0.05 K/UL (ref 0–0.2)
BASOPHILS # BLD AUTO: 0.06 K/UL (ref 0–0.2)
BASOPHILS NFR BLD: 0.7 % (ref 0–1.9)
BASOPHILS NFR BLD: 0.9 % (ref 0–1.9)
BASOPHILS NFR BLD: 0.9 % (ref 0–1.9)
BASOPHILS NFR BLD: 1.1 % (ref 0–1.9)
BILIRUB SERPL-MCNC: 1.7 MG/DL (ref 0.1–1)
BILIRUB SERPL-MCNC: 1.9 MG/DL (ref 0.1–1)
BILIRUB SERPL-MCNC: 1.9 MG/DL (ref 0.1–1)
BILIRUB SERPL-MCNC: 2.2 MG/DL (ref 0.1–1)
BODY FLD TYPE: NORMAL
BUN SERPL-MCNC: 11 MG/DL (ref 6–20)
BUN SERPL-MCNC: 4 MG/DL (ref 6–20)
BUN SERPL-MCNC: 6 MG/DL (ref 6–20)
BUN SERPL-MCNC: 7 MG/DL (ref 6–20)
BUN SERPL-MCNC: 7 MG/DL (ref 6–20)
BUN SERPL-MCNC: 8 MG/DL (ref 6–20)
BUN SERPL-MCNC: 8 MG/DL (ref 6–20)
CALCIUM SERPL-MCNC: 7.9 MG/DL (ref 8.7–10.5)
CALCIUM SERPL-MCNC: 7.9 MG/DL (ref 8.7–10.5)
CALCIUM SERPL-MCNC: 8.1 MG/DL (ref 8.7–10.5)
CALCIUM SERPL-MCNC: 8.1 MG/DL (ref 8.7–10.5)
CALCIUM SERPL-MCNC: 8.3 MG/DL (ref 8.7–10.5)
CALCIUM SERPL-MCNC: 8.4 MG/DL (ref 8.7–10.5)
CALCIUM SERPL-MCNC: 8.9 MG/DL (ref 8.7–10.5)
CHLORIDE SERPL-SCNC: 100 MMOL/L (ref 95–110)
CHLORIDE SERPL-SCNC: 101 MMOL/L (ref 95–110)
CHLORIDE SERPL-SCNC: 103 MMOL/L (ref 95–110)
CHLORIDE SERPL-SCNC: 104 MMOL/L (ref 95–110)
CHLORIDE SERPL-SCNC: 106 MMOL/L (ref 95–110)
CO2 SERPL-SCNC: 23 MMOL/L (ref 23–29)
CO2 SERPL-SCNC: 24 MMOL/L (ref 23–29)
CO2 SERPL-SCNC: 25 MMOL/L (ref 23–29)
CO2 SERPL-SCNC: 26 MMOL/L (ref 23–29)
CO2 SERPL-SCNC: 27 MMOL/L (ref 23–29)
COLOR FLD: YELLOW
CREAT SERPL-MCNC: 0.7 MG/DL (ref 0.5–1.4)
CREAT SERPL-MCNC: 0.7 MG/DL (ref 0.5–1.4)
CREAT SERPL-MCNC: 0.8 MG/DL (ref 0.5–1.4)
CREAT SERPL-MCNC: 1.1 MG/DL (ref 0.5–1.4)
CRP SERPL-MCNC: 23.57 MG/L (ref 0–3.19)
DIFFERENTIAL METHOD: ABNORMAL
DSDNA AB SER-ACNC: NORMAL [IU]/ML
EJ AB SER QL: NEGATIVE
ENA JO1 AB SER IA-ACNC: <20 UNITS
ENA SCL70 AB SER-ACNC: 3 UNITS
ENA SCL70 IGG SER IA-ACNC: <0.2 U
ENA SM+RNP AB SER IA-ACNC: <20 UNITS
EOSINOPHIL # BLD AUTO: 0.1 K/UL (ref 0–0.5)
EOSINOPHIL # BLD AUTO: 0.1 K/UL (ref 0–0.5)
EOSINOPHIL # BLD AUTO: 0.2 K/UL (ref 0–0.5)
EOSINOPHIL # BLD AUTO: 0.2 K/UL (ref 0–0.5)
EOSINOPHIL NFR BLD: 3 % (ref 0–8)
EOSINOPHIL NFR BLD: 3 % (ref 0–8)
EOSINOPHIL NFR BLD: 3.4 % (ref 0–8)
EOSINOPHIL NFR BLD: 4.2 % (ref 0–8)
ERYTHROCYTE [DISTWIDTH] IN BLOOD BY AUTOMATED COUNT: 16.6 % (ref 11.5–14.5)
ERYTHROCYTE [DISTWIDTH] IN BLOOD BY AUTOMATED COUNT: 17 % (ref 11.5–14.5)
ERYTHROCYTE [DISTWIDTH] IN BLOOD BY AUTOMATED COUNT: 17.4 % (ref 11.5–14.5)
ERYTHROCYTE [DISTWIDTH] IN BLOOD BY AUTOMATED COUNT: 19.8 % (ref 11.5–14.5)
EST. GFR  (AFRICAN AMERICAN): >60 ML/MIN/1.73 M^2
EST. GFR  (NON AFRICAN AMERICAN): 57 ML/MIN/1.73 M^2
EST. GFR  (NON AFRICAN AMERICAN): >60 ML/MIN/1.73 M^2
FERRITIN SERPL-MCNC: 21 NG/ML (ref 20–300)
FIBRILLARIN (U3 RNP): NEGATIVE
FINAL PATHOLOGIC DIAGNOSIS: NORMAL
FINAL PATHOLOGIC DIAGNOSIS: NORMAL
GLUCOSE SERPL-MCNC: 100 MG/DL (ref 70–110)
GLUCOSE SERPL-MCNC: 100 MG/DL (ref 70–110)
GLUCOSE SERPL-MCNC: 104 MG/DL (ref 70–110)
GLUCOSE SERPL-MCNC: 76 MG/DL (ref 70–110)
GLUCOSE SERPL-MCNC: 85 MG/DL (ref 70–110)
GLUCOSE SERPL-MCNC: 88 MG/DL (ref 70–110)
GLUCOSE SERPL-MCNC: 94 MG/DL (ref 70–110)
GROSS: NORMAL
GROSS: NORMAL
HBV SURFACE AB SER-ACNC: <3.1 MIU/ML
HCT VFR BLD AUTO: 32.2 % (ref 37–48.5)
HCT VFR BLD AUTO: 33.7 % (ref 37–48.5)
HCT VFR BLD AUTO: 34.3 % (ref 37–48.5)
HCT VFR BLD AUTO: 35.5 % (ref 37–48.5)
HCV RNA SERPL NAA+PROBE-LOG IU: <1.08 LOG (10) IU/ML
HCV RNA SERPL QL NAA+PROBE: NOT DETECTED IU/ML
HCV RNA SPEC NAA+PROBE-ACNC: <12 IU/ML
HGB BLD-MCNC: 10 G/DL (ref 12–16)
HGB BLD-MCNC: 10.7 G/DL (ref 12–16)
HGB BLD-MCNC: 11 G/DL (ref 12–16)
HGB BLD-MCNC: 11.3 G/DL (ref 12–16)
IGE SERPL-ACNC: 98 IU/ML (ref 0–100)
IMM GRANULOCYTES # BLD AUTO: 0.01 K/UL (ref 0–0.04)
IMM GRANULOCYTES # BLD AUTO: 0.01 K/UL (ref 0–0.04)
IMM GRANULOCYTES # BLD AUTO: 0.02 K/UL (ref 0–0.04)
IMM GRANULOCYTES # BLD AUTO: 0.02 K/UL (ref 0–0.04)
IMM GRANULOCYTES NFR BLD AUTO: 0.5 % (ref 0–0.5)
INR PPP: 1.1 (ref 0.8–1.2)
KU AB SER QL: NEGATIVE
LKM AB SER-ACNC: 1.4 UNITS
LYMPHOCYTES # BLD AUTO: 0.5 K/UL (ref 1–4.8)
LYMPHOCYTES # BLD AUTO: 0.6 K/UL (ref 1–4.8)
LYMPHOCYTES # BLD AUTO: 0.7 K/UL (ref 1–4.8)
LYMPHOCYTES # BLD AUTO: 0.7 K/UL (ref 1–4.8)
LYMPHOCYTES NFR BLD: 12.1 % (ref 18–48)
LYMPHOCYTES NFR BLD: 13.2 % (ref 18–48)
LYMPHOCYTES NFR BLD: 13.2 % (ref 18–48)
LYMPHOCYTES NFR BLD: 13.6 % (ref 18–48)
LYMPHOCYTES NFR FLD MANUAL: 34 %
MCH RBC QN AUTO: 27.6 PG (ref 27–31)
MCH RBC QN AUTO: 27.8 PG (ref 27–31)
MCH RBC QN AUTO: 28.6 PG (ref 27–31)
MCH RBC QN AUTO: 28.9 PG (ref 27–31)
MCHC RBC AUTO-ENTMCNC: 31.1 G/DL (ref 32–36)
MCHC RBC AUTO-ENTMCNC: 31.8 G/DL (ref 32–36)
MCHC RBC AUTO-ENTMCNC: 31.8 G/DL (ref 32–36)
MCHC RBC AUTO-ENTMCNC: 32.1 G/DL (ref 32–36)
MCV RBC AUTO: 87 FL (ref 82–98)
MCV RBC AUTO: 89 FL (ref 82–98)
MCV RBC AUTO: 89 FL (ref 82–98)
MCV RBC AUTO: 91 FL (ref 82–98)
MDA-5 (P140): <20 UNITS
MESOTHL CELL NFR FLD MANUAL: 30 %
MI2 AB SER QL: NEGATIVE
MONOCYTES # BLD AUTO: 0.3 K/UL (ref 0.3–1)
MONOCYTES # BLD AUTO: 0.4 K/UL (ref 0.3–1)
MONOCYTES # BLD AUTO: 0.4 K/UL (ref 0.3–1)
MONOCYTES # BLD AUTO: 0.6 K/UL (ref 0.3–1)
MONOCYTES NFR BLD: 10 % (ref 4–15)
MONOCYTES NFR BLD: 11.1 % (ref 4–15)
MONOCYTES NFR BLD: 7.8 % (ref 4–15)
MONOCYTES NFR BLD: 8.4 % (ref 4–15)
MONOS+MACROS NFR FLD MANUAL: 28 %
NEUTROPHILS # BLD AUTO: 3.1 K/UL (ref 1.8–7.7)
NEUTROPHILS # BLD AUTO: 3.1 K/UL (ref 1.8–7.7)
NEUTROPHILS # BLD AUTO: 3.8 K/UL (ref 1.8–7.7)
NEUTROPHILS # BLD AUTO: 3.9 K/UL (ref 1.8–7.7)
NEUTROPHILS NFR BLD: 70.8 % (ref 38–73)
NEUTROPHILS NFR BLD: 72.7 % (ref 38–73)
NEUTROPHILS NFR BLD: 73.3 % (ref 38–73)
NEUTROPHILS NFR BLD: 75.3 % (ref 38–73)
NEUTROPHILS NFR FLD MANUAL: 8 %
NRBC BLD-RTO: 0 /100 WBC
NXP-2 (P140): <20 UNITS
OJ AB SER QL: NEGATIVE
PL12 AB SER QL: NEGATIVE
PL7 AB SER QL: NEGATIVE
PLATELET # BLD AUTO: 102 K/UL (ref 150–350)
PLATELET # BLD AUTO: 81 K/UL (ref 150–350)
PLATELET # BLD AUTO: 96 K/UL (ref 150–350)
PLATELET # BLD AUTO: 97 K/UL (ref 150–350)
PMV BLD AUTO: 10.9 FL (ref 9.2–12.9)
PMV BLD AUTO: 11.2 FL (ref 9.2–12.9)
PMV BLD AUTO: 11.4 FL (ref 9.2–12.9)
PMV BLD AUTO: 12 FL (ref 9.2–12.9)
POTASSIUM SERPL-SCNC: 3.3 MMOL/L (ref 3.5–5.1)
POTASSIUM SERPL-SCNC: 3.4 MMOL/L (ref 3.5–5.1)
POTASSIUM SERPL-SCNC: 3.5 MMOL/L (ref 3.5–5.1)
POTASSIUM SERPL-SCNC: 4 MMOL/L (ref 3.5–5.1)
POTASSIUM SERPL-SCNC: 4.1 MMOL/L (ref 3.5–5.1)
PROT FLD-MCNC: <1 G/DL
PROT SERPL-MCNC: 6.1 G/DL (ref 6–8.4)
PROT SERPL-MCNC: 6.2 G/DL (ref 6–8.4)
PROTHROMBIN TIME: 11.4 SEC (ref 9–12.5)
PROTHROMBIN TIME: 11.5 SEC (ref 9–12.5)
PROTHROMBIN TIME: 11.6 SEC (ref 9–12.5)
PROTHROMBIN TIME: 11.8 SEC (ref 9–12.5)
RBC # BLD AUTO: 3.6 M/UL (ref 4–5.4)
RBC # BLD AUTO: 3.7 M/UL (ref 4–5.4)
RBC # BLD AUTO: 3.84 M/UL (ref 4–5.4)
RBC # BLD AUTO: 4.1 M/UL (ref 4–5.4)
RNA POLYMERASE III ANTIBODIES, IGG, SERUM: <10 U
SODIUM SERPL-SCNC: 134 MMOL/L (ref 136–145)
SODIUM SERPL-SCNC: 135 MMOL/L (ref 136–145)
SODIUM SERPL-SCNC: 136 MMOL/L (ref 136–145)
SODIUM SERPL-SCNC: 136 MMOL/L (ref 136–145)
SPECIMEN SOURCE: NORMAL
SPECIMEN SOURCE: NORMAL
SRP AB SERPL QL: NEGATIVE
TH/TO: NORMAL
TIF1 GAMMA (P155/140): <20 UNITS
U2 SNRNP: NEGATIVE
WBC # BLD AUTO: 4.05 K/UL (ref 3.9–12.7)
WBC # BLD AUTO: 4.32 K/UL (ref 3.9–12.7)
WBC # BLD AUTO: 5.29 K/UL (ref 3.9–12.7)
WBC # BLD AUTO: 5.31 K/UL (ref 3.9–12.7)
WBC # FLD: 22 /CU MM

## 2019-01-01 PROCEDURE — 87522 HEPATITIS C REVRS TRNSCRPJ: CPT

## 2019-01-01 PROCEDURE — 49083 ABD PARACENTESIS W/IMAGING: CPT | Mod: ,,, | Performed by: RADIOLOGY

## 2019-01-01 PROCEDURE — 99999 PR PBB SHADOW E&M-EST. PATIENT-LVL III: ICD-10-PCS | Mod: PBBFAC,,, | Performed by: INTERNAL MEDICINE

## 2019-01-01 PROCEDURE — 88305 TISSUE EXAM BY PATHOLOGIST: CPT | Performed by: PATHOLOGY

## 2019-01-01 PROCEDURE — 36415 COLL VENOUS BLD VENIPUNCTURE: CPT

## 2019-01-01 PROCEDURE — P9047 ALBUMIN (HUMAN), 25%, 50ML: HCPCS | Performed by: RADIOLOGY

## 2019-01-01 PROCEDURE — 49083 ABD PARACENTESIS W/IMAGING: CPT

## 2019-01-01 PROCEDURE — 88305 CYTOLOGY SPECIMEN- MEDICAL CYTOLOGY (FLUID/WASH/BRUSH): ICD-10-PCS | Mod: 26,,, | Performed by: PATHOLOGY

## 2019-01-01 PROCEDURE — 00731 ANES UPR GI NDSC PX NOS: CPT | Performed by: INTERNAL MEDICINE

## 2019-01-01 PROCEDURE — 27201022: Performed by: INTERNAL MEDICINE

## 2019-01-01 PROCEDURE — 80053 COMPREHEN METABOLIC PANEL: CPT

## 2019-01-01 PROCEDURE — 99214 OFFICE O/P EST MOD 30 MIN: CPT | Mod: S$PBB,,, | Performed by: INTERNAL MEDICINE

## 2019-01-01 PROCEDURE — 86235 NUCLEAR ANTIGEN ANTIBODY: CPT | Mod: 59

## 2019-01-01 PROCEDURE — 99215 PR OFFICE/OUTPT VISIT, EST, LEVL V, 40-54 MIN: ICD-10-PCS | Mod: S$PBB,,, | Performed by: PHYSICIAN ASSISTANT

## 2019-01-01 PROCEDURE — 00813 ANES UPR LWR GI NDSC PX: CPT | Performed by: INTERNAL MEDICINE

## 2019-01-01 PROCEDURE — 99999 PR PBB SHADOW E&M-EST. PATIENT-LVL III: CPT | Mod: PBBFAC,,, | Performed by: INTERNAL MEDICINE

## 2019-01-01 PROCEDURE — 85610 PROTHROMBIN TIME: CPT

## 2019-01-01 PROCEDURE — D9220A PRA ANESTHESIA: Mod: CRNA,,, | Performed by: NURSE ANESTHETIST, CERTIFIED REGISTERED

## 2019-01-01 PROCEDURE — 43244 EGD VARICES LIGATION: CPT | Mod: ,,, | Performed by: INTERNAL MEDICINE

## 2019-01-01 PROCEDURE — 43244 PR EGD, FLEX, W/BAND LIGATION, ESOPH/GASTR VARICES: ICD-10-PCS | Mod: ,,, | Performed by: INTERNAL MEDICINE

## 2019-01-01 PROCEDURE — 82042 OTHER SOURCE ALBUMIN QUAN EA: CPT

## 2019-01-01 PROCEDURE — 63600175 PHARM REV CODE 636 W HCPCS: Performed by: RADIOLOGY

## 2019-01-01 PROCEDURE — 82728 ASSAY OF FERRITIN: CPT

## 2019-01-01 PROCEDURE — 49083 US GUIDED PARACENTESIS INC IMAGING: ICD-10-PCS | Mod: ,,, | Performed by: RADIOLOGY

## 2019-01-01 PROCEDURE — 88112 CYTOPATH CELL ENHANCE TECH: CPT | Mod: 26,,, | Performed by: PATHOLOGY

## 2019-01-01 PROCEDURE — A7048 VACUUM DRAIN BOTTLE/TUBE KIT: HCPCS

## 2019-01-01 PROCEDURE — 82785 ASSAY OF IGE: CPT

## 2019-01-01 PROCEDURE — 99999 PR PBB SHADOW E&M-EST. PATIENT-LVL IV: CPT | Mod: PBBFAC,,, | Performed by: PHYSICIAN ASSISTANT

## 2019-01-01 PROCEDURE — 30000890 MISCELLANEOUS SENDOUT TEST, BLOOD

## 2019-01-01 PROCEDURE — 83516 IMMUNOASSAY NONANTIBODY: CPT | Mod: 59

## 2019-01-01 PROCEDURE — D9220A PRA ANESTHESIA: Mod: ANES,,, | Performed by: ANESTHESIOLOGY

## 2019-01-01 PROCEDURE — 88313 SPECIAL STAINS GROUP 2: CPT | Mod: 26,,, | Performed by: PATHOLOGY

## 2019-01-01 PROCEDURE — 86235 NUCLEAR ANTIGEN ANTIBODY: CPT

## 2019-01-01 PROCEDURE — D9220A PRA ANESTHESIA: ICD-10-PCS | Mod: CRNA,,, | Performed by: NURSE ANESTHETIST, CERTIFIED REGISTERED

## 2019-01-01 PROCEDURE — 43239 EGD BIOPSY SINGLE/MULTIPLE: CPT | Performed by: INTERNAL MEDICINE

## 2019-01-01 PROCEDURE — 63600175 PHARM REV CODE 636 W HCPCS: Performed by: NURSE ANESTHETIST, CERTIFIED REGISTERED

## 2019-01-01 PROCEDURE — 45380 COLONOSCOPY AND BIOPSY: CPT | Mod: ,,, | Performed by: INTERNAL MEDICINE

## 2019-01-01 PROCEDURE — 25000003 PHARM REV CODE 250: Performed by: NURSE ANESTHETIST, CERTIFIED REGISTERED

## 2019-01-01 PROCEDURE — 85025 COMPLETE CBC W/AUTO DIFF WBC: CPT

## 2019-01-01 PROCEDURE — D9220A PRA ANESTHESIA: ICD-10-PCS | Mod: ANES,,, | Performed by: ANESTHESIOLOGY

## 2019-01-01 PROCEDURE — 63600175 PHARM REV CODE 636 W HCPCS: Performed by: INTERNAL MEDICINE

## 2019-01-01 PROCEDURE — 37000009 HC ANESTHESIA EA ADD 15 MINS: Performed by: INTERNAL MEDICINE

## 2019-01-01 PROCEDURE — 76700 US EXAM ABDOM COMPLETE: CPT | Mod: TC

## 2019-01-01 PROCEDURE — 88305 TISSUE EXAM BY PATHOLOGIST: CPT | Mod: 26,,, | Performed by: PATHOLOGY

## 2019-01-01 PROCEDURE — 99999 PR PBB SHADOW E&M-EST. PATIENT-LVL IV: ICD-10-PCS | Mod: PBBFAC,,, | Performed by: PHYSICIAN ASSISTANT

## 2019-01-01 PROCEDURE — 86317 IMMUNOASSAY INFECTIOUS AGENT: CPT

## 2019-01-01 PROCEDURE — 86141 C-REACTIVE PROTEIN HS: CPT

## 2019-01-01 PROCEDURE — 43239 PR EGD, FLEX, W/BIOPSY, SGL/MULTI: ICD-10-PCS | Mod: 51,,, | Performed by: INTERNAL MEDICINE

## 2019-01-01 PROCEDURE — 43251 EGD REMOVE LESION SNARE: CPT | Performed by: INTERNAL MEDICINE

## 2019-01-01 PROCEDURE — 43251 PR EGD, FLEX, W/REMOVAL, TUMOR/POLYP/LESION(S), SNARE: ICD-10-PCS | Mod: 51,,, | Performed by: INTERNAL MEDICINE

## 2019-01-01 PROCEDURE — 86038 ANTINUCLEAR ANTIBODIES: CPT

## 2019-01-01 PROCEDURE — 88342 IMHCHEM/IMCYTCHM 1ST ANTB: CPT | Mod: 26,,, | Performed by: PATHOLOGY

## 2019-01-01 PROCEDURE — 88342 CHG IMMUNOCYTOCHEMISTRY: ICD-10-PCS | Mod: 26,,, | Performed by: PATHOLOGY

## 2019-01-01 PROCEDURE — 87070 CULTURE OTHR SPECIMN AEROBIC: CPT

## 2019-01-01 PROCEDURE — 00811 ANES LWR INTST NDSC NOS: CPT | Performed by: INTERNAL MEDICINE

## 2019-01-01 PROCEDURE — 27201012 HC FORCEPS, HOT/COLD, DISP: Performed by: INTERNAL MEDICINE

## 2019-01-01 PROCEDURE — 99999 PR PBB SHADOW E&M-EST. PATIENT-LVL III: CPT | Mod: PBBFAC,,, | Performed by: PHYSICIAN ASSISTANT

## 2019-01-01 PROCEDURE — 86376 MICROSOMAL ANTIBODY EACH: CPT

## 2019-01-01 PROCEDURE — 99213 OFFICE O/P EST LOW 20 MIN: CPT | Mod: PBBFAC,PO | Performed by: PHYSICIAN ASSISTANT

## 2019-01-01 PROCEDURE — 43244 EGD VARICES LIGATION: CPT | Performed by: INTERNAL MEDICINE

## 2019-01-01 PROCEDURE — 88305 TISSUE EXAM BY PATHOLOGIST: ICD-10-PCS | Mod: 26,,, | Performed by: PATHOLOGY

## 2019-01-01 PROCEDURE — G0444 DEPRESSION SCREEN ANNUAL: HCPCS | Mod: PBBFAC,PO,59 | Performed by: INTERNAL MEDICINE

## 2019-01-01 PROCEDURE — 45380 PR COLONOSCOPY,BIOPSY: ICD-10-PCS | Mod: ,,, | Performed by: INTERNAL MEDICINE

## 2019-01-01 PROCEDURE — 93970 EXTREMITY STUDY: CPT | Mod: TC,PO

## 2019-01-01 PROCEDURE — 99214 PR OFFICE/OUTPT VISIT, EST, LEVL IV, 30-39 MIN: ICD-10-PCS | Mod: S$PBB,,, | Performed by: INTERNAL MEDICINE

## 2019-01-01 PROCEDURE — 76700 US EXAM ABDOM COMPLETE: CPT | Mod: 26,,, | Performed by: RADIOLOGY

## 2019-01-01 PROCEDURE — 37000008 HC ANESTHESIA 1ST 15 MINUTES: Performed by: INTERNAL MEDICINE

## 2019-01-01 PROCEDURE — 86706 HEP B SURFACE ANTIBODY: CPT

## 2019-01-01 PROCEDURE — G0444 DEPRESSION SCREEN ANNUAL: HCPCS | Mod: PBBFAC,PO | Performed by: INTERNAL MEDICINE

## 2019-01-01 PROCEDURE — 89051 BODY FLUID CELL COUNT: CPT

## 2019-01-01 PROCEDURE — 99214 PR OFFICE/OUTPT VISIT, EST, LEVL IV, 30-39 MIN: ICD-10-PCS | Mod: S$PBB,,, | Performed by: PHYSICIAN ASSISTANT

## 2019-01-01 PROCEDURE — 43251 EGD REMOVE LESION SNARE: CPT | Mod: 51,,, | Performed by: INTERNAL MEDICINE

## 2019-01-01 PROCEDURE — 84157 ASSAY OF PROTEIN OTHER: CPT

## 2019-01-01 PROCEDURE — 99215 OFFICE O/P EST HI 40 MIN: CPT | Mod: S$PBB,,, | Performed by: PHYSICIAN ASSISTANT

## 2019-01-01 PROCEDURE — 80048 BASIC METABOLIC PNL TOTAL CA: CPT

## 2019-01-01 PROCEDURE — 86225 DNA ANTIBODY NATIVE: CPT

## 2019-01-01 PROCEDURE — 43239 EGD BIOPSY SINGLE/MULTIPLE: CPT | Mod: 51,,, | Performed by: INTERNAL MEDICINE

## 2019-01-01 PROCEDURE — P9047 ALBUMIN (HUMAN), 25%, 50ML: HCPCS | Performed by: INTERNAL MEDICINE

## 2019-01-01 PROCEDURE — 99213 OFFICE O/P EST LOW 20 MIN: CPT | Mod: PBBFAC,PO | Performed by: INTERNAL MEDICINE

## 2019-01-01 PROCEDURE — 83520 IMMUNOASSAY QUANT NOS NONAB: CPT

## 2019-01-01 PROCEDURE — 82105 ALPHA-FETOPROTEIN SERUM: CPT

## 2019-01-01 PROCEDURE — 76700 US ABDOMEN COMPLETE: ICD-10-PCS | Mod: 26,,, | Performed by: RADIOLOGY

## 2019-01-01 PROCEDURE — 88112 CYTOLOGY SPECIMEN- MEDICAL CYTOLOGY (FLUID/WASH/BRUSH): ICD-10-PCS | Mod: 26,,, | Performed by: PATHOLOGY

## 2019-01-01 PROCEDURE — 45380 COLONOSCOPY AND BIOPSY: CPT | Performed by: INTERNAL MEDICINE

## 2019-01-01 PROCEDURE — 99999 PR PBB SHADOW E&M-EST. PATIENT-LVL III: ICD-10-PCS | Mod: PBBFAC,,, | Performed by: PHYSICIAN ASSISTANT

## 2019-01-01 PROCEDURE — 99213 OFFICE O/P EST LOW 20 MIN: CPT | Mod: PBBFAC,PO,25 | Performed by: INTERNAL MEDICINE

## 2019-01-01 PROCEDURE — 99214 OFFICE O/P EST MOD 30 MIN: CPT | Mod: PBBFAC | Performed by: PHYSICIAN ASSISTANT

## 2019-01-01 PROCEDURE — 87075 CULTR BACTERIA EXCEPT BLOOD: CPT

## 2019-01-01 PROCEDURE — 99215 PR OFFICE/OUTPT VISIT, EST, LEVL V, 40-54 MIN: ICD-10-PCS | Mod: S$PBB,,, | Performed by: INTERNAL MEDICINE

## 2019-01-01 PROCEDURE — 99215 OFFICE O/P EST HI 40 MIN: CPT | Mod: S$PBB,,, | Performed by: INTERNAL MEDICINE

## 2019-01-01 PROCEDURE — 88313 PR  SPECIAL STAINS,GROUP II: ICD-10-PCS | Mod: 26,,, | Performed by: PATHOLOGY

## 2019-01-01 PROCEDURE — 88313 SPECIAL STAINS GROUP 2: CPT | Performed by: PATHOLOGY

## 2019-01-01 PROCEDURE — 99214 OFFICE O/P EST MOD 30 MIN: CPT | Mod: S$PBB,,, | Performed by: PHYSICIAN ASSISTANT

## 2019-01-01 PROCEDURE — 27201089 HC SNARE, DISP (ANY): Performed by: INTERNAL MEDICINE

## 2019-01-01 RX ORDER — GLYCOPYRROLATE 0.2 MG/ML
INJECTION INTRAMUSCULAR; INTRAVENOUS
Status: DISCONTINUED | OUTPATIENT
Start: 2019-01-01 | End: 2019-01-01

## 2019-01-01 RX ORDER — SODIUM CHLORIDE 9 MG/ML
INJECTION, SOLUTION INTRAVENOUS CONTINUOUS
Status: DISCONTINUED | OUTPATIENT
Start: 2019-01-01 | End: 2019-01-01 | Stop reason: HOSPADM

## 2019-01-01 RX ORDER — ALBUMIN HUMAN 250 G/1000ML
25 SOLUTION INTRAVENOUS
Status: DISCONTINUED | OUTPATIENT
Start: 2019-01-01 | End: 2019-01-01 | Stop reason: HOSPADM

## 2019-01-01 RX ORDER — PANTOPRAZOLE SODIUM 40 MG/1
40 TABLET, DELAYED RELEASE ORAL DAILY
Qty: 90 TABLET | Refills: 3 | Status: SHIPPED | OUTPATIENT
Start: 2019-01-01 | End: 2020-01-01

## 2019-01-01 RX ORDER — FUROSEMIDE 80 MG/1
TABLET ORAL
Qty: 60 TABLET | Refills: 0 | Status: SHIPPED | OUTPATIENT
Start: 2019-01-01 | End: 2020-01-01 | Stop reason: SDUPTHER

## 2019-01-01 RX ORDER — SPIRONOLACTONE 100 MG/1
TABLET, FILM COATED ORAL
Qty: 90 TABLET | Refills: 0 | Status: SHIPPED | OUTPATIENT
Start: 2019-01-01 | End: 2020-01-01 | Stop reason: SDUPTHER

## 2019-01-01 RX ORDER — ALBUMIN HUMAN 250 G/1000ML
25 SOLUTION INTRAVENOUS
Status: DISCONTINUED | OUTPATIENT
Start: 2019-01-01 | End: 2019-01-01

## 2019-01-01 RX ORDER — ALBUMIN HUMAN 250 G/1000ML
25 SOLUTION INTRAVENOUS ONCE
Status: COMPLETED | OUTPATIENT
Start: 2019-01-01 | End: 2019-01-01

## 2019-01-01 RX ORDER — LIDOCAINE HCL/PF 100 MG/5ML
SYRINGE (ML) INTRAVENOUS
Status: DISCONTINUED | OUTPATIENT
Start: 2019-01-01 | End: 2019-01-01

## 2019-01-01 RX ORDER — FUROSEMIDE 80 MG/1
160 TABLET ORAL DAILY
Qty: 60 TABLET | Refills: 1 | Status: SHIPPED | OUTPATIENT
Start: 2019-01-01 | End: 2019-01-01 | Stop reason: SDUPTHER

## 2019-01-01 RX ORDER — PROPOFOL 10 MG/ML
VIAL (ML) INTRAVENOUS
Status: DISCONTINUED | OUTPATIENT
Start: 2019-01-01 | End: 2019-01-01

## 2019-01-01 RX ORDER — SPIRONOLACTONE 100 MG/1
300 TABLET, FILM COATED ORAL DAILY
Qty: 90 TABLET | Refills: 1 | Status: SHIPPED | OUTPATIENT
Start: 2019-01-01 | End: 2019-01-01 | Stop reason: SDUPTHER

## 2019-01-01 RX ADMIN — ALBUMIN (HUMAN) 25 G: 12.5 SOLUTION INTRAVENOUS at 10:07

## 2019-01-01 RX ADMIN — PROPOFOL 50 MG: 10 INJECTION, EMULSION INTRAVENOUS at 10:11

## 2019-01-01 RX ADMIN — ALBUMIN (HUMAN) 25 G: 12.5 SOLUTION INTRAVENOUS at 02:07

## 2019-01-01 RX ADMIN — ALBUMIN (HUMAN) 25 G: 25 SOLUTION INTRAVENOUS at 11:12

## 2019-01-01 RX ADMIN — LIDOCAINE HYDROCHLORIDE 100 MG: 20 INJECTION, SOLUTION INTRAVENOUS at 11:10

## 2019-01-01 RX ADMIN — ALBUMIN (HUMAN) 25 G: 25 SOLUTION INTRAVENOUS at 12:12

## 2019-01-01 RX ADMIN — ALBUMIN (HUMAN) 25 G: 25 SOLUTION INTRAVENOUS at 11:11

## 2019-01-01 RX ADMIN — PROPOFOL 50 MG: 10 INJECTION, EMULSION INTRAVENOUS at 11:11

## 2019-01-01 RX ADMIN — PROPOFOL 120 MG: 10 INJECTION, EMULSION INTRAVENOUS at 11:12

## 2019-01-01 RX ADMIN — ALBUMIN (HUMAN) 25 G: 12.5 SOLUTION INTRAVENOUS at 01:08

## 2019-01-01 RX ADMIN — ALBUMIN (HUMAN) 25 G: 25 SOLUTION INTRAVENOUS at 02:10

## 2019-01-01 RX ADMIN — ALBUMIN (HUMAN) 25 G: 25 SOLUTION INTRAVENOUS at 02:11

## 2019-01-01 RX ADMIN — GLYCOPYRROLATE 0.2 MG: 0.2 INJECTION, SOLUTION INTRAMUSCULAR; INTRAVENOUS at 11:10

## 2019-01-01 RX ADMIN — PROPOFOL 40 MG: 10 INJECTION, EMULSION INTRAVENOUS at 08:12

## 2019-01-01 RX ADMIN — SODIUM CHLORIDE 1000 ML: 0.9 INJECTION, SOLUTION INTRAVENOUS at 09:10

## 2019-01-01 RX ADMIN — ALBUMIN (HUMAN) 25 G: 12.5 SOLUTION INTRAVENOUS at 10:09

## 2019-01-01 RX ADMIN — PROPOFOL 40 MG: 10 INJECTION, EMULSION INTRAVENOUS at 11:12

## 2019-01-01 RX ADMIN — PROPOFOL 40 MG: 10 INJECTION, EMULSION INTRAVENOUS at 07:12

## 2019-01-01 RX ADMIN — ALBUMIN (HUMAN) 25 G: 25 SOLUTION INTRAVENOUS at 10:09

## 2019-01-01 RX ADMIN — PROPOFOL 50 MG: 10 INJECTION, EMULSION INTRAVENOUS at 11:10

## 2019-01-01 RX ADMIN — ALBUMIN (HUMAN) 25 G: 12.5 SOLUTION INTRAVENOUS at 10:08

## 2019-01-01 RX ADMIN — PROPOFOL 100 MG: 10 INJECTION, EMULSION INTRAVENOUS at 07:12

## 2019-01-01 RX ADMIN — SODIUM CHLORIDE 1000 ML: 0.9 INJECTION, SOLUTION INTRAVENOUS at 10:11

## 2019-01-01 RX ADMIN — LIDOCAINE HYDROCHLORIDE 100 MG: 20 INJECTION, SOLUTION INTRAVENOUS at 07:12

## 2019-01-01 RX ADMIN — SODIUM CHLORIDE 1000 ML: 0.9 INJECTION, SOLUTION INTRAVENOUS at 07:12

## 2019-01-01 RX ADMIN — PROPOFOL 100 MG: 10 INJECTION, EMULSION INTRAVENOUS at 10:11

## 2019-01-01 RX ADMIN — LIDOCAINE HYDROCHLORIDE 100 MG: 20 INJECTION, SOLUTION INTRAVENOUS at 10:11

## 2019-01-01 RX ADMIN — SODIUM CHLORIDE: 0.9 INJECTION, SOLUTION INTRAVENOUS at 09:12

## 2019-01-01 RX ADMIN — ALBUMIN (HUMAN) 25 G: 25 SOLUTION INTRAVENOUS at 10:10

## 2019-01-01 RX ADMIN — ALBUMIN (HUMAN) 25 G: 25 SOLUTION INTRAVENOUS at 09:10

## 2019-01-01 RX ADMIN — ALBUMIN (HUMAN) 25 G: 12.5 SOLUTION INTRAVENOUS at 11:07

## 2019-01-02 ENCOUNTER — TELEPHONE (OUTPATIENT)
Dept: HEPATOLOGY | Facility: CLINIC | Age: 54
End: 2019-01-02

## 2019-01-02 NOTE — TELEPHONE ENCOUNTER
Attempt made to reach patient.  Unable to LVM.  Appt with PA Scheuermann scheduled 3/19/19.  Also letter sent informing patient of reason for scheduling.  I asked that she call us back for rescheduling if scheduled appt needed to be moved.

## 2019-01-02 NOTE — TELEPHONE ENCOUNTER
----- Message from Coleen Wray sent at 1/2/2019 10:20 AM CST -----  Contact: patient   Patient returning a call           Please call [phone]        Thanks!

## 2019-01-02 NOTE — TELEPHONE ENCOUNTER
----- Message from Jennifer B. Scheuermann, PA sent at 12/31/2018 12:52 PM CST -----  Michell, fine to schedule pt on [facility] for first avail  If she wishes to be seen sooner she'll need to come to main campus    thanks  ----- Message -----  From: Radha Bautista  Sent: 12/31/2018  10:18 AM  To: Jennifer B. Scheuermann, PA    Radha,  HCV/cirrhosis with MELD 8./Mild Ascites  Last HCV level 2017.  Looks like Suzette saw her once and plan was for HCV clinic but pt never followed up.    Can we book her on the [facility].?    ----- Message -----  From: Jimmie Garces MD  Sent: 12/29/2018   9:50 AM  To: Radha Garcia,    This is a patient Im seeing at [facility] that needs hepatology for Hep C cirrhosis. Can we get her set up to see them?    Thanks so much.

## 2019-01-04 ENCOUNTER — TELEPHONE (OUTPATIENT)
Dept: HEMATOLOGY/ONCOLOGY | Facility: CLINIC | Age: 54
End: 2019-01-04

## 2019-01-04 NOTE — TELEPHONE ENCOUNTER
----- Message from Rachael Chin sent at 1/4/2019  2:18 PM CST -----  Type:  Sooner Apoointment Request    Caller is requesting a sooner appointment.  Caller declined first available appointment listed below.  Caller will not accept being placed on the waitlist and is requesting a message be sent to doctor.    Name of Caller:  Self When is the first available appointment?  NA   Symptoms:  NA Best Call Back Number:  [phone] Additional Information:  Patient need to schedule follow up from [facility] hospital.

## 2019-01-04 NOTE — TELEPHONE ENCOUNTER
Spoke to patient patient stated she had been discharged from the hospital on 12/30/18 and would like to schedule an appointment. Appointment scheduled for 1/24/19.

## 2019-01-24 ENCOUNTER — OFFICE VISIT (OUTPATIENT)
Dept: HEMATOLOGY/ONCOLOGY | Facility: CLINIC | Age: 54
End: 2019-01-24
Payer: MEDICAID

## 2019-01-24 VITALS
RESPIRATION RATE: 18 BRPM | DIASTOLIC BLOOD PRESSURE: 64 MMHG | SYSTOLIC BLOOD PRESSURE: 135 MMHG | HEIGHT: 61 IN | OXYGEN SATURATION: 99 % | WEIGHT: 259.5 LBS | HEART RATE: 105 BPM | TEMPERATURE: 98 F | BODY MASS INDEX: 48.99 KG/M2

## 2019-01-24 DIAGNOSIS — Z86.39 HISTORY OF IRON DEFICIENCY: ICD-10-CM

## 2019-01-24 DIAGNOSIS — K74.60 HEPATIC CIRRHOSIS, UNSPECIFIED HEPATIC CIRRHOSIS TYPE, UNSPECIFIED WHETHER ASCITES PRESENT: ICD-10-CM

## 2019-01-24 DIAGNOSIS — Z87.42 HISTORY OF DYSFUNCTIONAL UTERINE BLEEDING: ICD-10-CM

## 2019-01-24 DIAGNOSIS — D64.9 ANEMIA, UNSPECIFIED TYPE: ICD-10-CM

## 2019-01-24 DIAGNOSIS — D69.6 THROMBOCYTOPENIA: Primary | ICD-10-CM

## 2019-01-24 DIAGNOSIS — R63.4 WEIGHT LOSS: ICD-10-CM

## 2019-01-24 PROCEDURE — 99213 OFFICE O/P EST LOW 20 MIN: CPT | Mod: PBBFAC,PO | Performed by: INTERNAL MEDICINE

## 2019-01-24 PROCEDURE — 99215 OFFICE O/P EST HI 40 MIN: CPT | Mod: S$PBB,,, | Performed by: INTERNAL MEDICINE

## 2019-01-24 PROCEDURE — 99999 PR PBB SHADOW E&M-EST. PATIENT-LVL III: ICD-10-PCS | Mod: PBBFAC,,, | Performed by: INTERNAL MEDICINE

## 2019-01-24 PROCEDURE — 99215 PR OFFICE/OUTPT VISIT, EST, LEVL V, 40-54 MIN: ICD-10-PCS | Mod: S$PBB,,, | Performed by: INTERNAL MEDICINE

## 2019-01-24 PROCEDURE — 99999 PR PBB SHADOW E&M-EST. PATIENT-LVL III: CPT | Mod: PBBFAC,,, | Performed by: INTERNAL MEDICINE

## 2019-01-24 NOTE — PROGRESS NOTES
CC:I was in the [facility] Cailin Villarreal is a 53 y.o.  Pt is here for evaluation of thrombocytopenia and iron deficiency with intermittent vaginal bleeding. The patient has been having menometrorrhagia with low platelets.  She has a history of hepatitis C with documented cirrhosis and splenomegaly.  She is seeing a GI specialist Dr Whitehead SHe ahs been denied her meds per her conversation with [place]   No further GYN bleeding episodes he is exhausted.  She is taking Protonix to help with gastritis and remains on Aldactone for control of fluid retention.     She was admitted and received two units of blood , She feels the same, no improvement in energy   Depressive screening : negative today   Started zoloft and this is being managed by primary care physician  No blood in urine or stool , no pain, Feeling groggy   Past Medical History:   Diagnosis Date    Acid reflux     Anemia     Arthritis     Ascites 03/08/2017    Cataract     Cirrhosis     Depression     Hepatitis C     Hiatal hernia     Ovary removal, prophylactic     Renal cyst 03/08/2017    Thrombocytopenia        Current Outpatient Medications:     albuterol (VENTOLIN HFA) 90 mcg/actuation inhaler, Ventolin HFA 90 mcg/actuation aerosol inhaler  take 2 puffs every 4hrs as needed for cough, Disp: , Rfl:     aluminum & magnesium hydroxide-simethicone (MAALOX MAXIMUM STRENGTH) 400-400-40 mg/5 mL suspension, Take by mouth every 6 (six) hours as needed for Indigestion., Disp: , Rfl:     cyclobenzaprine (FLEXERIL) 10 MG tablet, Take 1 tablet(s) 2 TIMES A DAY by oral route as needed for muscle spasm. May make you drowsy. Do not drive while on medication., Disp: , Rfl:     ferrous sulfate 325 (65 FE) MG EC tablet, Take 325 mg by mouth once daily. , Disp: , Rfl:     furosemide (LASIX) 40 MG tablet, Take 40 mg by mouth once daily., Disp: , Rfl: 3    gabapentin (NEURONTIN) 300 MG capsule, Take 300 mg by mouth 3 (three) times daily., Disp: ,  "Rfl:     pantoprazole (PROTONIX) 40 MG tablet, Take 1 tablet (40 mg total) by mouth once daily., Disp: 30 tablet, Rfl: 2    sertraline (ZOLOFT) 25 MG tablet, Take 1 tablet by mouth once daily., Disp: , Rfl: 1    spironolactone (ALDACTONE) 100 MG tablet, Take 100 mg by mouth once daily., Disp: , Rfl: 3    She is tolerating aldactone for edema and neurontin for paresthesias    CONSTITUTIONAL: No fevers, chills, night sweats, wt. loss, appetite changes  SKIN: no rashes or itching  ENT: No headaches, head trauma, vision changes, or eye pain  LYMPH NODES: None noticed   CV: No chest pain, palpitations.   RESP: + sob and dyspnea on exertion, no cough, wheezing  GI: No nausea, emesis, diarrhea, constipation, melena, hematochezia, pain.   : No dysuria, hematuria, urgency, or frequency   HEME: ++ easy bruising, history of bleeding problems  PSYCHIATRIC: +depression, no anxiety, psychosis, hallucinations.  NEURO: No seizures, memory loss, dizziness or difficulty sleeping  MSK: No arthralgias or joint swelling  Recheck   No further bleeding   Cervical abnormality on pap smear followed by gyn        /64   Pulse 105   Temp 98.2 °F (36.8 °C)   Resp 18   Ht 5' 1" (1.549 m)   Wt 117.7 kg (259 lb 7.7 oz)   LMP 08/06/2017 (Approximate)   SpO2 99%   BMI 49.03 kg/m²   Gen: NAD, A and O x3, facial swelling   Psych: pleasant yet somewhat flat affect, normal thought process  Eyes: Pupils round and non dilated, EOM intact  Nose: Nares patent  OP clear, mucosa patent  Neck: suppple, no JVD, trachea midline, no palpable mass, no adenopathy  Lungs: CTAB, no wheezes, no use of accessory muscles  CV: S1S2 with RR tachycardia, No mrg  Abd: soft, NTND, obese + ascites   Extr: No CC positive  3 + pitting edema of bilateral extr  extremities, SHANT  Neuro: steady gait, CNs grossly intact  Skin: intact, no lesions noted  Rheum: No joint swelling  Iron and TIBC   Order: [id number]   Status:  Final result   Visible to patient:  " Yes (Patient Portal)   Next appt:  03/19/2019 at 11:00 AM in Hepatology (Jennifer B Scheuermann, PA)   Dx:  Thrombocytopenia; History of anemia; ...    Ref Range & Units 8mo ago   Iron 30 - 160 ug/dL 55    Transferrin 200 - 375 mg/dL 196 Abnormally low     TIBC 250 - 450 ug/dL 290    Saturated Iron 20 - 50 % 19 Abnormally low             FREE LT CHAIN ANAL   Order: [id number]   Status:  Final result   Visible to patient:  Yes (Patient Portal)   Next appt:  03/19/2019 at 11:00 AM in Hepatology (Jennifer B Scheuermann, PA)   Dx:  Elevated serum immunoglobulin free li...    Ref Range & Units 1mo ago   F[place]enthal Free Light Chains 0.33 - 1.94 mg/dL 4.67 Abnormally high     Lambda Free Light Chains 0.57 - 2.63 mg/dL 3.66 Abnormally high     Kappa/Lambda FLC Ratio 0.26 - 1.65 1.28    [facility]      Narrative   [facility]formed by: [facility]                               VALUE             REFERENCE RANGE  UNITS                                         ----------------------------------------------------------------------------------  Hepatitis C Quant          [id number]                               IU/mL       HCV log10                   5.009                               log10 IU/mL  Test Information:         Comment                                            The quantitative range of this assay is 15      IU/mL to 100 million IU/mL.              HCV FIBROSURE   Order: [id number]   Status:  Final result   Visible to patient:  Yes (Patient Portal)   Next appt:  03/19/2019 at 11:00 AM in Hepatology (Jennifer B Scheuermann, PA)   Dx:  Unspecified viral hepatitis C without...    Ref Range & Units 4mo ago   Fibrosis Score  0.56    Fibrosis Stage  F2    FibroTest Interpretation  moderate fibrosis    Comment: FibroTest estimates liver fibrosis   FibroTest Score    Stage    Interpretation   -----------------------------------------   0.00-0.21       F0       no fibrosis   0.21-0.27        F0-F1    no  fibrosis   0.27-0.31        F1       minimal fibrosis   0.31-0.48        F1-F2    minimal fibrosis   0.48-0.58        F2       moderate fibrosis   0.58-0.72        F3       advanced fibrosis   0.72-0.74        F3-F4    advanced fibrosis   0.74-1.00        F4       severe fibrosis (Cirrhosis)    Necroinflammat Activity Score  0.18    Necroinflammat Activity Grade  A0-A1    Necroinflammat Interp  no activity    Comment: ActiTest estimates necroinflammatory activity   ActiTest Score     Grade    Interpretation   ----------------------------------------   0.00-0.17       A0       no activity   0.17-0.29        A0-A1    no activity   0.29-0.36        A1       minimal activity   0.36-0.52        A1-A2    minimal activity   0.52-0.60        A2       significant activity   0.60-0.62        A2-A3    significant activity   0.62-1.00        A3       severe activity    FibroTest-ActiTest Comment  SEE BELOW    Comment: The reliability of results is dependent                Lab Results   Component Value Date    WBC 4.70 12/30/2018    HGB 8.8 (L) 12/30/2018    HCT 27.8 (L) 12/30/2018    MCV 84 12/30/2018    PLT 89 (L) 12/30/2018   genotype 1 a HCV    Thrombocytopenia    Anemia, unspecified type  -     CBC auto differential; Standing  -     Iron and TIBC; Future; Expected date: 01/24/2019  -     CMP; Future; Expected date: 01/24/2019    Hepatic cirrhosis, unspecified hepatic cirrhosis type, unspecified whether ascites present    History of iron deficiency  -     CBC auto differential; Standing  -     Iron and TIBC; Future; Expected date: 01/24/2019  -     CMP; Future; Expected date: 01/24/2019    History of dysfunctional uterine bleeding  -     CBC auto differential; Standing  -     Iron and TIBC; Future; Expected date: 01/24/2019  -     CMP; Future; Expected date: 01/24/2019    Weight loss      Diuresed appropriately in the hospital; records were reviewed     1.  Cirrhosis and splenomegaly   2.  SOB due to anemia and ascites  3.   Hepatitis C to start treatment    4.  Thrombocytopenia and I am afraid she may have varices that could be bleeding : to another GI doc asap for scope   5.  Anemia better after blood in hospital end of December 2018   6.  Fatigue due to anemia with SOB intermittently   7.  Tobacco use    To GI for a second opinion  To hepatology   SHE Needs treatment for her hep C   She is to continue her PPI for intermittent GERD and she is not to stop taking Zoloft without letting her physician know.       Thank you for allowing me to evaluate and participate in the care of this pleasant patient. Please fell free to call me with any questions or concerns.    Elizabet Goel MD    This note was dictated with Dragon and briefly proofread. Please excuse any sentences that may be unclear or words misspelled

## 2019-01-28 DIAGNOSIS — R10.9 ABDOMINAL PAIN, UNSPECIFIED ABDOMINAL LOCATION: ICD-10-CM

## 2019-01-28 DIAGNOSIS — B18.2 CHRONIC HEPATITIS C WITHOUT HEPATIC COMA: ICD-10-CM

## 2019-01-28 DIAGNOSIS — K92.2 GASTROINTESTINAL HEMORRHAGE, UNSPECIFIED GASTROINTESTINAL HEMORRHAGE TYPE: Primary | ICD-10-CM

## 2019-02-01 ENCOUNTER — HOSPITAL ENCOUNTER (INPATIENT)
Facility: HOSPITAL | Age: 54
LOS: 6 days | Discharge: HOME | DRG: 432 | End: 2019-02-07
Attending: EMERGENCY MEDICINE | Admitting: INTERNAL MEDICINE
Payer: MEDICAID

## 2019-02-01 DIAGNOSIS — K92.2 UGIB (UPPER GASTROINTESTINAL BLEED): Primary | ICD-10-CM

## 2019-02-01 DIAGNOSIS — Z86.39 HISTORY OF IRON DEFICIENCY: ICD-10-CM

## 2019-02-01 DIAGNOSIS — I10 ESSENTIAL HYPERTENSION: ICD-10-CM

## 2019-02-01 DIAGNOSIS — K74.60 CHRONIC HEPATITIS C WITH CIRRHOSIS: ICD-10-CM

## 2019-02-01 DIAGNOSIS — E66.01 MORBID OBESITY: ICD-10-CM

## 2019-02-01 DIAGNOSIS — K92.2 GI BLEED: ICD-10-CM

## 2019-02-01 DIAGNOSIS — B18.2 CHRONIC HEPATITIS C WITH CIRRHOSIS: ICD-10-CM

## 2019-02-01 DIAGNOSIS — D64.9 SYMPTOMATIC ANEMIA: ICD-10-CM

## 2019-02-01 DIAGNOSIS — D69.6 THROMBOCYTOPENIA: ICD-10-CM

## 2019-02-01 PROBLEM — E44.0 MALNUTRITION OF MODERATE DEGREE: Status: ACTIVE | Noted: 2019-02-01

## 2019-02-01 LAB
ABO + RH BLD: NORMAL
ALBUMIN SERPL BCP-MCNC: 2.3 G/DL
ALP SERPL-CCNC: 108 U/L
ALT SERPL W/O P-5'-P-CCNC: 18 U/L
ANION GAP SERPL CALC-SCNC: 8 MMOL/L
ANISOCYTOSIS BLD QL SMEAR: ABNORMAL
APTT BLDCRRT: 24.5 SEC
AST SERPL-CCNC: 28 U/L
BASOPHILS # BLD AUTO: ABNORMAL K/UL
BASOPHILS NFR BLD: 0 %
BILIRUB SERPL-MCNC: 1.8 MG/DL
BLD GP AB SCN CELLS X3 SERPL QL: NORMAL
BLD PROD TYP BPU: NORMAL
BLD PROD TYP BPU: NORMAL
BLOOD UNIT EXPIRATION DATE: NORMAL
BLOOD UNIT EXPIRATION DATE: NORMAL
BLOOD UNIT TYPE CODE: 5100
BLOOD UNIT TYPE CODE: 5100
BLOOD UNIT TYPE: NORMAL
BLOOD UNIT TYPE: NORMAL
BUN SERPL-MCNC: 30 MG/DL
CALCIUM SERPL-MCNC: 8 MG/DL
CHLORIDE SERPL-SCNC: 104 MMOL/L
CO2 SERPL-SCNC: 20 MMOL/L
CODING SYSTEM: NORMAL
CODING SYSTEM: NORMAL
CREAT SERPL-MCNC: 0.8 MG/DL
DACRYOCYTES BLD QL SMEAR: ABNORMAL
DIFFERENTIAL METHOD: ABNORMAL
DISPENSE STATUS: NORMAL
DISPENSE STATUS: NORMAL
EOSINOPHIL # BLD AUTO: ABNORMAL K/UL
EOSINOPHIL NFR BLD: 0 %
ERYTHROCYTE [DISTWIDTH] IN BLOOD BY AUTOMATED COUNT: 24.3 %
EST. GFR  (AFRICAN AMERICAN): >60 ML/MIN/1.73 M^2
EST. GFR  (NON AFRICAN AMERICAN): >60 ML/MIN/1.73 M^2
GLUCOSE SERPL-MCNC: 138 MG/DL
HCT VFR BLD AUTO: 20.3 %
HGB BLD-MCNC: 6.4 G/DL
HYPOCHROMIA BLD QL SMEAR: ABNORMAL
INR PPP: 1.5
LYMPHOCYTES # BLD AUTO: ABNORMAL K/UL
LYMPHOCYTES NFR BLD: 2 %
MCH RBC QN AUTO: 27.4 PG
MCHC RBC AUTO-ENTMCNC: 31.8 G/DL
MCV RBC AUTO: 86 FL
MONOCYTES # BLD AUTO: ABNORMAL K/UL
MONOCYTES NFR BLD: 2 %
NEUTROPHILS NFR BLD: 96 %
NRBC BLD-RTO: 1 /100 WBC
NUM UNITS TRANS PACKED RBC: NORMAL
NUM UNITS TRANS PACKED RBC: NORMAL
OVALOCYTES BLD QL SMEAR: ABNORMAL
PLATELET # BLD AUTO: 135 K/UL
PLATELET BLD QL SMEAR: ABNORMAL
PMV BLD AUTO: 11.4 FL
POIKILOCYTOSIS BLD QL SMEAR: SLIGHT
POLYCHROMASIA BLD QL SMEAR: ABNORMAL
POTASSIUM SERPL-SCNC: 4.4 MMOL/L
PROT SERPL-MCNC: 4.9 G/DL
PROTHROMBIN TIME: 14.9 SEC
RBC # BLD AUTO: 2.35 M/UL
SODIUM SERPL-SCNC: 132 MMOL/L
WBC # BLD AUTO: 19.4 K/UL

## 2019-02-01 PROCEDURE — 85007 BL SMEAR W/DIFF WBC COUNT: CPT

## 2019-02-01 PROCEDURE — 80053 COMPREHEN METABOLIC PANEL: CPT

## 2019-02-01 PROCEDURE — 82272 OCCULT BLD FECES 1-3 TESTS: CPT

## 2019-02-01 PROCEDURE — C9113 INJ PANTOPRAZOLE SODIUM, VIA: HCPCS | Performed by: EMERGENCY MEDICINE

## 2019-02-01 PROCEDURE — 93005 ELECTROCARDIOGRAM TRACING: CPT

## 2019-02-01 PROCEDURE — 99291 CRITICAL CARE FIRST HOUR: CPT | Mod: 25

## 2019-02-01 PROCEDURE — 25000003 PHARM REV CODE 250: Performed by: EMERGENCY MEDICINE

## 2019-02-01 PROCEDURE — 63600175 PHARM REV CODE 636 W HCPCS: Performed by: INTERNAL MEDICINE

## 2019-02-01 PROCEDURE — P9016 RBC LEUKOCYTES REDUCED: HCPCS

## 2019-02-01 PROCEDURE — 96376 TX/PRO/DX INJ SAME DRUG ADON: CPT

## 2019-02-01 PROCEDURE — 85027 COMPLETE CBC AUTOMATED: CPT

## 2019-02-01 PROCEDURE — 85610 PROTHROMBIN TIME: CPT

## 2019-02-01 PROCEDURE — 63600175 PHARM REV CODE 636 W HCPCS: Performed by: EMERGENCY MEDICINE

## 2019-02-01 PROCEDURE — 96365 THER/PROPH/DIAG IV INF INIT: CPT

## 2019-02-01 PROCEDURE — 96375 TX/PRO/DX INJ NEW DRUG ADDON: CPT

## 2019-02-01 PROCEDURE — 85730 THROMBOPLASTIN TIME PARTIAL: CPT

## 2019-02-01 PROCEDURE — 20000000 HC ICU ROOM

## 2019-02-01 PROCEDURE — 86850 RBC ANTIBODY SCREEN: CPT

## 2019-02-01 PROCEDURE — 36430 TRANSFUSION BLD/BLD COMPNT: CPT

## 2019-02-01 PROCEDURE — 86920 COMPATIBILITY TEST SPIN: CPT

## 2019-02-01 RX ORDER — PHYTONADIONE 10 MG/ML
5 INJECTION, EMULSION INTRAMUSCULAR; INTRAVENOUS; SUBCUTANEOUS ONCE
Status: COMPLETED | OUTPATIENT
Start: 2019-02-01 | End: 2019-02-01

## 2019-02-01 RX ORDER — HYDROCODONE BITARTRATE AND ACETAMINOPHEN 500; 5 MG/1; MG/1
TABLET ORAL
Status: DISCONTINUED | OUTPATIENT
Start: 2019-02-01 | End: 2019-02-04

## 2019-02-01 RX ORDER — PANTOPRAZOLE SODIUM 40 MG/10ML
80 INJECTION, POWDER, LYOPHILIZED, FOR SOLUTION INTRAVENOUS
Status: COMPLETED | OUTPATIENT
Start: 2019-02-01 | End: 2019-02-01

## 2019-02-01 RX ORDER — SODIUM CHLORIDE 9 MG/ML
INJECTION, SOLUTION INTRAVENOUS CONTINUOUS
Status: DISCONTINUED | OUTPATIENT
Start: 2019-02-01 | End: 2019-02-05

## 2019-02-01 RX ORDER — SODIUM CHLORIDE 0.9 % (FLUSH) 0.9 %
3 SYRINGE (ML) INJECTION
Status: DISCONTINUED | OUTPATIENT
Start: 2019-02-01 | End: 2019-02-07 | Stop reason: HOSPADM

## 2019-02-01 RX ORDER — ONDANSETRON 2 MG/ML
8 INJECTION INTRAMUSCULAR; INTRAVENOUS
Status: COMPLETED | OUTPATIENT
Start: 2019-02-01 | End: 2019-02-01

## 2019-02-01 RX ADMIN — SODIUM CHLORIDE: 0.9 INJECTION, SOLUTION INTRAVENOUS at 04:02

## 2019-02-01 RX ADMIN — ONDANSETRON 8 MG: 2 INJECTION INTRAMUSCULAR; INTRAVENOUS at 04:02

## 2019-02-01 RX ADMIN — DEXTROSE 8 MG/HR: 50 INJECTION, SOLUTION INTRAVENOUS at 04:02

## 2019-02-01 RX ADMIN — CEFTRIAXONE 1 G: 1 INJECTION, SOLUTION INTRAVENOUS at 03:02

## 2019-02-01 RX ADMIN — SODIUM CHLORIDE 1000 ML: 0.9 INJECTION, SOLUTION INTRAVENOUS at 04:02

## 2019-02-01 RX ADMIN — OCTREOTIDE ACETATE 50 MCG/HR: 1000 INJECTION, SOLUTION INTRAVENOUS; SUBCUTANEOUS at 05:02

## 2019-02-01 RX ADMIN — DEXTROSE 8 MG/HR: 50 INJECTION, SOLUTION INTRAVENOUS at 08:02

## 2019-02-01 RX ADMIN — PANTOPRAZOLE SODIUM 80 MG: 40 INJECTION, POWDER, FOR SOLUTION INTRAVENOUS at 03:02

## 2019-02-01 RX ADMIN — PHYTONADIONE 5 MG: 10 INJECTION, EMULSION INTRAMUSCULAR; INTRAVENOUS; SUBCUTANEOUS at 11:02

## 2019-02-01 RX ADMIN — SODIUM CHLORIDE: 0.9 INJECTION, SOLUTION INTRAVENOUS at 03:02

## 2019-02-01 NOTE — ED PROVIDER NOTES
Encounter Date: 2/1/2019    SCRIBE #1 NOTE: Erika MATA, nora scribing for, and in the presence of, Dr. Jimmie Sharp MD.       History     Chief Complaint   Patient presents with    GI Bleeding     per EMS upper and lower GI bleed        Time seen by provider: 2:37 PM on 02/01/2019    Karen Villarreal is a 53 y.o. female with PMHx of Hep C, thrombocytopenia, and anemia who presents to the ED via EMS for evaluation of several episodes of coffee ground emesis today and dark stool throughout this week. EMS was called due to worsening weakness. Upon arrival of EMS to the patient's home, she had a systolic blood pressure of 100 that briefly decreased to 60. Patient was given IV fluids en route and BP returned to 100. Patient is scheduled for a upper endoscopy next week by Dr. Leigha Whitehead. Patient denies cough, fever, and hemoptysis. Last episode of vomiting was x45 minutes ago and last episode of diarrhea was x1 hour ago. Patient mentions being admitted to the hospital around Christmas 2018 to receive a blood transfusion. She is not on long term anticoagulants. Patient has no other medical concerns or complaints at this moment. She denies abdominal pain and onset of any other new symptoms.         The history is provided by the patient and the EMS personnel.     Review of patient's allergies indicates:  No Known Allergies  Past Medical History:   Diagnosis Date    Acid reflux     Anemia     Arthritis     Ascites 03/08/2017    Cataract     Cirrhosis     Depression     Hepatitis C     Hiatal hernia     Ovary removal, prophylactic     Renal cyst 03/08/2017    Thrombocytopenia      Past Surgical History:   Procedure Laterality Date    ANKLE SURGERY      APPENDECTOMY      CATARACT EXTRACTION      EXTRACTION-CATARACT-IOL Right 1/5/2018    [facility]    EXTRACTION-CATARACT-IOL Left 12/1/2017    Per[facility] at [facility]     Family  History   Problem Relation Age of Onset    Heart failure Mother     COPD Mother     Arthritis Mother     Vision loss Mother     Diabetes Father     Cancer Father     Brain cancer Father     Diabetes type II Father      Social History     Tobacco Use    Smoking status: Current Some Day Smoker     Packs/day: 0.50     Years: 33.00     Pack years: 16.50     Types: Cigarettes    Smokeless tobacco: Never Used   Substance Use Topics    Alcohol use: No     Frequency: Never    Drug use: No     Review of Systems   Constitutional: Negative for chills and fever.   HENT: Negative for congestion, rhinorrhea and sore throat.    Respiratory: Negative for cough and shortness of breath.    Cardiovascular: Negative for chest pain and palpitations.   Gastrointestinal: Positive for blood in stool, nausea and vomiting. Negative for abdominal pain and diarrhea.   Genitourinary: Negative for dysuria, flank pain, frequency, hematuria and urgency.   Musculoskeletal: Negative for gait problem, myalgias and neck pain.   Skin: Negative for rash.   Neurological: Positive for weakness. Negative for dizziness, light-headedness and headaches.   Hematological: Does not bruise/bleed easily.   Psychiatric/Behavioral: The patient is not nervous/anxious.        Physical Exam     Initial Vitals [02/01/19 1430]   BP Pulse Resp Temp SpO2   (!) 143/64 (!) 117 18 98.1 °F (36.7 °C) 99 %      MAP       --         Physical Exam    Nursing note and vitals reviewed.  Constitutional: She appears well-developed. She is Obese .   HENT:   Head: Normocephalic and atraumatic.   Eyes: EOM are normal. Pupils are equal, round, and reactive to light.   Pale conjunctivae bilaterally.    Neck: Neck supple.   Cardiovascular: Regular rhythm, normal heart sounds and intact distal pulses. Tachycardia present.  Exam reveals no gallop and no friction rub.    No murmur heard.  Pulmonary/Chest: Breath sounds normal. No respiratory distress. She has no decreased breath  sounds. She has no wheezes. She has no rhonchi. She has no rales.   Abdominal: Soft. Bowel sounds are normal. She exhibits no distension. There is no tenderness.   No palpable abdominal tenderness noted.     Genitourinary: Rectal exam shows guaiac positive stool. Guaiac positive stool. : Acceptable.  Genitourinary Comments: Melena noted.    Musculoskeletal: Normal range of motion.   Neurological: She is alert and oriented to person, place, and time. She has normal strength. No cranial nerve deficit or sensory deficit. GCS eye subscore is 4. GCS verbal subscore is 5. GCS motor subscore is 6.   No focal neurological deficits noted.  Cranial nerves III-XII grossly intact.  Equal, rapid alternating movements noted to bilateral upper and lower extremities.    Skin: Skin is warm and dry. There is pallor.   Psychiatric: She has a normal mood and affect.         ED Course   Critical Care  Date/Time: 2/1/2019 3:40 PM  Performed by: Jimmie Sharp MD  Authorized by: Ely Rod MD   Direct patient critical care time: 30 minutes  Additional history critical care time: 5 minutes  Ordering / reviewing critical care time: 5 minutes  Documentation critical care time: 5 minutes  Consulting other physicians critical care time: 5 minutes  Consult with family critical care time: 5 minutes  Total critical care time (exclusive of procedural time) : 55 minutes  Critical care was necessary to treat or prevent imminent or life-threatening deterioration of the following conditions: metabolic crisis.  Critical care was time spent personally by me on the following activities: discussions with consultants, interpretation of cardiac output measurements, evaluation of patient's response to treatment, examination of patient, obtaining history from patient or surrogate, ordering and performing treatments and interventions, ordering and review of laboratory studies, ordering and review of radiographic studies, pulse oximetry and  re-evaluation of patient's condition.        Labs Reviewed   CBC W/ AUTO DIFFERENTIAL   COMPREHENSIVE METABOLIC PANEL   PROTIME-INR   APTT   TYPE & SCREEN          Imaging Results    None          Medical Decision Making:   History:   Old Medical Records: I decided to obtain old medical records.  Clinical Tests:   Lab Tests: Reviewed and Ordered  Medical Tests: Reviewed and Ordered  Patient has an upper GI bleed and will need to be admitted to the ICU given recent active melena in the setting of significant anemia.  She does not need platelet transfusion.  She is not on oral anticoagulant oral antiplatelets at this time.  She will need upper endoscopy.  I will transfuse blood.  I will admit to the ICU given hypotension field.  I will discuss the case with her gastroenterologist.  The hospital agrees with admission.  Octreotide, Rocephin, Protonix drip have all been given            Scribe Attestation:   Scribe #1: I performed the above scribed service and the documentation accurately describes the services I performed. I attest to the accuracy of the note.      I, Dr. Jimmie Sharp personally performed the services described in this documentation. All medical record entries made by the scribe were at my direction and in my presence.  I have reviewed the chart and agree that the record reflects my personal performance and is accurate and complete. Jimmie Sharp MD.  3:40 PM 02/01/2019    DISCLAIMER: This note was prepared with Dragon NaturallySpeaking voice recognition transcription software. Garbled syntax, mangled pronouns, and other bizarre constructions may be attributed to that software system              Clinical Impression:   The primary encounter diagnosis was UGIB (upper gastrointestinal bleed). A diagnosis of GI bleed was also pertinent to this visit.                             Jimmie Sharp MD  02/01/19 [id number]

## 2019-02-01 NOTE — H&P
PCP: Shama Mccoy MD    History & Physical    Chief Complaint: Upper and lower GI bleeding    History of Present Illness:  Patient is a 53 y.o. female admitted to Hospitalist Service from [facility] with complaint of upper and lower GI bleeding. Patient reportedly has past medical history significant for GERD, OA, chronic hepatitis C infection and thrombocytopenia. Patient presented to the ED via EMS for evaluation of multiple episodes of coffee ground emesis and dark stool throughout this week. EMS was called due to worsening weakness. Upon arrival of EMS to the patient's home, she had a systolic blood pressure of 100 that briefly decreased to 60. Patient was given IV fluids en route and BP returned to 100. Patient is scheduled for a upper endoscopy next week by Dr. Leigha Whitehead. Patient denied cough, fever, and hemoptysis. Last episode of vomiting was x 45 minutes ago and last episode of diarrhea was x1 hour ago. Patient mentions being admitted to the hospital around Christmas 2018 to receive a blood transfusion. She is not on long term anticoagulants. Patient denied chest pain, shortness of breath, abdominal pain, nausea, vomiting, headache, vision changes, focal neuro-deficits, cough or fever.    Past Medical History:   Diagnosis Date    Acid reflux     Anemia     Arthritis     Ascites 03/08/2017    Cataract     Cirrhosis     Depression     Hepatitis C     Hiatal hernia     Ovary removal, prophylactic     Renal cyst 03/08/2017    Thrombocytopenia      Past Surgical History:   Procedure Laterality Date    ANKLE SURGERY      APPENDECTOMY      CATARACT EXTRACTION      EXTRACTION-CATARACT-IOL Right 1/5/2018    Performed by [facility]g [facility] at [facility] OR    EXTRACTION-CATARACT-IOL Left 12/1/2017    Performed by Dg [facility] at [facility] OR     Family History   Problem Relation Age of Onset    Heart failure Mother     COPD Mother     Arthritis  Mother     Vision loss Mother     Diabetes Father     Cancer Father     Brain cancer Father     Diabetes type II Father      Social History     Tobacco Use    Smoking status: Current Some Day Smoker     Packs/day: 0.50     Years: 33.00     Pack years: 16.50     Types: Cigarettes    Smokeless tobacco: Never Used   Substance Use Topics    Alcohol use: No     Frequency: Never    Drug use: No      Review of patient's allergies indicates:  No Known Allergies    (Not in a hospital admission)  Review of Systems:  Constitutional: no fever or chills  Eyes: no visual changes  Ears, nose, mouth, throat, and face: no nasal congestion or sore throat  Respiratory: no cough or shorness of breath  Cardiovascular: no chest pain or palpitations  Gastrointestinal: see HPI  Genitourinary: no hematuria or dysuria  Integument/breast: no rash or pruritis  Hematologic/lymphatic: no easy bruising or lymphadenopathy  Musculoskeletal: no arthralgias or myalgias  Neurological: no seizures or tremors.  Behavioral/Psych: no auditory or visual hallucinations  Endocrine: no heat or cold intolerance     OBJECTIVE:     Vital Signs (Most Recent)  Temp: 98.1 °F (36.7 °C) (02/01/19 1430)  Pulse: (!) 114 (02/01/19 1501)  Resp: 18 (02/01/19 1430)  BP: (!) 143/64 (02/01/19 1437)  SpO2: 100 % (02/01/19 1501)    Physical Exam:  General appearance: well developed, appears stated age, morbidly obese female  Head: normocephalic, atraumatic  Eyes: Pallor conjunctivae/corneas clear. PERRL.  Nose: Nares normal. Septum midline.  Throat: lips, dry mucosa, and tongue normal; teeth and gums normal, no throat erythema.  Neck: supple, symmetrical, trachea midline, no JVD and thyroid not enlarged, symmetric, no tenderness/mass/nodules  Lungs:  clear to auscultation bilaterally and normal respiratory effort  Chest wall: no tenderness  Heart: regular rate and rhythm, S1, S2 normal, no murmur, click, rub or gallop  Abdomen: soft, non-tender non-distented; bowel  sounds normal; no masses,  no organomegaly  Extremities: no cyanosis, clubbing or edema.   Pulses: 2+ and symmetric  Skin: Skin color, texture, turgor normal. No rashes or lesions.  Lymph nodes: Cervical, supraclavicular, and axillary nodes normal.  Neurologic: Normal strength and tone. No focal numbness or weakness. CNII-XII intact.      Laboratory:   CBC:   Recent Labs   Lab 02/01/19  1447   WBC 19.40*   RBC 2.35*   HGB 6.4*   HCT 20.3*   *   MCV 86   MCH 27.4   MCHC 31.8*     CMP:   Recent Labs   Lab 02/01/19  1447   *   CALCIUM 8.0*   ALBUMIN 2.3*   PROT 4.9*   *   K 4.4   CO2 20*      BUN 30*   CREATININE 0.8   ALKPHOS 108   ALT 18   AST 28   BILITOT 1.8*     Coagulation:   Recent Labs   Lab 02/01/19  1447   LABPROT 14.9*   INR 1.5*   APTT 24.5     Diagnostic Results: None    Assessment/Plan:     Active Hospital Problems    Diagnosis  POA    *UGIB (upper gastrointestinal bleed) [K92.2]  History of iron deficiency [Z86.39]  Admit to ICU.  Keep patient NPO.  Follow H/H closely. Type and screen blood and transfuse 2 units of PRBC.  Continue IV Protonix infusion 8 mg/hr.  Continue Octreotide infusion.  Consult Gastronetrologist.   Check Iron, TIBC, B12 and folic acid.   Continue IVF hydration.   Use IV anti-emetics as needed.     Yes    Malnutrition of moderate degree [E44.0]  Yes    Nutrition consulted. Encourage maximal PO intake. Diet supplementation ordered per nutrition approval. Will encourage PO and monitor closely for weight changes.        Thrombocytopenia [D69.6]  Follow platelet count closely.    Yes    Chronic hepatitis C with cirrhosis [B18.2, K74.60]  Noted. Under care by Dr. Whitehead. She was about to start HCV treatment.    Yes    Essential hypertension [I10]  Hold anti-HTN agents. Monitor BP.    Yes    Morbid obesity [E66.01]  Body mass index is 48.94 kg/m². Morbid obesity complicates all aspects of disease management from diagnostic modalities to treatment. Weight  loss encouraged and health benefits explained to patient.  Yes        DVT prophylaxis: Use SCD and TEDs. No anticoagulation due to GI bleeding.    Ely Rod MD  Department of Hospital Medicine   [facility]

## 2019-02-02 ENCOUNTER — ANESTHESIA EVENT (OUTPATIENT)
Dept: ENDOSCOPY | Facility: HOSPITAL | Age: 54
DRG: 432 | End: 2019-02-02
Payer: MEDICAID

## 2019-02-02 ENCOUNTER — ANESTHESIA (OUTPATIENT)
Dept: ENDOSCOPY | Facility: HOSPITAL | Age: 54
DRG: 432 | End: 2019-02-02
Payer: MEDICAID

## 2019-02-02 LAB
ALBUMIN SERPL BCP-MCNC: 2.3 G/DL
ALBUMIN SERPL BCP-MCNC: 2.3 G/DL
ALP SERPL-CCNC: 105 U/L
ALP SERPL-CCNC: 105 U/L
ALT SERPL W/O P-5'-P-CCNC: 16 U/L
ALT SERPL W/O P-5'-P-CCNC: 16 U/L
AMYLASE SERPL-CCNC: 28 U/L
ANION GAP SERPL CALC-SCNC: 5 MMOL/L
ANION GAP SERPL CALC-SCNC: 5 MMOL/L
AST SERPL-CCNC: 29 U/L
AST SERPL-CCNC: 29 U/L
BASOPHILS # BLD AUTO: 0.1 K/UL
BASOPHILS # BLD AUTO: 0.1 K/UL
BASOPHILS NFR BLD: 0.6 %
BASOPHILS NFR BLD: 0.6 %
BILIRUB SERPL-MCNC: 2.3 MG/DL
BILIRUB SERPL-MCNC: 2.3 MG/DL
BUN SERPL-MCNC: 25 MG/DL
BUN SERPL-MCNC: 25 MG/DL
CALCIUM SERPL-MCNC: 7.7 MG/DL
CALCIUM SERPL-MCNC: 7.7 MG/DL
CHLORIDE SERPL-SCNC: 109 MMOL/L
CHLORIDE SERPL-SCNC: 109 MMOL/L
CO2 SERPL-SCNC: 22 MMOL/L
CO2 SERPL-SCNC: 22 MMOL/L
CREAT SERPL-MCNC: 0.9 MG/DL
CREAT SERPL-MCNC: 0.9 MG/DL
CRP SERPL-MCNC: 9.7 MG/L
DIFFERENTIAL METHOD: ABNORMAL
DIFFERENTIAL METHOD: ABNORMAL
EOSINOPHIL # BLD AUTO: 0.3 K/UL
EOSINOPHIL # BLD AUTO: 0.3 K/UL
EOSINOPHIL NFR BLD: 1.7 %
EOSINOPHIL NFR BLD: 1.7 %
ERYTHROCYTE [DISTWIDTH] IN BLOOD BY AUTOMATED COUNT: 20.4 %
ERYTHROCYTE [DISTWIDTH] IN BLOOD BY AUTOMATED COUNT: 20.4 %
ERYTHROCYTE [SEDIMENTATION RATE] IN BLOOD BY WESTERGREN METHOD: 5 MM/HR
EST. GFR  (AFRICAN AMERICAN): >60 ML/MIN/1.73 M^2
EST. GFR  (AFRICAN AMERICAN): >60 ML/MIN/1.73 M^2
EST. GFR  (NON AFRICAN AMERICAN): >60 ML/MIN/1.73 M^2
EST. GFR  (NON AFRICAN AMERICAN): >60 ML/MIN/1.73 M^2
GLUCOSE SERPL-MCNC: 119 MG/DL
GLUCOSE SERPL-MCNC: 119 MG/DL
HCT VFR BLD AUTO: 24.5 %
HCT VFR BLD AUTO: 24.5 %
HCT VFR BLD AUTO: 26.9 %
HGB BLD-MCNC: 7.6 G/DL
HGB BLD-MCNC: 7.8 G/DL
HGB BLD-MCNC: 7.9 G/DL
HGB BLD-MCNC: 8.7 G/DL
INR PPP: 1.3
LIPASE SERPL-CCNC: 33 U/L
LYMPHOCYTES # BLD AUTO: 2.2 K/UL
LYMPHOCYTES # BLD AUTO: 2.2 K/UL
LYMPHOCYTES NFR BLD: 14.7 %
LYMPHOCYTES NFR BLD: 14.7 %
MAGNESIUM SERPL-MCNC: 1.6 MG/DL
MAGNESIUM SERPL-MCNC: 1.6 MG/DL
MCH RBC QN AUTO: 28 PG
MCH RBC QN AUTO: 28 PG
MCHC RBC AUTO-ENTMCNC: 32.4 G/DL
MCHC RBC AUTO-ENTMCNC: 32.4 G/DL
MCV RBC AUTO: 87 FL
MCV RBC AUTO: 87 FL
MONOCYTES # BLD AUTO: 0.8 K/UL
MONOCYTES # BLD AUTO: 0.8 K/UL
MONOCYTES NFR BLD: 5.6 %
MONOCYTES NFR BLD: 5.6 %
NEUTROPHILS # BLD AUTO: 11.8 K/UL
NEUTROPHILS # BLD AUTO: 11.8 K/UL
NEUTROPHILS NFR BLD: 77.4 %
NEUTROPHILS NFR BLD: 77.4 %
PHOSPHATE SERPL-MCNC: 1.8 MG/DL
PLATELET # BLD AUTO: 102 K/UL
PLATELET # BLD AUTO: 102 K/UL
PLATELET BLD QL SMEAR: ABNORMAL
PLATELET BLD QL SMEAR: ABNORMAL
PMV BLD AUTO: 11.2 FL
PMV BLD AUTO: 11.2 FL
POTASSIUM SERPL-SCNC: 3.9 MMOL/L
POTASSIUM SERPL-SCNC: 3.9 MMOL/L
PROT SERPL-MCNC: 4.6 G/DL
PROT SERPL-MCNC: 4.6 G/DL
PROTHROMBIN TIME: 13.7 SEC
RBC # BLD AUTO: 2.83 M/UL
RBC # BLD AUTO: 2.83 M/UL
SODIUM SERPL-SCNC: 136 MMOL/L
SODIUM SERPL-SCNC: 136 MMOL/L
TSH SERPL DL<=0.005 MIU/L-ACNC: 0.62 UIU/ML
WBC # BLD AUTO: 15.3 K/UL
WBC # BLD AUTO: 15.3 K/UL

## 2019-02-02 PROCEDURE — 63600175 PHARM REV CODE 636 W HCPCS: Performed by: EMERGENCY MEDICINE

## 2019-02-02 PROCEDURE — 85018 HEMOGLOBIN: CPT

## 2019-02-02 PROCEDURE — 84100 ASSAY OF PHOSPHORUS: CPT

## 2019-02-02 PROCEDURE — 43235 EGD DIAGNOSTIC BRUSH WASH: CPT | Performed by: INTERNAL MEDICINE

## 2019-02-02 PROCEDURE — 84443 ASSAY THYROID STIM HORMONE: CPT

## 2019-02-02 PROCEDURE — 36415 COLL VENOUS BLD VENIPUNCTURE: CPT

## 2019-02-02 PROCEDURE — D9220A PRA ANESTHESIA: ICD-10-PCS | Mod: ANES,,, | Performed by: ANESTHESIOLOGY

## 2019-02-02 PROCEDURE — 85651 RBC SED RATE NONAUTOMATED: CPT

## 2019-02-02 PROCEDURE — 63600175 PHARM REV CODE 636 W HCPCS: Performed by: NURSE ANESTHETIST, CERTIFIED REGISTERED

## 2019-02-02 PROCEDURE — D9220A PRA ANESTHESIA: ICD-10-PCS | Mod: CRNA,,, | Performed by: NURSE ANESTHETIST, CERTIFIED REGISTERED

## 2019-02-02 PROCEDURE — 37000009 HC ANESTHESIA EA ADD 15 MINS: Performed by: INTERNAL MEDICINE

## 2019-02-02 PROCEDURE — 85610 PROTHROMBIN TIME: CPT

## 2019-02-02 PROCEDURE — 25000003 PHARM REV CODE 250: Performed by: INTERNAL MEDICINE

## 2019-02-02 PROCEDURE — 80053 COMPREHEN METABOLIC PANEL: CPT

## 2019-02-02 PROCEDURE — 86140 C-REACTIVE PROTEIN: CPT

## 2019-02-02 PROCEDURE — 37000008 HC ANESTHESIA 1ST 15 MINUTES: Performed by: INTERNAL MEDICINE

## 2019-02-02 PROCEDURE — 85025 COMPLETE CBC W/AUTO DIFF WBC: CPT

## 2019-02-02 PROCEDURE — 20000000 HC ICU ROOM

## 2019-02-02 PROCEDURE — 25000003 PHARM REV CODE 250: Performed by: NURSE ANESTHETIST, CERTIFIED REGISTERED

## 2019-02-02 PROCEDURE — 85014 HEMATOCRIT: CPT

## 2019-02-02 PROCEDURE — 83735 ASSAY OF MAGNESIUM: CPT

## 2019-02-02 PROCEDURE — 25000003 PHARM REV CODE 250: Performed by: EMERGENCY MEDICINE

## 2019-02-02 PROCEDURE — D9220A PRA ANESTHESIA: Mod: CRNA,,, | Performed by: NURSE ANESTHETIST, CERTIFIED REGISTERED

## 2019-02-02 PROCEDURE — 94761 N-INVAS EAR/PLS OXIMETRY MLT: CPT

## 2019-02-02 PROCEDURE — 86704 HEP B CORE ANTIBODY TOTAL: CPT

## 2019-02-02 PROCEDURE — 86709 HEPATITIS A IGM ANTIBODY: CPT

## 2019-02-02 PROCEDURE — 83690 ASSAY OF LIPASE: CPT

## 2019-02-02 PROCEDURE — C9113 INJ PANTOPRAZOLE SODIUM, VIA: HCPCS | Performed by: EMERGENCY MEDICINE

## 2019-02-02 PROCEDURE — 82150 ASSAY OF AMYLASE: CPT

## 2019-02-02 PROCEDURE — D9220A PRA ANESTHESIA: Mod: ANES,,, | Performed by: ANESTHESIOLOGY

## 2019-02-02 PROCEDURE — 85018 HEMOGLOBIN: CPT | Mod: 91

## 2019-02-02 PROCEDURE — 87340 HEPATITIS B SURFACE AG IA: CPT

## 2019-02-02 RX ORDER — PROPOFOL 10 MG/ML
INJECTION, EMULSION INTRAVENOUS
Status: DISCONTINUED | OUTPATIENT
Start: 2019-02-02 | End: 2019-02-02

## 2019-02-02 RX ORDER — SUCRALFATE 1 G/10ML
1 SUSPENSION ORAL
Status: DISCONTINUED | OUTPATIENT
Start: 2019-02-02 | End: 2019-02-07 | Stop reason: HOSPADM

## 2019-02-02 RX ORDER — DEXTROMETHORPHAN/PSEUDOEPHED 2.5-7.5/.8
DROPS ORAL
Status: DISCONTINUED
Start: 2019-02-02 | End: 2019-02-07 | Stop reason: HOSPADM

## 2019-02-02 RX ORDER — EPINEPHRINE 0.1 MG/ML
INJECTION INTRAVENOUS
Status: DISCONTINUED
Start: 2019-02-02 | End: 2019-02-02 | Stop reason: WASHOUT

## 2019-02-02 RX ORDER — GLYCOPYRROLATE 0.2 MG/ML
INJECTION INTRAMUSCULAR; INTRAVENOUS
Status: DISCONTINUED | OUTPATIENT
Start: 2019-02-02 | End: 2019-02-02

## 2019-02-02 RX ORDER — LIDOCAINE HCL/PF 100 MG/5ML
SYRINGE (ML) INTRAVENOUS
Status: DISCONTINUED | OUTPATIENT
Start: 2019-02-02 | End: 2019-02-02

## 2019-02-02 RX ADMIN — PROPOFOL 40 MG: 10 INJECTION, EMULSION INTRAVENOUS at 11:02

## 2019-02-02 RX ADMIN — DEXTROSE 8 MG/HR: 50 INJECTION, SOLUTION INTRAVENOUS at 08:02

## 2019-02-02 RX ADMIN — LIDOCAINE HYDROCHLORIDE 50 MG: 20 INJECTION, SOLUTION INTRAVENOUS at 11:02

## 2019-02-02 RX ADMIN — GLYCOPYRROLATE 0.2 MG: 0.2 INJECTION, SOLUTION INTRAMUSCULAR; INTRAVENOUS at 11:02

## 2019-02-02 RX ADMIN — OCTREOTIDE ACETATE 50 MCG/HR: 1000 INJECTION, SOLUTION INTRAVENOUS; SUBCUTANEOUS at 02:02

## 2019-02-02 RX ADMIN — DEXTROSE 8 MG/HR: 50 INJECTION, SOLUTION INTRAVENOUS at 07:02

## 2019-02-02 RX ADMIN — PROPOFOL 50 MG: 10 INJECTION, EMULSION INTRAVENOUS at 11:02

## 2019-02-02 RX ADMIN — PROPOFOL 100 MG: 10 INJECTION, EMULSION INTRAVENOUS at 11:02

## 2019-02-02 RX ADMIN — DEXTROSE 8 MG/HR: 50 INJECTION, SOLUTION INTRAVENOUS at 02:02

## 2019-02-02 RX ADMIN — SUCRALFATE 1 G: 1 SUSPENSION ORAL at 07:02

## 2019-02-02 RX ADMIN — OCTREOTIDE ACETATE 50 MCG/HR: 1000 INJECTION, SOLUTION INTRAVENOUS; SUBCUTANEOUS at 11:02

## 2019-02-02 RX ADMIN — SODIUM CHLORIDE: 0.9 INJECTION, SOLUTION INTRAVENOUS at 05:02

## 2019-02-02 RX ADMIN — SODIUM CHLORIDE, SODIUM GLUCONATE, SODIUM ACETATE, POTASSIUM CHLORIDE, MAGNESIUM CHLORIDE, SODIUM PHOSPHATE, DIBASIC, AND POTASSIUM PHOSPHATE: .53; .5; .37; .037; .03; .012; .00082 INJECTION, SOLUTION INTRAVENOUS at 11:02

## 2019-02-02 NOTE — PROGRESS NOTES
02/02/19 0829   Patient Assessment/Suction   Level of Consciousness (AVPU) alert   PRE-TX-O2   O2 Device (Oxygen Therapy) room air   SpO2 100 %   Pulse Oximetry Type Continuous   $ Pulse Oximetry - Multiple Charge Pulse Oximetry - Multiple   Pulse 93   Resp 16

## 2019-02-02 NOTE — ANESTHESIA PREPROCEDURE EVALUATION
02/02/2019  Karen Villarreal is a 53 y.o., female.    Anesthesia Evaluation    I have reviewed the Patient Summary Reports.    I have reviewed the Nursing Notes.   I have reviewed the Medications.     Review of Systems  Anesthesia Hx:  Denies Family Hx of Anesthesia complications.   Denies Personal Hx of Anesthesia complications.   Hematology/Oncology:         -- Anemia: Hematology Comments: Hypersplenism/thrombocytopenia   Cardiovascular:   Hypertension    Renal/:   Chronic Renal Disease    Hepatic/GI:   GERD Liver Disease, Hepatitis, C Hematemesis and melana  Current nausea  Cirrhosis, portal HTN   Neurological:   Denies Neuromuscular Disease.    Psych:   Psychiatric History          Physical Exam  General:  Morbid Obesity, Malnutrition Protein malnutrition    Airway/Jaw/Neck:  Airway Findings: Mouth Opening: Normal Tongue: Normal  General Airway Assessment: Adult  Mallampati: III  Improves to II with phonation.  TM Distance: Normal, at least 6 cm  Jaw/Neck Findings:  Neck ROM: Normal ROM  Neck Findings:  Girth Increased      Dental:  DENTAL FINDINGS: Normal   Chest/Lungs:  Chest/Lungs Findings: Clear to auscultation, Normal Respiratory Rate     Heart/Vascular:  Heart Findings: Rate: Normal  Rhythm: Regular Rhythm  Sounds: Normal             Anesthesia Plan  Type of Anesthesia, risks & benefits discussed:  Anesthesia Type:  general  Patient's Preference:   Intra-op Monitoring Plan: standard ASA monitors  Intra-op Monitoring Plan Comments:   Post Op Pain Control Plan:   Post Op Pain Control Plan Comments:   Induction:   IV  Beta Blocker:  Patient is not currently on a Beta-Blocker (No further documentation required).       Informed Consent: Patient understands risks and agrees with Anesthesia plan.  Questions answered. Anesthesia consent signed with patient.  ASA Score: 3     Day of Surgery Review of  History & Physical:    H&P update referred to the provider.         Ready For Surgery From Anesthesia Perspective.

## 2019-02-02 NOTE — NURSING
[id number] Received from ED via stretcher. Pale. Respirations unlabored. ST on monitor. Protonix drip infusing. . Assisted to BSC, Voided without difficult. Reports dizzy at times.   IVF started at 125cc hour to left hand and Sandostatin drip started at 50mcgs/hr to right shoulder.   Dr. Whitehead notified of consult by VIVIAN Solomon RN. States that he will see pt tonight.

## 2019-02-02 NOTE — TRANSFER OF CARE
"Anesthesia Transfer of Care Note    Patient: Karen Villarreal    Procedure(s) Performed: Procedure(s) (LRB):  EGD (ESOPHAGOGASTRODUODENOSCOPY) (N/A)    Patient location: ICU    Anesthesia Type: general    Transport from OR: Transported from OR on 2-3 L/min O2 by NC with adequate spontaneous ventilation    Post pain: adequate analgesia    Post assessment: no apparent anesthetic complications and tolerated procedure well    Post vital signs: stable    Level of consciousness: sedated and responds to stimulation    Nausea/Vomiting: no nausea/vomiting    Complications: none    Transfer of care protocol was followed      Last vitals:   Visit Vitals  BP (!) 109/54   Pulse 93   Temp 37.1 °C (98.8 °F) (Oral)   Resp 12   Ht 5' 1" (1.549 m)   Wt 117.2 kg (258 lb 6.1 oz)   LMP 08/06/2017 (Approximate)   SpO2 100%   Breastfeeding? No   BMI 48.82 kg/m²     "

## 2019-02-02 NOTE — HPI
53 y.o. female admitted to Hospitalist Service from [facility] Room with complaint of upper and lower GI bleeding. Patient reportedly has past medical history significant for GERD, OA, chronic hepatitis C infection and thrombocytopenia. Patient presented to the ED via EMS for evaluation of multiple episodes of coffee ground emesis and dark stool throughout this week. EMS was called due to worsening weakness. Upon arrival of EMS to the patient's home, she had a systolic blood pressure of 100 that briefly decreased to 60. Patient was given IV fluids en route and BP returned to 100. Patient is scheduled for a upper endoscopy next week by Dr. Leigha Whitehead. Patient denied cough, fever, and hemoptysis. Last episode of vomiting was x 45 minutes ago and last episode of diarrhea was x1 hour ago. Patient mentions being admitted to the hospital around Christmas 2018 to receive a blood transfusion. She is not on long term anticoagulants. Patient denied chest pain, shortness of breath, abdominal pain, nausea, vomiting, headache, vision changes, focal neuro-deficits, cough or fever.

## 2019-02-02 NOTE — ASSESSMENT & PLAN NOTE
Nutrition consulted. Encourage maximal PO intake. Diet supplementation ordered per nutrition approval. Will encourage PO and monitor closely for weight changes.

## 2019-02-02 NOTE — ASSESSMENT & PLAN NOTE
S/p EGD: +EV and esophagitis. No intervention performed  Monitor in ICU per GI  Follow H/H closely. Transfuse Hgb > 7   Continue IV Protonix infusion 8 mg/hr.  Continue Octreotide infusion.  Continue Abx  Continue IVF hydration.   Use IV anti-emetics as needed.   Clear liquid diet

## 2019-02-02 NOTE — PLAN OF CARE
Problem: Adult Inpatient Plan of Care  Goal: Plan of Care Review  Outcome: Ongoing (interventions implemented as appropriate)  Awake and alert. Respirations unlabored. Abdomen soft. Denies nausea. C/O intermittent cramping. No emesis or BM since admit. Voids. ST on monitor. Receiving 2 units of PRBC's. Monitoring H/H. GI consulted. Safety maintained.

## 2019-02-02 NOTE — CONSULTS
The patient is a 53-year-old  female who has had upper GI bleed, the   cause of which is not too clear.  The patient has history of chronic liver   disease and is being worked up for anemia in the past.  The patient had a bout   of hematemesis and also had a bout of melena.  The patient hemodynamically was   mildly hypotensive when she came in, but has corrected with fluids and blood   transfusion.  At this point, we would consider an upper endoscopy tomorrow   morning and see what we find and proceed from there.    Once again, I am grateful to Dr. Valencia for this referral.    Dictated but not signed.      DS/HN  dd: 02/01/2019 22:02:56 (CST)  td: 02/01/2019 23:09:15 (CST)  Doc ID   #[id number]  Job ID #[id number]    CC: Elizabet Valencia M.D.

## 2019-02-02 NOTE — ANESTHESIA POSTPROCEDURE EVALUATION
"Anesthesia Post Evaluation    Patient: Karen Villarreal    Procedure(s) Performed: Procedure(s) (LRB):  EGD (ESOPHAGOGASTRODUODENOSCOPY) (N/A)    Final Anesthesia Type: general  Patient location during evaluation: PACU  Patient participation: Yes- Able to Participate  Level of consciousness: awake and alert  Post-procedure vital signs: reviewed and stable  Pain management: adequate  Airway patency: patent  PONV status at discharge: No PONV  Anesthetic complications: no      Cardiovascular status: blood pressure returned to baseline  Respiratory status: unassisted  Hydration status: euvolemic  Follow-up not needed.        Visit Vitals  BP (!) 109/54   Pulse 93   Temp 37.1 °C (98.8 °F) (Oral)   Resp 12   Ht 5' 1" (1.549 m)   Wt 117.2 kg (258 lb 6.1 oz)   LMP 08/06/2017 (Approximate)   SpO2 100%   Breastfeeding? No   BMI 48.82 kg/m²       Pain/Kang Score: Pain Rating Prior to Med Admin: 2 (2/2/2019  9:30 AM)        "

## 2019-02-02 NOTE — PLAN OF CARE
Problem: Adult Inpatient Plan of Care  Goal: Plan of Care Review  Outcome: Ongoing (interventions implemented as appropriate)  Pt npo for her egd today. She has minor abdominal pain toady no nausea or vomiting. Pt jovon he egd and did well no bleeding actively seen. Pt remains on iv meds for stomach. Pt diet advanced to full.

## 2019-02-02 NOTE — ASSESSMENT & PLAN NOTE
Body mass index is 48.94 kg/m². Morbid obesity complicates all aspects of disease management from diagnostic modalities to treatment. Weight loss encouraged and health benefits explained to patient.

## 2019-02-02 NOTE — PLAN OF CARE
Problem: Adult Inpatient Plan of Care  Goal: Plan of Care Review  Outcome: Ongoing (interventions implemented as appropriate)  Patient free of falls and injuries this shift. Oriented x4. Up out of bed to bedside commode with no complaints. Voids clear, vaughn urine. No active bleeding noted. 2 units PRBC given. Sandostatin and protonix gtt infusing. Sinus to sinus tach on monitor. EGD scheduled today @1100am. NPO since midnight.

## 2019-02-02 NOTE — NURSING
1st unit of PRBC's started without problems. Remains ST on monitor. BP stable. No active signs of bleeding at present. Call bell at side.

## 2019-02-02 NOTE — SUBJECTIVE & OBJECTIVE
Interval History: S/p EGD today. Per nursing pt had EV that were not banded. + esophagitis. Plan to repeat EGD on Monday for repeat EGD with EVL    Review of Systems   Constitutional: Negative for chills and fever.   HENT: Negative for sore throat.    Respiratory: Negative for cough and shortness of breath.    Gastrointestinal: Positive for abdominal distention. Negative for abdominal pain and blood in stool.   Neurological: Positive for weakness. Negative for numbness.   Psychiatric/Behavioral: Negative for agitation, behavioral problems and confusion.     Objective:     Vital Signs (Most Recent):  Temp: 98.2 °F (36.8 °C) (02/02/19 1230)  Pulse: 91 (02/02/19 1450)  Resp: 18 (02/02/19 1450)  BP: (!) 155/69 (02/02/19 1450)  SpO2: 98 % (02/02/19 1450) Vital Signs (24h Range):  Temp:  [98.2 °F (36.8 °C)-99 °F (37.2 °C)] 98.2 °F (36.8 °C)  Pulse:  [] 91  Resp:  [12-32] 18  SpO2:  [97 %-100 %] 98 %  BP: (101-163)/(54-72) 155/69     Weight: 117.2 kg (258 lb 6.1 oz)  Body mass index is 48.82 kg/m².    Intake/Output Summary (Last 24 hours) at 2/2/2019 1554  Last data filed at 2/2/2019 1142  Gross per 24 hour   Intake 2796.17 ml   Output 500 ml   Net 2296.17 ml      Physical Exam   Constitutional: She is oriented to person, place, and time. She appears well-developed.   Eyes: Pupils are equal, round, and reactive to light.   Neck: Normal range of motion. Neck supple.   Cardiovascular: Normal rate, regular rhythm and normal heart sounds.   Pulmonary/Chest: Effort normal and breath sounds normal. No respiratory distress.   Abdominal: Soft. Bowel sounds are normal. She exhibits no distension. There is no tenderness.   Musculoskeletal: Normal range of motion. She exhibits no edema.   Neurological: She is alert and oriented to person, place, and time.   Skin: Skin is warm and dry.   Psychiatric: She has a normal mood and affect. Her behavior is normal.       Significant Labs: All pertinent labs within the past 24 hours have  been reviewed.    Significant Imaging: I have reviewed all pertinent imaging results/findings within the past 24 hours.

## 2019-02-02 NOTE — PROGRESS NOTES
[facility] Medicine  Progress Note    Patient Name: Karen Villarreal  MRN: [id number]  Patient Class: IP- Inpatient   Admission Date: 2/1/2019  Length of Stay: 1 days  Attending Physician: Ely Rod MD  Primary Care Provider: Shama Mccoy MD        Subjective:     Principal Problem:UGIB (upper gastrointestinal bleed)    HPI:  53 y.o. female admitted to Hospitalist Service from [facility] with complaint of upper and lower GI bleeding. Patient reportedly has past medical history significant for GERD, OA, chronic hepatitis C infection and thrombocytopenia. Patient presented to the ED via EMS for evaluation of multiple episodes of coffee ground emesis and dark stool throughout this week. EMS was called due to worsening weakness. Upon arrival of EMS to the patient's home, she had a systolic blood pressure of 100 that briefly decreased to 60. Patient was given IV fluids en route and BP returned to 100. Patient is scheduled for a upper endoscopy next week by Dr. Leigha Whitehead. Patient denied cough, fever, and hemoptysis. Last episode of vomiting was x 45 minutes ago and last episode of diarrhea was x1 hour ago. Patient mentions being admitted to the hospital around Christmas 2018 to receive a blood transfusion. She is not on long term anticoagulants. Patient denied chest pain, shortness of breath, abdominal pain, nausea, vomiting, headache, vision changes, focal neuro-deficits, cough or fever.    Hospital Course:  No notes on file    Interval History: S/p EGD today. Per nursing pt had EV that were not banded. + esophagitis. Plan to repeat EGD on Monday for repeat EGD with EVL    Review of Systems   Constitutional: Negative for chills and fever.   HENT: Negative for sore throat.    Respiratory: Negative for cough and shortness of breath.    Gastrointestinal: Positive for abdominal distention. Negative for abdominal pain and blood in stool.    Neurological: Positive for weakness. Negative for numbness.   Psychiatric/Behavioral: Negative for agitation, behavioral problems and confusion.     Objective:     Vital Signs (Most Recent):  Temp: 98.2 °F (36.8 °C) (02/02/19 1230)  Pulse: 91 (02/02/19 1450)  Resp: 18 (02/02/19 1450)  BP: (!) 155/69 (02/02/19 1450)  SpO2: 98 % (02/02/19 1450) Vital Signs (24h Range):  Temp:  [98.2 °F (36.8 °C)-99 °F (37.2 °C)] 98.2 °F (36.8 °C)  Pulse:  [] 91  Resp:  [12-32] 18  SpO2:  [97 %-100 %] 98 %  BP: (101-163)/(54-72) 155/69     Weight: 117.2 kg (258 lb 6.1 oz)  Body mass index is 48.82 kg/m².    Intake/Output Summary (Last 24 hours) at 2/2/2019 1554  Last data filed at 2/2/2019 1142  Gross per 24 hour   Intake 2796.17 ml   Output 500 ml   Net 2296.17 ml      Physical Exam   Constitutional: She is oriented to person, place, and time. She appears well-developed.   Eyes: Pupils are equal, round, and reactive to light.   Neck: Normal range of motion. Neck supple.   Cardiovascular: Normal rate, regular rhythm and normal heart sounds.   Pulmonary/Chest: Effort normal and breath sounds normal. No respiratory distress.   Abdominal: Soft. Bowel sounds are normal. She exhibits no distension. There is no tenderness.   Musculoskeletal: Normal range of motion. She exhibits no edema.   Neurological: She is alert and oriented to person, place, and time.   Skin: Skin is warm and dry.   Psychiatric: She has a normal mood and affect. Her behavior is normal.       Significant Labs: All pertinent labs within the past 24 hours have been reviewed.    Significant Imaging: I have reviewed all pertinent imaging results/findings within the past 24 hours.    Assessment/Plan:      * UGIB (upper gastrointestinal bleed)    S/p EGD: +EV and esophagitis. No intervention performed  Monitor in ICU per GI  Follow H/H closely. Transfuse Hgb > 7   Continue IV Protonix infusion 8 mg/hr.  Continue Octreotide infusion.  Continue Abx  Continue IVF  hydration.   Use IV anti-emetics as needed.   Clear liquid diet         Malnutrition of moderate degree    Nutrition consulted. Encourage maximal PO intake. Diet supplementation ordered per nutrition approval. Will encourage PO and monitor closely for weight changes.       Thrombocytopenia    Follow platelet count closely       Essential hypertension    Hold anti-HTN agents. Monitor BP       Chronic hepatitis C with cirrhosis    Noted. Under care by Dr. Whitehead. She was about to start HCV treatment.       Morbid obesity    Body mass index is 48.94 kg/m². Morbid obesity complicates all aspects of disease management from diagnostic modalities to treatment. Weight loss encouraged and health benefits explained to patient.         VTE Risk Mitigation (From admission, onward)        Ordered     IP VTE HIGH RISK PATIENT  Once      02/01/19 1636     Place NICOLE hose  Until discontinued      02/01/19 1636     Place sequential compression device  Until discontinued      02/01/19 1636     Place NICOLE hose  Until discontinued      02/01/19 1636          Critical care time spent on the evaluation and treatment of severe organ dysfunction, review of pertinent labs and imaging studies, discussions with consulting providers and discussions with patient/family: 25 minutes.    Nura Medina MD  Department of Hospital Medicine   [facility]

## 2019-02-03 LAB
ALBUMIN SERPL BCP-MCNC: 2.3 G/DL
ALP SERPL-CCNC: 104 U/L
ALT SERPL W/O P-5'-P-CCNC: 23 U/L
ANION GAP SERPL CALC-SCNC: 6 MMOL/L
AST SERPL-CCNC: 38 U/L
BASOPHILS # BLD AUTO: 0.1 K/UL
BASOPHILS NFR BLD: 0.7 %
BILIRUB SERPL-MCNC: 1.8 MG/DL
BUN SERPL-MCNC: 16 MG/DL
CALCIUM SERPL-MCNC: 7.5 MG/DL
CHLORIDE SERPL-SCNC: 110 MMOL/L
CO2 SERPL-SCNC: 19 MMOL/L
CREAT SERPL-MCNC: 0.8 MG/DL
DIFFERENTIAL METHOD: ABNORMAL
EOSINOPHIL # BLD AUTO: 0.4 K/UL
EOSINOPHIL NFR BLD: 3.5 %
ERYTHROCYTE [DISTWIDTH] IN BLOOD BY AUTOMATED COUNT: 21.7 %
EST. GFR  (AFRICAN AMERICAN): >60 ML/MIN/1.73 M^2
EST. GFR  (NON AFRICAN AMERICAN): >60 ML/MIN/1.73 M^2
GLUCOSE SERPL-MCNC: 106 MG/DL
HCT VFR BLD AUTO: 23.3 %
HGB BLD-MCNC: 7.2 G/DL
HGB BLD-MCNC: 7.6 G/DL
HGB BLD-MCNC: 7.6 G/DL
HGB BLD-MCNC: 7.7 G/DL
HGB BLD-MCNC: 8.1 G/DL
INR PPP: 1.2
LYMPHOCYTES # BLD AUTO: 1.7 K/UL
LYMPHOCYTES NFR BLD: 15.3 %
MAGNESIUM SERPL-MCNC: 1.8 MG/DL
MCH RBC QN AUTO: 28.1 PG
MCHC RBC AUTO-ENTMCNC: 32.4 G/DL
MCV RBC AUTO: 87 FL
MONOCYTES # BLD AUTO: 0.8 K/UL
MONOCYTES NFR BLD: 7 %
NEUTROPHILS # BLD AUTO: 7.9 K/UL
NEUTROPHILS NFR BLD: 73.5 %
PLATELET # BLD AUTO: 91 K/UL
PMV BLD AUTO: 10.7 FL
POTASSIUM SERPL-SCNC: 3.8 MMOL/L
PROT SERPL-MCNC: 4.7 G/DL
PROTHROMBIN TIME: 12.6 SEC
RBC # BLD AUTO: 2.69 M/UL
SODIUM SERPL-SCNC: 135 MMOL/L
WBC # BLD AUTO: 10.8 K/UL

## 2019-02-03 PROCEDURE — 63600175 PHARM REV CODE 636 W HCPCS: Performed by: EMERGENCY MEDICINE

## 2019-02-03 PROCEDURE — 85018 HEMOGLOBIN: CPT

## 2019-02-03 PROCEDURE — 25000003 PHARM REV CODE 250: Performed by: EMERGENCY MEDICINE

## 2019-02-03 PROCEDURE — 25000003 PHARM REV CODE 250: Performed by: INTERNAL MEDICINE

## 2019-02-03 PROCEDURE — 36415 COLL VENOUS BLD VENIPUNCTURE: CPT

## 2019-02-03 PROCEDURE — C9113 INJ PANTOPRAZOLE SODIUM, VIA: HCPCS | Performed by: EMERGENCY MEDICINE

## 2019-02-03 PROCEDURE — 83735 ASSAY OF MAGNESIUM: CPT

## 2019-02-03 PROCEDURE — 80053 COMPREHEN METABOLIC PANEL: CPT

## 2019-02-03 PROCEDURE — 94761 N-INVAS EAR/PLS OXIMETRY MLT: CPT

## 2019-02-03 PROCEDURE — 63600175 PHARM REV CODE 636 W HCPCS: Performed by: INTERNAL MEDICINE

## 2019-02-03 PROCEDURE — 20000000 HC ICU ROOM

## 2019-02-03 PROCEDURE — 85610 PROTHROMBIN TIME: CPT

## 2019-02-03 PROCEDURE — 85025 COMPLETE CBC W/AUTO DIFF WBC: CPT

## 2019-02-03 RX ORDER — PHYTONADIONE 10 MG/ML
10 INJECTION, EMULSION INTRAMUSCULAR; INTRAVENOUS; SUBCUTANEOUS ONCE
Status: COMPLETED | OUTPATIENT
Start: 2019-02-03 | End: 2019-02-03

## 2019-02-03 RX ADMIN — SUCRALFATE 1 G: 1 SUSPENSION ORAL at 05:02

## 2019-02-03 RX ADMIN — DEXTROSE 8 MG/HR: 50 INJECTION, SOLUTION INTRAVENOUS at 11:02

## 2019-02-03 RX ADMIN — DEXTROSE 8 MG/HR: 50 INJECTION, SOLUTION INTRAVENOUS at 06:02

## 2019-02-03 RX ADMIN — OCTREOTIDE ACETATE 50 MCG/HR: 1000 INJECTION, SOLUTION INTRAVENOUS; SUBCUTANEOUS at 09:02

## 2019-02-03 RX ADMIN — SUCRALFATE 1 G: 1 SUSPENSION ORAL at 03:02

## 2019-02-03 RX ADMIN — PHYTONADIONE 10 MG: 10 INJECTION, EMULSION INTRAMUSCULAR; INTRAVENOUS; SUBCUTANEOUS at 08:02

## 2019-02-03 RX ADMIN — SODIUM CHLORIDE: 0.9 INJECTION, SOLUTION INTRAVENOUS at 09:02

## 2019-02-03 RX ADMIN — SODIUM CHLORIDE: 0.9 INJECTION, SOLUTION INTRAVENOUS at 05:02

## 2019-02-03 RX ADMIN — OCTREOTIDE ACETATE 50 MCG/HR: 1000 INJECTION, SOLUTION INTRAVENOUS; SUBCUTANEOUS at 08:02

## 2019-02-03 RX ADMIN — DEXTROSE 8 MG/HR: 50 INJECTION, SOLUTION INTRAVENOUS at 03:02

## 2019-02-03 RX ADMIN — SUCRALFATE 1 G: 1 SUSPENSION ORAL at 11:02

## 2019-02-03 RX ADMIN — DEXTROSE 8 MG/HR: 50 INJECTION, SOLUTION INTRAVENOUS at 09:02

## 2019-02-03 RX ADMIN — DEXTROSE 8 MG/HR: 50 INJECTION, SOLUTION INTRAVENOUS at 12:02

## 2019-02-03 RX ADMIN — SODIUM CHLORIDE 125 ML/HR: 0.9 INJECTION, SOLUTION INTRAVENOUS at 03:02

## 2019-02-03 NOTE — ASSESSMENT & PLAN NOTE
S/p EGD: +EV and esophagitis. No intervention performed  Monitor in ICU per GI  Follow H/H closely. Transfuse Hgb > 7   Continue IV Protonix infusion 8 mg/hr.  Continue Octreotide infusion.  Continue Abx  Continue IVF hydration.   Use IV anti-emetics as needed.   Clear liquid diet, NPO at midnight  Likely repeat EGD in AM

## 2019-02-03 NOTE — PROGRESS NOTES
This note also relates to the following rows which could not be included:  Pulse - Cannot attach notes to unvalidated device data  Resp - Cannot attach notes to unvalidated device data  SpO2 - Cannot attach notes to unvalidated device data  BP - Cannot attach notes to unvalidated device data  MAP (mmHg) - Cannot attach notes to unvalidated device data

## 2019-02-03 NOTE — PROGRESS NOTES
[facility] Medicine  Progress Note    Patient Name: Karen Villarreal  MRN: [id number]  Patient Class: IP- Inpatient   Admission Date: 2/1/2019  Length of Stay: 2 days  Attending Physician: Ely Rod MD  Primary Care Provider: Shama Mccoy MD        Subjective:     Principal Problem:UGIB (upper gastrointestinal bleed)    HPI:  53 y.o. female admitted to Hospitalist Service from [facility] with complaint of upper and lower GI bleeding. Patient reportedly has past medical history significant for GERD, OA, chronic hepatitis C infection and thrombocytopenia. Patient presented to the ED via EMS for evaluation of multiple episodes of coffee ground emesis and dark stool throughout this week. EMS was called due to worsening weakness. Upon arrival of EMS to the patient's home, she had a systolic blood pressure of 100 that briefly decreased to 60. Patient was given IV fluids en route and BP returned to 100. Patient is scheduled for a upper endoscopy next week by Dr. Leigha Whitehead. Patient denied cough, fever, and hemoptysis. Last episode of vomiting was x 45 minutes ago and last episode of diarrhea was x1 hour ago. Patient mentions being admitted to the hospital around Christmas 2018 to receive a blood transfusion. She is not on long term anticoagulants. Patient denied chest pain, shortness of breath, abdominal pain, nausea, vomiting, headache, vision changes, focal neuro-deficits, cough or fever.    Hospital Course:  No notes on file    Interval History: NAEON. Denies further bleeding. Hgb stable.     Review of Systems   Constitutional: Negative for chills and fever.   HENT: Negative for sore throat.    Respiratory: Negative for cough and shortness of breath.    Gastrointestinal: Positive for abdominal distention. Negative for abdominal pain and blood in stool.   Neurological: Positive for weakness. Negative for numbness.   Psychiatric/Behavioral:  Negative for agitation, behavioral problems and confusion.     Objective:     Vital Signs (Most Recent):  Temp: 98.4 °F (36.9 °C) (02/03/19 0800)  Pulse: 86 (02/03/19 1000)  Resp: 16 (02/03/19 1000)  BP: (!) 99/54 (02/03/19 1000)  SpO2: (!) 94 % (02/03/19 1000) Vital Signs (24h Range):  Temp:  [97.1 °F (36.2 °C)-98.7 °F (37.1 °C)] 98.4 °F (36.9 °C)  Pulse:  [] 86  Resp:  [12-29] 16  SpO2:  [93 %-100 %] 94 %  BP: ()/(54-78) 99/54     Weight: 120 kg (264 lb 8.8 oz)  Body mass index is 49.99 kg/m².    Intake/Output Summary (Last 24 hours) at 2/3/2019 1045  Last data filed at 2/3/2019 0611  Gross per 24 hour   Intake 5229.24 ml   Output 1100 ml   Net 4129.24 ml      Physical Exam   Constitutional: She is oriented to person, place, and time. She appears well-developed.   Eyes: Pupils are equal, round, and reactive to light.   Neck: Normal range of motion. Neck supple.   Cardiovascular: Normal rate, regular rhythm and normal heart sounds.   Pulmonary/Chest: Effort normal and breath sounds normal. No respiratory distress.   Abdominal: Soft. Bowel sounds are normal. She exhibits no distension. There is no tenderness.   Musculoskeletal: Normal range of motion. She exhibits no edema.   Neurological: She is alert and oriented to person, place, and time.   No asterixis   Skin: Skin is warm and dry.   Psychiatric: She has a normal mood and affect. Her behavior is normal.       Significant Labs: All pertinent labs within the past 24 hours have been reviewed.    Significant Imaging: I have reviewed all pertinent imaging results/findings within the past 24 hours.    Assessment/Plan:      * UGIB (upper gastrointestinal bleed)    S/p EGD: +EV and esophagitis. No intervention performed  Monitor in ICU per GI  Follow H/H closely. Transfuse Hgb > 7   Continue IV Protonix infusion 8 mg/hr.  Continue Octreotide infusion.  Continue Abx  Continue IVF hydration.   Use IV anti-emetics as needed.   Clear liquid diet, NPO at  midnight  Likely repeat EGD in AM         Malnutrition of moderate degree    Nutrition consulted. Encourage maximal PO intake. Diet supplementation ordered per nutrition approval. Will encourage PO and monitor closely for weight changes.       Thrombocytopenia    Follow platelet count closely       Essential hypertension    Hold anti-HTN agents. Monitor BP       Chronic hepatitis C with cirrhosis    Noted. Under care by Dr. Whitehead. She was about to start HCV treatment.       Morbid obesity    Body mass index is 48.94 kg/m². Morbid obesity complicates all aspects of disease management from diagnostic modalities to treatment. Weight loss encouraged and health benefits explained to patient.         VTE Risk Mitigation (From admission, onward)        Ordered     IP VTE HIGH RISK PATIENT  Once      02/01/19 1636     Place NICOLE hose  Until discontinued      02/01/19 1636     Place sequential compression device  Until discontinued      02/01/19 1636     Place NICOLE hose  Until discontinued      02/01/19 1636          Critical care time spent on the evaluation and treatment of severe organ dysfunction, review of pertinent labs and imaging studies, discussions with consulting providers and discussions with patient/family: 20 minutes.    Nura Medina MD  Department of Hospital Medicine   [facility]

## 2019-02-03 NOTE — SUBJECTIVE & OBJECTIVE
Interval History: NAEON. Denies further bleeding. Hgb stable.     Review of Systems   Constitutional: Negative for chills and fever.   HENT: Negative for sore throat.    Respiratory: Negative for cough and shortness of breath.    Gastrointestinal: Positive for abdominal distention. Negative for abdominal pain and blood in stool.   Neurological: Positive for weakness. Negative for numbness.   Psychiatric/Behavioral: Negative for agitation, behavioral problems and confusion.     Objective:     Vital Signs (Most Recent):  Temp: 98.4 °F (36.9 °C) (02/03/19 0800)  Pulse: 86 (02/03/19 1000)  Resp: 16 (02/03/19 1000)  BP: (!) 99/54 (02/03/19 1000)  SpO2: (!) 94 % (02/03/19 1000) Vital Signs (24h Range):  Temp:  [97.1 °F (36.2 °C)-98.7 °F (37.1 °C)] 98.4 °F (36.9 °C)  Pulse:  [] 86  Resp:  [12-29] 16  SpO2:  [93 %-100 %] 94 %  BP: ()/(54-78) 99/54     Weight: 120 kg (264 lb 8.8 oz)  Body mass index is 49.99 kg/m².    Intake/Output Summary (Last 24 hours) at 2/3/2019 1045  Last data filed at 2/3/2019 0611  Gross per 24 hour   Intake 5229.24 ml   Output 1100 ml   Net 4129.24 ml      Physical Exam   Constitutional: She is oriented to person, place, and time. She appears well-developed.   Eyes: Pupils are equal, round, and reactive to light.   Neck: Normal range of motion. Neck supple.   Cardiovascular: Normal rate, regular rhythm and normal heart sounds.   Pulmonary/Chest: Effort normal and breath sounds normal. No respiratory distress.   Abdominal: Soft. Bowel sounds are normal. She exhibits no distension. There is no tenderness.   Musculoskeletal: Normal range of motion. She exhibits no edema.   Neurological: She is alert and oriented to person, place, and time.   No asterixis   Skin: Skin is warm and dry.   Psychiatric: She has a normal mood and affect. Her behavior is normal.       Significant Labs: All pertinent labs within the past 24 hours have been reviewed.    Significant Imaging: I have reviewed all  pertinent imaging results/findings within the past 24 hours.

## 2019-02-03 NOTE — PLAN OF CARE
Problem: Adult Inpatient Plan of Care  Goal: Plan of Care Review  Outcome: Ongoing (interventions implemented as appropriate)  Patient free of falls and injuries this shift. Oriented x4. Follows commands. Continues with protonix and sandostatin gtts, along with IVF. Up to bedside commode with minimal assistance. No active bleeding noted. No tarry stools. Denies any complaints when voiding. Sinus on monitor. Afebrile. Slept most of the night.

## 2019-02-03 NOTE — PROGRESS NOTES
The patient is an unfortunate 53-year-old  female who is status post   hepatitis C, cirrhosis of liver, ascites, decompensated cirrhosis.  The patient   came with GI bleeding and has been suitably resuscitated with packed cells and   the patient is going to have a scope today and was found to have esophageal   varices, was not actively bleeding, and also the patient has had a portal   gastropathy.  The plan is to observe the patient and probably re-scope the   patient on Monday and see if we can band some of those varices.    PAST HISTORY, SOCIAL HISTORY, REVIEW OF SYSTEMS:  Completely is noted in the   chart and there is no interval change.    The patient has had no complications from the procedure done this morning.  The   patient will be continued on ICU care.  We will continue IV Protonix and IV   Sandostatin and see how she does.  We will start the clear liquids and advance   to low-fat full liquids as tolerated.    Once again, I am grateful to Dr. Valencia and Dr. Rod for this referral.      KATRINA/IN  dd: 02/02/2019 13:01:14 (CST)  td: 02/03/2019 00:44:18 (CST)  Doc ID   #[id number]  Job ID #[id number]    CC:

## 2019-02-03 NOTE — PLAN OF CARE
Problem: Adult Inpatient Plan of Care  Goal: Plan of Care Review  Outcome: Ongoing (interventions implemented as appropriate)  Pt with no issues or complaints today; no evidence of bleeding; VS stable, afebrile; continued on sandostatin and protonix drip; pt to remain NPO after midnight for EGD tomorrow; will continue current plan of care at this time

## 2019-02-04 LAB
ALBUMIN SERPL BCP-MCNC: 2.4 G/DL
ALP SERPL-CCNC: 108 U/L
ALT SERPL W/O P-5'-P-CCNC: 22 U/L
ANION GAP SERPL CALC-SCNC: 4 MMOL/L
AST SERPL-CCNC: 35 U/L
BASOPHILS # BLD AUTO: 0 K/UL
BASOPHILS NFR BLD: 0.4 %
BILIRUB SERPL-MCNC: 1.7 MG/DL
BUN SERPL-MCNC: 9 MG/DL
CALCIUM SERPL-MCNC: 7.5 MG/DL
CHLORIDE SERPL-SCNC: 109 MMOL/L
CO2 SERPL-SCNC: 21 MMOL/L
CREAT SERPL-MCNC: 0.8 MG/DL
DIFFERENTIAL METHOD: ABNORMAL
EOSINOPHIL # BLD AUTO: 0.3 K/UL
EOSINOPHIL NFR BLD: 4 %
ERYTHROCYTE [DISTWIDTH] IN BLOOD BY AUTOMATED COUNT: 21.9 %
EST. GFR  (AFRICAN AMERICAN): >60 ML/MIN/1.73 M^2
EST. GFR  (NON AFRICAN AMERICAN): >60 ML/MIN/1.73 M^2
GLUCOSE SERPL-MCNC: 101 MG/DL
HAV IGM SERPL QL IA: NEGATIVE
HBV CORE AB SERPL QL IA: NEGATIVE
HBV SURFACE AG SERPL QL IA: NEGATIVE
HCT VFR BLD AUTO: 24.8 %
HGB BLD-MCNC: 7.6 G/DL
HGB BLD-MCNC: 7.7 G/DL
HGB BLD-MCNC: 7.8 G/DL
HGB BLD-MCNC: 7.9 G/DL
HGB BLD-MCNC: 7.9 G/DL
INR PPP: 1.2
LYMPHOCYTES # BLD AUTO: 1.3 K/UL
LYMPHOCYTES NFR BLD: 15.6 %
MAGNESIUM SERPL-MCNC: 1.7 MG/DL
MCH RBC QN AUTO: 27.9 PG
MCHC RBC AUTO-ENTMCNC: 32 G/DL
MCV RBC AUTO: 87 FL
MONOCYTES # BLD AUTO: 0.5 K/UL
MONOCYTES NFR BLD: 6.3 %
NEUTROPHILS # BLD AUTO: 6.3 K/UL
NEUTROPHILS NFR BLD: 73.7 %
PLATELET # BLD AUTO: 86 K/UL
PMV BLD AUTO: 10.5 FL
POTASSIUM SERPL-SCNC: 3.5 MMOL/L
PROT SERPL-MCNC: 4.8 G/DL
PROTHROMBIN TIME: 12.1 SEC
RBC # BLD AUTO: 2.83 M/UL
SODIUM SERPL-SCNC: 134 MMOL/L
WBC # BLD AUTO: 8.6 K/UL

## 2019-02-04 PROCEDURE — 63600175 PHARM REV CODE 636 W HCPCS: Performed by: EMERGENCY MEDICINE

## 2019-02-04 PROCEDURE — 25000003 PHARM REV CODE 250: Performed by: INTERNAL MEDICINE

## 2019-02-04 PROCEDURE — 80053 COMPREHEN METABOLIC PANEL: CPT

## 2019-02-04 PROCEDURE — 36415 COLL VENOUS BLD VENIPUNCTURE: CPT

## 2019-02-04 PROCEDURE — C9113 INJ PANTOPRAZOLE SODIUM, VIA: HCPCS | Performed by: EMERGENCY MEDICINE

## 2019-02-04 PROCEDURE — 85025 COMPLETE CBC W/AUTO DIFF WBC: CPT

## 2019-02-04 PROCEDURE — 85018 HEMOGLOBIN: CPT | Mod: 91

## 2019-02-04 PROCEDURE — 83735 ASSAY OF MAGNESIUM: CPT

## 2019-02-04 PROCEDURE — 12000002 HC ACUTE/MED SURGE SEMI-PRIVATE ROOM

## 2019-02-04 PROCEDURE — 25000003 PHARM REV CODE 250: Performed by: EMERGENCY MEDICINE

## 2019-02-04 PROCEDURE — 94761 N-INVAS EAR/PLS OXIMETRY MLT: CPT

## 2019-02-04 PROCEDURE — 85610 PROTHROMBIN TIME: CPT

## 2019-02-04 RX ADMIN — SUCRALFATE 1 G: 1 SUSPENSION ORAL at 05:02

## 2019-02-04 RX ADMIN — OCTREOTIDE ACETATE 50 MCG/HR: 1000 INJECTION, SOLUTION INTRAVENOUS; SUBCUTANEOUS at 08:02

## 2019-02-04 RX ADMIN — DEXTROSE 8 MG/HR: 50 INJECTION, SOLUTION INTRAVENOUS at 06:02

## 2019-02-04 RX ADMIN — SODIUM CHLORIDE 125 ML/HR: 0.9 INJECTION, SOLUTION INTRAVENOUS at 02:02

## 2019-02-04 RX ADMIN — OCTREOTIDE ACETATE 50 MCG/HR: 1000 INJECTION, SOLUTION INTRAVENOUS; SUBCUTANEOUS at 06:02

## 2019-02-04 RX ADMIN — DEXTROSE 8 MG/HR: 50 INJECTION, SOLUTION INTRAVENOUS at 11:02

## 2019-02-04 RX ADMIN — DEXTROSE 8 MG/HR: 50 INJECTION, SOLUTION INTRAVENOUS at 02:02

## 2019-02-04 RX ADMIN — DEXTROSE 8 MG/HR: 50 INJECTION, SOLUTION INTRAVENOUS at 08:02

## 2019-02-04 NOTE — PLAN OF CARE
Problem: Adult Inpatient Plan of Care  Goal: Plan of Care Review  Outcome: Ongoing (interventions implemented as appropriate)  Pt on room air with 97% sats.

## 2019-02-04 NOTE — PROGRESS NOTES
Progress Note  Hospital Medicine  Patient Name:Karen Villarreal  MRN:  [id number]  Patient Class: IP- Inpatient  Admit Date: 2/1/2019  Length of Stay: 3 days  Expected Discharge Date:   Attending Physician: Ely Rod MD  Primary Care Provider:  Shama Mccoy MD    SUBJECTIVE:     Principal Problem: UGIB (upper gastrointestinal bleed)  Initial history of present illness: Patient is a 53 y.o. female admitted to Hospitalist Service from [facility] with complaint of upper and lower GI bleeding. Patient reportedly has past medical history significant for GERD, OA, chronic hepatitis C infection and thrombocytopenia. Patient presented to the ED via EMS for evaluation of multiple episodes of coffee ground emesis and dark stool throughout this week. EMS was called due to worsening weakness. Upon arrival of EMS to the patient's home, she had a systolic blood pressure of 100 that briefly decreased to 60. Patient was given IV fluids en route and BP returned to 100. Patient is scheduled for a upper endoscopy next week by Dr. Leigha Whitehead. Patient denied cough, fever, and hemoptysis. Last episode of vomiting was x 45 minutes ago and last episode of diarrhea was x1 hour ago. Patient mentions being admitted to the hospital around Christmas 2018 to receive a blood transfusion. She is not on long term anticoagulants. Patient denied chest pain, shortness of breath, abdominal pain, nausea, vomiting, headache, vision changes, focal neuro-deficits, cough or fever.    PMH/PSH/SH/FH/Meds: reviewed.    Symptoms/Review of Systems: Patiently waiting for EGD and banding procedure to be done. No shortness of breath, cough, chest pain or headache, fever or abdominal pain.     Diet: NPO  Activity level: Normal.    Pain:  Patient reports no pain.       OBJECTIVE:   Vital Signs (Most Recent):      Temp: 98.2 °F (36.8 °C) (02/04/19 0400)  Pulse: 87 (02/04/19 0500)  Resp: 18 (02/04/19 0500)  BP: 131/65  (02/04/19 0500)  SpO2: 100 % (02/04/19 0500)       Vital Signs Range (Last 24H):  Temp:  [98.2 °F (36.8 °C)-98.5 °F (36.9 °C)]   Pulse:  []   Resp:  [15-39]   BP: ()/(54-68)   SpO2:  [94 %-100 %]     Weight: 123.7 kg (272 lb 11.3 oz)  Body mass index is 51.53 kg/m².    Intake/Output Summary (Last 24 hours) at 2/4/2019 0826  Last data filed at 2/4/2019 0530  Gross per 24 hour   Intake 3626.58 ml   Output 925 ml   Net 2701.58 ml     Physical Examination:  Constitutional: She is oriented to person, place, and time. She appears well-developed.   Eyes: Pupils are equal, round, and reactive to light.   Neck: Normal range of motion. Neck supple.   Cardiovascular: Normal rate, regular rhythm and normal heart sounds.   Pulmonary/Chest: Effort normal and breath sounds normal. No respiratory distress.   Abdominal: Soft. Bowel sounds are normal. She exhibits no distension. There is no tenderness.   Musculoskeletal: Normal range of motion. She exhibits no edema.   Neurological: She is alert and oriented to person, place, and time.   No asterixis   Skin: Skin is warm and dry.   Psychiatric: She has a normal mood and affect. Her behavior is normal.     CBC:  Recent Labs   Lab 02/02/19  0654  02/03/19  0626  02/03/19  1742 02/03/19  2347 02/04/19  0603   WBC 15.30*  15.30*  --  10.80  --   --   --  8.60   RBC 2.83*  2.83*  --  2.69*  --   --   --  2.83*   HGB 7.9*  7.9*  7.9*   < > 7.6*  7.6*   < > 7.7* 7.8* 7.9*  7.9*   HCT 24.5*  24.5*  --  23.3*  --   --   --  24.8*   *  102*  --  91*  --   --   --  86*   MCV 87  87  --  87  --   --   --  87   MCH 28.0  28.0  --  28.1  --   --   --  27.9   MCHC 32.4  32.4  --  32.4  --   --   --  32.0    < > = values in this interval not displayed.   BMP  Recent Labs   Lab 02/02/19  0654 02/03/19  0626 02/04/19  0603   *  119* 106 101     136 135* 134*   K 3.9  3.9 3.8 3.5     109 110 109   CO2 22*  22* 19* 21*   BUN 25*  25* 16 9    CREATININE 0.9  0.9 0.8 0.8   CALCIUM 7.7*  7.7* 7.5* 7.5*   MG 1.6  1.6 1.8 1.7      Diagnostic Results:  Microbiology Results (last 7 days)     ** No results found for the last 168 hours. **         Assessment/Plan:     * UGIB (upper gastrointestinal bleed)  Esophageal varices and esophagitis     S/p EGD: +EV and esophagitis. No intervention performed  Monitor in ICU per GI. Follow repeat EGD results.  Follow H/H closely. Transfuse Hgb > 7   Continue IV Protonix infusion 8 mg/hr.  Continue Octreotide infusion.  Continue Abx  Continue IVF hydration.   Use IV anti-emetics as needed. May need PRBC if H/H drops.      Malnutrition of moderate degree     Nutrition consulted. Encourage maximal PO intake. Diet supplementation ordered per nutrition approval. Will encourage PO and monitor closely for weight changes.     Thrombocytopenia     Follow platelet count closely.       Essential hypertension     Hold anti-HTN agents. Monitor BP     Chronic hepatitis C with cirrhosis     Noted. Under care by Dr. Whitehead. She was about to start HCV treatment.     Morbid obesity     Body mass index is 48.94 kg/m². Morbid obesity complicates all aspects of disease management from diagnostic modalities to treatment. Weight loss encouraged and health benefits explained to patient.     DVT prophylaxis: Use SCD and TEDs, avoid anticoagulation due to GI bleeding.    Ely Rod MD  Department of Hospital Medicine   [facility]

## 2019-02-04 NOTE — PROGRESS NOTES
Pt transferred to room 301 A via wheelchair; remote tele on; Arun at bedside to receive pt, and report; pt placed in bed; IV fluids and infusions continued on continuous pump; pt belongings: cell phone, clothing, clogs at bedside; chart with Arun; Arun at bedside upon my leaving the room

## 2019-02-04 NOTE — PLAN OF CARE
Problem: Adult Inpatient Plan of Care  Goal: Plan of Care Review  Outcome: Ongoing (interventions implemented as appropriate)  Care plan reviewed.  Pt resting comfortably with no complaints, no injury or falls. Used BSC twice, tolerated well, UO 925ml, clear vaughn urine.  NPO since midnight for anticipated EGD today. No bleeding observed or reported this shift. Bathed pt, and sat up in chair for about 30 minutes, tolerated well. Protonix, octreotide, and NS infusing. Will continue to monitor closely.

## 2019-02-04 NOTE — PROGRESS NOTES
The patient is a 53-year-old  female with a known history of hepatitis   C for more than 20 years, came with upper GI bleeding, was found to have   esophageal varices and we will subsequently proceed with a banding and a repeat   upper endoscopy in the morning.  Detailed examination was done.  Exam of chest,   cardiovascular system, and abdomen was essentially as per yesterday and H and H   is stable.  The patient has a platelet count, which is low at 92.  Rest of the   parameters are within normal limits.    Once again, I am grateful to Dr. Rod for this referral.      KATRINA/ODIN  dd: 02/03/2019 17:32:24 (CST)  td: 02/04/2019 00:32:13 (CST)  Doc ID   #[id number]  Job ID #[id number]    CC: Ely Rod M.D.

## 2019-02-04 NOTE — PROGRESS NOTES
The patient is presently not having any active bleed.  The patient is going to   have upper endoscopy in the morning and probable banding of varices.  The   patient denies any history of hematemesis and denies any history of black bowel   movements, etc.    Laboratory data is satisfactory. The patient has a platelet count which is low,   which is to be expected in a patient who is cirrhotic and also the patient's INR   is reasonable. Detailed physical examination is noncontributory.    Once again, I am grateful to Dr. Rod for this referral.      KATRINA/IN  dd: 02/04/2019 14:40:36 (CST)  td: 02/04/2019 15:01:02 (CST)  Doc ID   #[id number]  Job ID #[id number]    CC: Ely Rod M.D.

## 2019-02-05 ENCOUNTER — ANESTHESIA EVENT (OUTPATIENT)
Dept: ENDOSCOPY | Facility: HOSPITAL | Age: 54
DRG: 432 | End: 2019-02-05
Payer: MEDICAID

## 2019-02-05 ENCOUNTER — ANESTHESIA (OUTPATIENT)
Dept: ENDOSCOPY | Facility: HOSPITAL | Age: 54
DRG: 432 | End: 2019-02-05
Payer: MEDICAID

## 2019-02-05 LAB
ABO + RH BLD: NORMAL
ALBUMIN SERPL BCP-MCNC: 2.3 G/DL
ALP SERPL-CCNC: 109 U/L
ALT SERPL W/O P-5'-P-CCNC: 18 U/L
ANION GAP SERPL CALC-SCNC: 3 MMOL/L
AST SERPL-CCNC: 34 U/L
BASOPHILS # BLD AUTO: 0 K/UL
BASOPHILS NFR BLD: 0.7 %
BILIRUB SERPL-MCNC: 1.9 MG/DL
BLD GP AB SCN CELLS X3 SERPL QL: NORMAL
BLD PROD TYP BPU: NORMAL
BLD PROD TYP BPU: NORMAL
BLOOD UNIT EXPIRATION DATE: NORMAL
BLOOD UNIT EXPIRATION DATE: NORMAL
BLOOD UNIT TYPE CODE: 5100
BLOOD UNIT TYPE CODE: 5100
BLOOD UNIT TYPE: NORMAL
BLOOD UNIT TYPE: NORMAL
BUN SERPL-MCNC: 6 MG/DL
CALCIUM SERPL-MCNC: 7.3 MG/DL
CHLORIDE SERPL-SCNC: 109 MMOL/L
CO2 SERPL-SCNC: 21 MMOL/L
CODING SYSTEM: NORMAL
CODING SYSTEM: NORMAL
CREAT SERPL-MCNC: 0.8 MG/DL
DIFFERENTIAL METHOD: ABNORMAL
DISPENSE STATUS: NORMAL
DISPENSE STATUS: NORMAL
EOSINOPHIL # BLD AUTO: 0.2 K/UL
EOSINOPHIL NFR BLD: 3.3 %
ERYTHROCYTE [DISTWIDTH] IN BLOOD BY AUTOMATED COUNT: 21.6 %
EST. GFR  (AFRICAN AMERICAN): >60 ML/MIN/1.73 M^2
EST. GFR  (NON AFRICAN AMERICAN): >60 ML/MIN/1.73 M^2
GLUCOSE SERPL-MCNC: 103 MG/DL
HCT VFR BLD AUTO: 22.1 %
HGB BLD-MCNC: 7.1 G/DL
HGB BLD-MCNC: 7.2 G/DL
HGB BLD-MCNC: 7.5 G/DL
INR PPP: 1.3
LYMPHOCYTES # BLD AUTO: 1.2 K/UL
LYMPHOCYTES NFR BLD: 18.9 %
MAGNESIUM SERPL-MCNC: 1.7 MG/DL
MCH RBC QN AUTO: 27.9 PG
MCHC RBC AUTO-ENTMCNC: 32.1 G/DL
MCV RBC AUTO: 87 FL
MONOCYTES # BLD AUTO: 0.4 K/UL
MONOCYTES NFR BLD: 6.5 %
NEUTROPHILS # BLD AUTO: 4.5 K/UL
NEUTROPHILS NFR BLD: 70.6 %
NUM UNITS TRANS PACKED RBC: NORMAL
NUM UNITS TRANS PACKED RBC: NORMAL
PLATELET # BLD AUTO: 81 K/UL
PMV BLD AUTO: 10.7 FL
POTASSIUM SERPL-SCNC: 3.5 MMOL/L
PROT SERPL-MCNC: 4.6 G/DL
PROTHROMBIN TIME: 13.1 SEC
RBC # BLD AUTO: 2.55 M/UL
SODIUM SERPL-SCNC: 133 MMOL/L
WBC # BLD AUTO: 6.4 K/UL

## 2019-02-05 PROCEDURE — 63600175 PHARM REV CODE 636 W HCPCS: Performed by: EMERGENCY MEDICINE

## 2019-02-05 PROCEDURE — 85610 PROTHROMBIN TIME: CPT

## 2019-02-05 PROCEDURE — D9220A PRA ANESTHESIA: ICD-10-PCS | Mod: ANES,,, | Performed by: ANESTHESIOLOGY

## 2019-02-05 PROCEDURE — C9113 INJ PANTOPRAZOLE SODIUM, VIA: HCPCS | Performed by: EMERGENCY MEDICINE

## 2019-02-05 PROCEDURE — 12000002 HC ACUTE/MED SURGE SEMI-PRIVATE ROOM

## 2019-02-05 PROCEDURE — 25000003 PHARM REV CODE 250: Performed by: INTERNAL MEDICINE

## 2019-02-05 PROCEDURE — 27201022: Performed by: INTERNAL MEDICINE

## 2019-02-05 PROCEDURE — 63600175 PHARM REV CODE 636 W HCPCS: Performed by: NURSE ANESTHETIST, CERTIFIED REGISTERED

## 2019-02-05 PROCEDURE — 86901 BLOOD TYPING SEROLOGIC RH(D): CPT

## 2019-02-05 PROCEDURE — D9220A PRA ANESTHESIA: Mod: CRNA,,, | Performed by: NURSE ANESTHETIST, CERTIFIED REGISTERED

## 2019-02-05 PROCEDURE — 80053 COMPREHEN METABOLIC PANEL: CPT

## 2019-02-05 PROCEDURE — D9220A PRA ANESTHESIA: ICD-10-PCS | Mod: CRNA,,, | Performed by: NURSE ANESTHETIST, CERTIFIED REGISTERED

## 2019-02-05 PROCEDURE — D9220A PRA ANESTHESIA: Mod: ANES,,, | Performed by: ANESTHESIOLOGY

## 2019-02-05 PROCEDURE — 86920 COMPATIBILITY TEST SPIN: CPT

## 2019-02-05 PROCEDURE — 37000008 HC ANESTHESIA 1ST 15 MINUTES: Performed by: INTERNAL MEDICINE

## 2019-02-05 PROCEDURE — 37000009 HC ANESTHESIA EA ADD 15 MINS: Performed by: INTERNAL MEDICINE

## 2019-02-05 PROCEDURE — 85025 COMPLETE CBC W/AUTO DIFF WBC: CPT

## 2019-02-05 PROCEDURE — 83735 ASSAY OF MAGNESIUM: CPT

## 2019-02-05 PROCEDURE — 25000003 PHARM REV CODE 250: Performed by: EMERGENCY MEDICINE

## 2019-02-05 PROCEDURE — P9016 RBC LEUKOCYTES REDUCED: HCPCS

## 2019-02-05 PROCEDURE — 43244 EGD VARICES LIGATION: CPT | Performed by: INTERNAL MEDICINE

## 2019-02-05 PROCEDURE — 85018 HEMOGLOBIN: CPT

## 2019-02-05 PROCEDURE — 36415 COLL VENOUS BLD VENIPUNCTURE: CPT

## 2019-02-05 RX ORDER — LIDOCAINE HCL/PF 100 MG/5ML
SYRINGE (ML) INTRAVENOUS
Status: DISCONTINUED | OUTPATIENT
Start: 2019-02-05 | End: 2019-02-05

## 2019-02-05 RX ORDER — LIDOCAINE HYDROCHLORIDE 20 MG/ML
INJECTION, SOLUTION EPIDURAL; INFILTRATION; INTRACAUDAL; PERINEURAL
Status: DISCONTINUED
Start: 2019-02-05 | End: 2019-02-07 | Stop reason: HOSPADM

## 2019-02-05 RX ORDER — HYDROCODONE BITARTRATE AND ACETAMINOPHEN 500; 5 MG/1; MG/1
TABLET ORAL
Status: DISCONTINUED | OUTPATIENT
Start: 2019-02-05 | End: 2019-02-07 | Stop reason: HOSPADM

## 2019-02-05 RX ORDER — PANTOPRAZOLE SODIUM 40 MG/10ML
40 INJECTION, POWDER, LYOPHILIZED, FOR SOLUTION INTRAVENOUS DAILY
Status: DISCONTINUED | OUTPATIENT
Start: 2019-02-05 | End: 2019-02-07 | Stop reason: HOSPADM

## 2019-02-05 RX ORDER — MAG HYDROX/ALUMINUM HYD/SIMETH 200-200-20
30 SUSPENSION, ORAL (FINAL DOSE FORM) ORAL
Status: DISCONTINUED | OUTPATIENT
Start: 2019-02-05 | End: 2019-02-05 | Stop reason: HOSPADM

## 2019-02-05 RX ORDER — PROPOFOL 10 MG/ML
INJECTION, EMULSION INTRAVENOUS
Status: COMPLETED
Start: 2019-02-05 | End: 2019-02-05

## 2019-02-05 RX ORDER — FUROSEMIDE 20 MG/1
20 TABLET ORAL ONCE
Status: COMPLETED | OUTPATIENT
Start: 2019-02-06 | End: 2019-02-06

## 2019-02-05 RX ORDER — PROPOFOL 10 MG/ML
VIAL (ML) INTRAVENOUS
Status: DISCONTINUED | OUTPATIENT
Start: 2019-02-05 | End: 2019-02-05

## 2019-02-05 RX ORDER — SODIUM CHLORIDE 9 MG/ML
INJECTION, SOLUTION INTRAVENOUS CONTINUOUS
Status: DISCONTINUED | OUTPATIENT
Start: 2019-02-05 | End: 2019-02-05

## 2019-02-05 RX ADMIN — PROPOFOL 20 MG: 10 INJECTION, EMULSION INTRAVENOUS at 07:02

## 2019-02-05 RX ADMIN — PROPOFOL 80 MG: 10 INJECTION, EMULSION INTRAVENOUS at 07:02

## 2019-02-05 RX ADMIN — PROPOFOL 40 MG: 10 INJECTION, EMULSION INTRAVENOUS at 07:02

## 2019-02-05 RX ADMIN — LIDOCAINE HYDROCHLORIDE 100 MG: 20 INJECTION, SOLUTION INTRAVENOUS at 07:02

## 2019-02-05 RX ADMIN — SODIUM CHLORIDE: 0.9 INJECTION, SOLUTION INTRAVENOUS at 05:02

## 2019-02-05 RX ADMIN — OCTREOTIDE ACETATE 50 MCG/HR: 1000 INJECTION, SOLUTION INTRAVENOUS; SUBCUTANEOUS at 04:02

## 2019-02-05 RX ADMIN — DEXTROSE 8 MG/HR: 50 INJECTION, SOLUTION INTRAVENOUS at 04:02

## 2019-02-05 RX ADMIN — SUCRALFATE 1 G: 1 SUSPENSION ORAL at 05:02

## 2019-02-05 NOTE — PLAN OF CARE
Problem: Adult Inpatient Plan of Care  Goal: Plan of Care Review  Outcome: Revised  Pt is AAOx4.  IVF infusing, infusing cont protonix, cont sandostatin, no redness or swelling at PIV sites.  Cardiac monitoring in place, SR.  No BM or emesis noted this shift.  VSS, in NAD, pt remains afebrile.  Pt remains free from injury.  Bed in low position, wheels locked, call light within reach.  Pt verbalized understanding of POC.  Will continue to monitor.

## 2019-02-05 NOTE — ANESTHESIA POSTPROCEDURE EVALUATION
"Anesthesia Post Evaluation    Patient: Karen Villarreal    Procedure(s) Performed: Procedure(s) (LRB):  EGD (ESOPHAGOGASTRODUODENOSCOPY) (N/A)    Final Anesthesia Type: general  Patient location during evaluation: PACU  Patient participation: Yes- Able to Participate  Level of consciousness: awake and alert  Post-procedure vital signs: reviewed and stable  Pain management: adequate  Airway patency: patent  PONV status at discharge: No PONV  Anesthetic complications: no      Cardiovascular status: blood pressure returned to baseline  Respiratory status: unassisted  Hydration status: euvolemic  Follow-up not needed.        Visit Vitals  /63 (Patient Position: Sitting)   Pulse 87   Temp 36.6 °C (97.8 °F) (Oral)   Resp 16   Ht 5' 1" (1.549 m)   Wt 123.7 kg (272 lb 11.3 oz)   LMP 08/06/2017 (Approximate)   SpO2 98%   Breastfeeding? No   BMI 51.53 kg/m²       Pain/Kang Score: Kang Score: 10 (2/5/2019  8:19 AM)        "

## 2019-02-05 NOTE — PLAN OF CARE
Problem: Adult Inpatient Plan of Care  Goal: Plan of Care Review  Outcome: Revised  Awake, alert, and oriented. Ambulatory to BR. IVF infusing per order. Continuous protonix and sandostatin infusing per order. VS stable. Telemetry monitored; normal sinus rhythm. Remains afebrile throughout shift. Comfort level established. Bed in low position, brakes locked, side rails up x 2, call light w/in reach. Verbalized understanding of POC. Open communication facilitated. Will continue to monitor.

## 2019-02-05 NOTE — PROGRESS NOTES
The patient underwent an upper endoscopy today with banding of varices.  The   patient tolerated the procedure well.  She is presently in room in good   condition.  Vital signs are stable.  The H and H is stable.  She could possibly   go home this evening and see how she does.  The patient will be rebanded in   about 4 weeks depending on a clinical condition.  The patient will be treated   with hepatitis C treatment for her viremia.  She has end-stage liver disease and   appropriate precautions will be taken for surveillance purposes.  I am grateful   to Dr. Saldana for his help in the management of the patient.      KATRINA/ODIN  dd: 02/05/2019 08:57:21 (CST)  td: 02/05/2019 13:56:31 (CST)  Doc ID   #[id number]  Job ID #[id number]    CC:

## 2019-02-05 NOTE — TRANSFER OF CARE
"Anesthesia Transfer of Care Note    Patient: Karen Villarreal    Procedure(s) Performed: Procedure(s) (LRB):  EGD (ESOPHAGOGASTRODUODENOSCOPY) (N/A)    Patient location: PACU    Anesthesia Type: general    Transport from OR: Transported from OR on room air with adequate spontaneous ventilation    Post pain: adequate analgesia    Post assessment: no apparent anesthetic complications and tolerated procedure well    Post vital signs: stable    Level of consciousness: awake, alert and oriented    Nausea/Vomiting: no nausea/vomiting    Complications: none    Transfer of care protocol was followed      Last vitals:   Visit Vitals  /74 (BP Location: Left arm, Patient Position: Lying)   Pulse 91   Temp 36.7 °C (98.1 °F) (Temporal)   Resp 18   Ht 5' 1" (1.549 m)   Wt 123.7 kg (272 lb 11.3 oz)   LMP 08/06/2017 (Approximate)   SpO2 100%   Breastfeeding? No   BMI 51.53 kg/m²     "

## 2019-02-05 NOTE — ANESTHESIA PREPROCEDURE EVALUATION
02/05/2019  Karen Villarreal is a 53 y.o., female.    Pre-op Assessment    I have reviewed the Patient Summary Reports.     I have reviewed the Nursing Notes.   I have reviewed the Medications.     Review of Systems  Anesthesia Hx:  Denies Family Hx of Anesthesia complications.   Denies Personal Hx of Anesthesia complications.   Hematology/Oncology:         -- Anemia: Hematology Comments: Hypersplenism/thrombocytopenia   Cardiovascular:   Hypertension    Renal/:   Chronic Renal Disease    Hepatic/GI:   GERD Liver Disease, Hepatitis, C Hematemesis and melana  Current nausea  Cirrhosis, portal HTN   Neurological:   Denies Neuromuscular Disease.    Psych:   Psychiatric History          Physical Exam  General:  Morbid Obesity, Malnutrition Protein malnutrition    Airway/Jaw/Neck:  Airway Findings: Mouth Opening: Normal Tongue: Normal  General Airway Assessment: Adult  Mallampati: III  Improves to II with phonation.  TM Distance: Normal, at least 6 cm  Jaw/Neck Findings:  Neck ROM: Normal ROM  Neck Findings:  Girth Increased      Dental:  DENTAL FINDINGS: Normal   Chest/Lungs:  Chest/Lungs Findings: Clear to auscultation, Normal Respiratory Rate     Heart/Vascular:  Heart Findings: Rate: Normal  Rhythm: Regular Rhythm  Sounds: Normal             Anesthesia Plan  Type of Anesthesia, risks & benefits discussed:  Anesthesia Type:  general  Patient's Preference:   Intra-op Monitoring Plan: standard ASA monitors  Intra-op Monitoring Plan Comments:   Post Op Pain Control Plan:   Post Op Pain Control Plan Comments:   Induction:   IV  Beta Blocker:  Patient is not currently on a Beta-Blocker (No further documentation required).       Informed Consent: Patient understands risks and agrees with Anesthesia plan.  Questions answered. Anesthesia consent signed with patient.  ASA Score: 3     Day of Surgery Review of  History & Physical:    H&P update referred to the provider.         Ready For Surgery From Anesthesia Perspective.

## 2019-02-05 NOTE — PLAN OF CARE
CM met with  patient at bedside to complete discharge planning assessment. Patient lives with her mother, next of kin, Soraya Villarreal, [phone]. Patient denies POA or living will, reports independence, drives and does not use any assist devices. Pharmacy is [facility], PCP is Dr. Cueva. Sister will bring patient home and patient denies any needs at this time.      02/04/19 1650   Discharge Assessment   Assessment Type Discharge Planning Assessment   Confirmed/corrected address and phone number on facesheet? Yes   Assessment information obtained from? Patient   Communicated expected length of stay with patient/caregiver yes   Prior to hospitilization cognitive status: Alert/Oriented   Prior to hospitalization functional status: Independent   Current cognitive status: Alert/Oriented   Current Functional Status: Independent   Lives With parent(s)   Able to Return to Prior Arrangements yes   Is patient able to care for self after discharge? Yes   Patient's perception of discharge disposition home or selfcare   Readmission Within the Last 30 Days no previous admission in last 30 days   Patient currently being followed by outpatient case management? No   Patient currently receives any other outside agency services? No   Equipment Currently Used at Home none   Do you have any problems affording any of your prescribed medications? No   Is the patient taking medications as prescribed? yes   Does the patient have transportation home? Yes   Transportation Anticipated family or friend will provide   Does the patient receive services at the [facility]? No   Discharge Plan A Home with family   DME Needed Upon Discharge  none   Patient/Family in Agreement with Plan yes

## 2019-02-05 NOTE — DISCHARGE INSTRUCTIONS
Esophageal Varices     With esophageal varices, blood vessels in the esophagus become abnormally enlarged. They may then burst (rupture) and bleed.   Esophageal varices are enlarged veins at the lower end of the esophagus. The esophagus is the tube that carries food from your mouth to your stomach. Varices most often occur because of problems with blood flow in the liver caused by chronic liver disease. Normally, a blood vessel called the portal vein carries blood from the digestive organs to the liver. But with liver disease, blood flow can be blocked due to scarring of the liver. This increases the blood pressure in the portal vein (a condition known as portal hypertension). Blood then backs up in nearby veins in the esophagus and stomach, causing varices. Varices are a serious and deadly problem. Treatment is needed to prevent them from bursting (rupturing) and bleeding. If bleeding occurs, it can be fatal.  Symptoms of esophageal varices  Symptoms do not occur unless the varices are bleeding. This is an emergency problem. If you have any of the following symptoms, get medical attention right away:  · Vomiting blood  · Black, tarry, or bloody stools  · Feeling lightheaded, or fainting (loss of consciousness)  Diagnosing esophageal \varices  Youll likely be checked for varices if you have liver disease or other health problems that can cause them. Your healthcare provider will ask about your symptoms and health history. Youll also be examined. Tests are then done to confirm the problem. Tests can include:  · Upper endoscopy. This is done to see inside the upper digestive tract. During the test, an endoscope is used. This is a thin, flexible tube with a tiny camera on the end. Its inserted through your mouth. Its then guided down through your esophagus, stomach, and first part of your small intestine. This allows the provider to check for varices and find any bleeding.  · Imaging tests. These provide pictures  of the liver or blood flow in the liver. They allow the provider to check for enlarged veins around the liver and assess the risk of bleeding. Common imaging tests done include ultrasound and CT scans.  Treating esophageal varices  The goal of treatment is to reduce the risk of bleeding or to control bleeding. Treatment can include 1 or more of the following:  · Medicines. These may be prescribed to lower the blood pressure inside the enlarged veins. This reduces the risk of bleeding. Beta-blockers are the most common medicine used.  · Endoscopic therapy. These are treatments for enlarged or bleeding veins that are done using an endoscope. With ligation, small rubber bands are placed around the veins to close them off and stop any bleeding. With sclerotherapy, a blood-clotting medicine is injected into the veins to cause scarring and shrink them.  · Balloon tamponade. A tube with a balloon is guided down into your esophagus and stomach. The balloon is then filled with air. This puts pressure on enlarged or bleeding veins to control bleeding. This is a short-term (temporary) way to control bleeding until other treatments are available.   · Surgery. This may be done to place a tubelike device (stent) in the liver. The stent helps redirect blood flow in the liver to lower the blood pressure in enlarged veins. Sometimes, the enlarged veins may be connected to other nearby veins to redirect blood flow. In severe cases, a liver transplant may be needed. For this surgery, a diseased liver is replaced with a healthy liver from another person.   Follow-up  Regular visits with your provider are needed to check for bleeding of the varices. If bleeding occurs, it is likely to occur again. More treatments will then be needed in the future. Once endoscopic therapy (banding) is performed, regular follow-up endoscopic scans with banding are done to completely get rid of the varices. If you are given medicines to take by mouth, be  sure to take them as directed. Work closely with your provider to manage your condition. Know when to seek emergency care.  Date Last Reviewed: 7/1/2016 © 2000-2017 [employer]. [address] [place] [address], [place] [address]. All rights reserved. This information is not intended as a substitute for professional medical care. Always follow your healthcare professional's instructions.        Upper GI Endoscopy     During endoscopy, a long, flexible tube is used to view the inside of your upper GI tract.      Upper GI endoscopy allows your healthcare provider to look directly into the beginning of your gastrointestinal (GI) tract. The esophagus, stomach, and duodenum (the first part of the small intestine) make up the upper GI tract.   Before the exam  Follow these and any other instructions you are given before your endoscopy. If you dont follow the healthcare providers instructions carefully, the test may need to be canceled or done over:  · Don't eat or drink anything after midnight the night before your exam. If your exam is in the afternoon, drink only clear liquids in the morning. Don't eat or drink anything for 8 hours before the exam. In some cases, you may be able to take medicines with sips of water until 2 hours before the procedure. Speak with your healthcare provider about this.   · Bring your X-rays and any other test results you have.  · Because you will be sedated, arrange for an adult to drive you home after the exam.  · Tell your healthcare provider before the exam if you are taking any medicines or have any medical problems.  The procedure  Here is what to expect:  · You will lie on the endoscopy table. Usually patients lie on the left side.  · You will be monitored and given oxygen.  · Your throat may be numbed with a spray or gargle. You are given medicine through an intravenous (IV) line that will help you relax and remain comfortable. You may be awake or asleep during the  procedure.  · The healthcare provider will put the endoscope in your mouth and down your esophagus. It is thinner than most pieces of food that you swallow. It will not affect your breathing. The medicine helps keep you from gagging.  · Air is put into your GI tract to expand it. It can make you burp.  · During the procedure, the healthcare provider can take biopsies (tissue samples), remove abnormalities, such as polyps, or treat abnormalities through a variety of devices placed through the endoscope. You will not feel this.   · The endoscope carries images of your upper GI tract to a video screen. If you are awake, you may be able to look at the images.  · After the procedure is done, you will rest for a time. An adult must drive you home.  When to call your healthcare provider  Contact your healthcare provider if you have:  · Black or tarry stools, or blood in your stool  · Fever  · Pain in your belly that does not go away  · Nausea and vomiting, or vomiting blood   Date Last Reviewed: 7/1/2016 © 2000-2017 [employer] [address] [place] [address], [place] [address]. All rights reserved. This information is not intended as a substitute for professional medical care. Always follow your healthcare professional's instructions.

## 2019-02-05 NOTE — [employer] PATIENT INSTRUCTIONS
Discharge Summary/Instructions after an Endoscopic Procedure  Patient Name: Karen Villarreal  Patient MRN: [id number]  Patient YOB: 1965  Tuesday, February 05, 2019  Leigha Whitehead MD  RESTRICTIONS:  During your procedure today, you received medications for sedation.  These   medications may affect your judgment, balance and coordination.  Therefore,   for 24 hours, you have the following restrictions:   - DO NOT drive a car, operate machinery, make legal/financial decisions,   sign important papers or drink alcohol.    ACTIVITY:  Today: no heavy lifting, straining or running due to procedural   sedation/anesthesia.  The following day: return to full activity including work.  DIET:  Eat and drink normally unless instructed otherwise.     TREATMENT FOR COMMON SIDE EFFECTS:  - Mild abdominal pain, nausea, belching, bloating or excessive gas:  rest,   eat lightly and use a heating pad.  - Sore Throat: treat with throat lozenges and/or gargle with warm salt   water.  - Because air was used during the procedure, expelling large amounts of air   from your rectum or belching is normal.  - If a bowel prep was taken, you may not have a bowel movement for 1-3 days.    This is normal.  SYMPTOMS TO WATCH FOR AND REPORT TO YOUR PHYSICIAN:  1. Abdominal pain or bloating, other than gas cramps.  2. Chest pain.  3. Back pain.  4. Signs of infection such as: chills or fever occurring within 24 hours   after the procedure.  5. Rectal bleeding, which would show as bright red, maroon, or black stools.   (A tablespoon of blood from the rectum is not serious, especially if   hemorrhoids are present.)  6. Vomiting.  7. Weakness or dizziness.  GO DIRECTLY TO THE NEAREST EMERGENCY ROOM IF YOU HAVE ANY OF THE FOLLOWING:      Difficulty breathing              Chills and/or fever over 101 F   Persistent vomiting and/or vomiting blood   Severe abdominal pain   Severe chest pain   Black, tarry stools   Bleeding- more than one  tablespoon   Any other symptom or condition that you feel may need urgent attention  Your doctor recommends these additional instructions:  If any biopsies were taken, your doctors clinic will contact you in 1 to 2   weeks with any results.  - Use Prilosec (omeprazole) 40 mg PO BID indefinitely.   - Use sucralfate suspension 1 gram PO QID for 3 months.   - Return patient to hospital [facility]amer for ongoing care.  For questions, problems or results please call your physician - Leigha Whitehead MD at Work:  [phone].  [facility], EMERGENCY ROOM PHONE NUMBER: [phone]  IF A COMPLICATION OR EMERGENCY SITUATION ARISES AND YOU ARE UNABLE TO REACH   YOUR PHYSICIAN - GO DIRECTLY TO THE EMERGENCY ROOM.  Leigha Whitehead MD  2/5/2019 8:40:00 AM  This report has been verified and signed electronically.  Justin Saldana MD  [employer]

## 2019-02-05 NOTE — PROGRESS NOTES
Progress Note  Hospital Medicine  Patient Name:Karen Villarreal  MRN:  [id number]  Patient Class: IP- Inpatient  Admit Date: 2/1/2019  Length of Stay: 4 days  Expected Discharge Date:   Attending Physician: Ely Rod MD  Primary Care Provider:  Shama Mccoy MD    SUBJECTIVE:     Principal Problem: UGIB (upper gastrointestinal bleed)  Initial history of present illness: Patient is a 53 y.o. female admitted to Hospitalist Service from [facility] with complaint of upper and lower GI bleeding. Patient reportedly has past medical history significant for GERD, OA, chronic hepatitis C infection and thrombocytopenia. Patient presented to the ED via EMS for evaluation of multiple episodes of coffee ground emesis and dark stool throughout this week. EMS was called due to worsening weakness. Upon arrival of EMS to the patient's home, she had a systolic blood pressure of 100 that briefly decreased to 60. Patient was given IV fluids en route and BP returned to 100. Patient is scheduled for a upper endoscopy next week by Dr. Leigha Whitehead. Patient denied cough, fever, and hemoptysis. Last episode of vomiting was x 45 minutes ago and last episode of diarrhea was x1 hour ago. Patient mentions being admitted to the hospital around Christmas 2018 to receive a blood transfusion. She is not on long term anticoagulants. Patient denied chest pain, shortness of breath, abdominal pain, nausea, vomiting, headache, vision changes, focal neuro-deficits, cough or fever.    PMH/PSH/SH/FH/Meds: reviewed.    Symptoms/Review of Systems: Patiently is scheduled for EGD this am. No shortness of breath, cough, chest pain or headache, fever or abdominal pain.     Diet: NPO  Activity level: Normal.    Pain:  Patient reports no pain.       OBJECTIVE:   Vital Signs (Most Recent):      Temp: 98 °F (36.7 °C) (02/05/19 0828)  Pulse: 94 (02/05/19 0828)  Resp: (!) 98 (02/05/19 0828)  BP: 131/60 (02/05/19  0828)  SpO2: 100 % (02/05/19 0828)       Vital Signs Range (Last 24H):  Temp:  [96.9 °F (36.1 °C)-98.8 °F (37.1 °C)]   Pulse:  []   Resp:  [15-98]   BP: (106-146)/(54-82)   SpO2:  [96 %-100 %]     Weight: 123.7 kg (272 lb 11.3 oz)  Body mass index is 51.53 kg/m².    Intake/Output Summary (Last 24 hours) at 2/5/2019 0901  Last data filed at 2/5/2019 0823  Gross per 24 hour   Intake 4447.5 ml   Output --   Net 4447.5 ml     Physical Examination:  Constitutional: She is oriented to person, place, and time. She appears well-developed.   Eyes: Pupils are equal, round, and reactive to light.   Neck: Normal range of motion. Neck supple.   Cardiovascular: Normal rate, regular rhythm and normal heart sounds.   Pulmonary/Chest: Effort normal and breath sounds normal. No respiratory distress.   Abdominal: Soft. Bowel sounds are normal. She exhibits no distension. There is no tenderness.   Musculoskeletal: Normal range of motion. She exhibits no edema.   Neurological: She is alert and oriented to person, place, and time.   No asterixis   Skin: Skin is warm and dry.   Psychiatric: She has a normal mood and affect. Her behavior is normal.     CBC:  Recent Labs   Lab 02/03/19  0626  02/04/19  0603  02/04/19  1746 02/04/19  2353 02/05/19  0617   WBC 10.80  --  8.60  --   --   --  6.40   RBC 2.69*  --  2.83*  --   --   --  2.55*   HGB 7.6*  7.6*   < > 7.9*  7.9*   < > 7.6* 7.5* 7.1*   HCT 23.3*  --  24.8*  --   --   --  22.1*   PLT 91*  --  86*  --   --   --  81*   MCV 87  --  87  --   --   --  87   MCH 28.1  --  27.9  --   --   --  27.9   MCHC 32.4  --  32.0  --   --   --  32.1    < > = values in this interval not displayed.   BMP  Recent Labs   Lab 02/03/19  0626 02/04/19  0603 02/05/19  0617    101 103   * 134* 133*   K 3.8 3.5 3.5    109 109   CO2 19* 21* 21*   BUN 16 9 6   CREATININE 0.8 0.8 0.8   CALCIUM 7.5* 7.5* 7.3*   MG 1.8 1.7 1.7      Diagnostic Results:  Microbiology Results (last 7 days)      ** No results found for the last 168 hours. **         Assessment/Plan:     * UGIB (upper gastrointestinal bleed)  Esophageal varices and esophagitis     S/p EGD: +EV and esophagitis. No intervention performed  Monitor in ICU per GI. Follow repeat EGD results.  Transfuse 2 units of PRBC.  Continue IV Protonix infusion 8 mg/hr.  Continue Octreotide infusion.  Continue Abx  Continue IVF hydration.   Use IV anti-emetics as needed. May need PRBC if H/H drops.      Malnutrition of moderate degree     Nutrition consulted. Encourage maximal PO intake. Diet supplementation ordered per nutrition approval. Will encourage PO and monitor closely for weight changes.     Thrombocytopenia     Follow platelet count closely.       Essential hypertension     Hold anti-HTN agents. Monitor BP     Chronic hepatitis C with cirrhosis     Noted. Under care by Dr. Whitehead. She was about to start HCV treatment.     Morbid obesity     Body mass index is 48.94 kg/m². Morbid obesity complicates all aspects of disease management from diagnostic modalities to treatment. Weight loss encouraged and health benefits explained to patient.     DVT prophylaxis: Use SCD and TEDs, avoid anticoagulation due to GI bleeding.    Ely Rod MD  Department of Hospital Medicine   [facility]

## 2019-02-06 LAB
ALBUMIN SERPL BCP-MCNC: 2.4 G/DL
ALP SERPL-CCNC: 115 U/L
ALT SERPL W/O P-5'-P-CCNC: 20 U/L
ANION GAP SERPL CALC-SCNC: 5 MMOL/L
AST SERPL-CCNC: 36 U/L
BASOPHILS # BLD AUTO: 0.1 K/UL
BASOPHILS NFR BLD: 0.9 %
BILIRUB SERPL-MCNC: 3 MG/DL
BUN SERPL-MCNC: 4 MG/DL
CALCIUM SERPL-MCNC: 7.6 MG/DL
CHLORIDE SERPL-SCNC: 108 MMOL/L
CO2 SERPL-SCNC: 21 MMOL/L
CREAT SERPL-MCNC: 0.7 MG/DL
DIFFERENTIAL METHOD: ABNORMAL
EOSINOPHIL # BLD AUTO: 0.2 K/UL
EOSINOPHIL NFR BLD: 2.7 %
ERYTHROCYTE [DISTWIDTH] IN BLOOD BY AUTOMATED COUNT: 19.1 %
EST. GFR  (AFRICAN AMERICAN): >60 ML/MIN/1.73 M^2
EST. GFR  (NON AFRICAN AMERICAN): >60 ML/MIN/1.73 M^2
GLUCOSE SERPL-MCNC: 103 MG/DL
HCT VFR BLD AUTO: 27.5 %
HGB BLD-MCNC: 8.8 G/DL
HGB BLD-MCNC: 9 G/DL
INR PPP: 1.3
LYMPHOCYTES # BLD AUTO: 1.2 K/UL
LYMPHOCYTES NFR BLD: 16.5 %
MAGNESIUM SERPL-MCNC: 1.7 MG/DL
MCH RBC QN AUTO: 28.5 PG
MCHC RBC AUTO-ENTMCNC: 32.8 G/DL
MCV RBC AUTO: 87 FL
MONOCYTES # BLD AUTO: 0.6 K/UL
MONOCYTES NFR BLD: 8.2 %
NEUTROPHILS # BLD AUTO: 5 K/UL
NEUTROPHILS NFR BLD: 71.7 %
PLATELET # BLD AUTO: 83 K/UL
PMV BLD AUTO: 10.6 FL
POTASSIUM SERPL-SCNC: 3.5 MMOL/L
PROT SERPL-MCNC: 4.9 G/DL
PROTHROMBIN TIME: 12.8 SEC
RBC # BLD AUTO: 3.17 M/UL
SODIUM SERPL-SCNC: 134 MMOL/L
WBC # BLD AUTO: 7 K/UL

## 2019-02-06 PROCEDURE — 85610 PROTHROMBIN TIME: CPT

## 2019-02-06 PROCEDURE — 25000003 PHARM REV CODE 250: Performed by: NURSE PRACTITIONER

## 2019-02-06 PROCEDURE — 25000003 PHARM REV CODE 250: Performed by: INTERNAL MEDICINE

## 2019-02-06 PROCEDURE — 63600175 PHARM REV CODE 636 W HCPCS: Performed by: INTERNAL MEDICINE

## 2019-02-06 PROCEDURE — 85025 COMPLETE CBC W/AUTO DIFF WBC: CPT

## 2019-02-06 PROCEDURE — 36430 TRANSFUSION BLD/BLD COMPNT: CPT

## 2019-02-06 PROCEDURE — 80053 COMPREHEN METABOLIC PANEL: CPT

## 2019-02-06 PROCEDURE — 83735 ASSAY OF MAGNESIUM: CPT

## 2019-02-06 PROCEDURE — 85018 HEMOGLOBIN: CPT

## 2019-02-06 PROCEDURE — 94761 N-INVAS EAR/PLS OXIMETRY MLT: CPT

## 2019-02-06 PROCEDURE — C9113 INJ PANTOPRAZOLE SODIUM, VIA: HCPCS | Performed by: INTERNAL MEDICINE

## 2019-02-06 PROCEDURE — 12000002 HC ACUTE/MED SURGE SEMI-PRIVATE ROOM

## 2019-02-06 PROCEDURE — 36415 COLL VENOUS BLD VENIPUNCTURE: CPT

## 2019-02-06 RX ORDER — THIAMINE HCL 100 MG
100 TABLET ORAL 2 TIMES DAILY
Status: DISCONTINUED | OUTPATIENT
Start: 2019-02-06 | End: 2019-02-07 | Stop reason: HOSPADM

## 2019-02-06 RX ORDER — FUROSEMIDE 20 MG/1
20 TABLET ORAL DAILY
Status: DISCONTINUED | OUTPATIENT
Start: 2019-02-06 | End: 2019-02-07 | Stop reason: HOSPADM

## 2019-02-06 RX ORDER — FOLIC ACID 1 MG/1
1 TABLET ORAL 2 TIMES DAILY
Status: DISCONTINUED | OUTPATIENT
Start: 2019-02-06 | End: 2019-02-07 | Stop reason: HOSPADM

## 2019-02-06 RX ORDER — SPIRONOLACTONE 25 MG/1
50 TABLET ORAL DAILY
Status: DISCONTINUED | OUTPATIENT
Start: 2019-02-06 | End: 2019-02-06

## 2019-02-06 RX ORDER — CYCLOBENZAPRINE HCL 10 MG
10 TABLET ORAL 2 TIMES DAILY PRN
Status: DISCONTINUED | OUTPATIENT
Start: 2019-02-06 | End: 2019-02-07 | Stop reason: HOSPADM

## 2019-02-06 RX ORDER — FUROSEMIDE 40 MG/1
40 TABLET ORAL DAILY
Status: DISCONTINUED | OUTPATIENT
Start: 2019-02-07 | End: 2019-02-07 | Stop reason: HOSPADM

## 2019-02-06 RX ORDER — GABAPENTIN 300 MG/1
300 CAPSULE ORAL 3 TIMES DAILY
Status: DISCONTINUED | OUTPATIENT
Start: 2019-02-07 | End: 2019-02-07 | Stop reason: HOSPADM

## 2019-02-06 RX ORDER — SPIRONOLACTONE 25 MG/1
50 TABLET ORAL DAILY
Status: DISCONTINUED | OUTPATIENT
Start: 2019-02-06 | End: 2019-02-07 | Stop reason: HOSPADM

## 2019-02-06 RX ADMIN — FUROSEMIDE 20 MG: 20 TABLET ORAL at 01:02

## 2019-02-06 RX ADMIN — CYCLOBENZAPRINE HYDROCHLORIDE 10 MG: 10 TABLET, FILM COATED ORAL at 10:02

## 2019-02-06 RX ADMIN — PANTOPRAZOLE SODIUM 40 MG: 40 INJECTION, POWDER, LYOPHILIZED, FOR SOLUTION INTRAVENOUS at 11:02

## 2019-02-06 RX ADMIN — SPIRONOLACTONE 50 MG: 25 TABLET, FILM COATED ORAL at 09:02

## 2019-02-06 RX ADMIN — SUCRALFATE 1 G: 1 SUSPENSION ORAL at 05:02

## 2019-02-06 RX ADMIN — SUCRALFATE 1 G: 1 SUSPENSION ORAL at 11:02

## 2019-02-06 RX ADMIN — Medication 100 MG: at 09:02

## 2019-02-06 RX ADMIN — FOLIC ACID 1 MG: 1 TABLET ORAL at 09:02

## 2019-02-06 RX ADMIN — FUROSEMIDE 20 MG: 20 TABLET ORAL at 05:02

## 2019-02-06 NOTE — PROGRESS NOTES
02/06/19 1000   Patient Assessment/Suction   Level of Consciousness (AVPU) alert   Respiratory Effort Normal;Unlabored   PRE-TX-O2   O2 Device (Oxygen Therapy) room air   SpO2 95 %   Pulse Oximetry Type Intermittent   $ Pulse Oximetry - Multiple Charge Pulse Oximetry - Multiple   Pulse 95   Resp 18

## 2019-02-06 NOTE — PROGRESS NOTES
HISTORY OF PRESENT ILLNESS:  The patient is a 53-year-old white  female   with decompensated liver disease, hematemesis and also GI bleeding.  The   patient underwent variceal ligation yesterday and has done reasonably well.  The   patient needed two units of blood transfusion.  Her H and H has been stable   this morning.  The patient is tolerating a full liquid diet.  The patient is   going to get rehabilitation services to work with her to ambulate the patient.    The patient denies any fever or chills.  At this point, the patient is going to   have advanced diet in the morning and see how she does and proceed from there.    Detail physical examination was done and did not reveal any fresh objective   findings other than free fluid in the abdomen.  Lab data were reviewed.  No   other medical issues at this time and the patient hopefully will be treated with   directly acting antiviral agents to get rid of her hepatitis C.  She is   genotype 1a.      DS/IN  dd: 02/06/2019 13:38:49 (CST)  td: 02/06/2019 17:07:26 (CST)  Doc ID   #[id number]  Job ID #[id number]    CC:

## 2019-02-06 NOTE — PROGRESS NOTES
Progress Note  Hospital Medicine  Patient Name:Karen Villarreal  MRN:  [id number]  Patient Class: IP- Inpatient  Admit Date: 2/1/2019  Length of Stay: 5 days  Expected Discharge Date:   Attending Physician: Ely Rod MD  Primary Care Provider:  Shama Mccoy MD    SUBJECTIVE:     Principal Problem: UGIB (upper gastrointestinal bleed)  Initial history of present illness: Patient is a 53 y.o. female admitted to Hospitalist Service from [facility] with complaint of upper and lower GI bleeding. Patient reportedly has past medical history significant for GERD, OA, chronic hepatitis C infection and thrombocytopenia. Patient presented to the ED via EMS for evaluation of multiple episodes of coffee ground emesis and dark stool throughout this week. EMS was called due to worsening weakness. Upon arrival of EMS to the patient's home, she had a systolic blood pressure of 100 that briefly decreased to 60. Patient was given IV fluids en route and BP returned to 100. Patient is scheduled for a upper endoscopy next week by Dr. Leigha Whitehead. Patient denied cough, fever, and hemoptysis. Last episode of vomiting was x 45 minutes ago and last episode of diarrhea was x1 hour ago. Patient mentions being admitted to the hospital around Christmas 2018 to receive a blood transfusion. She is not on long term anticoagulants. Patient denied chest pain, shortness of breath, abdominal pain, nausea, vomiting, headache, vision changes, focal neuro-deficits, cough or fever.    PMH/PSH/SH/FH/Meds: reviewed.    Symptoms/Review of Systems: Patiently had esophageal banding done yesterday and received 2 units of PRBC. No shortness of breath, cough, chest pain or headache, fever or abdominal pain. No BM overnight.  Diet: Full liquids  Activity level: Normal.    Pain:  Patient reports no pain.       OBJECTIVE:   Vital Signs (Most Recent):      Temp: 98.1 °F (36.7 °C) (02/06/19 0723)  Pulse: 90 (02/06/19  0723)  Resp: 18 (02/06/19 0723)  BP: (!) 126/58 (02/06/19 0723)  SpO2: 95 % (02/06/19 0723)       Vital Signs Range (Last 24H):  Temp:  [96.9 °F (36.1 °C)-98.5 °F (36.9 °C)]   Pulse:  [84-96]   Resp:  [16-98]   BP: (115-144)/(51-70)   SpO2:  [94 %-100 %]     Weight: 123.7 kg (272 lb 11.3 oz)  Body mass index is 51.53 kg/m².    Intake/Output Summary (Last 24 hours) at 2/6/2019 0824  Last data filed at 2/6/2019 0600  Gross per 24 hour   Intake 900 ml   Output --   Net 900 ml     Physical Examination:  Constitutional: She is oriented to person, place, and time. She appears well-developed.   Eyes: Pupils are equal, round, and reactive to light.   Neck: Normal range of motion. Neck supple.   Cardiovascular: Normal rate, regular rhythm and normal heart sounds.   Pulmonary/Chest: Effort normal and breath sounds normal. No respiratory distress.   Abdominal: Soft. Bowel sounds are normal. She exhibits no distension. There is no tenderness.   Musculoskeletal: Normal range of motion. She exhibits no edema.   Neurological: She is alert and oriented to person, place, and time.   No asterixis   Skin: Skin is warm and dry.   Psychiatric: She has a normal mood and affect. Her behavior is normal.     CBC:  Recent Labs   Lab 02/04/19  0603  02/05/19  0617 02/05/19  1126 02/06/19  0522   WBC 8.60  --  6.40  --  7.00   RBC 2.83*  --  2.55*  --  3.17*   HGB 7.9*  7.9*   < > 7.1* 7.2* 9.0*  9.0*   HCT 24.8*  --  22.1*  --  27.5*   PLT 86*  --  81*  --  83*   MCV 87  --  87  --  87   MCH 27.9  --  27.9  --  28.5   MCHC 32.0  --  32.1  --  32.8    < > = values in this interval not displayed.   BMP  Recent Labs   Lab 02/04/19  0603 02/05/19  0617 02/06/19  0522    103 103   * 133* 134*   K 3.5 3.5 3.5    109 108   CO2 21* 21* 21*   BUN 9 6 4*   CREATININE 0.8 0.8 0.7   CALCIUM 7.5* 7.3* 7.6*   MG 1.7 1.7 1.7      Diagnostic Results:  Microbiology Results (last 7 days)     ** No results found for the last 168 hours. **          EGD:  - Grade II esophageal varices.                       - Type 2 isolated gastric varices (IGV2, varices                        located in the body, antrum or around the pylorus),                        without bleeding.                       - Portal hypertensive gastropathy.                       - Erythematous duodenopathy.                       - No specimens collected.    EGD:  - Recently bleeding grade II esophageal varices.                        Incompletely eradicated. Banded.                       - Portal hypertensive gastropathy.                       - Duodenitis.                       - No specimens collected.    Assessment/Plan:     * UGIB (upper gastrointestinal bleed)  Esophageal varices and esophagitis  Acute GI blood loss anemia     S/p EGD: +EV and esophagitis. No intervention performed  Discussed with Dr. Whitehead. On full liquids for 1 more day.  Continue IV Protonix daily.  Continue Abx  Continue IVF hydration.   Use IV anti-emetics as needed.      Malnutrition of moderate degree     Nutrition consulted. Encourage maximal PO intake. Diet supplementation ordered per nutrition approval. Will encourage PO and monitor closely for weight changes.     Thrombocytopenia     Follow platelet count closely.       Essential hypertension     Hold anti-HTN agents. Monitor BP     Chronic hepatitis C with cirrhosis     Noted. Under care by Dr. Whitehead. She was about to start HCV treatment.     Morbid obesity     Body mass index is 48.94 kg/m². Morbid obesity complicates all aspects of disease management from diagnostic modalities to treatment. Weight loss encouraged and health benefits explained to patient.     DVT prophylaxis: Use SCD and TEDs, avoid anticoagulation due to GI bleeding.    Likely DC home in am if H/H is stable.    Ely Rod MD  Department of Hospital Medicine   [facility]

## 2019-02-06 NOTE — PLAN OF CARE
Problem: Adult Inpatient Plan of Care  Goal: Plan of Care Review  Outcome: Revised  Pt is AAOx4.  IV saline locked, 2 units of PRBC completed this shift.  Cardiac monitoring in place, NSR.  Pt is continent of B and B, up with assist.  1800 mL fluid restriction maintained.  VSS, in NAD, pt remains afebrile.  Pt remains free from injury.  Bed in low position, wheels locked, call light within reach.  Pt verbalized understanding of POC.

## 2019-02-06 NOTE — NURSING
Spoke with Dr. Gloria.  He wants to give one dose of lasix 20 mg PO in 0600 AM and place on 1800 mL fluid restriction.  Dr. Gloria also said to stop all the infusions including sandostatin.  Placed orders in per Dr. Gloria and will continue to monitor

## 2019-02-07 VITALS
HEART RATE: 104 BPM | BODY MASS INDEX: 51.48 KG/M2 | SYSTOLIC BLOOD PRESSURE: 132 MMHG | TEMPERATURE: 98 F | OXYGEN SATURATION: 97 % | RESPIRATION RATE: 16 BRPM | DIASTOLIC BLOOD PRESSURE: 63 MMHG | WEIGHT: 272.69 LBS | HEIGHT: 61 IN

## 2019-02-07 DIAGNOSIS — K74.60 HEPATIC CIRRHOSIS, UNSPECIFIED HEPATIC CIRRHOSIS TYPE, UNSPECIFIED WHETHER ASCITES PRESENT: ICD-10-CM

## 2019-02-07 DIAGNOSIS — D69.6 THROMBOCYTOPENIA: ICD-10-CM

## 2019-02-07 DIAGNOSIS — R18.0 MALIGNANT ASCITES: ICD-10-CM

## 2019-02-07 DIAGNOSIS — R10.9 ABDOMINAL PAIN, UNSPECIFIED ABDOMINAL LOCATION: ICD-10-CM

## 2019-02-07 DIAGNOSIS — K21.9 GASTROESOPHAGEAL REFLUX DISEASE, ESOPHAGITIS PRESENCE NOT SPECIFIED: ICD-10-CM

## 2019-02-07 DIAGNOSIS — D64.9 ANEMIA, UNSPECIFIED TYPE: Primary | ICD-10-CM

## 2019-02-07 DIAGNOSIS — K92.2 GASTROINTESTINAL HEMORRHAGE, UNSPECIFIED GASTROINTESTINAL HEMORRHAGE TYPE: ICD-10-CM

## 2019-02-07 LAB
ALBUMIN SERPL BCP-MCNC: 2.5 G/DL
ALP SERPL-CCNC: 136 U/L
ALT SERPL W/O P-5'-P-CCNC: 20 U/L
AMYLASE SERPL-CCNC: 32 U/L
ANION GAP SERPL CALC-SCNC: 5 MMOL/L
AST SERPL-CCNC: 37 U/L
BASOPHILS # BLD AUTO: 0 K/UL
BASOPHILS NFR BLD: 0.5 %
BILIRUB SERPL-MCNC: 2.1 MG/DL
BUN SERPL-MCNC: 5 MG/DL
CALCIUM SERPL-MCNC: 7.6 MG/DL
CHLORIDE SERPL-SCNC: 104 MMOL/L
CO2 SERPL-SCNC: 25 MMOL/L
CREAT SERPL-MCNC: 0.8 MG/DL
DIFFERENTIAL METHOD: ABNORMAL
EOSINOPHIL # BLD AUTO: 0.1 K/UL
EOSINOPHIL NFR BLD: 1.7 %
ERYTHROCYTE [DISTWIDTH] IN BLOOD BY AUTOMATED COUNT: 18.8 %
EST. GFR  (AFRICAN AMERICAN): >60 ML/MIN/1.73 M^2
EST. GFR  (NON AFRICAN AMERICAN): >60 ML/MIN/1.73 M^2
GLUCOSE SERPL-MCNC: 95 MG/DL
HCT VFR BLD AUTO: 29 %
HGB BLD-MCNC: 8.1 G/DL
HGB BLD-MCNC: 9.3 G/DL
INR PPP: 1.2
LIPASE SERPL-CCNC: 32 U/L
LYMPHOCYTES # BLD AUTO: 1.1 K/UL
LYMPHOCYTES NFR BLD: 12.9 %
MAGNESIUM SERPL-MCNC: 1.6 MG/DL
MCH RBC QN AUTO: 27.8 PG
MCHC RBC AUTO-ENTMCNC: 32.2 G/DL
MCV RBC AUTO: 86 FL
MONOCYTES # BLD AUTO: 0.7 K/UL
MONOCYTES NFR BLD: 8.2 %
NEUTROPHILS # BLD AUTO: 6.3 K/UL
NEUTROPHILS NFR BLD: 76.7 %
PLATELET # BLD AUTO: 82 K/UL
PMV BLD AUTO: 10.1 FL
POTASSIUM SERPL-SCNC: 3.1 MMOL/L
PROT SERPL-MCNC: 5.1 G/DL
PROTHROMBIN TIME: 12.7 SEC
RBC # BLD AUTO: 3.36 M/UL
SODIUM SERPL-SCNC: 134 MMOL/L
WBC # BLD AUTO: 8.2 K/UL

## 2019-02-07 PROCEDURE — 25000003 PHARM REV CODE 250: Performed by: INTERNAL MEDICINE

## 2019-02-07 PROCEDURE — 85018 HEMOGLOBIN: CPT

## 2019-02-07 PROCEDURE — 85610 PROTHROMBIN TIME: CPT

## 2019-02-07 PROCEDURE — 63600175 PHARM REV CODE 636 W HCPCS: Performed by: INTERNAL MEDICINE

## 2019-02-07 PROCEDURE — 80053 COMPREHEN METABOLIC PANEL: CPT

## 2019-02-07 PROCEDURE — 83690 ASSAY OF LIPASE: CPT

## 2019-02-07 PROCEDURE — 25000003 PHARM REV CODE 250: Performed by: NURSE PRACTITIONER

## 2019-02-07 PROCEDURE — C9113 INJ PANTOPRAZOLE SODIUM, VIA: HCPCS | Performed by: INTERNAL MEDICINE

## 2019-02-07 PROCEDURE — 36415 COLL VENOUS BLD VENIPUNCTURE: CPT

## 2019-02-07 PROCEDURE — 85025 COMPLETE CBC W/AUTO DIFF WBC: CPT

## 2019-02-07 PROCEDURE — 82150 ASSAY OF AMYLASE: CPT

## 2019-02-07 PROCEDURE — 83735 ASSAY OF MAGNESIUM: CPT

## 2019-02-07 RX ORDER — PANTOPRAZOLE SODIUM 40 MG/1
40 TABLET, DELAYED RELEASE ORAL DAILY
Qty: 30 TABLET | Refills: 11 | Status: SHIPPED | OUTPATIENT
Start: 2019-02-07 | End: 2019-05-06 | Stop reason: SDUPTHER

## 2019-02-07 RX ORDER — PANTOPRAZOLE SODIUM 40 MG/1
40 TABLET, DELAYED RELEASE ORAL DAILY
Qty: 30 TABLET | Refills: 11 | Status: SHIPPED | OUTPATIENT
Start: 2019-02-07 | End: 2019-02-12 | Stop reason: SDUPTHER

## 2019-02-07 RX ORDER — SUCRALFATE 1 G/10ML
1 SUSPENSION ORAL
Qty: 300 ML | Refills: 0 | Status: SHIPPED | OUTPATIENT
Start: 2019-02-07 | End: 2019-02-12 | Stop reason: SDUPTHER

## 2019-02-07 RX ORDER — LANOLIN ALCOHOL/MO/W.PET/CERES
100 CREAM (GRAM) TOPICAL 2 TIMES DAILY
Qty: 30 TABLET | Refills: 0 | Status: SHIPPED | OUTPATIENT
Start: 2019-02-07 | End: 2019-02-12 | Stop reason: SDUPTHER

## 2019-02-07 RX ORDER — FOLIC ACID 1 MG/1
1 TABLET ORAL 2 TIMES DAILY
Qty: 30 TABLET | Refills: 0 | Status: SHIPPED | OUTPATIENT
Start: 2019-02-07 | End: 2019-02-12 | Stop reason: SDUPTHER

## 2019-02-07 RX ORDER — PANTOPRAZOLE SODIUM 40 MG/1
40 TABLET, DELAYED RELEASE ORAL DAILY
Qty: 30 TABLET | Refills: 0 | Status: SHIPPED | OUTPATIENT
Start: 2019-02-07 | End: 2019-02-12 | Stop reason: SDUPTHER

## 2019-02-07 RX ORDER — POTASSIUM CHLORIDE 20 MEQ/1
40 TABLET, EXTENDED RELEASE ORAL ONCE
Status: COMPLETED | OUTPATIENT
Start: 2019-02-07 | End: 2019-02-07

## 2019-02-07 RX ADMIN — PANTOPRAZOLE SODIUM 40 MG: 40 INJECTION, POWDER, LYOPHILIZED, FOR SOLUTION INTRAVENOUS at 09:02

## 2019-02-07 RX ADMIN — SUCRALFATE 1 G: 1 SUSPENSION ORAL at 12:02

## 2019-02-07 RX ADMIN — FOLIC ACID 1 MG: 1 TABLET ORAL at 09:02

## 2019-02-07 RX ADMIN — SUCRALFATE 1 G: 1 SUSPENSION ORAL at 05:02

## 2019-02-07 RX ADMIN — POTASSIUM CHLORIDE 40 MEQ: 20 TABLET, EXTENDED RELEASE ORAL at 12:02

## 2019-02-07 RX ADMIN — FUROSEMIDE 40 MG: 40 TABLET ORAL at 09:02

## 2019-02-07 RX ADMIN — GABAPENTIN 300 MG: 300 CAPSULE ORAL at 09:02

## 2019-02-07 RX ADMIN — Medication 100 MG: at 09:02

## 2019-02-07 RX ADMIN — SPIRONOLACTONE 50 MG: 25 TABLET, FILM COATED ORAL at 09:02

## 2019-02-07 NOTE — TELEPHONE ENCOUNTER
----- Message from Yue Saucedo sent at 2/7/2019 12:46 PM CST -----  Pt was discharged from the hospital on 2/7/19.  Dr Whitehead wants to see her in April.  He would like her to have the following labs done one week before her appt:  CBC, CMP, MAGNESIUM, PHOSPHORUS, AMYLASE, LIPASE, SED RATE, AND C REACTIVE PROTEIN.  PLEASE LET PATIENT KNOW WHEN THESE ARE ORDERED.

## 2019-02-07 NOTE — NURSING
Situation Principle Problem:  UGIB (upper gastrointestinal bleed)      Reason for Calling: Patient requesting we reorder her home medications, Neurontin and Flexeril.     Provider Calling: Catarina Barnes   Background Vitals:    02/06/19 1000 02/06/19 1125 02/06/19 1608 02/06/19 2056   BP:  (!) 140/63 130/62 (!) 124/59   BP Location:    Left arm   Patient Position:  Sitting  Lying   Pulse: 95 85 92 93   Resp: 18 18 20 16   Temp:  98.5 °F (36.9 °C) 98.4 °F (36.9 °C) 98.2 °F (36.8 °C)   TempSrc:  Oral  Oral   SpO2: 95% 96% 95% 96%   Weight:       Height:           No results found for: POCTGLUCOSE    Intake/Output:    Intake/Output Summary (Last 24 hours) at 2/6/2019 2111  Last data filed at 2/6/2019 0600  Gross per 24 hour   Intake 750 ml   Output --   Net 750 ml        Assessment What is happening: Patient is requesting that we order her home medications, Flexeril 10 mg twice daily and Neurontin 300 mg three times daily.  Can we do this for her?   Response Provider Response: reordered

## 2019-02-07 NOTE — PLAN OF CARE
Problem: Adult Inpatient Plan of Care  Goal: Plan of Care Review  Outcome: Ongoing (interventions implemented as appropriate)  AAOx4. Plan of care reviewed with patient. Safety maintained throughout shift. Bed locked and low, SRx2, call light within reach. PRN medication offered for c/o pain but refused. Telemetry monitoring, SR. Ambulated in the vernon, no new complaints. Flushed PIVs, CDI. Right arm IV removed. Hourly/q2 rounding completed this shift for pt safety. Will continue to monitor

## 2019-02-07 NOTE — DISCHARGE SUMMARY
Discharge Summary  Hospital Medicine    Admit Date: 2/1/2019    Date and Time: 2/7/201910:19 AM    Discharge Attending Physician: Ely Rod MD    Primary Care Physician: Shama Mccoy MD    Diagnoses:  Active Hospital Problems    Diagnosis  POA    *UGIB (upper gastrointestinal bleed) [K92.2]  Esophageal varices and esophagitis s/p banding  Acute GI blood loss anemia  Yes    Malnutrition of moderate degree [E44.0]  Yes    History of iron deficiency [Z86.39]  Not Applicable    Thrombocytopenia [D69.6]  Yes    Chronic hepatitis C with cirrhosis [B18.2, K74.60]  Yes    Essential hypertension [I10]  Yes    Morbid obesity [E66.01]  Yes     Discharged Condition: Good    Hospital Course:   Patient is a 53 y.o. female admitted to Hospitalist Service from [facility] with complaint of upper and lower GI bleeding. Patient reportedly has past medical history significant for GERD, OA, chronic hepatitis C infection and thrombocytopenia. Patient presented to the ED via EMS for evaluation of multiple episodes of coffee ground emesis and dark stool throughout this week. EMS was called due to worsening weakness. Upon arrival of EMS to the patient's home, she had a systolic blood pressure of 100 that briefly decreased to 60. Patient was given IV fluids en route and BP returned to 100. Patient is scheduled for a upper endoscopy next week by Dr. Leigha Whitehead. Patient denied cough, fever, and hemoptysis. Last episode of vomiting was x 45 minutes ago and last episode of diarrhea was x1 hour ago. Patient mentions being admitted to the hospital around Christmas 2018 to receive a blood transfusion. She is not on long term anticoagulants. Patient denied chest pain, shortness of breath, abdominal pain, nausea, vomiting, headache, vision changes, focal neuro-deficits, cough or fever. Patient was admitted to Hospitalist medicine service. Patient was evaluated by Dr. Whitehead. Patient was  evaluated by GI specialist. Hemoglobin and hematocrit closely monitored. Patient required transfusion of PRBC. Patient under went endoscopic evaluation, results below and had esophageal banding completed. Hemoglobin and hematocrit remained stable, diet slowly advanced, patient tolerated diet. Symptoms resolved. Patient counseled to avoid NSAIDs. Follow CBC to be checked in 7 days. Patient was discharged home in stable condition with following discharge plan of care. Total time with the patient was 30 minutes and greater than 50% was spent in counseling and coordination of care. The assessment and plan have been discussed at length. Physicians' notes reviewed. Labs and procedure reviewed.     Consults: Dr. Whitehead    Significant Diagnostic Studies:     EGD:  - Grade II esophageal varices.                       - Type 2 isolated gastric varices (IGV2, varices                        located in the body, antrum or around the pylorus),                        without bleeding.                       - Portal hypertensive gastropathy.                       - Erythematous duodenopathy.                       - No specimens collected.     EGD:  - Recently bleeding grade II esophageal varices.                        Incompletely eradicated. Banded.                       - Portal hypertensive gastropathy.                       - Duodenitis.                       - No specimens collected.    Special Treatments/Procedures: as above  Disposition: Home or Self Care    Medications:  Reconciled Home Medications:   Current Discharge Medication List      START taking these medications    Details   folic acid (FOLVITE) 1 MG tablet Take 1 tablet (1 mg total) by mouth 2 (two) times daily.  Qty: 30 tablet, Refills: 0      sucralfate (CARAFATE) 100 mg/mL suspension Take 10 mLs (1 g total) by mouth 3 (three) times daily before meals. for 10 days  Qty: 300 mL, Refills: 0      thiamine 100 MG tablet Take 1 tablet (100 mg total) by mouth 2  (two) times daily.  Qty: 30 tablet, Refills: 0         CONTINUE these medications which have CHANGED    Details   !! pantoprazole (PROTONIX) 40 MG tablet Take 1 tablet (40 mg total) by mouth once daily.  Qty: 30 tablet, Refills: 0      !! pantoprazole (PROTONIX) 40 MG tablet Take 1 tablet (40 mg total) by mouth once daily.  Qty: 30 tablet, Refills: 11      !! pantoprazole (PROTONIX) 40 MG tablet Take 1 tablet (40 mg total) by mouth once daily.  Qty: 30 tablet, Refills: 11       !! - Potential duplicate medications found. Please discuss with provider.      CONTINUE these medications which have NOT CHANGED    Details   albuterol (VENTOLIN HFA) 90 mcg/actuation inhaler Ventolin HFA 90 mcg/actuation aerosol inhaler   take 2 puffs every 4hrs as needed for cough      aluminum & magnesium hydroxide-simethicone (MAALOX MAXIMUM STRENGTH) 400-400-40 mg/5 mL suspension Take by mouth every 6 (six) hours as needed for Indigestion.      cyclobenzaprine (FLEXERIL) 10 MG tablet Take 1 tablet(s) 2 TIMES A DAY by oral route as needed for muscle spasm. May make you drowsy. Do not drive while on medication.      ferrous sulfate 325 (65 FE) MG EC tablet Take 325 mg by mouth once daily.       furosemide (LASIX) 40 MG tablet Take 40 mg by mouth once daily.  Refills: 3      gabapentin (NEURONTIN) 300 MG capsule Take 300 mg by mouth 3 (three) times daily.      sertraline (ZOLOFT) 25 MG tablet Take 1 tablet by mouth once daily.  Refills: 1      spironolactone (ALDACTONE) 100 MG tablet Take 100 mg by mouth once daily.  Refills: 3           Discharge Procedure Orders   Diet Cardiac     Other restrictions (specify):   Order Comments: PLEASE OBSERVE FALL PRECAUTIONS     Call MD for:   Order Comments: For worsening symptoms, chest pain, shortness of breath, increased abdominal pain, high grade fever, stroke or stroke like symptoms, immediately go to the nearest Emergency Room or call 911 as soon as possible.     Follow-up Information      Shama Mccoy MD In 1 week.    Specialty:  Family Medicine  Contact information:  [address]  [address]  [facility]  [place] [address] [phone]             Leigha Whitehead MD In 2 weeks.    Specialties:  Gastroenterology, Surgery, General Surgery, Hepatology  Contact information:  [address] [facility]  [address]  [place] [id number] [phone]             Please follow up.    Contact information:  Avoid use of NSAIDs such as Motrin, Ibuprofen or Aleve.           Please follow up.    Contact information:  Check CBC in 7 days.

## 2019-02-07 NOTE — NURSING
Pt discharged, verbalized an understanding of s/c instructions, s/w Dr Whitehead prior to discharge, pt instructed to come to his office immediately from hospital so he can talk to her in his office.

## 2019-02-07 NOTE — TELEPHONE ENCOUNTER
HOSPITAL DID NOT SEND HER HOME WITH HER RX.  DR WOO WANTS TO GIVE HER FOLIC ACID, SUCRALFATE, AND THIAMINE.  PLEASE LET PATIENT KNOW WHEN THIS HAS BEEN DONE.

## 2019-02-07 NOTE — PROGRESS NOTES
02/07/19 0801   Patient Assessment/Suction   Level of Consciousness (AVPU) alert   Respiratory Effort Normal;Unlabored   PRE-TX-O2   O2 Device (Oxygen Therapy) room air   SpO2 97 %   Pulse 100   Resp 16

## 2019-02-08 NOTE — PROGRESS NOTES
The patient is an obese  female with the decompensated liver disease.    The patient's H and H are stable and not bleeding at this time.  The patient   denies any history of dysphagia, seems to be tolerating liquid diet, had a   detailed discussion with the hospitalist, Dr. Rod, and we will discharge the   patient today as she is stable for discharge.  Subsequently, the patient will   have a repeat endoscopy in about four weeks to band more varices.  A detailed   examination as already mentioned was done and labs were reviewed.  Consultations   are obtained from various consultants and this case was discussed in detail.    The patient will be needed to be treated for hepatitis C at a later date once   the bleeding issues resolved.  Once again, I am grateful to Dr. Rod for this   referral.        /adelso [id number] felipe(s)        KATRINA/ODIN  dd: 02/07/2019 20:45:05 (CST)  td: 02/07/2019 23:02:26 (CST)  Doc ID   #[id number]  Job ID #[id number]    CC:

## 2019-02-08 NOTE — PLAN OF CARE
02/08/19 0805   Final Note   Assessment Type Final Discharge Note   Anticipated Discharge Disposition Home

## 2019-02-08 NOTE — PHYSICIAN QUERY
"PT Name: Karen Villarreal  MR #: [id number]     Physician Query Form - Documentation Clarification      Dee Amezcua RN, CCDS  Desk # [phone]; [facility] # [phone] [email or web address]      This form is a permanent document in the medical record.     Query Date: February 8, 2019    By submitting this query, we are merely seeking further clarification of documentation. Please utilize your independent clinical judgment when addressing the question(s) below.    The Medical record reflects the following:    Supporting Clinical Findings Location in Medical Record   EGD:  - Grade II esophageal varices.  - Type 2 isolated gastric varices (IGV2, varices   located in the body, antrum or around the pylorus),   without bleeding.  - Portal hypertensive gastropathy.  - Erythematous duodenopathy.  - No specimens collected.     EGD:  - Recently bleeding grade II esophageal varices.    Incompletely eradicated. Banded.   - Portal hypertensive gastropathy.   - Duodenitis.   - No specimens collected.    UGIB (upper gastrointestinal bleed)  Esophageal varices and esophagitis  Acute GI blood loss anemia    PN 2/6                                                                              Doctor, Please specify diagnosis or diagnoses associated with above clinical findings.       Please further specify "esophagitis".     Provider Use Only    (  ) Erosion of esophagus with bleeding  (  ) Erosion of esophagus without bleeding    (  ) Ulcerative with bleeding  (  ) Ulcerative without bleeding     (  ) Due to gastrointestinal reflux disease with bleeding  (  ) Due to gastrointestinal reflux disease without bleeding    (  ) Reflux with hiatal hernia  (  ) Reflux without hiatal hernia    ( x ) Other type - specify: ___Recently bleeding grade II esophageal varices.__________    (  ) Esophagitis not further specified                                                                                                         [  ] " Clinically Undetermined

## 2019-02-08 NOTE — PHYSICIAN QUERY
"PT Name: Karen Villarreal  MR #: [id number]     Physician Query Form - Etiology of Condition Clarification      Dee Amezcua RN, CCDS  Desk # [phone]; [facility] # [phone] [email or web address]    This form is a permanent document in the medical record.     Query Date: February 8, 2019    By submitting this query, we are merely seeking further clarification of documentation.  Please utilize your independent clinical judgment when addressing the question(s) below.     The medical record contains the following:    Findings Supporting Clinical Information Location in Medical Record   Upper GI Bleed    -- H&P  Patient was evaluated by GI specialist. Hemoglobin and hematocrit closely monitored.     Patient required transfusion of PRBC.    Patient under went endoscopic evaluation, results below and had esophageal banding completed.     Patient counseled to avoid NSAIDs.     Follow CBC to be checked in 7 days. DC Summary    EGD:  - Grade II esophageal varices.                       - Type 2 isolated gastric varices (IGV2, varices                        located in the body, antrum or around the pylorus),                        without bleeding.                       - Portal hypertensive gastropathy.                       - Erythematous duodenopathy.                       - No specimens collected.   EGD:  - Recently bleeding grade II esophageal varices.                        Incompletely eradicated. Banded.                       - Portal hypertensive gastropathy.                       - Duodenitis.                       - No specimens collected.    UGIB (upper gastrointestinal bleed)  Esophageal varices and esophagitis  Acute GI blood loss anemia [facility] PN 2/6     Please document your best medical opinion regarding the etiology of  "Upper GI Bleed"  for which treatment is/was directed.     Provider use only        Bleeding esophageal varices           [  ] Clinically Undetermined     Please document in your " progress notes daily for the duration of treatment, until resolved, and include in your discharge summary.

## 2019-02-12 RX ORDER — LANOLIN ALCOHOL/MO/W.PET/CERES
100 CREAM (GRAM) TOPICAL 2 TIMES DAILY
Qty: 30 TABLET | Refills: 0 | Status: SHIPPED | OUTPATIENT
Start: 2019-02-12 | End: 2019-04-29

## 2019-02-12 RX ORDER — SUCRALFATE 1 G/10ML
1 SUSPENSION ORAL 3 TIMES DAILY
Qty: 300 ML | Refills: 0 | Status: SHIPPED | OUTPATIENT
Start: 2019-02-12 | End: 2019-04-29

## 2019-02-12 RX ORDER — FOLIC ACID 1 MG/1
1 TABLET ORAL 2 TIMES DAILY
Qty: 30 TABLET | Refills: 0 | Status: SHIPPED | OUTPATIENT
Start: 2019-02-12 | End: 2019-03-14

## 2019-02-14 ENCOUNTER — HOSPITAL ENCOUNTER (EMERGENCY)
Facility: HOSPITAL | Age: 54
Discharge: HOME | End: 2019-02-14
Attending: EMERGENCY MEDICINE
Payer: MEDICAID

## 2019-02-14 ENCOUNTER — OFFICE VISIT (OUTPATIENT)
Dept: HEMATOLOGY/ONCOLOGY | Facility: CLINIC | Age: 54
End: 2019-02-14
Payer: MEDICAID

## 2019-02-14 VITALS
HEIGHT: 61 IN | DIASTOLIC BLOOD PRESSURE: 83 MMHG | RESPIRATION RATE: 20 BRPM | OXYGEN SATURATION: 100 % | WEIGHT: 279 LBS | BODY MASS INDEX: 52.67 KG/M2 | TEMPERATURE: 99 F | SYSTOLIC BLOOD PRESSURE: 158 MMHG | HEART RATE: 103 BPM

## 2019-02-14 VITALS
SYSTOLIC BLOOD PRESSURE: 124 MMHG | RESPIRATION RATE: 20 BRPM | BODY MASS INDEX: 52.73 KG/M2 | HEIGHT: 61 IN | TEMPERATURE: 98 F | DIASTOLIC BLOOD PRESSURE: 57 MMHG | HEART RATE: 108 BPM | WEIGHT: 279.31 LBS

## 2019-02-14 DIAGNOSIS — K74.60 CHRONIC HEPATITIS C WITH CIRRHOSIS: ICD-10-CM

## 2019-02-14 DIAGNOSIS — D64.9 SYMPTOMATIC ANEMIA: ICD-10-CM

## 2019-02-14 DIAGNOSIS — R18.8 CIRRHOSIS OF LIVER WITH ASCITES, UNSPECIFIED HEPATIC CIRRHOSIS TYPE: Primary | ICD-10-CM

## 2019-02-14 DIAGNOSIS — Z87.19 HISTORY OF GI BLEED: ICD-10-CM

## 2019-02-14 DIAGNOSIS — R18.8 OTHER ASCITES: ICD-10-CM

## 2019-02-14 DIAGNOSIS — D64.9 ANEMIA, UNSPECIFIED TYPE: ICD-10-CM

## 2019-02-14 DIAGNOSIS — D69.6 THROMBOCYTOPENIA: ICD-10-CM

## 2019-02-14 DIAGNOSIS — Z86.39 HISTORY OF IRON DEFICIENCY: ICD-10-CM

## 2019-02-14 DIAGNOSIS — K92.2 UGIB (UPPER GASTROINTESTINAL BLEED): Primary | ICD-10-CM

## 2019-02-14 DIAGNOSIS — R60.0 BILATERAL LEG EDEMA: ICD-10-CM

## 2019-02-14 DIAGNOSIS — B18.2 CHRONIC HEPATITIS C WITH CIRRHOSIS: ICD-10-CM

## 2019-02-14 DIAGNOSIS — R06.02 SOB (SHORTNESS OF BREATH): ICD-10-CM

## 2019-02-14 DIAGNOSIS — L97.921 SKIN ULCER OF LEFT LOWER LEG, LIMITED TO BREAKDOWN OF SKIN: ICD-10-CM

## 2019-02-14 DIAGNOSIS — K74.60 CIRRHOSIS OF LIVER WITH ASCITES, UNSPECIFIED HEPATIC CIRRHOSIS TYPE: Primary | ICD-10-CM

## 2019-02-14 DIAGNOSIS — E66.01 MORBID OBESITY: ICD-10-CM

## 2019-02-14 LAB
ALBUMIN SERPL BCP-MCNC: 2.6 G/DL
ALP SERPL-CCNC: 158 U/L
ALT SERPL W/O P-5'-P-CCNC: 18 U/L
ANION GAP SERPL CALC-SCNC: 7 MMOL/L
AST SERPL-CCNC: 30 U/L
BASOPHILS # BLD AUTO: 0.1 K/UL
BASOPHILS NFR BLD: 1.7 %
BILIRUB SERPL-MCNC: 1.8 MG/DL
BNP SERPL-MCNC: 33 PG/ML
BUN SERPL-MCNC: 5 MG/DL
CALCIUM SERPL-MCNC: 8.3 MG/DL
CHLORIDE SERPL-SCNC: 102 MMOL/L
CO2 SERPL-SCNC: 29 MMOL/L
CREAT SERPL-MCNC: 0.8 MG/DL
DIFFERENTIAL METHOD: ABNORMAL
EOSINOPHIL # BLD AUTO: 0.1 K/UL
EOSINOPHIL NFR BLD: 2.1 %
ERYTHROCYTE [DISTWIDTH] IN BLOOD BY AUTOMATED COUNT: 19.3 %
EST. GFR  (AFRICAN AMERICAN): >60 ML/MIN/1.73 M^2
EST. GFR  (NON AFRICAN AMERICAN): >60 ML/MIN/1.73 M^2
GLUCOSE SERPL-MCNC: 140 MG/DL
HCT VFR BLD AUTO: 30 %
HGB BLD-MCNC: 9.5 G/DL
INR PPP: 1.1
LYMPHOCYTES # BLD AUTO: 0.9 K/UL
LYMPHOCYTES NFR BLD: 15.4 %
MCH RBC QN AUTO: 27.1 PG
MCHC RBC AUTO-ENTMCNC: 31.8 G/DL
MCV RBC AUTO: 85 FL
MONOCYTES # BLD AUTO: 0.4 K/UL
MONOCYTES NFR BLD: 7.8 %
NEUTROPHILS # BLD AUTO: 4.1 K/UL
NEUTROPHILS NFR BLD: 73 %
PLATELET # BLD AUTO: 102 K/UL
PMV BLD AUTO: 9.6 FL
POTASSIUM SERPL-SCNC: 3 MMOL/L
PROT SERPL-MCNC: 5.7 G/DL
PROTHROMBIN TIME: 11.8 SEC
RBC # BLD AUTO: 3.52 M/UL
SODIUM SERPL-SCNC: 138 MMOL/L
WBC # BLD AUTO: 5.7 K/UL

## 2019-02-14 PROCEDURE — 99999 PR PBB SHADOW E&M-EST. PATIENT-LVL III: CPT | Mod: PBBFAC,,, | Performed by: INTERNAL MEDICINE

## 2019-02-14 PROCEDURE — 99213 OFFICE O/P EST LOW 20 MIN: CPT | Mod: PBBFAC,PO | Performed by: INTERNAL MEDICINE

## 2019-02-14 PROCEDURE — 99999 PR PBB SHADOW E&M-EST. PATIENT-LVL III: ICD-10-PCS | Mod: PBBFAC,,, | Performed by: INTERNAL MEDICINE

## 2019-02-14 PROCEDURE — 99215 OFFICE O/P EST HI 40 MIN: CPT | Mod: S$PBB,,, | Performed by: INTERNAL MEDICINE

## 2019-02-14 PROCEDURE — 80053 COMPREHEN METABOLIC PANEL: CPT

## 2019-02-14 PROCEDURE — 36415 COLL VENOUS BLD VENIPUNCTURE: CPT

## 2019-02-14 PROCEDURE — 85610 PROTHROMBIN TIME: CPT

## 2019-02-14 PROCEDURE — 99215 PR OFFICE/OUTPT VISIT, EST, LEVL V, 40-54 MIN: ICD-10-PCS | Mod: S$PBB,,, | Performed by: INTERNAL MEDICINE

## 2019-02-14 PROCEDURE — 25000003 PHARM REV CODE 250: Performed by: EMERGENCY MEDICINE

## 2019-02-14 PROCEDURE — 99283 EMERGENCY DEPT VISIT LOW MDM: CPT | Mod: 27

## 2019-02-14 PROCEDURE — 85025 COMPLETE CBC W/AUTO DIFF WBC: CPT

## 2019-02-14 PROCEDURE — 83880 ASSAY OF NATRIURETIC PEPTIDE: CPT

## 2019-02-14 RX ORDER — POTASSIUM CHLORIDE 20 MEQ/1
40 TABLET, EXTENDED RELEASE ORAL
Status: COMPLETED | OUTPATIENT
Start: 2019-02-14 | End: 2019-02-14

## 2019-02-14 RX ORDER — CYCLOBENZAPRINE HCL 10 MG
10 TABLET ORAL 3 TIMES DAILY PRN
COMMUNITY

## 2019-02-14 RX ORDER — GLECAPREVIR AND PIBRENTASVIR 40; 100 MG/1; MG/1
TABLET, FILM COATED ORAL
Refills: 2 | Status: ON HOLD | COMMUNITY
Start: 2019-01-28 | End: 2019-06-25 | Stop reason: ALTCHOICE

## 2019-02-14 RX ADMIN — BACITRACIN ZINC NEOMYCIN SULFATE POLYMYXIN B SULFATE 15 G: 400; 3.5; 5 OINTMENT TOPICAL at 04:02

## 2019-02-14 RX ADMIN — POTASSIUM CHLORIDE 40 MEQ: 20 TABLET, EXTENDED RELEASE ORAL at 04:02

## 2019-02-14 NOTE — PROGRESS NOTES
CC:I was in the hospital AGAIN for variceal bleeding    Karen Villarreal is a 53 y.o.  Pt is here for evaluation of thrombocytopenia and iron deficiency with intermittent vaginal bleeding. The patient has been having menometrorrhagia with low platelets.  She has a history of hepatitis C with documented cirrhosis and splenomegaly.  She is seeing a GI specialist Dr Whitehead SHe ahs been denied her meds per her conversation with [place]   No further GYN bleeding episodes he is exhausted.  She is taking Protonix to help with gastritis and remains on Aldactone for control of fluid retention.     She was admitted and received four  units of blood at this last admission  Records reviewed  She has her Hep c meds DR Whitehead is holding her treatment for now   Depressive screening : negative today   Started zoloft and this is being managed by primary care physician  Pt is not having further melena , hematochezia  She received fluid in the hospital and now she is c/o fluid retention with new bullous lesion that burst open on her left leg    Leg is red and burning her   Past Medical History:   Diagnosis Date    Acid reflux     Anemia     Arthritis     Ascites 03/08/2017    Cataract     Cirrhosis     Depression     Hepatitis C     Hiatal hernia     Ovary removal, prophylactic     Renal cyst 03/08/2017    Thrombocytopenia        Current Outpatient Medications:     albuterol (VENTOLIN HFA) 90 mcg/actuation inhaler, Ventolin HFA 90 mcg/actuation aerosol inhaler  take 2 puffs every 4hrs as needed for cough, Disp: , Rfl:     aluminum & magnesium hydroxide-simethicone (MAALOX MAXIMUM STRENGTH) 400-400-40 mg/5 mL suspension, Take by mouth every 6 (six) hours as needed for Indigestion., Disp: , Rfl:     cyclobenzaprine (FLEXERIL) 10 MG tablet, Take 1 tablet(s) 2 TIMES A DAY by oral route as needed for muscle spasm. May make you drowsy. Do not drive while on medication., Disp: , Rfl:     ferrous sulfate 325 (65 FE)  "MG EC tablet, Take 325 mg by mouth once daily. , Disp: , Rfl:     folic acid (FOLVITE) 1 MG tablet, Take 1 tablet (1 mg total) by mouth 2 (two) times daily., Disp: 30 tablet, Rfl: 0    furosemide (LASIX) 40 MG tablet, Take 40 mg by mouth once daily., Disp: , Rfl: 3    gabapentin (NEURONTIN) 300 MG capsule, Take 300 mg by mouth 3 (three) times daily., Disp: , Rfl:     pantoprazole (PROTONIX) 40 MG tablet, Take 1 tablet (40 mg total) by mouth once daily., Disp: 30 tablet, Rfl: 11    sertraline (ZOLOFT) 25 MG tablet, Take 1 tablet by mouth once daily., Disp: , Rfl: 1    spironolactone (ALDACTONE) 100 MG tablet, Take 100 mg by mouth once daily., Disp: , Rfl: 3    sucralfate (CARAFATE) 100 mg/mL suspension, Take 10 mLs (1 g total) by mouth 3 (three) times daily., Disp: 300 mL, Rfl: 0    thiamine 100 MG tablet, Take 1 tablet (100 mg total) by mouth 2 (two) times daily., Disp: 30 tablet, Rfl: 0    MAVYRET 100-40 mg Tab, TAKE THREE TABLETS BY MOUTH EVERY DAY WITH FOOD, Disp: , Rfl: 2    She is tolerating aldactone for edema and neurontin for paresthesias    CONSTITUTIONAL: No fevers, chills, night sweats, wt. loss, appetite changes  SKIN: + open lesion on her left leg   ENT: No headaches, head trauma, or eye pain  LYMPH NODES: None noticed   CV: No chest pain, palpitations.   RESP: + sob and dyspnea on exertion, no cough, wheezing  GI: No nausea, emesis, diarrhea, constipation, melena, hematochezia, pain. ++ swelling   : No dysuria, hematuria, urgency, or frequency   HEME: Continued  easy bruising, history of bleeding problems  PSYCHIATRIC: Positive depression, no anxiety, psychosis, hallucinations.  NEURO: No seizures, memory loss, dizziness or difficulty sleeping  MSK: No arthralgias or joint swelling  No further bleeding   Cervical abnormality on pap smear followed by gyn        BP (!) 124/57   Pulse 108   Temp 98.3 °F (36.8 °C)   Resp 20   Ht 5' 1" (1.549 m)   Wt 126.7 kg (279 lb 5.2 oz)   LMP " 08/06/2017 (Approximate)   BMI 52.78 kg/m²   Gen: NAD, A and O x3, facial swelling   Psych: pleasant yet somewhat flat affect, normal thought process  Eyes: Pupils round and non dilated, EOM intact  Nose: Nares patent  OP clear, mucosa patent  Neck: suppple, no JVD, trachea midline, no palpable mass, no adenopathy  Lungs: decreased BS bilateral bases  no wheezes, no use of accessory muscles  CV: S1S2 with RR  +++  tachycardia, No mrg  Abd: soft, NTND, obese + ascites   Extr: No CC positive  3 + pitting edema  WORSE today of bilateral extr  extremities, SHANT  Left leg with bandaid but erythema evident   Neuro: steady gait, CNs grossly intact  Skin: intact, no lesions noted  Rheum: No joint swelling  Iron and TIBC   Order: [id number]   Status:  Final result   Visible to patient:  Yes (Patient Portal)   Next appt:  03/19/2019 at 11:00 AM in Hepatology ([facility] [place] [place])   Dx:  Thrombocytopenia; History of anemia; ...    Ref Range & Units 8mo ago   Iron 30 - 160 ug/dL 55    Transferrin 200 - 375 mg/dL 196 Abnormally low     TIBC 250 - 450 ug/dL 290    Saturated Iron 20 - 50 % 19 Abnormally low             FREE LT CHAIN ANAL   Order: [id number]   Status:  Final result   Visible to patient:  Yes (Patient Portal)   Next appt:  03/19/2019 at 11:00 AM in Hepatology ([facility])   Dx:  Elevated serum immunoglobulin free li...    Ref Range & Units 1mo ago   [place] Free Light Chains 0.33 - 1.94 mg/dL 4.67 Abnormally high     Lambda Free Light Chains 0.57 - 2.63 mg/dL 3.66 Abnormally high     Kappa/Lambda FLC Ratio 0.26 - 1.65 1.28    [facility] [place] [place]      Narrative   [facility]                               VALUE             REFERENCE RANGE  UNITS                                         ----------------------------------------------------------------------------------  Hepatitis C Quant          [id number]                               IU/mL       HCV log10                    5.009                               log10 IU/mL  Test Information:         Comment                                            The quantitative range of this assay is 15      IU/mL to 100 million IU/mL.              HCV FIBROSURE   Order: [id number]   Status:  Final result   Visible to patient:  Yes (Patient Portal)   Next appt:  03/19/2019 at 11:00 AM in Hepatology ([facility])   Dx:  Unspecified viral hepatitis C without...    Ref Range & Units 4mo ago   Fibrosis Score  0.56    Fibrosis Stage  F2    FibroTest Interpretation  moderate fibrosis    Comment: FibroTest estimates liver fibrosis   FibroTest Score    Stage    Interpretation   -----------------------------------------   0.00-0.21       F0       no fibrosis   0.21-0.27        F0-F1    no fibrosis   0.27-0.31        F1       minimal fibrosis   0.31-0.48        F1-F2    minimal fibrosis   0.48-0.58        F2       moderate fibrosis   0.58-0.72        F3       advanced fibrosis   0.72-0.74        F3-F4    advanced fibrosis   0.74-1.00        F4       severe fibrosis (Cirrhosis)    Necroinflammat Activity Score  0.18    Necroinflammat Activity Grade  A0-A1    Necroinflammat Interp  no activity    Comment: ActiTest estimates necroinflammatory activity   ActiTest Score     Grade    Interpretation   ----------------------------------------   0.00-0.17       A0       no activity   0.17-0.29        A0-A1    no activity   0.29-0.36        A1       minimal activity   0.36-0.52        A1-A2    minimal activity   0.52-0.60        A2       significant activity   0.60-0.62        A2-A3    significant activity   0.62-1.00        A3       severe activity    FibroTest-ActiTest Comment  SEE BELOW    Comment: The reliability of results is dependent            CBC auto differential   Order: [id number]   Status:  Final result   Visible to patient:  Yes (Patient Portal)   Next appt:  03/19/2019 at 11:00 AM in Hepatology ([facility] [place])     Ref Range & Units 7d ago   WBC 3.90 - 12.70 K/uL 8.20    RBC 4.00 - 5.40 M/uL 3.36 Abnormally low     Hemoglobin 12.0 - 16.0 g/dL 9.3 Abnormally low     Hematocrit 37.0 - 48.5 % 29.0 Abnormally low     MCV 82 - 98 fL 86    MCH 27.0 - 31.0 pg 27.8    MCHC 32.0 - 36.0 g/dL 32.2    RDW 11.5 - 14.5 % 18.8 Abnormally high     Platelets 150 - 350 K/uL 82 Abnormally low     MPV 9.2 - 12.9 fL 10.1    Gran # (ANC) 1.8 - 7.7 K/uL 6.3    Lymph # 1.0 - 4.8 K/uL 1.1    Mono # 0.3 - 1.0 K/uL 0.7    Eos # 0.0 - 0.5 K/uL 0.1    Baso # 0.00 - 0.20 K/uL 0.00    Gran% 38.0 - 73.0 % 76.7 Abnormally high     Lymph% 18.0 - 48.0 % 12.9 Abnormally low     Mono% 4.0 - 15.0 % 8.2    Eosinophil% 0.0 - 8.0 % 1.7    Basophil% 0.0 - 1.9 % 0.5    Differential Method  Automated    Resulting Agency               Ref Range & Xwjox3s ago  Ngbjml963 - 145 mmol/L134 Abnormally low  Potassium3.5 - 5.1 mmol/L3.1 Abnormally low  Usyxjmnw34 - 110 mmol/L104  - 29 mmol/L25 Tghkirk99 - 110 mg/dL95 BUN, Bld6 - 20 mg/dL5 Abnormally low  Creatinine0.5 - 1.4 mg/dL0.8 Calcium8.7 - 10.5 mg/dL7.6 Abnormally low  Total Protein6.0 - 8.4 g/dL5.1 Abnormally low  Albumin3.5 - 5.2 g/dL2.5 Abnormally low  Total Bilirubin0.1 - 1.0 mg/dL2.1 Abnormally high  Comment: For infants and newborns, interpretation of results should be based   on gestational age, weight and in agreement with clinical   observations.   Premature Infant recommended reference ranges:   Up to 24 hours.............<8.0 mg/dL   Up to 48 hours............<12.0 mg/dL   3-5 days..................<15.0 mg/dL   6-29 days.................<15.0 mg/dL   Alkaline Uigskyftrhw66 - 135 U/L136 Abnormally high  AST10 - 40 U/L37 ALT10 - 44 U/L20 Anion Gap8 - 16 mmol/L5 Abnormally low  eGFR if African American>60 mL/min/1.73 m^2>60 eGFR if non African American>60 mL/min/1.73 m^2>60   Iron and TIBC   Order: [id number]   Status:  Final result   Visible to patient:  Yes (Patient Portal)   Next appt:  03/19/2019 at 11:00  AM in Hepatology ([facility])   Dx:  Thrombocytopenia; History of anemia; ...    Ref Range & Units 8mo ago   Iron 30 - 160 ug/dL 55    Transferrin 200 - 375 mg/dL 196 Abnormally low     TIBC 250 - 450 ug/dL 290    Saturated Iron 20 - 50 % 19 Abnormally low             FREE LT CHAIN ANAL   Order: [id number]   Status:  Final result   Visible to patient:  Yes (Patient Portal)   Next appt:  03/19/2019 at 11:00 AM in Hepatology ([facility] [place])   Dx:  Elevated serum immunoglobulin free li...    Ref Range & Units 1mo ago   [place] Free Light Chains 0.33 - 1.94 mg/dL 4.67 Abnormally high     Lambda Free Light Chains 0.57 - 2.63 mg/dL 3.66 Abnormally high     Kappa/Lambda FLC Ratio 0.26 - 1.65 1.28    Resulting Agency  [facility]           [place])      Narrative   Performed by: [facility]                               VALUE             REFERENCE RANGE  UNITS                                         ----------------------------------------------------------------------------------  Hepatitis C Quant          [id number]                               IU/mL       HCV log10                   5.009                               log10 IU/mL  Test Information:         Comment                                            The quantitative range of this assay is 15      IU/mL to 100 million IU/mL.              HCV FIBROSURE   Order: [id number]   Status:  Final result   Visible to patient:  Yes (Patient Portal)   Next appt:  03/19/2019 at 11:00 AM in Hepatology ([facility])   Dx:  Unspecified viral hepatitis C without...    Ref Range & Units 4mo ago   Fibrosis Score  0.56    Fibrosis Stage  F2    FibroTest Interpretation  moderate fibrosis    Comment: FibroTest estimates liver fibrosis   FibroTest Score    Stage    Interpretation   -----------------------------------------   0.00-0.21       F0       no fibrosis   0.21-0.27        F0-F1    no fibrosis   0.27-0.31        F1       minimal fibrosis    0.31-0.48        F1-F2    minimal fibrosis   0.48-0.58        F2       moderate fibrosis   0.58-0.72        F3       advanced fibrosis   0.72-0.74        F3-F4    advanced fibrosis   0.74-1.00        F4       severe fibrosis (Cirrhosis)    Necroinflammat Activity Score  0.18    Necroinflammat Activity Grade  A0-A1    Necroinflammat Interp  no activity    Comment: ActiTest estimates necroinflammatory activity   ActiTest Score     Grade    Interpretation   ----------------------------------------   0.00-0.17       A0       no activity   0.17-0.29        A0-A1    no activity   0.29-0.36        A1       minimal activity   0.36-0.52        A1-A2    minimal activity   0.52-0.60        A2       significant activity   0.60-0.62        A2-A3    significant activity   0.62-1.00        A3       severe activity    FibroTest-ActiTest Comment  SEE BELOW    Comment: The reliability of results is dependent                Lab Results   Component Value Date    WBC 8.20 02/07/2019    HGB 9.3 (L) 02/07/2019    HCT 29.0 (L) 02/07/2019    MCV 86 02/07/2019    PLT 82 (L) 02/07/2019   genotype 1 a HCV    UGIB (upper gastrointestinal bleed)    Chronic hepatitis C with cirrhosis    Other ascites    Morbid obesity    History of iron deficiency    Thrombocytopenia    Symptomatic anemia    SOB (shortness of breath)    Bilateral leg edema    Skin ulcer of left lower leg, limited to breakdown of skin    Anemia, unspecified type  -     CBC auto differential; Future; Expected date: 02/14/2019  -     CMP; Future; Expected date: 02/14/2019  -     Iron and TIBC; Future; Expected date: 02/14/2019    History of GI bleed  -     CBC auto differential; Future; Expected date: 02/14/2019  -     CMP; Future; Expected date: 02/14/2019  -     Iron and TIBC; Future; Expected date: 02/14/2019      Diuresed appropriately in the hospital; records were reviewed     1.  Cirrhosis and splenomegaly   2.  SOB due to anemia and ascites which ascites has worsened since  last visit   3.  Hepatitis C to start treatment  See Dr harvey   4.  Thrombocytopenia with varices that were bleeding ; I cannot tell if she received platelets in the hospital   5.  Anemia  Stable after blood in hospital  6.  Fatigue due to anemia with SOB  Progressing   7.  Tobacco use  Needs diet to reduce weight   High risk for thromboembolism due to morbid obesity and sedentary lifestyle   To GI for monitoring iof varices  To hepatology to DR Harvey today to see if she needs paracentesis and wound care for her left leg   SHE Needs treatment for her hep C Start as soon as she can with Dr Harvey  She is to continue her PPI for intermittent GERD and she is not to stop taking Zoloft without letting her physician know.   Fluid overload evident after hospitalization  Cont aldactone   No need for platelet transfusion now since she has no evidence of bleeding  BUT if she goes back to the hospital she WILL need a platelet transfusion    RTC 2 weeks with labs   Thank you for allowing me to evaluate and participate in the care of this pleasant patient. Please fell free to call me with any questions or concerns.    [place]arm[place],  Elizabet Valencia MD    This note was dictated with Dragon and briefly proofread. Please excuse any sentences that may be unclear or words misspelled

## 2019-02-14 NOTE — ED PROVIDER NOTES
Encounter Date: 2/14/2019    SCRIBE #1 NOTE: I, Arun Fleming, am scribing for, and in the presence of, Dr. Gentile.       History     Chief Complaint   Patient presents with    Swelling     to abdomen and legs.       Time seen by provider: 4:13 PM on 02/14/2019    Karen Villarreal is a 53 y.o. female with Hepatitis C who presents to the ED with c/o of swelling. The pt states that her legs are full of fluid and that she has a wound on her left lower leg. Associated sx include difficulty breathing. The patient denies any abdominal pain or any other symptoms at this time. Past surgical hx includes an appendectomy and an ankle surgery. No known drug allergies noted.      The history is provided by the patient.     Review of patient's allergies indicates:  No Known Allergies  Past Medical History:   Diagnosis Date    Acid reflux     Anemia     Arthritis     Ascites 03/08/2017    Cataract     Cirrhosis     Depression     Hepatitis C     Hiatal hernia     Ovary removal, prophylactic     Renal cyst 03/08/2017    Thrombocytopenia      Past Surgical History:   Procedure Laterality Date    ANKLE SURGERY      APPENDECTOMY      CATARACT EXTRACTION      EGD (ESOPHAGOGASTRODUODENOSCOPY) N/A 2/5/2019    Performed by Leigha Whitehead MD at [facility]    EGD (ESOPHAGOGASTRODUODENOSCOPY) N/A 2/2/2019    Performed by Leigha [facility]head [facility]    EXTRACTION-CATARACT-IOL Right 1/5/2018    [facility] Dg [facility] at [facility]    EXTRACTION-CATARACT-IOL Left 12/1/2017    [facility]formed [facility]g Garcia [facility] at [facility] OR     Family History   Problem Relation Age of Onset    Heart failure Mother     COPD Mother     Arthritis Mother     Vision loss Mother     Diabetes Father     Cancer Father     Brain cancer Father     Diabetes type II Father      Social History     Tobacco Use    Smoking status: Current Some Day Smoker     Packs/day: 0.50     Years: 33.00     Pack years: 16.50     Types:  Cigarettes    Smokeless tobacco: Never Used   Substance Use Topics    Alcohol use: No     Frequency: Never    Drug use: No     Review of Systems   Constitutional: Negative for fever.   HENT: Negative for sore throat.    Respiratory: Positive for shortness of breath.    Cardiovascular: Positive for leg swelling. Negative for chest pain.        Generalized swelling.    Gastrointestinal: Negative for abdominal pain and nausea.   Genitourinary: Negative for dysuria.   Musculoskeletal: Negative for back pain.   Skin: Positive for wound. Negative for rash.   Neurological: Negative for weakness.   Hematological: Does not bruise/bleed easily.       Physical Exam     Initial Vitals [02/14/19 1301]   BP Pulse Resp Temp SpO2   (!) 158/83 103 20 99.1 °F (37.3 °C) 100 %      MAP       --         Physical Exam    Nursing note and vitals reviewed.  Constitutional: She appears well-developed and well-nourished.  Non-toxic appearance. No distress.   No acute distress.    HENT:   Head: Normocephalic and atraumatic.   Eyes: EOM are normal. Pupils are equal, round, and reactive to light.   Neck: Normal range of motion. Neck supple. No neck rigidity. No JVD present.   Cardiovascular: Normal rate, regular rhythm, normal heart sounds and intact distal pulses. Exam reveals no gallop and no friction rub.    No murmur heard.  Pulmonary/Chest: Breath sounds normal. She has no wheezes. She has no rhonchi. She has no rales.   Lungs are clear to auscultation bilaterally.    Abdominal: Soft. Bowel sounds are normal. There is no tenderness. There is no rigidity, no rebound and no guarding.   Extended abdomen.    Musculoskeletal: Normal range of motion.   4+ pitting edema of the lower extremities.    Neurological: She is alert and oriented to person, place, and time. She has normal strength and normal reflexes. No cranial nerve deficit or sensory deficit. She exhibits normal muscle tone. Coordination normal. GCS eye subscore is 4. GCS verbal  subscore is 5. GCS motor subscore is 6.   Skin: Skin is warm and dry.   Developing wound on the inside of her left inner lower leg with clear leakage.    Psychiatric: She has a normal mood and affect. Her speech is normal and behavior is normal. She is not actively hallucinating.         ED Course   Procedures  Labs Reviewed   CBC W/ AUTO DIFFERENTIAL - Abnormal; Notable for the following components:       Result Value    RBC 3.52 (*)     Hemoglobin 9.5 (*)     Hematocrit 30.0 (*)     MCHC 31.8 (*)     RDW 19.3 (*)     Platelets 102 (*)     Lymph # 0.9 (*)     Lymph% 15.4 (*)     All other components within normal limits   COMPREHENSIVE METABOLIC PANEL - Abnormal; Notable for the following components:    Potassium 3.0 (*)     Glucose 140 (*)     BUN, Bld 5 (*)     Calcium 8.3 (*)     Total Protein 5.7 (*)     Albumin 2.6 (*)     Total Bilirubin 1.8 (*)     Alkaline Phosphatase 158 (*)     Anion Gap 7 (*)     All other components within normal limits   B-TYPE NATRIURETIC PEPTIDE   PROTIME-INR          Imaging Results    None          Medical Decision Making:   History:   Old Medical Records: I decided to obtain old medical records.  Initial Assessment:   Patient is a 53-year-old woman with a history of hepatitis C with cirrhosis who presents emergency department for therapeutic paracentesis.  Patient notices increased abdominal swelling causing her discomfort.  Denies any fever.  Abdominal examination reveals distended abdomen with mild tenderness and positive fluid wave.  Abdomen is not rigid or tense.  Patient is in no acute distress and able to speak in full sentences with normal oxygenation saturation on room air.  Patient given potassium for slightly low potassium at 3.0.  Patient also with bilateral peripheral edema and weeping in the left lower leg.   Antibiotic ointment and Mepilex was applied for wound care.  No evidence of cellulitis.  Discussed with case management and I have placed a outpatient order for  paracentesis to be performed that the next day in the morning.  Discussed with patient who is in agreement.  She is to follow up with her GI physician and is discharged improved in no acute distress to receive therapeutic paracentesisthe next day on outpatient basis.  I have low suspicion for spontaneous bacterial peritonitis.  Clinical Tests:   Lab Tests: Reviewed and Ordered            Scribe Attestation:   Scribe #1: I performed the above scribed service and the documentation accurately describes the services I performed. I attest to the accuracy of the note.    I, Seferino Caceres, personally performed the services described in this documentation. All medical record entries made by the scribe were at my direction and in my presence.  I have reviewed the chart and agree that the record reflects my personal performance and is accurate and complete. Wood Gentile MD.  1:22 PM 02/16/2019       DISCLAIMER: This note was prepared with [employer] Direct voice recognition transcription software. Garbled syntax, mangled pronouns, and other bizarre constructions may be attributed to that software system.             Clinical Impression:   The encounter diagnosis was Cirrhosis of liver with ascites, unspecified hepatic cirrhosis type.      Disposition:   Disposition: Discharged  Condition: Stable                        Wood Gentile MD  02/16/19 [id number]

## 2019-02-14 NOTE — PROGRESS NOTES
02/14/20191:32 PM  I have evaluated and performed a medical screening assessment on this patient while awaiting a thorough evaluation and treatment. All of the emergency department beds are occupied at this time. When appropriate, laboratory studies will be ordered from triage. The patient has been advised of this process and care plan. Patient complains of swelling generalized and ascites; hx of liver disease.     Orders pending:labs  Disposition: stable

## 2019-02-15 DIAGNOSIS — B19.20 HEPATITIS C VIRUS INFECTION WITHOUT HEPATIC COMA, UNSPECIFIED CHRONICITY: Primary | ICD-10-CM

## 2019-02-19 NOTE — PROGRESS NOTES
Per conversation with Lizabeth Jaime RN Case Management- Paracentesis scheduled for Wednesday 2.20 is therapeutic only and not diagnostic. Specimens will not be submitted.

## 2019-02-20 ENCOUNTER — HOSPITAL ENCOUNTER (OUTPATIENT)
Dept: RADIOLOGY | Facility: HOSPITAL | Age: 54
Discharge: HOME | End: 2019-02-20
Attending: INTERNAL MEDICINE
Payer: MEDICAID

## 2019-02-20 VITALS
RESPIRATION RATE: 16 BRPM | OXYGEN SATURATION: 100 % | SYSTOLIC BLOOD PRESSURE: 114 MMHG | DIASTOLIC BLOOD PRESSURE: 55 MMHG | HEART RATE: 92 BPM

## 2019-02-20 DIAGNOSIS — B19.20 HEPATITIS C VIRUS INFECTION WITHOUT HEPATIC COMA, UNSPECIFIED CHRONICITY: ICD-10-CM

## 2019-02-20 PROCEDURE — A7048 VACUUM DRAIN BOTTLE/TUBE KIT: HCPCS

## 2019-02-20 PROCEDURE — 49083 ABD PARACENTESIS W/IMAGING: CPT

## 2019-02-20 PROCEDURE — 49083 US GUIDED PARACENTESIS INC IMAGING: ICD-10-PCS | Mod: ,,, | Performed by: RADIOLOGY

## 2019-02-20 PROCEDURE — 49083 ABD PARACENTESIS W/IMAGING: CPT | Mod: ,,, | Performed by: RADIOLOGY

## 2019-02-20 RX ORDER — ALBUMIN HUMAN 250 G/1000ML
SOLUTION INTRAVENOUS
Status: DISPENSED
Start: 2019-02-20 | End: 2019-02-21

## 2019-02-20 NOTE — NURSING
Ultrasound guided paracentesis performed by Dr. Hong- 5200mLs removed- Patient received 25g albumin IV- VS remained stable for duration of procedure- specimens sent off to lab per order-  IV d/c'd, with tip intact. Access site cleaned with peroxide, derma bond and steri-strips applied, then covered with sterile gauze and tape. Pt discharge home.

## 2019-02-20 NOTE — PROGRESS NOTES
Radiology Post-Procedure Note    Pre Op Diagnosis: ascites  Post Op Diagnosis: Same    Procedure: therapeutic ultrasound guided paracentesis    Procedure performed by: Dr. Alyssa Hong    Written Informed Consent Obtained: Yes  Specimen Removed: YES 5.2 L clear yellow ascites  Estimated Blood Loss: Minimal    Findings:   Successful paracentesis under ultrasound guidance.  Patient to resume previous diet and followup with Dr. Whitehead    Patient tolerated procedure well.    @SIG@

## 2019-02-22 LAB
AMYLASE SERPL-CCNC: 55 U/L (ref 28–100)
CRP SERPL-MCNC: 2.35 MG/DL (ref 0–1.4)
LIPASE SERPL-CCNC: 30 U/L (ref 0–38)
MAGNESIUM SERPL-MCNC: 1.8 MG/DL (ref 1.5–2.6)
PHOSPHATE FLD-MCNC: 2.4 MG/DL (ref 2.5–4.9)

## 2019-02-25 ENCOUNTER — LAB VISIT (OUTPATIENT)
Dept: LAB | Facility: HOSPITAL | Age: 54
End: 2019-02-25
Attending: INTERNAL MEDICINE
Payer: MEDICAID

## 2019-02-25 DIAGNOSIS — D64.9 ANEMIA, UNSPECIFIED TYPE: ICD-10-CM

## 2019-02-25 DIAGNOSIS — Z87.19 HISTORY OF GI BLEED: ICD-10-CM

## 2019-02-25 LAB
ALBUMIN SERPL BCP-MCNC: 2.6 G/DL
ALP SERPL-CCNC: 189 U/L
ALT SERPL W/O P-5'-P-CCNC: 15 U/L
ANION GAP SERPL CALC-SCNC: 8 MMOL/L
ANISOCYTOSIS BLD QL SMEAR: SLIGHT
AST SERPL-CCNC: 29 U/L
BASOPHILS # BLD AUTO: 0 K/UL
BASOPHILS NFR BLD: 0.5 %
BILIRUB SERPL-MCNC: 1.6 MG/DL
BUN SERPL-MCNC: 4 MG/DL
CALCIUM SERPL-MCNC: 8.1 MG/DL
CHLORIDE SERPL-SCNC: 104 MMOL/L
CO2 SERPL-SCNC: 24 MMOL/L
CREAT SERPL-MCNC: 0.7 MG/DL
DIFFERENTIAL METHOD: ABNORMAL
EOSINOPHIL # BLD AUTO: 0.2 K/UL
EOSINOPHIL NFR BLD: 2.9 %
ERYTHROCYTE [DISTWIDTH] IN BLOOD BY AUTOMATED COUNT: 18.9 %
EST. GFR  (AFRICAN AMERICAN): >60 ML/MIN/1.73 M^2
EST. GFR  (NON AFRICAN AMERICAN): >60 ML/MIN/1.73 M^2
GIANT PLATELETS BLD QL SMEAR: PRESENT
GLUCOSE SERPL-MCNC: 99 MG/DL
HCT VFR BLD AUTO: 30.1 %
HGB BLD-MCNC: 9.5 G/DL
HYPOCHROMIA BLD QL SMEAR: ABNORMAL
IRON SERPL-MCNC: 16 UG/DL
LYMPHOCYTES # BLD AUTO: 0.9 K/UL
LYMPHOCYTES NFR BLD: 17.7 %
MCH RBC QN AUTO: 26.5 PG
MCHC RBC AUTO-ENTMCNC: 31.7 G/DL
MCV RBC AUTO: 84 FL
MONOCYTES # BLD AUTO: 0.5 K/UL
MONOCYTES NFR BLD: 9.5 %
NEUTROPHILS # BLD AUTO: 3.7 K/UL
NEUTROPHILS NFR BLD: 69.4 %
OVALOCYTES BLD QL SMEAR: ABNORMAL
PLATELET # BLD AUTO: 100 K/UL
PLATELET BLD QL SMEAR: ABNORMAL
PMV BLD AUTO: 10 FL
POIKILOCYTOSIS BLD QL SMEAR: SLIGHT
POLYCHROMASIA BLD QL SMEAR: ABNORMAL
POTASSIUM SERPL-SCNC: 3.6 MMOL/L
PROT SERPL-MCNC: 5.7 G/DL
RBC # BLD AUTO: 3.6 M/UL
SATURATED IRON: 5 %
SODIUM SERPL-SCNC: 136 MMOL/L
TOTAL IRON BINDING CAPACITY: 340 UG/DL
TRANSFERRIN SERPL-MCNC: 230 MG/DL
WBC # BLD AUTO: 5.4 K/UL

## 2019-02-25 PROCEDURE — 36415 COLL VENOUS BLD VENIPUNCTURE: CPT

## 2019-02-25 PROCEDURE — 80053 COMPREHEN METABOLIC PANEL: CPT

## 2019-02-25 PROCEDURE — 83540 ASSAY OF IRON: CPT

## 2019-02-25 PROCEDURE — 85025 COMPLETE CBC W/AUTO DIFF WBC: CPT

## 2019-02-28 ENCOUNTER — OFFICE VISIT (OUTPATIENT)
Dept: HEMATOLOGY/ONCOLOGY | Facility: CLINIC | Age: 54
End: 2019-02-28
Payer: MEDICAID

## 2019-02-28 VITALS
HEIGHT: 61 IN | BODY MASS INDEX: 50.45 KG/M2 | TEMPERATURE: 99 F | RESPIRATION RATE: 16 BRPM | WEIGHT: 267.19 LBS | DIASTOLIC BLOOD PRESSURE: 65 MMHG | OXYGEN SATURATION: 97 % | SYSTOLIC BLOOD PRESSURE: 115 MMHG | HEART RATE: 95 BPM

## 2019-02-28 DIAGNOSIS — D64.9 SYMPTOMATIC ANEMIA: ICD-10-CM

## 2019-02-28 DIAGNOSIS — D69.6 THROMBOCYTOPENIA: ICD-10-CM

## 2019-02-28 DIAGNOSIS — K74.60 CHRONIC HEPATITIS C WITH CIRRHOSIS: Primary | ICD-10-CM

## 2019-02-28 DIAGNOSIS — Z86.39 HISTORY OF IRON DEFICIENCY: ICD-10-CM

## 2019-02-28 DIAGNOSIS — B18.2 CHRONIC HEPATITIS C WITH CIRRHOSIS: Primary | ICD-10-CM

## 2019-02-28 PROCEDURE — 99215 OFFICE O/P EST HI 40 MIN: CPT | Mod: S$PBB,,, | Performed by: INTERNAL MEDICINE

## 2019-02-28 PROCEDURE — 99214 OFFICE O/P EST MOD 30 MIN: CPT | Mod: PBBFAC,PO | Performed by: INTERNAL MEDICINE

## 2019-02-28 PROCEDURE — 99999 PR PBB SHADOW E&M-EST. PATIENT-LVL IV: CPT | Mod: PBBFAC,,, | Performed by: INTERNAL MEDICINE

## 2019-02-28 PROCEDURE — 99999 PR PBB SHADOW E&M-EST. PATIENT-LVL IV: ICD-10-PCS | Mod: PBBFAC,,, | Performed by: INTERNAL MEDICINE

## 2019-02-28 PROCEDURE — 99215 PR OFFICE/OUTPT VISIT, EST, LEVL V, 40-54 MIN: ICD-10-PCS | Mod: S$PBB,,, | Performed by: INTERNAL MEDICINE

## 2019-02-28 RX ORDER — HEPARIN 100 UNIT/ML
5 SYRINGE INTRAVENOUS
Status: CANCELLED | OUTPATIENT
Start: 2019-02-28

## 2019-02-28 RX ORDER — SODIUM CHLORIDE 9 MG/ML
INJECTION, SOLUTION INTRAVENOUS CONTINUOUS
Status: CANCELLED | OUTPATIENT
Start: 2019-02-28

## 2019-02-28 RX ORDER — SODIUM CHLORIDE 0.9 % (FLUSH) 0.9 %
10 SYRINGE (ML) INJECTION
Status: CANCELLED | OUTPATIENT
Start: 2019-02-28

## 2019-02-28 NOTE — PROGRESS NOTES
CC:I  had to undergo a paracentesis    Karen Villarreal is a 53 y.o.  Pt is here for evaluation of thrombocytopenia and iron deficiency with intermittent vaginal bleeding. The patient has been having menometrorrhagia with low platelets.  She has a history of hepatitis C with documented cirrhosis and splenomegaly.  She is seeing a GI specialist Dr Whitehead SHe ahs been denied her meds per her conversation with [place]   No further GYN bleeding episodes he is exhausted.  She is taking Protonix to help with gastritis and remains on Aldactone for control of fluid retention.     She has her Hep c meds DR Whitehead  Started zoloft for depression  She had over a liter of fluid drained     Her leg left wound is bandaged: no fever, no bleeding     Past Medical History:   Diagnosis Date    Acid reflux     Anemia     Arthritis     Ascites 03/08/2017    Cataract     Cirrhosis     Depression     Hepatitis C     Hiatal hernia     Ovary removal, prophylactic     Renal cyst 03/08/2017    Thrombocytopenia        Current Outpatient Medications:     albuterol (VENTOLIN HFA) 90 mcg/actuation inhaler, Ventolin HFA 90 mcg/actuation aerosol inhaler  take 2 puffs every 4hrs as needed for cough, Disp: , Rfl:     aluminum & magnesium hydroxide-simethicone (MAALOX MAXIMUM STRENGTH) 400-400-40 mg/5 mL suspension, Take by mouth every 6 (six) hours as needed for Indigestion., Disp: , Rfl:     cyclobenzaprine (FLEXERIL) 10 MG tablet, Take 10 mg by mouth 3 (three) times daily as needed for Muscle spasms., Disp: , Rfl:     ferrous sulfate 325 (65 FE) MG EC tablet, Take 325 mg by mouth once daily. , Disp: , Rfl:     folic acid (FOLVITE) 1 MG tablet, Take 1 tablet (1 mg total) by mouth 2 (two) times daily. (Patient taking differently: Take 1 mg by mouth 2 (two) times daily. Take for 15 days.), Disp: 30 tablet, Rfl: 0    furosemide (LASIX) 40 MG tablet, Take 40 mg by mouth once daily., Disp: , Rfl: 3    gabapentin (NEURONTIN)  "300 MG capsule, Take 300 mg by mouth 3 (three) times daily., Disp: , Rfl:     pantoprazole (PROTONIX) 40 MG tablet, Take 1 tablet (40 mg total) by mouth once daily., Disp: 30 tablet, Rfl: 11    sertraline (ZOLOFT) 25 MG tablet, Take 1 tablet by mouth once daily., Disp: , Rfl: 1    spironolactone (ALDACTONE) 100 MG tablet, Take 100 mg by mouth once daily., Disp: , Rfl: 3    sucralfate (CARAFATE) 100 mg/mL suspension, Take 10 mLs (1 g total) by mouth 3 (three) times daily. (Patient taking differently: Take 1 g by mouth 3 (three) times daily. Take for 10 days.), Disp: 300 mL, Rfl: 0    thiamine 100 MG tablet, Take 1 tablet (100 mg total) by mouth 2 (two) times daily., Disp: 30 tablet, Rfl: 0    MAVYRET 100-40 mg Tab, TAKE THREE TABLETS BY MOUTH EVERY DAY WITH FOOD - pt has not started this medication yet (02/14/19), Disp: , Rfl: 2    She is tolerating aldactone for edema and neurontin for paresthesias    CONSTITUTIONAL: No fevers, chills, night sweats, wt. loss  SKIN: + open lesion on her left leg REMAINS   ENT: No headaches, head trauma, or eye pain  LYMPH NODES: None noticed   CV: No chest pain, palpitations.   RESP: + sob and dyspnea on exertion, no cough, wheezing  GI: No nausea, emesis, diarrhea, constipation, melena, hematochezia,   : No dysuria, hematuria, urgency, or frequency   HEME: Continued  easy bruising, history of bleeding problems  PSYCHIATRIC: Positive depression, no anxiety,hallucinations.  NEURO: No seizures, memory loss, dizziness or difficulty sleeping  MSK: No arthralgias or joint swelling  Legs getting more swollen  No further bleeding   Cervical abnormality on pap smear followed by gyn        /65   Pulse 95   Temp 98.6 °F (37 °C)   Resp 16   Ht 5' 1" (1.549 m)   Wt 121.2 kg (267 lb 3.2 oz)   LMP 08/06/2017 (Approximate)   SpO2 97%   BMI 50.49 kg/m²   Gen: NAD, A and O x3,  Conversant   Psych: pleasant yet somewhat flat affect, normal thought process  Eyes: Pupils round and " non dilated, EOM intact  Nose: Nares patent  OP clear, mucosa patent  Neck: suppple, no JVD, trachea midline, no adenopathy  Lungs: decreased BS bilateral bases No fremitus  CV: S1S2 with RR R  No mrg  Abd: soft, NTND, obese + ascites  Today   Extr: No CC positive  3 + pitting edema  And erythema on left with open lesion  Left leg with bandaid but erythema evident   Neuro: steady gait, CNs grossly intact  Skin: intact, no lesions noted  Rheum: No joint swelling        Lab Results   Component Value Date    WBC 5.40 02/25/2019    HGB 9.5 (L) 02/25/2019    HCT 30.1 (L) 02/25/2019    MCV 84 02/25/2019     (L) 02/25/2019   genotype 1 a HCV        Ref Range & Bjeep1x ago  Iron30 - 160 ug/dL16 Abnormally low  [id number] - 375 mg/dL230 ZPXH621 - 450 ug/dL340 Saturated Iron20 - 50 %5 Abnormally low        Chronic hepatitis C with cirrhosis  -     CBC auto differential; Standing  -     Lactate dehydrogenase; Future; Expected date: 02/28/2019  -     Haptoglobin; Future; Expected date: 02/28/2019  -     Direct antiglobulin test; Future; Expected date: 02/28/2019    History of iron deficiency    Symptomatic anemia  -     CBC auto differential; Standing  -     Lactate dehydrogenase; Future; Expected date: 02/28/2019  -     Haptoglobin; Future; Expected date: 02/28/2019  -     Direct antiglobulin test; Future; Expected date: 02/28/2019    Thrombocytopenia    Other orders  -     ferric carboxymaltose (INJECTAFER) 750 mg in sodium chloride 0.9% 250 mL infusion  -     0.9%  NaCl infusion  -     sodium chloride 0.9% flush 10 mL  -     heparin, porcine (PF) 100 unit/mL injection flush 500 Units  -     sodium chloride 0.9% 100 mL flush bag      Diuresed appropriately in the hospital; records were reviewed     1.  Cirrhosis and splenomegaly   2. Iron deficiency anemia: post EGD : needs a colonoscopy with DR Harvey   3.  Hepatitis C to start treatment  See Dr harvey   4.  Thrombocytopenia with varices that were bleeding no  bleeding evident at this time so no need for transfusion   5.  Anemia  Stable yet iron trending down   6.  GRIMALDO   7.  Tobacco use  8. Ascites : drain per Dr Whitehead prn   9. Open skin lesion left leg : remove bandage at home off of leg to heal  Checking hemolysis labs next visit      High risk for thromboembolism due to morbid obesity and sedentary lifestyle   To GI for monitoring of varices   She  Needs  treatment for her hep C Start as soon as she can with Dr Whitehead  She is to continue her PPI for intermittent GERD and she is not to stop taking Zoloft without letting her physician know.   Cont aldactone       rtc one month   Thank you for allowing me to evaluate and participate in the care of this pleasant patient. Please fell free to call me with any questions or concerns.    Warmly,  Elizabet Valencia MD    This note was dictated with Dragon and briefly proofread. Please excuse any sentences that may be unclear or words misspelled

## 2019-03-07 ENCOUNTER — TELEPHONE (OUTPATIENT)
Dept: HEMATOLOGY/ONCOLOGY | Facility: CLINIC | Age: 54
End: 2019-03-07

## 2019-03-07 NOTE — TELEPHONE ENCOUNTER
Called and left message for pt informing her that her f/u with Dr. Valencia and lab apt will need to be changed to 3 weeks after her second dose of ivig. I informed pt that navneet going to change the apts. And if the time and dates do not work for her she can give me a call back to change. I instructed pt to call

## 2019-03-11 ENCOUNTER — TELEPHONE (OUTPATIENT)
Dept: GASTROENTEROLOGY | Facility: CLINIC | Age: 54
End: 2019-03-11

## 2019-03-11 NOTE — TELEPHONE ENCOUNTER
----- Message from Yue Saucedo sent at 3/11/2019  9:50 AM CDT -----  Contact: SELF  PT HAS IRON DEFICIENCY ANEMIA.  DR BLANCHARD WOULD LIKE THE PATIENT TO HAVE A COLONOSCOPY.  HER RECOMMENDATION HAS BEEN SCANNED INTO THE CHART.  ALSO, THE PATIENT WOULD LIKE TO KNOW WHEN SHE CAN START HER HCV MEDS.

## 2019-03-14 ENCOUNTER — OFFICE VISIT (OUTPATIENT)
Dept: GASTROENTEROLOGY | Facility: CLINIC | Age: 54
End: 2019-03-14
Payer: MEDICAID

## 2019-03-14 VITALS
DIASTOLIC BLOOD PRESSURE: 72 MMHG | TEMPERATURE: 99 F | HEART RATE: 107 BPM | RESPIRATION RATE: 18 BRPM | SYSTOLIC BLOOD PRESSURE: 128 MMHG | HEIGHT: 61 IN | WEIGHT: 271.81 LBS | BODY MASS INDEX: 51.32 KG/M2

## 2019-03-14 DIAGNOSIS — I85.00 ESOPHAGEAL VARICES WITHOUT BLEEDING, UNSPECIFIED ESOPHAGEAL VARICES TYPE: ICD-10-CM

## 2019-03-14 DIAGNOSIS — R10.9 ABDOMINAL PAIN, UNSPECIFIED ABDOMINAL LOCATION: ICD-10-CM

## 2019-03-14 DIAGNOSIS — E66.01 MORBID OBESITY: ICD-10-CM

## 2019-03-14 DIAGNOSIS — K76.6 PORTAL HYPERTENSION: ICD-10-CM

## 2019-03-14 DIAGNOSIS — D50.9 IRON DEFICIENCY ANEMIA, UNSPECIFIED IRON DEFICIENCY ANEMIA TYPE: ICD-10-CM

## 2019-03-14 DIAGNOSIS — K74.60 CIRRHOSIS OF LIVER WITH ASCITES, UNSPECIFIED HEPATIC CIRRHOSIS TYPE: ICD-10-CM

## 2019-03-14 DIAGNOSIS — R18.8 CIRRHOSIS OF LIVER WITH ASCITES, UNSPECIFIED HEPATIC CIRRHOSIS TYPE: ICD-10-CM

## 2019-03-14 DIAGNOSIS — M19.90 ARTHRITIS: ICD-10-CM

## 2019-03-14 DIAGNOSIS — B18.2 CHRONIC HEPATITIS C WITHOUT HEPATIC COMA: Primary | ICD-10-CM

## 2019-03-14 DIAGNOSIS — K21.9 GASTROESOPHAGEAL REFLUX DISEASE, ESOPHAGITIS PRESENCE NOT SPECIFIED: ICD-10-CM

## 2019-03-14 DIAGNOSIS — I10 ESSENTIAL HYPERTENSION: ICD-10-CM

## 2019-03-14 PROCEDURE — 99215 OFFICE O/P EST HI 40 MIN: CPT | Mod: ,,, | Performed by: INTERNAL MEDICINE

## 2019-03-14 PROCEDURE — 99215 PR OFFICE/OUTPT VISIT, EST, LEVL V, 40-54 MIN: ICD-10-PCS | Mod: ,,, | Performed by: INTERNAL MEDICINE

## 2019-03-14 NOTE — PROGRESS NOTES
[facility] Established Patient Visit    Subjective:           PCP: Shama Mccoy    Chief Complaint: Follow-up (start HCV Med) and Colonoscopy       HPI:  Karen Villarreal is a 53 y.o. female here for follow-up of her hepatitis C. Patient has a low viral load. She has a good chance of cure. Patient has decompensated cirrhosis with ascites. She also has portal hypertension and esophageal varices. Patient is s/p banding. Reviewed all old records in detail. Spoke to Dr. Valencia, the hematologist, and will do a colonoscopy after treatment with DAAV.  Patient is genotype 1A with a low viral load.     ROS:   Constitutional: No fevers, chills, weight loss  ENT: No allergies, sore throat, rhinorrhea  CV: No chest pain, palpitations, edema  Pulm: No cough, shortness of breath, wheezing  Ophtho: No vision changes  GI: No blood in stools, change in bowel habits, nausea/vomiting  Denies alternating diarrhea/constipation.   Derm: No rash  Heme: No easy bruising or lymphadenopathy  MSK: No arthralgias or myalgias  : No dysuria, hematuria, frequency, polyuria, or flank tenderness  Endo: No hot or cold intolerance  Neuro: No syncope or seizure, or dizziness  Psych: No hallucination, depression or suicidal ideation      Medical History:  has a past medical history of Acid reflux, Anemia, Arthritis, Ascites (03/08/2017), Cataract, Cirrhosis, Depression, Hepatitis C, Hiatal hernia, Ovary removal, prophylactic, Renal cyst (03/08/2017), and Thrombocytopenia.      Surgical History:  has a past surgical history that includes Appendectomy; Ankle surgery; Cataract extraction; Esophagogastroduodenoscopy (N/A, 2/2/2019); and Esophagogastroduodenoscopy (N/A, 2/5/2019).    Family History:   Family History   Problem Relation Age of Onset    Heart failure Mother     COPD Mother     Arthritis Mother     Vision loss Mother     Diabetes Father     Cancer Father     Brain cancer Father     Diabetes type II Father         Social History:   Social History     Tobacco Use    Smoking status: Current Some Day Smoker     Packs/day: 0.50     Years: 33.00     Pack years: 16.50     Types: Cigarettes    Smokeless tobacco: Never Used   Substance Use Topics    Alcohol use: No     Frequency: Never    Drug use: No       The patient's social and family histories were reviewed by me and updated in the appropriate section of the electronic medical record.    Allergies: Review of patient's allergies indicates:  No Known Allergies      Medications:   Current Outpatient Medications   Medication Sig Dispense Refill    albuterol (VENTOLIN HFA) 90 mcg/actuation inhaler Ventolin HFA 90 mcg/actuation aerosol inhaler   take 2 puffs every 4hrs as needed for cough      aluminum & magnesium hydroxide-simethicone (MAALOX MAXIMUM STRENGTH) 400-400-40 mg/5 mL suspension Take by mouth every 6 (six) hours as needed for Indigestion.      cyclobenzaprine (FLEXERIL) 10 MG tablet Take 10 mg by mouth 3 (three) times daily as needed for Muscle spasms.      ferrous sulfate 325 (65 FE) MG EC tablet Take 325 mg by mouth once daily.       furosemide (LASIX) 40 MG tablet Take 60 mg by mouth once daily.   3    gabapentin (NEURONTIN) 300 MG capsule Take 300 mg by mouth 3 (three) times daily.      MAVYRET 100-40 mg Tab TAKE THREE TABLETS BY MOUTH EVERY DAY WITH FOOD - pt has not started this medication yet (02/14/19)  2    pantoprazole (PROTONIX) 40 MG tablet Take 1 tablet (40 mg total) by mouth once daily. (Patient taking differently: Take 40 mg by mouth 2 (two) times daily. ) 30 tablet 11    sertraline (ZOLOFT) 25 MG tablet Take 1 tablet by mouth once daily.  1    spironolactone (ALDACTONE) 100 MG tablet Take 150 mg by mouth once daily.   3    sucralfate (CARAFATE) 100 mg/mL suspension Take 10 mLs (1 g total) by mouth 3 (three) times daily. (Patient taking differently: Take 1 g by mouth 3 (three) times daily as needed. Take for 10 days.) 300 mL 0     "thiamine 100 MG tablet Take 1 tablet (100 mg total) by mouth 2 (two) times daily. 30 tablet 0     No current facility-administered medications for this visit.      All medications and past history have been reviewed.        Objective:        Vital Signs:  Blood pressure 128/72, pulse 107, temperature 99.2 °F (37.3 °C), resp. rate 18, height 5' 1" (1.549 m), weight 123.3 kg (271 lb 12.8 oz), last menstrual period 08/06/2017. Body mass index is 51.36 kg/m².    Physical Exam:   General Appearance: Well appearing in no acute distress, well developed, well                nourished  Head: Normocephalic, without obvious abnormality  Eyes:  Pupils equal, round and reactive to light  Throat: Lips, mucosa, and tongue normal; teeth and gums normal  Lungs: CTA bilaterally in anterior and posterior fields, no wheezes, no crackles  Heart:  Regular rate and rhythm, no murmurs heard  Abdomen: Soft, non tender, non distended with positive bowel sounds in all four quadrants. No hepatosplenomegaly, ascites, or mass. Negative for succusion splash  : female   Extremities: No cyanosis, edema or deformity  Skin: No rash  Neurologic: Normal exam    Labs:  Lab Results   Component Value Date    WBC 5.40 02/25/2019    HGB 9.5 (L) 02/25/2019    HCT 30.1 (L) 02/25/2019     (L) 02/25/2019    ALT 15 02/25/2019    AST 29 02/25/2019     02/25/2019    K 3.6 02/25/2019     02/25/2019    CREATININE 0.7 02/25/2019    BUN 4 (L) 02/25/2019    CO2 24 02/25/2019    TSH 0.619 02/02/2019    INR 1.1 02/14/2019       Imaging:     All lab results and imaging results have been reviewed and discussed with the patient      Assessment:       1. Chronic hepatitis C without hepatic coma    2. Abdominal pain, unspecified abdominal location    3. Cirrhosis of liver with ascites, unspecified hepatic cirrhosis type    4. Esophageal varices without bleeding, unspecified esophageal varices type    5. Portal hypertension    6. Gastroesophageal reflux " disease, esophagitis presence not specified    7. Iron deficiency anemia, unspecified iron deficiency anemia type    8. Morbid obesity    9. Arthritis    10. Essential hypertension        Plan:       Karen was seen today for follow-up and colonoscopy.    Diagnoses and all orders for this visit:    Chronic hepatitis C without hepatic coma    Abdominal pain, unspecified abdominal location    Cirrhosis of liver with ascites, unspecified hepatic cirrhosis type    Esophageal varices without bleeding, unspecified esophageal varices type    Portal hypertension    Gastroesophageal reflux disease, esophagitis presence not specified    Iron deficiency anemia, unspecified iron deficiency anemia type    Morbid obesity    Arthritis    Essential hypertension        See HPI    Follow-up in about 3 weeks (around 4/4/2019).    The plan was discussed with the patient and all questions/concerns have been answered to the patient's satisfaction.        Electronically signed by Leigha Whitehead MD    This note was dictated using voice recognition software and may contain grammatical errors.

## 2019-03-15 RX ORDER — SODIUM CHLORIDE 9 MG/ML
INJECTION, SOLUTION INTRAVENOUS CONTINUOUS
Status: CANCELLED | OUTPATIENT
Start: 2019-03-15

## 2019-03-15 RX ORDER — HEPARIN 100 UNIT/ML
5 SYRINGE INTRAVENOUS
Status: CANCELLED | OUTPATIENT
Start: 2019-03-15

## 2019-03-15 RX ORDER — SODIUM CHLORIDE 0.9 % (FLUSH) 0.9 %
10 SYRINGE (ML) INJECTION
Status: CANCELLED | OUTPATIENT
Start: 2019-03-15

## 2019-03-19 ENCOUNTER — TELEPHONE (OUTPATIENT)
Dept: HEPATOLOGY | Facility: CLINIC | Age: 54
End: 2019-03-19

## 2019-03-19 NOTE — TELEPHONE ENCOUNTER
Patient a no-show for scheduled appt with PA Scheuermann on 3/19.  Attempt made to reach her by phone.  LVM and sent letter asking that she contact us for rescheduling.

## 2019-03-22 RX ORDER — SODIUM CHLORIDE 0.9 % (FLUSH) 0.9 %
10 SYRINGE (ML) INJECTION
Status: CANCELLED | OUTPATIENT
Start: 2019-03-22

## 2019-03-22 RX ORDER — SODIUM CHLORIDE 9 MG/ML
INJECTION, SOLUTION INTRAVENOUS CONTINUOUS
Status: CANCELLED | OUTPATIENT
Start: 2019-03-22

## 2019-03-22 RX ORDER — HEPARIN 100 UNIT/ML
5 SYRINGE INTRAVENOUS
Status: CANCELLED | OUTPATIENT
Start: 2019-03-22

## 2019-03-25 ENCOUNTER — TELEPHONE (OUTPATIENT)
Dept: GASTROENTEROLOGY | Facility: CLINIC | Age: 54
End: 2019-03-25

## 2019-03-25 DIAGNOSIS — K74.60 HEPATIC CIRRHOSIS, UNSPECIFIED HEPATIC CIRRHOSIS TYPE, UNSPECIFIED WHETHER ASCITES PRESENT: Primary | ICD-10-CM

## 2019-03-25 NOTE — TELEPHONE ENCOUNTER
----- Message from Yue Saucedo sent at 3/22/2019 11:22 AM CDT -----  Contact: SELF  HAS DR WOO SPOKEN TO DR LO WILSON.  PT IS ANXIOUS TO START HER HCV MEDS.

## 2019-03-27 ENCOUNTER — TELEPHONE (OUTPATIENT)
Dept: GASTROENTEROLOGY | Facility: CLINIC | Age: 54
End: 2019-03-27

## 2019-03-28 ENCOUNTER — TELEPHONE (OUTPATIENT)
Dept: HEMATOLOGY/ONCOLOGY | Facility: CLINIC | Age: 54
End: 2019-03-28

## 2019-03-28 NOTE — TELEPHONE ENCOUNTER
Pt returned my call to reschedule apt with Félix. Pt confirmed apt date and time rescheduled and verbalized understanding.

## 2019-03-28 NOTE — TELEPHONE ENCOUNTER
Called and left message for pt to inform her that we would need to reschedule her apt with Dr. Valencia on 4/15 due to her not being in office. I instructed pt to call  to reschedule.

## 2019-03-30 NOTE — TELEPHONE ENCOUNTER
Keep appt on 4/16.  Needs bloodwork before coming.--CBC, CMP, mag, phos, amylase, lipase, hcv rna pcr quant

## 2019-04-17 ENCOUNTER — LAB VISIT (OUTPATIENT)
Dept: LAB | Facility: HOSPITAL | Age: 54
End: 2019-04-17
Attending: INTERNAL MEDICINE
Payer: MEDICAID

## 2019-04-17 DIAGNOSIS — K74.60 CHRONIC HEPATITIS C WITH CIRRHOSIS: ICD-10-CM

## 2019-04-17 DIAGNOSIS — D64.9 SYMPTOMATIC ANEMIA: ICD-10-CM

## 2019-04-17 DIAGNOSIS — B18.2 CHRONIC HEPATITIS C WITH CIRRHOSIS: ICD-10-CM

## 2019-04-17 LAB
ALBUMIN SERPL-MCNC: 2.7 G/DL (ref 3.1–4.7)
ALP SERPL-CCNC: 239 IU/L (ref 40–104)
ALT (SGPT): 19 IU/L (ref 3–33)
AMYLASE SERPL-CCNC: 62 U/L (ref 28–100)
ANISOCYTOSIS BLD QL SMEAR: ABNORMAL
AST SERPL-CCNC: 33 IU/L (ref 10–40)
BASOPHILS # BLD AUTO: 0 K/UL (ref 0–0.2)
BASOPHILS NFR BLD: 0 K/UL (ref 0–0.2)
BASOPHILS NFR BLD: 0.7 % (ref 0–1.9)
BASOPHILS NFR BLD: 0.8 %
BILIRUB SERPL-MCNC: 1.5 MG/DL (ref 0.3–1)
BUN SERPL-MCNC: 6 MG/DL (ref 8–20)
CALCIUM SERPL-MCNC: 8.2 MG/DL (ref 7.7–10.4)
CHLORIDE: 105 MMOL/L (ref 98–110)
CO2 SERPL-SCNC: 24.2 MMOL/L (ref 22.8–31.6)
CREATININE: 0.77 MG/DL (ref 0.6–1.4)
DIFFERENTIAL METHOD: ABNORMAL
EOSINOPHIL # BLD AUTO: 0.1 K/UL (ref 0–0.5)
EOSINOPHIL NFR BLD: 0.1 K/UL (ref 0–0.7)
EOSINOPHIL NFR BLD: 1.9 %
EOSINOPHIL NFR BLD: 1.9 % (ref 0–8)
ERYTHROCYTE [DISTWIDTH] IN BLOOD BY AUTOMATED COUNT: 23.9 % (ref 11.7–14.9)
ERYTHROCYTE [DISTWIDTH] IN BLOOD BY AUTOMATED COUNT: 26.6 % (ref 11.5–14.5)
GLUCOSE: 110 MG/DL (ref 70–99)
GRAN #: 4.1 K/UL (ref 1.4–6.5)
GRAN%: 78.3 %
HCT VFR BLD AUTO: 32.9 % (ref 37–48.5)
HCT VFR BLD AUTO: 33.2 % (ref 36–48)
HGB BLD-MCNC: 10.6 G/DL (ref 12–15)
HGB BLD-MCNC: 10.7 G/DL (ref 12–16)
HYPOCHROMIA BLD QL SMEAR: ABNORMAL
IMMATURE GRANS (ABS): 0 K/UL (ref 0–1)
IMMATURE GRANULOCYTES: 0.6 %
IMMATURE PLATELET FRACTION: 6.6 % (ref 0.5–7.5)
LYMPH #: 0.6 K/UL (ref 1.2–3.4)
LYMPH%: 11 %
LYMPHOCYTES # BLD AUTO: 0.7 K/UL (ref 1–4.8)
LYMPHOCYTES NFR BLD: 12.5 % (ref 18–48)
MAGNESIUM SERPL-MCNC: 1.9 MG/DL (ref 1.5–2.6)
MCH RBC QN AUTO: 29.5 PG (ref 27–31)
MCH RBC QN AUTO: 30 PG (ref 25–35)
MCHC RBC AUTO-ENTMCNC: 31.9 G/DL (ref 31–36)
MCHC RBC AUTO-ENTMCNC: 32.5 G/DL (ref 32–36)
MCV RBC AUTO: 91 FL (ref 82–98)
MCV RBC AUTO: 94.1 FL (ref 79–98)
MONO #: 0.4 K/UL (ref 0.1–0.6)
MONO%: 7.4 %
MONOCYTES # BLD AUTO: 0.4 K/UL (ref 0.3–1)
MONOCYTES NFR BLD: 7.9 % (ref 4–15)
NEUTROPHILS # BLD AUTO: 4.3 K/UL (ref 1.8–7.7)
NEUTROPHILS NFR BLD: 77 % (ref 38–73)
NUCLEATED RBCS: 0 %
PLATELET # BLD AUTO: 85 K/UL (ref 150–350)
PLATELET # BLD AUTO: 88 K/UL (ref 140–440)
PLATELET BLD QL SMEAR: ABNORMAL
PMV BLD AUTO: 10.4 FL (ref 9.2–12.9)
PMV BLD AUTO: 11.9 FL (ref 8.8–12.7)
POTASSIUM SERPL-SCNC: 3.5 MMOL/L (ref 3.5–5)
PROT SERPL-MCNC: 5.9 G/DL (ref 6–8.2)
RBC # BLD AUTO: 3.53 M/UL (ref 3.5–5.5)
RBC # BLD AUTO: 3.62 M/UL (ref 4–5.4)
SODIUM: 137 MMOL/L (ref 134–144)
TSH SERPL DL<=0.005 MIU/L-ACNC: 3.23 ULU/ML (ref 0.3–5.6)
WBC # BLD AUTO: 5.2 K/UL (ref 5–10)
WBC # BLD AUTO: 5.5 K/UL (ref 3.9–12.7)

## 2019-04-17 PROCEDURE — 85025 COMPLETE CBC W/AUTO DIFF WBC: CPT

## 2019-04-17 PROCEDURE — 36415 COLL VENOUS BLD VENIPUNCTURE: CPT

## 2019-04-18 ENCOUNTER — TELEPHONE (OUTPATIENT)
Dept: GASTROENTEROLOGY | Facility: CLINIC | Age: 54
End: 2019-04-18

## 2019-04-18 NOTE — TELEPHONE ENCOUNTER
----- Message from Yue Saucedo sent at 4/17/2019  1:40 PM CDT -----  Contact: self  Dr harvey upped her pantoprazole to bid when she was in the hospital.  Needs new rx sent to her pharamacy.  Her primary is out on maternity leave.

## 2019-04-23 ENCOUNTER — TELEPHONE (OUTPATIENT)
Dept: HEMATOLOGY/ONCOLOGY | Facility: CLINIC | Age: 54
End: 2019-04-23

## 2019-04-23 NOTE — TELEPHONE ENCOUNTER
----- Message from Rosana Mcgarry sent at 4/23/2019 11:49 AM CDT -----  Contact: self  Patient [phone] is calling to cancel her appt for today 04 23 19 and needs to reschedule/please call patient

## 2019-04-29 ENCOUNTER — ANESTHESIA EVENT (OUTPATIENT)
Dept: ENDOSCOPY | Facility: HOSPITAL | Age: 54
DRG: 432 | End: 2019-04-29
Payer: MEDICAID

## 2019-04-29 ENCOUNTER — HOSPITAL ENCOUNTER (INPATIENT)
Facility: HOSPITAL | Age: 54
LOS: 5 days | Discharge: HOME | DRG: 432 | End: 2019-05-04
Attending: EMERGENCY MEDICINE | Admitting: INTERNAL MEDICINE
Payer: MEDICAID

## 2019-04-29 ENCOUNTER — ANESTHESIA (OUTPATIENT)
Dept: ENDOSCOPY | Facility: HOSPITAL | Age: 54
DRG: 432 | End: 2019-04-29
Payer: MEDICAID

## 2019-04-29 DIAGNOSIS — R10.9 ABDOMINAL PAIN, UNSPECIFIED ABDOMINAL LOCATION: ICD-10-CM

## 2019-04-29 DIAGNOSIS — D69.6 THROMBOCYTOPENIA: ICD-10-CM

## 2019-04-29 DIAGNOSIS — K92.2 GASTROINTESTINAL HEMORRHAGE, UNSPECIFIED GASTROINTESTINAL HEMORRHAGE TYPE: ICD-10-CM

## 2019-04-29 DIAGNOSIS — I85.01 BLEEDING ESOPHAGEAL VARICES, UNSPECIFIED ESOPHAGEAL VARICES TYPE: Primary | ICD-10-CM

## 2019-04-29 DIAGNOSIS — I85.01 BLEEDING ESOPHAGEAL VARICES: ICD-10-CM

## 2019-04-29 DIAGNOSIS — K74.60 HEPATIC CIRRHOSIS, UNSPECIFIED HEPATIC CIRRHOSIS TYPE, UNSPECIFIED WHETHER ASCITES PRESENT: ICD-10-CM

## 2019-04-29 DIAGNOSIS — K21.9 GASTROESOPHAGEAL REFLUX DISEASE, ESOPHAGITIS PRESENCE NOT SPECIFIED: ICD-10-CM

## 2019-04-29 DIAGNOSIS — R18.0 MALIGNANT ASCITES: ICD-10-CM

## 2019-04-29 DIAGNOSIS — D64.9 ANEMIA, UNSPECIFIED TYPE: ICD-10-CM

## 2019-04-29 DIAGNOSIS — R00.0 TACHYCARDIA: ICD-10-CM

## 2019-04-29 LAB
ABO + RH BLD: NORMAL
ALBUMIN SERPL BCP-MCNC: 2.4 G/DL (ref 3.5–5.2)
ALP SERPL-CCNC: 170 U/L (ref 55–135)
ALT SERPL W/O P-5'-P-CCNC: 17 U/L (ref 10–44)
ANION GAP SERPL CALC-SCNC: 8 MMOL/L (ref 8–16)
APTT BLDCRRT: 25.3 SEC (ref 21–32)
AST SERPL-CCNC: 26 U/L (ref 10–40)
BASOPHILS # BLD AUTO: 0 K/UL (ref 0–0.2)
BASOPHILS # BLD AUTO: 0 K/UL (ref 0–0.2)
BASOPHILS NFR BLD: 0.2 % (ref 0–1.9)
BASOPHILS NFR BLD: 0.3 % (ref 0–1.9)
BILIRUB SERPL-MCNC: 2.3 MG/DL (ref 0.1–1)
BLD GP AB SCN CELLS X3 SERPL QL: NORMAL
BUN SERPL-MCNC: 18 MG/DL (ref 6–20)
CALCIUM SERPL-MCNC: 8.3 MG/DL (ref 8.7–10.5)
CHLORIDE SERPL-SCNC: 105 MMOL/L (ref 95–110)
CO2 SERPL-SCNC: 21 MMOL/L (ref 23–29)
CREAT SERPL-MCNC: 0.8 MG/DL (ref 0.5–1.4)
DIFFERENTIAL METHOD: ABNORMAL
DIFFERENTIAL METHOD: ABNORMAL
EOSINOPHIL # BLD AUTO: 0 K/UL (ref 0–0.5)
EOSINOPHIL # BLD AUTO: 0 K/UL (ref 0–0.5)
EOSINOPHIL NFR BLD: 0.1 % (ref 0–8)
EOSINOPHIL NFR BLD: 0.2 % (ref 0–8)
ERYTHROCYTE [DISTWIDTH] IN BLOOD BY AUTOMATED COUNT: 22.7 % (ref 11.5–14.5)
ERYTHROCYTE [DISTWIDTH] IN BLOOD BY AUTOMATED COUNT: 23.1 % (ref 11.5–14.5)
EST. GFR  (AFRICAN AMERICAN): >60 ML/MIN/1.73 M^2
EST. GFR  (NON AFRICAN AMERICAN): >60 ML/MIN/1.73 M^2
FOLATE SERPL-MCNC: 5.9 NG/ML (ref 4–24)
GLUCOSE SERPL-MCNC: 123 MG/DL (ref 70–110)
HCT VFR BLD AUTO: 25.9 % (ref 37–48.5)
HCT VFR BLD AUTO: 30.2 % (ref 37–48.5)
HGB BLD-MCNC: 8.3 G/DL (ref 12–16)
HGB BLD-MCNC: 9.6 G/DL (ref 12–16)
INR PPP: 1.3 (ref 0.8–1.2)
IRON SERPL-MCNC: 159 UG/DL (ref 30–160)
LYMPHOCYTES # BLD AUTO: 1 K/UL (ref 1–4.8)
LYMPHOCYTES # BLD AUTO: 1.2 K/UL (ref 1–4.8)
LYMPHOCYTES NFR BLD: 7.2 % (ref 18–48)
LYMPHOCYTES NFR BLD: 9.1 % (ref 18–48)
MCH RBC QN AUTO: 29.4 PG (ref 27–31)
MCH RBC QN AUTO: 30 PG (ref 27–31)
MCHC RBC AUTO-ENTMCNC: 31.9 G/DL (ref 32–36)
MCHC RBC AUTO-ENTMCNC: 31.9 G/DL (ref 32–36)
MCV RBC AUTO: 92 FL (ref 82–98)
MCV RBC AUTO: 94 FL (ref 82–98)
MONOCYTES # BLD AUTO: 0.5 K/UL (ref 0.3–1)
MONOCYTES # BLD AUTO: 0.8 K/UL (ref 0.3–1)
MONOCYTES NFR BLD: 3.8 % (ref 4–15)
MONOCYTES NFR BLD: 6.4 % (ref 4–15)
NEUTROPHILS # BLD AUTO: 11 K/UL (ref 1.8–7.7)
NEUTROPHILS # BLD AUTO: 12.2 K/UL (ref 1.8–7.7)
NEUTROPHILS NFR BLD: 84.1 % (ref 38–73)
NEUTROPHILS NFR BLD: 88.6 % (ref 38–73)
PLATELET # BLD AUTO: 119 K/UL (ref 150–350)
PLATELET # BLD AUTO: 162 K/UL (ref 150–350)
PMV BLD AUTO: 10.2 FL (ref 9.2–12.9)
PMV BLD AUTO: 9.7 FL (ref 9.2–12.9)
POTASSIUM SERPL-SCNC: 4.2 MMOL/L (ref 3.5–5.1)
PROT SERPL-MCNC: 5.5 G/DL (ref 6–8.4)
PROTHROMBIN TIME: 12.9 SEC (ref 9–12.5)
RBC # BLD AUTO: 2.76 M/UL (ref 4–5.4)
RBC # BLD AUTO: 3.27 M/UL (ref 4–5.4)
SATURATED IRON: 65 % (ref 20–50)
SODIUM SERPL-SCNC: 134 MMOL/L (ref 136–145)
TOTAL IRON BINDING CAPACITY: 246 UG/DL (ref 250–450)
TRANSFERRIN SERPL-MCNC: 166 MG/DL (ref 200–375)
VIT B12 SERPL-MCNC: 630 PG/ML (ref 210–950)
WBC # BLD AUTO: 13.1 K/UL (ref 3.9–12.7)
WBC # BLD AUTO: 13.8 K/UL (ref 3.9–12.7)

## 2019-04-29 PROCEDURE — 86901 BLOOD TYPING SEROLOGIC RH(D): CPT

## 2019-04-29 PROCEDURE — 27201022: Performed by: INTERNAL MEDICINE

## 2019-04-29 PROCEDURE — D9220A PRA ANESTHESIA: ICD-10-PCS | Mod: ANES,,, | Performed by: ANESTHESIOLOGY

## 2019-04-29 PROCEDURE — 63600175 PHARM REV CODE 636 W HCPCS: Performed by: EMERGENCY MEDICINE

## 2019-04-29 PROCEDURE — C9113 INJ PANTOPRAZOLE SODIUM, VIA: HCPCS | Performed by: EMERGENCY MEDICINE

## 2019-04-29 PROCEDURE — 63600175 PHARM REV CODE 636 W HCPCS: Performed by: NURSE ANESTHETIST, CERTIFIED REGISTERED

## 2019-04-29 PROCEDURE — 96375 TX/PRO/DX INJ NEW DRUG ADDON: CPT

## 2019-04-29 PROCEDURE — 85025 COMPLETE CBC W/AUTO DIFF WBC: CPT | Mod: 91

## 2019-04-29 PROCEDURE — 63600175 PHARM REV CODE 636 W HCPCS: Performed by: HOSPITALIST

## 2019-04-29 PROCEDURE — 96376 TX/PRO/DX INJ SAME DRUG ADON: CPT

## 2019-04-29 PROCEDURE — 93010 EKG 12-LEAD: ICD-10-PCS | Mod: ,,, | Performed by: INTERNAL MEDICINE

## 2019-04-29 PROCEDURE — D9220A PRA ANESTHESIA: Mod: ANES,,, | Performed by: ANESTHESIOLOGY

## 2019-04-29 PROCEDURE — 96367 TX/PROPH/DG ADDL SEQ IV INF: CPT

## 2019-04-29 PROCEDURE — 85730 THROMBOPLASTIN TIME PARTIAL: CPT

## 2019-04-29 PROCEDURE — 25000003 PHARM REV CODE 250: Performed by: HOSPITALIST

## 2019-04-29 PROCEDURE — 20000000 HC ICU ROOM

## 2019-04-29 PROCEDURE — 99285 EMERGENCY DEPT VISIT HI MDM: CPT | Mod: 25

## 2019-04-29 PROCEDURE — 43244 EGD VARICES LIGATION: CPT | Mod: ,,, | Performed by: INTERNAL MEDICINE

## 2019-04-29 PROCEDURE — 80053 COMPREHEN METABOLIC PANEL: CPT

## 2019-04-29 PROCEDURE — 43244 PR EGD, FLEX, W/BAND LIGATION, ESOPH/GASTR VARICES: ICD-10-PCS | Mod: ,,, | Performed by: INTERNAL MEDICINE

## 2019-04-29 PROCEDURE — 36415 COLL VENOUS BLD VENIPUNCTURE: CPT

## 2019-04-29 PROCEDURE — 37000008 HC ANESTHESIA 1ST 15 MINUTES: Performed by: INTERNAL MEDICINE

## 2019-04-29 PROCEDURE — 25000003 PHARM REV CODE 250: Performed by: EMERGENCY MEDICINE

## 2019-04-29 PROCEDURE — 82607 VITAMIN B-12: CPT

## 2019-04-29 PROCEDURE — 85610 PROTHROMBIN TIME: CPT

## 2019-04-29 PROCEDURE — 99499 UNLISTED E&M SERVICE: CPT | Mod: ,,, | Performed by: INTERNAL MEDICINE

## 2019-04-29 PROCEDURE — D9220A PRA ANESTHESIA: ICD-10-PCS | Mod: CRNA,,, | Performed by: NURSE ANESTHETIST, CERTIFIED REGISTERED

## 2019-04-29 PROCEDURE — 37000009 HC ANESTHESIA EA ADD 15 MINS: Performed by: INTERNAL MEDICINE

## 2019-04-29 PROCEDURE — 93010 ELECTROCARDIOGRAM REPORT: CPT | Mod: ,,, | Performed by: INTERNAL MEDICINE

## 2019-04-29 PROCEDURE — 82746 ASSAY OF FOLIC ACID SERUM: CPT

## 2019-04-29 PROCEDURE — 96365 THER/PROPH/DIAG IV INF INIT: CPT

## 2019-04-29 PROCEDURE — 93005 ELECTROCARDIOGRAM TRACING: CPT

## 2019-04-29 PROCEDURE — 83540 ASSAY OF IRON: CPT

## 2019-04-29 PROCEDURE — 43255 EGD CONTROL BLEEDING ANY: CPT | Performed by: INTERNAL MEDICINE

## 2019-04-29 PROCEDURE — 99499 NO LOS: ICD-10-PCS | Mod: ,,, | Performed by: INTERNAL MEDICINE

## 2019-04-29 PROCEDURE — 25000003 PHARM REV CODE 250: Performed by: NURSE ANESTHETIST, CERTIFIED REGISTERED

## 2019-04-29 PROCEDURE — D9220A PRA ANESTHESIA: Mod: CRNA,,, | Performed by: NURSE ANESTHETIST, CERTIFIED REGISTERED

## 2019-04-29 PROCEDURE — 25000003 PHARM REV CODE 250: Performed by: NURSE PRACTITIONER

## 2019-04-29 RX ORDER — SODIUM CHLORIDE 9 MG/ML
INJECTION, SOLUTION INTRAVENOUS CONTINUOUS
Status: DISCONTINUED | OUTPATIENT
Start: 2019-04-29 | End: 2019-05-01

## 2019-04-29 RX ORDER — SERTRALINE HYDROCHLORIDE 25 MG/1
25 TABLET, FILM COATED ORAL DAILY
Status: DISCONTINUED | OUTPATIENT
Start: 2019-04-29 | End: 2019-04-29

## 2019-04-29 RX ORDER — HALOPERIDOL 5 MG/ML
5 INJECTION INTRAMUSCULAR EVERY 6 HOURS PRN
Status: DISCONTINUED | OUTPATIENT
Start: 2019-04-29 | End: 2019-05-04 | Stop reason: HOSPADM

## 2019-04-29 RX ORDER — OCTREOTIDE ACETATE 100 UG/ML
50 INJECTION, SOLUTION INTRAVENOUS; SUBCUTANEOUS
Status: COMPLETED | OUTPATIENT
Start: 2019-04-29 | End: 2019-04-29

## 2019-04-29 RX ORDER — LIDOCAINE HYDROCHLORIDE 10 MG/ML
INJECTION, SOLUTION INTRAVENOUS
Status: DISCONTINUED | OUTPATIENT
Start: 2019-04-29 | End: 2019-04-29

## 2019-04-29 RX ORDER — ONDANSETRON 2 MG/ML
4 INJECTION INTRAMUSCULAR; INTRAVENOUS EVERY 6 HOURS PRN
Status: DISCONTINUED | OUTPATIENT
Start: 2019-04-29 | End: 2019-05-04 | Stop reason: HOSPADM

## 2019-04-29 RX ORDER — ONDANSETRON 2 MG/ML
4 INJECTION INTRAMUSCULAR; INTRAVENOUS
Status: COMPLETED | OUTPATIENT
Start: 2019-04-29 | End: 2019-04-29

## 2019-04-29 RX ORDER — SODIUM CHLORIDE 0.9 % (FLUSH) 0.9 %
10 SYRINGE (ML) INJECTION
Status: DISCONTINUED | OUTPATIENT
Start: 2019-04-29 | End: 2019-05-04 | Stop reason: HOSPADM

## 2019-04-29 RX ORDER — HYOSCYAMINE SULFATE 0.12 MG/1
0.12 TABLET SUBLINGUAL EVERY 4 HOURS PRN
Status: DISCONTINUED | OUTPATIENT
Start: 2019-04-29 | End: 2019-04-29

## 2019-04-29 RX ORDER — ACETAMINOPHEN 325 MG/1
650 TABLET ORAL EVERY 4 HOURS PRN
Status: DISCONTINUED | OUTPATIENT
Start: 2019-04-29 | End: 2019-05-04 | Stop reason: HOSPADM

## 2019-04-29 RX ORDER — PANTOPRAZOLE SODIUM 40 MG/10ML
80 INJECTION, POWDER, LYOPHILIZED, FOR SOLUTION INTRAVENOUS
Status: COMPLETED | OUTPATIENT
Start: 2019-04-29 | End: 2019-04-29

## 2019-04-29 RX ORDER — HYDROMORPHONE HYDROCHLORIDE 2 MG/ML
0.2 INJECTION, SOLUTION INTRAMUSCULAR; INTRAVENOUS; SUBCUTANEOUS EVERY 6 HOURS PRN
Status: DISCONTINUED | OUTPATIENT
Start: 2019-04-29 | End: 2019-05-04 | Stop reason: HOSPADM

## 2019-04-29 RX ORDER — HYOSCYAMINE SULFATE 0.12 MG/1
0.12 TABLET SUBLINGUAL EVERY 4 HOURS PRN
Status: DISCONTINUED | OUTPATIENT
Start: 2019-04-29 | End: 2019-05-04 | Stop reason: HOSPADM

## 2019-04-29 RX ORDER — GABAPENTIN 300 MG/1
300 CAPSULE ORAL 3 TIMES DAILY
Status: DISCONTINUED | OUTPATIENT
Start: 2019-04-29 | End: 2019-05-04 | Stop reason: HOSPADM

## 2019-04-29 RX ORDER — SERTRALINE HYDROCHLORIDE 25 MG/1
25 TABLET, FILM COATED ORAL NIGHTLY
Status: DISCONTINUED | OUTPATIENT
Start: 2019-04-29 | End: 2019-05-02

## 2019-04-29 RX ORDER — PROPOFOL 10 MG/ML
VIAL (ML) INTRAVENOUS
Status: DISCONTINUED | OUTPATIENT
Start: 2019-04-29 | End: 2019-04-29

## 2019-04-29 RX ADMIN — DEXTROSE 8 MG/HR: 50 INJECTION, SOLUTION INTRAVENOUS at 08:04

## 2019-04-29 RX ADMIN — DEXTROSE 8 MG/HR: 50 INJECTION, SOLUTION INTRAVENOUS at 10:04

## 2019-04-29 RX ADMIN — LIDOCAINE HYDROCHLORIDE 50 MG: 10 INJECTION, SOLUTION INTRAVENOUS at 02:04

## 2019-04-29 RX ADMIN — ONDANSETRON 4 MG: 2 INJECTION INTRAMUSCULAR; INTRAVENOUS at 03:04

## 2019-04-29 RX ADMIN — HYDROMORPHONE HYDROCHLORIDE 0.2 MG: 2 INJECTION, SOLUTION INTRAMUSCULAR; INTRAVENOUS; SUBCUTANEOUS at 05:04

## 2019-04-29 RX ADMIN — DEXTROSE 8 MG/HR: 50 INJECTION, SOLUTION INTRAVENOUS at 05:04

## 2019-04-29 RX ADMIN — PROMETHAZINE HYDROCHLORIDE 12.5 MG: 25 INJECTION INTRAMUSCULAR; INTRAVENOUS at 05:04

## 2019-04-29 RX ADMIN — ONDANSETRON 4 MG: 2 INJECTION INTRAMUSCULAR; INTRAVENOUS at 04:04

## 2019-04-29 RX ADMIN — DEXTROSE 8 MG/HR: 50 INJECTION, SOLUTION INTRAVENOUS at 02:04

## 2019-04-29 RX ADMIN — HYOSCYAMINE SULFATE 0.12 MG: 0.12 TABLET ORAL; SUBLINGUAL at 04:04

## 2019-04-29 RX ADMIN — CEFTRIAXONE 1 G: 1 INJECTION, SOLUTION INTRAVENOUS at 04:04

## 2019-04-29 RX ADMIN — PROPOFOL 40 MG: 10 INJECTION, EMULSION INTRAVENOUS at 02:04

## 2019-04-29 RX ADMIN — OCTREOTIDE ACETATE 50 MCG: 100 INJECTION, SOLUTION INTRAVENOUS; SUBCUTANEOUS at 04:04

## 2019-04-29 RX ADMIN — SERTRALINE 25 MG: 25 TABLET, FILM COATED ORAL at 08:04

## 2019-04-29 RX ADMIN — PROPOFOL 100 MG: 10 INJECTION, EMULSION INTRAVENOUS at 02:04

## 2019-04-29 RX ADMIN — PANTOPRAZOLE SODIUM 80 MG: 40 INJECTION, POWDER, LYOPHILIZED, FOR SOLUTION INTRAVENOUS at 04:04

## 2019-04-29 RX ADMIN — PROPOFOL 20 MG: 10 INJECTION, EMULSION INTRAVENOUS at 02:04

## 2019-04-29 RX ADMIN — GABAPENTIN 300 MG: 300 CAPSULE ORAL at 10:04

## 2019-04-29 RX ADMIN — OCTREOTIDE ACETATE 50 MCG/HR: 1000 INJECTION, SOLUTION INTRAVENOUS; SUBCUTANEOUS at 02:04

## 2019-04-29 RX ADMIN — OCTREOTIDE ACETATE 50 MCG/HR: 1000 INJECTION, SOLUTION INTRAVENOUS; SUBCUTANEOUS at 05:04

## 2019-04-29 RX ADMIN — SODIUM CHLORIDE: 0.9 INJECTION, SOLUTION INTRAVENOUS at 02:04

## 2019-04-29 RX ADMIN — SODIUM CHLORIDE: 0.9 INJECTION, SOLUTION INTRAVENOUS at 07:04

## 2019-04-29 NOTE — PROGRESS NOTES
Messaged Dr. Valencia per request of MD for okay to transfuse. Okay with Dr. Valencia per messaging and information forwarded to Dr. Glaser.

## 2019-04-29 NOTE — PLAN OF CARE
Problem: Adult Inpatient Plan of Care  Goal: Plan of Care Review  Outcome: Ongoing (interventions implemented as appropriate)  Received from ED. Patient awake and alert. Vital signs stable. Resting at present. No s/s bleeding at present. Oriented to room. Plan monitor vital signs monitor for bleeding. Monitor patient safety

## 2019-04-29 NOTE — ANESTHESIA PREPROCEDURE EVALUATION
04/29/2019  Karen Villarreal is a 53 y.o., female.    Pre-op Assessment    I have reviewed the Patient Summary Reports.     I have reviewed the Nursing Notes.   I have reviewed the Medications.     Review of Systems  Anesthesia Hx:  Denies Family Hx of Anesthesia complications.   Denies Personal Hx of Anesthesia complications.   Hematology/Oncology:         -- Anemia: Hematology Comments: Hypersplenism/thrombocytopenia   Cardiovascular:   Hypertension    Renal/:   Chronic Renal Disease    Hepatic/GI:   Hiatal Hernia, GERD Liver Disease, Hepatitis, C Hematemesis and melana  Current nausea  Cirrhosis, portal HTN   Neurological:   Denies Neuromuscular Disease.    Psych:   Psychiatric History          Physical Exam  General:  Morbid Obesity    Airway/Jaw/Neck:  Airway Findings: Mouth Opening: Normal Tongue: Normal  General Airway Assessment: Adult  Mallampati: III  Improves to II with phonation.  TM Distance: Normal, at least 6 cm  Jaw/Neck Findings:  Neck ROM: Normal ROM  Neck Findings:  Girth Increased      Dental:  DENTAL FINDINGS: Normal   Chest/Lungs:  Chest/Lungs Findings: Clear to auscultation, Normal Respiratory Rate     Heart/Vascular:  Heart Findings: Rate: Tachycardia  Rhythm: Regular Rhythm  Heart Murmur  Systolic  Systolic Heart Murmur Description: L Upper Sternal Border, Ejection Type  Systolic Heart Murmur Grade: Grade II             Anesthesia Plan  Type of Anesthesia, risks & benefits discussed:  Anesthesia Type:  general  Patient's Preference:   Intra-op Monitoring Plan: standard ASA monitors  Intra-op Monitoring Plan Comments:   Post Op Pain Control Plan:   Post Op Pain Control Plan Comments:   Induction:   IV  Beta Blocker:  Patient is not currently on a Beta-Blocker (No further documentation required).       Informed Consent: Patient understands risks and agrees with Anesthesia plan.   Questions answered. Anesthesia consent signed with patient.  ASA Score: 3     Day of Surgery Review of History & Physical:    H&P update referred to the provider.         Ready For Surgery From Anesthesia Perspective.

## 2019-04-29 NOTE — TRANSFER OF CARE
"Anesthesia Transfer of Care Note    Patient: Karen Villarreal    Procedure(s) Performed: Procedure(s) (LRB):  EGD (ESOPHAGOGASTRODUODENOSCOPY) (N/A)    Patient location: ICU    Anesthesia Type: general    Transport from OR: Transported from OR on 2-3 L/min O2 by NC with adequate spontaneous ventilation    Post pain: adequate analgesia    Post assessment: no apparent anesthetic complications and tolerated procedure well    Post vital signs: stable    Level of consciousness: sedated and responds to stimulation    Nausea/Vomiting: no nausea/vomiting    Complications: none    Transfer of care protocol was followed      Last vitals:   Visit Vitals  BP (!) 114/56   Pulse 101   Temp 37.6 °C (99.6 °F) (Oral)   Resp (!) 27   Ht 5' 1" (1.549 m)   Wt 114.9 kg (253 lb 4.9 oz)   LMP 08/06/2017 (Approximate)   SpO2 (!) 91%   Breastfeeding? No   BMI 47.86 kg/m²     "

## 2019-04-29 NOTE — [employer] PATIENT INSTRUCTIONS
Discharge Summary/Instructions after an Endoscopic Procedure  Patient Name: Karen Villarreal  Patient MRN: [id number]  Patient YOB: 1965  Monday, April 29, 2019  Justin Saldana MD  RESTRICTIONS:  During your procedure today, you received medications for sedation.  These   medications may affect your judgment, balance and coordination.  Therefore,   for 24 hours, you have the following restrictions:   - DO NOT drive a car, operate machinery, make legal/financial decisions,   sign important papers or drink alcohol.    ACTIVITY:  Today: no heavy lifting, straining or running due to procedural   sedation/anesthesia.  The following day: return to full activity including work.  DIET:  Eat and drink normally unless instructed otherwise.     TREATMENT FOR COMMON SIDE EFFECTS:  - Mild abdominal pain, nausea, belching, bloating or excessive gas:  rest,   eat lightly and use a heating pad.  - Sore Throat: treat with throat lozenges and/or gargle with warm salt   water.  - Because air was used during the procedure, expelling large amounts of air   from your rectum or belching is normal.  - If a bowel prep was taken, you may not have a bowel movement for 1-3 days.    This is normal.  SYMPTOMS TO WATCH FOR AND REPORT TO YOUR PHYSICIAN:  1. Abdominal pain or bloating, other than gas cramps.  2. Chest pain.  3. Back pain.  4. Signs of infection such as: chills or fever occurring within 24 hours   after the procedure.  5. Rectal bleeding, which would show as bright red, maroon, or black stools.   (A tablespoon of blood from the rectum is not serious, especially if   hemorrhoids are present.)  6. Vomiting.  7. Weakness or dizziness.  GO DIRECTLY TO THE NEAREST EMERGENCY ROOM IF YOU HAVE ANY OF THE FOLLOWING:      Difficulty breathing              Chills and/or fever over 101 F   Persistent vomiting and/or vomiting blood   Severe abdominal pain   Severe chest pain   Black, tarry stools   Bleeding- more than one  tablespoon   Any other symptom or condition that you feel may need urgent attention  Your doctor recommends these additional instructions:  If any biopsies were taken, your doctors clinic will contact you in 1 to 2   weeks with any results.  - Return patient to ICU for ongoing care.   - Clear liquid diet.   - Continue present medications.   - No aspirin, ibuprofen, naproxen, or other non-steroidal anti-inflammatory   drugs.   - Repeat upper endoscopy in 2 weeks for retreatment.   - Follow an antireflux regimen.   - Give Protonix (pantoprazole): initiate therapy with 80 mg IV bolus, then 8   mg/hr IV by continuous infusion for 3 days.   - Administer an IV bolus of 50 micrograms of octreotide followed by an   infusion of 50 micrograms per hour for 3 days.  For questions, problems or results please call your physician - Justin Saldana MD at Work:  [phone].  [facility], EMERGENCY ROOM PHONE NUMBER: [phone]  IF A COMPLICATION OR EMERGENCY SITUATION ARISES AND YOU ARE UNABLE TO REACH   YOUR PHYSICIAN - GO DIRECTLY TO THE EMERGENCY ROOM.  Justin Saldana MD  4/29/2019 2:39:06 PM  This report has been verified and signed electronically.  [employer]

## 2019-04-29 NOTE — ASSESSMENT & PLAN NOTE
Admit to ICU.  Keep patient NPO.  Follow H/H closely. T&S.  Currently H/H is at baseline.  Continue IV Protonix infusion 8 mg/hr.  Continue Octreotide infusion.  Consult Gastronetrologist.   Check Iron, TIBC, B12 and folic acid.   Continue IVF hydration.   Use IV anti-emetics as needed.

## 2019-04-29 NOTE — SUBJECTIVE & OBJECTIVE
Past Medical History:   Diagnosis Date    Acid reflux     Anemia     Arthritis     Ascites 03/08/2017    Cataract     Cirrhosis     Depression     Hepatitis C     Hiatal hernia     Ovary removal, prophylactic     Renal cyst 03/08/2017    Thrombocytopenia        Past Surgical History:   Procedure Laterality Date    ANKLE SURGERY      APPENDECTOMY      CATARACT EXTRACTION      EGD (ESOPHAGOGASTRODUODENOSCOPY) N/A 2/5/2019    [facility]    EGD (ESOPHAGOGASTRODUODENOSCOPY) N/A 2/2/2019    [facility] ENDO    EXTRACTION-CATARACT-IOL Right 1/5/2018    [facility]    EXTRACTION-CATARACT-IOL Left 12/1/2017    [facility] at [facility] OR       Review of patient's allergies indicates:  No Known Allergies    No current facility-administered medications on file prior to encounter.      Current Outpatient Medications on File Prior to Encounter   Medication Sig    albuterol (VENTOLIN HFA) 90 mcg/actuation inhaler Ventolin HFA 90 mcg/actuation aerosol inhaler   take 2 puffs every 4hrs as needed for cough    aluminum & magnesium hydroxide-simethicone (MAALOX MAXIMUM STRENGTH) 400-400-40 mg/5 mL suspension Take by mouth every 6 (six) hours as needed for Indigestion.    ferrous sulfate 325 (65 FE) MG EC tablet Take 325 mg by mouth once daily.     furosemide (LASIX) 40 MG tablet Take 60 mg by mouth once daily.     gabapentin (NEURONTIN) 300 MG capsule Take 300 mg by mouth 3 (three) times daily.    pantoprazole (PROTONIX) 40 MG tablet Take 1 tablet (40 mg total) by mouth once daily. (Patient taking differently: Take 40 mg by mouth 2 (two) times daily. )    cyclobenzaprine (FLEXERIL) 10 MG tablet Take 10 mg by mouth 3 (three) times daily as needed for Muscle spasms.    MAVYRET 100-40 mg Tab TAKE THREE TABLETS BY MOUTH EVERY DAY WITH FOOD - pt has not started this medication yet  (02/14/19)    sertraline (ZOLOFT) 25 MG tablet Take 1 tablet by mouth once daily.    spironolactone (ALDACTONE) 100 MG tablet Take 150 mg by mouth once daily.     [DISCONTINUED] sucralfate (CARAFATE) 100 mg/mL suspension Take 10 mLs (1 g total) by mouth 3 (three) times daily. (Patient taking differently: Take 1 g by mouth 3 (three) times daily as needed. Take for 10 days.)    [DISCONTINUED] thiamine 100 MG tablet Take 1 tablet (100 mg total) by mouth 2 (two) times daily.     Family History     Problem Relation (Age of Onset)    Arthritis Mother    Brain cancer Father    COPD Mother    Cancer Father    Diabetes Father    Diabetes type II Father    Heart failure Mother    Vision loss Mother        Tobacco Use    Smoking status: Current Some Day Smoker     Packs/day: 0.50     Years: 33.00     Pack years: 16.50     Types: Cigarettes    Smokeless tobacco: Never Used   Substance and Sexual Activity    Alcohol use: No     Frequency: Never    Drug use: No    Sexual activity: Yes     Review of Systems   Constitutional: Negative for chills and fever.   HENT: Negative for congestion and rhinorrhea.    Eyes: Negative for photophobia and visual disturbance.   Respiratory: Negative for cough, shortness of breath and wheezing.    Cardiovascular: Negative for chest pain and palpitations.   Gastrointestinal: Positive for abdominal distention, abdominal pain, blood in stool, diarrhea, nausea and vomiting.   Endocrine: Negative for polyphagia and polyuria.   Genitourinary: Negative for dysuria and flank pain.   Musculoskeletal: Negative for back pain and gait problem.   Skin: Positive for pallor. Negative for rash.   Neurological: Positive for dizziness and light-headedness.   Hematological: Does not bruise/bleed easily.   Psychiatric/Behavioral: Negative for agitation and confusion.   All other systems reviewed and are negative.    Objective:     Vital Signs (Most Recent):  Temp: 99.9 °F (37.7 °C) (04/29/19 0615)  Pulse: (!)  111 (04/29/19 0615)  Resp: 17 (04/29/19 0615)  BP: (!) 123/59 (04/29/19 0615)  SpO2: 100 % (04/29/19 0615) Vital Signs (24h Range):  Temp:  [99.6 °F (37.6 °C)-99.9 °F (37.7 °C)] 99.9 °F (37.7 °C)  Pulse:  [111-122] 111  Resp:  [17] 17  SpO2:  [98 %-100 %] 100 %  BP: ()/(56-63) 123/59     Weight: 114.9 kg (253 lb 4.9 oz)  Body mass index is 47.86 kg/m².    Physical Exam   Constitutional: She is oriented to person, place, and time. She appears well-developed and well-nourished.   HENT:   Head: Normocephalic and atraumatic.   Eyes: Pupils are equal, round, and reactive to light. Conjunctivae and EOM are normal.   Neck: Normal range of motion. Neck supple. No JVD present.   Cardiovascular: Normal rate, regular rhythm and intact distal pulses.   Pulmonary/Chest: Effort normal and breath sounds normal. No respiratory distress.   Abdominal: Soft. Bowel sounds are normal. She exhibits distension. There is tenderness.   Genitourinary:   Genitourinary Comments: deferred   Musculoskeletal: Normal range of motion. She exhibits no tenderness.   Neurological: She is alert and oriented to person, place, and time.   Skin: Skin is warm and dry. There is pallor.   Psychiatric: She has a normal mood and affect. Her behavior is normal. Judgment and thought content normal.   Vitals reviewed.        CRANIAL NERVES     CN III, IV, VI   Pupils are equal, round, and reactive to light.  Extraocular motions are normal.        Significant Labs:   CBC:   Recent Labs   Lab 04/29/19  0350   WBC 13.80*   HGB 9.6*   HCT 30.2*        CMP:   Recent Labs   Lab 04/29/19  0350   *   K 4.2      CO2 21*   *   BUN 18   CREATININE 0.8   CALCIUM 8.3*   PROT 5.5*   ALBUMIN 2.4*   BILITOT 2.3*   ALKPHOS 170*   AST 26   ALT 17   ANIONGAP 8   EGFRNONAA >60

## 2019-04-29 NOTE — ASSESSMENT & PLAN NOTE
Nutrition consulted. Encourage maximal PO intake when OK with GI. Will encourage PO and monitor closely for weight changes

## 2019-04-29 NOTE — PROGRESS NOTES
EGD done.  Esophageal varices with stigmata of recent bleeding (red mary jane marks) noted.  PHG also noted.  No active bleeding.  Band ligation performed with successful deployment of 6 bands.  No bleeding at end of procedure.  Recommend continued ICU monitoring.  Recommend octreotide and PPI drips x 72 hours.  Recommend clear liquid diet today.  Repeat EGD for banding again in 2-3 weeks and repeat every 2-3 weeks until complete eradication.  Can consider nonselective BB titrated to HR of 55-60 upon discharge, however, risk versus benefit of this will need to be weighed given presence of ascites.  Will continue to follow.  Follow H/H and transfuse PRN to goal hemoglobin of 8-8.5.

## 2019-04-29 NOTE — CONSULTS
[facility]     CC: Hematemesis    HPI 53 y.o. 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 Whitehead with whom the case was discussed.  Patient has hematemesis, onset yesterday, x 2 episodes, dark red in color with associated epigastric pain and passage of stool with maroon blood x 1.  She had variceal bleeding about 2 months ago and had banding at that time.  She denies any other acute complaints at this time.  She states that last colonoscopy was many years ago.  Last EGD was done at this facility two months ago.    Past Medical History:   Diagnosis Date    Acid reflux     Anemia     Arthritis     Ascites 03/08/2017    Cataract     Cirrhosis     Depression     Hepatitis C     Hiatal hernia     Ovary removal, prophylactic     Renal cyst 03/08/2017    Thrombocytopenia        Past Surgical History:   Procedure Laterality Date    ANKLE SURGERY      APPENDECTOMY      CATARACT EXTRACTION      EGD (ESOPHAGOGASTRODUODENOSCOPY) N/A 2/5/2019    [facility]    EGD (ESOPHAGOGASTRODUODENOSCOPY) N/A 2/2/2019    [facility] ENDO    EXTRACTION-CATARACT-IOL Right 1/5/2018    [facility] OR    EXTRACTION-CATARACT-IOL Left 12/1/2017    [facility] by [facility] at [facility] OR       Social History     Tobacco Use    Smoking status: Current Some Day Smoker     Packs/day: 0.50     Years: 33.00     Pack years: 16.50     Types: Cigarettes    Smokeless tobacco: Never Used   Substance Use Topics    Alcohol use: No     Frequency: Never    Drug use: No       Family History   Problem Relation Age of Onset    Heart failure Mother     COPD Mother     Arthritis Mother     Vision loss Mother     Diabetes Father     Cancer Father     Brain cancer Father     Diabetes type II Father        Review of Systems  General ROS: negative for - chills, fever or weight  "loss  Psychological ROS: negative for - hallucination, depression or suicidal ideation  Ophthalmic ROS: negative for - blurry vision, photophobia or eye pain  ENT ROS: negative for - epistaxis, sore throat or rhinorrhea  Respiratory ROS: no cough, shortness of breath, or wheezing  Cardiovascular ROS: no chest pain or dyspnea on exertion  Gastrointestinal ROS: + abdominal pain and hematemesis with blood in stool  Genito-Urinary ROS: no dysuria, trouble voiding, or hematuria  Musculoskeletal ROS: negative for - arthralgia, myalgia, weakness  Neurological ROS: no syncope or seizures; no ataxia  Dermatological ROS: negative for pruritis, rash and jaundice    Physical Examination  /61 (BP Location: Left arm, Patient Position: Sitting)   Pulse 99   Temp 99.6 °F (37.6 °C) (Oral)   Resp 18   Ht 5' 1" (1.549 m)   Wt 114.9 kg (253 lb 4.9 oz)   LMP 08/06/2017 (Approximate)   SpO2 100%   Breastfeeding? No   BMI 47.86 kg/m²   General appearance: alert, cooperative, no distress  HENT: Normocephalic, atraumatic, neck symmetrical, no nasal discharge   Eyes: conjunctivae/corneas clear, PERRL, EOM's intact, sclera anicteric  Lungs: clear to auscultation bilaterally, no dullness to percussion bilaterally, symmetric expansion, breathing unlabored  Heart: regular rate and rhythm without rub; no displacement of the PMI   Abdomen: obese with diffuse TTP.  + BS  Extremities: extremities symmetric; no clubbing, cyanosis, or edema  Integument: Skin color, texture, turgor normal; no rashes; hair distrubution normal, no jaundice  Neurologic: Alert and oriented X 3, no focal sensory or motor neurologic deficits  Psychiatric: no pressured speech; normal affect; no evidence of impaired cognition, no anxiety/depression     Labs:  Lab Results   Component Value Date    WBC 13.10 (H) 04/29/2019    HGB 8.3 (L) 04/29/2019    HCT 25.9 (L) 04/29/2019    MCV 94 04/29/2019     (L) 04/29/2019       CMP  Sodium   Date Value Ref Range " Status   04/29/2019 134 (L) 136 - 145 mmol/L Final   04/17/2019 137 134 - 144 mmol/L      Potassium   Date Value Ref Range Status   04/29/2019 4.2 3.5 - 5.1 mmol/L Final     Chloride   Date Value Ref Range Status   04/29/2019 105 95 - 110 mmol/L Final   04/17/2019 105 98 - 110 mmol/L      CO2   Date Value Ref Range Status   04/29/2019 21 (L) 23 - 29 mmol/L Final     Glucose   Date Value Ref Range Status   04/29/2019 123 (H) 70 - 110 mg/dL Final   04/17/2019 110 (H) 70 - 99 mg/dL      BUN, Bld   Date Value Ref Range Status   04/29/2019 18 6 - 20 mg/dL Final     Creatinine   Date Value Ref Range Status   04/29/2019 0.8 0.5 - 1.4 mg/dL Final   04/17/2019 0.77 0.60 - 1.40 mg/dL      Calcium   Date Value Ref Range Status   04/29/2019 8.3 (L) 8.7 - 10.5 mg/dL Final     Total Protein   Date Value Ref Range Status   04/29/2019 5.5 (L) 6.0 - 8.4 g/dL Final     Albumin   Date Value Ref Range Status   04/29/2019 2.4 (L) 3.5 - 5.2 g/dL Final   04/17/2019 2.7 (L) 3.1 - 4.7 g/dL      Total Bilirubin   Date Value Ref Range Status   04/29/2019 2.3 (H) 0.1 - 1.0 mg/dL Final     Comment:     For infants and newborns, interpretation of results should be based  on gestational age, weight and in agreement with clinical  observations.  Premature Infant recommended reference ranges:  Up to 24 hours.............<8.0 mg/dL  Up to 48 hours............<12.0 mg/dL  3-5 days..................<15.0 mg/dL  6-29 days.................<15.0 mg/dL       Alkaline Phosphatase   Date Value Ref Range Status   04/29/2019 170 (H) 55 - 135 U/L Final     AST   Date Value Ref Range Status   04/29/2019 26 10 - 40 U/L Final     ALT   Date Value Ref Range Status   04/29/2019 17 10 - 44 U/L Final     Anion Gap   Date Value Ref Range Status   04/29/2019 8 8 - 16 mmol/L Final     eGFR if    Date Value Ref Range Status   04/29/2019 >60 >60 mL/min/1.73 m^2 Final     eGFR if non    Date Value Ref Range Status   04/29/2019 >60 >60  mL/min/1.73 m^2 Final     Comment:     Calculation used to obtain the estimated glomerular filtration  rate (eGFR) is the CKD-EPI equation.        Lab Results   Component Value Date    INR 1.3 (H) 04/29/2019    INR 1.1 02/14/2019    INR 1.2 02/07/2019       Case discussed with Dr. Whitehead by phone.  See HPI.    Imaging:  Recent ultrasound was independently visualized and reviewed by me and showed mild ascites.    I have personally reviewed these images        Assessment:   1.  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  Esophageal varices  3.  Hematemesis  4.  Blood in stool    Plan:  1.  Agree with octreotide and PPI infusions  2.  NPO  3.  Transfuse PRN to goal hgb of 8.  4.  Monitor for rebleeding  5.  Urgent EGD  6.  Continue follow up with Dr. Whitehead as outpatient for HCV treatment  7.  Further recommendations to follow after above.  8.  Communication will be sent to the referring provider, Dr. Glaser regarding my assessment and plan on this patient via EPIC.      Justin Saldana MD  [facility]  [address], [address]  [place] [address]  Office: [phone]  Fax: [phone]

## 2019-04-29 NOTE — PROGRESS NOTES
Consult called early this am to Dr. Montenegro. Patient reports that Dr. Whitehead is her doctor. Consult to Dr. Montenegro cancelled and attempted unsuccessfully to contact Dr. Whitehead. Will try to contact again.

## 2019-04-29 NOTE — ANESTHESIA POSTPROCEDURE EVALUATION
Anesthesia Post Evaluation    Patient: Karen Villarreal    Procedure(s) Performed: Procedure(s) (LRB):  EGD (ESOPHAGOGASTRODUODENOSCOPY) (N/A)    Final Anesthesia Type: general  Patient location during evaluation: ICU  Patient participation: Yes- Able to Participate  Level of consciousness: awake and alert  Post-procedure vital signs: reviewed and stable  Pain management: adequate  Airway patency: patent  PONV status at discharge: No PONV  Anesthetic complications: no      Cardiovascular status: hemodynamically stable  Respiratory status: unassisted and room air  Hydration status: euvolemic  Follow-up not needed.          Vitals Value Taken Time   /64 4/29/2019  6:01 PM   Temp 37.3 °C (99.1 °F) 4/29/2019  3:37 PM   Pulse 97 4/29/2019  6:01 PM   Resp 15 4/29/2019  6:01 PM   SpO2 93 % 4/29/2019  6:01 PM   Vitals shown include unvalidated device data.      Event Time     Out of Recovery 14:49:10          Pain/Kang Score: Pain Rating Prior to Med Admin: 9 (4/29/2019  5:39 PM)  Kang Score: 9 (4/29/2019  2:45 PM)

## 2019-04-29 NOTE — ED PROVIDER NOTES
Encounter Date: 4/29/2019       History     Chief Complaint   Patient presents with    Rectal Bleeding     Upper and lower GI bleeding      Patient is a 53-year-old female with a past medical history of hepatitis C cirrhosis thrombocytopenia ascites who presents the emergency department for evaluation of 2-3 days of dark stools and she started having red hematemesis earlier this evening.  She vomited 3 times at home and most recently was approximately an hour ago.  She has been out of her Protonix for several days.  She is complaining of abdominal distension and feels like she is having recurrent ascites.  However, she does not have any significant abdominal pain fevers chest pain shortness of breath.  She does feel slightly lightheaded.  She denies any urinary complaints        Review of patient's allergies indicates:  No Known Allergies  Past Medical History:   Diagnosis Date    Acid reflux     Anemia     Arthritis     Ascites 03/08/2017    Cataract     Cirrhosis     Depression     Hepatitis C     Hiatal hernia     Ovary removal, prophylactic     Renal cyst 03/08/2017    Thrombocytopenia      Past Surgical History:   Procedure Laterality Date    ANKLE SURGERY      APPENDECTOMY      CATARACT EXTRACTION      EGD (ESOPHAGOGASTRODUODENOSCOPY) N/A 2/5/2019    [facility]    EGD (ESOPHAGOGASTRODUODENOSCOPY) N/A 2/2/2019    [facility]    EXTRACTION-CATARACT-IOL Right 1/5/2018    [facility]    EXTRACTION-CATARACT-IOL Left 12/1/2017    [facility] at [facility]     Family History   Problem Relation Age of Onset    Heart failure Mother     COPD Mother     Arthritis Mother     Vision loss Mother     Diabetes Father     Cancer Father     Brain cancer Father     Diabetes type II Father      Social History     Tobacco Use    Smoking status: Current Some Day Smoker      Packs/day: 0.50     Years: 33.00     Pack years: 16.50     Types: Cigarettes    Smokeless tobacco: Never Used   Substance Use Topics    Alcohol use: No     Frequency: Never    Drug use: No     Review of Systems   Constitutional: Negative for fatigue and fever.   HENT: Negative for congestion, ear pain, rhinorrhea and sore throat.    Eyes: Negative for pain and visual disturbance.   Respiratory: Negative for cough, shortness of breath, wheezing and stridor.    Cardiovascular: Negative for chest pain, palpitations and leg swelling.   Gastrointestinal: Positive for abdominal distention, blood in stool, diarrhea, nausea and vomiting. Negative for abdominal pain.   Genitourinary: Negative for difficulty urinating, dysuria and hematuria.   Musculoskeletal: Negative for arthralgias.   Skin: Positive for pallor. Negative for rash.   Neurological: Positive for light-headedness. Negative for dizziness, syncope, weakness, numbness and headaches.   All other systems reviewed and are negative.      Physical Exam     Initial Vitals [04/29/19 0341]   BP Pulse Resp Temp SpO2   (!) 129/59 (!) 121 17 99.6 °F (37.6 °C) 99 %      MAP       --         Physical Exam    Nursing note and vitals reviewed.  Constitutional: She appears well-developed and well-nourished.   HENT:   Head: Normocephalic and atraumatic.   Mouth/Throat: Oropharynx is clear and moist.   Eyes: Conjunctivae and EOM are normal. Pupils are equal, round, and reactive to light.   Pale conjunctiva   Neck: Normal range of motion. Neck supple.   Cardiovascular: Regular rhythm, normal heart sounds and intact distal pulses. Exam reveals no gallop and no friction rub.    No murmur heard.  Tachycardic   Pulmonary/Chest: Breath sounds normal. No respiratory distress. She has no decreased breath sounds. She has no wheezes. She has no rhonchi. She has no rales.   Abdominal: Soft. Bowel sounds are normal. She exhibits distension. There is no tenderness.   Positive fluid wave no  significant tenderness   Musculoskeletal: Normal range of motion. She exhibits no edema.   Neurological: She is alert and oriented to person, place, and time. She has normal strength. No sensory deficit. GCS score is 15. GCS eye subscore is 4. GCS verbal subscore is 5. GCS motor subscore is 6.   Skin: Skin is warm and dry.   Psychiatric: She has a normal mood and affect.         ED Course   Procedures  Labs Reviewed   CBC W/ AUTO DIFFERENTIAL   COMPREHENSIVE METABOLIC PANEL   PROTIME-INR   APTT   TYPE & SCREEN          Imaging Results    None          Medical Decision Making:   Initial Assessment:   Ascites with probable bleeding esophageal varices.  Patient been vomiting at home.  Will get type and screen.  It anticipate admission for endoscopy and transfusion.  Will start on octreotide, Protonix drips and give her Rocephin.  4:47 AM patient will be admitted to the ICU.  She just had an episode of hematemesis.  I spoke to the hospitalist just prior.  She will be given Phenergan and Zofran.  She does not seen any transfusion at this time.  I suspect she has not using esophageal varices.  She does not appear to be significantly unstable.  I do not think she is septic or toxic.                      Clinical Impression:       ICD-10-CM ICD-9-CM   1. Bleeding esophageal varices, unspecified esophageal varices type I85.01 456.0   2. Tachycardia R00.0 785.0                                Jimmie Sharp MD  04/29/19 6865

## 2019-04-29 NOTE — PLAN OF CARE
Problem: Adult Inpatient Plan of Care  Goal: Plan of Care Review  Outcome: Revised  No emesis today. Two bloody BMs today. EGD by Dr. Montenegro with multiple bands placed per report. Patient c/o abd cramping, Hyoscyamine given as ordered. Clear liquid diet initiated. Ambulates in room to BR without difficulty. Scabbed stasis ulcer to LLE covered with mepilex and SCDs in place. Safety maintained.

## 2019-04-29 NOTE — PLAN OF CARE
Met with pt to complete her assessment.  Pt, who is independent with her self care, lives with her mother and sister and denies having any DME or home health services.  Pt's PCP is Dr. Carrillo however pt would like a new PCP.  Her gastroenterologist is Dr. Basurto, hematologist is Dr. Valencia and insurance is Medicaid [employer].  Pt's discharge disposition is home with no anticipated needs.       04/29/19 7275   Discharge Assessment   Assessment Type Discharge Planning Assessment   Confirmed/corrected address and phone number on facesheet? Yes   Assessment information obtained from? Patient   Prior to hospitilization cognitive status: Alert/Oriented   Prior to hospitalization functional status: Independent   Current cognitive status: Alert/Oriented   Current Functional Status: Independent   Lives With parent(s);sibling(s)  (Pt lives with her mother and sister. )   Able to Return to Prior Arrangements yes   Is patient able to care for self after discharge? Yes   Who are your caregiver(s) and their phone number(s)?   (sister Soraya [phone])   Patient's perception of discharge disposition home or selfcare   Readmission Within the Last 30 Days no previous admission in last 30 days   Patient currently being followed by outpatient case management? No   Patient currently receives any other outside agency services? No   Equipment Currently Used at Home none   Do you have any problems affording any of your prescribed medications? No  (pharmacy is [facility] on [address])   Is the patient taking medications as prescribed? yes   Does the patient have transportation home? Yes   Transportation Anticipated family or friend will provide   Does the patient receive services at the [facility]? No   Discharge Plan A Home with family   Patient/Family in Agreement with Plan yes

## 2019-04-29 NOTE — H&P
[facility] Hospital Medicine  History & Physical    Patient Name: Karen Villarreal  MRN: [id number]  Admission Date: 4/29/2019  Attending Physician: Geo Otoole MD   Primary Care Provider: Shama Mccoy MD         Patient information was obtained from patient, past medical records and ER records.     Subjective:     Principal Problem:Bleeding esophageal varices    Chief Complaint:   Chief Complaint   Patient presents with    Rectal Bleeding     Upper and lower GI bleeding         HPI: Karen Villarreal is a 53-year-old female with a past medical history of hepatitis C, cirrhosis, thrombocytopenia and ascites who presented to the emergency department for evaluation of 2-3 days of dark stools and hematemesis which began earlier this evening.   Patient states that she began having diarrhea 3 days ago which became tarry yesterday.  She vomited 3 times at home and once in the emergency department describing it as red with blood clots.  She has been out of her Protonix for several days.  She is complaining of abdominal distension, but no fever, chest pain, shortness of breath, or unusual weakness.  She is admitted to the service of hospital Medicine for further treatment.        Past Medical History:   Diagnosis Date    Acid reflux     Anemia     Arthritis     Ascites 03/08/2017    Cataract     Cirrhosis     Depression     Hepatitis C     Hiatal hernia     Ovary removal, prophylactic     Renal cyst 03/08/2017    Thrombocytopenia        Past Surgical History:   Procedure Laterality Date    ANKLE SURGERY      APPENDECTOMY      CATARACT EXTRACTION      EGD (ESOPHAGOGASTRODUODENOSCOPY) N/A 2/5/2019    [facility]    EGD (ESOPHAGOGASTRODUODENOSCOPY) N/A 2/2/2019    [facility] END[facility]    EXTRACTION-CATARACT-IOL Right 1/5/2018    [facility]     EXTRACTION-CATARACT-IOL Left 12/1/2017    Performed by [facility]g [facility] at [facility] OR       Review of patient's allergies indicates:  No Known Allergies    No current facility-administered medications on file prior to encounter.      Current Outpatient Medications on File Prior to Encounter   Medication Sig    albuterol (VENTOLIN HFA) 90 mcg/actuation inhaler Ventolin HFA 90 mcg/actuation aerosol inhaler   take 2 puffs every 4hrs as needed for cough    aluminum & magnesium hydroxide-simethicone (MAALOX MAXIMUM STRENGTH) 400-400-40 mg/5 mL suspension Take by mouth every 6 (six) hours as needed for Indigestion.    ferrous sulfate 325 (65 FE) MG EC tablet Take 325 mg by mouth once daily.     furosemide (LASIX) 40 MG tablet Take 60 mg by mouth once daily.     gabapentin (NEURONTIN) 300 MG capsule Take 300 mg by mouth 3 (three) times daily.    pantoprazole (PROTONIX) 40 MG tablet Take 1 tablet (40 mg total) by mouth once daily. (Patient taking differently: Take 40 mg by mouth 2 (two) times daily. )    cyclobenzaprine (FLEXERIL) 10 MG tablet Take 10 mg by mouth 3 (three) times daily as needed for Muscle spasms.    MAVYRET 100-40 mg Tab TAKE THREE TABLETS BY MOUTH EVERY DAY WITH FOOD - pt has not started this medication yet (02/14/19)    sertraline (ZOLOFT) 25 MG tablet Take 1 tablet by mouth once daily.    spironolactone (ALDACTONE) 100 MG tablet Take 150 mg by mouth once daily.     [DISCONTINUED] sucralfate (CARAFATE) 100 mg/mL suspension Take 10 mLs (1 g total) by mouth 3 (three) times daily. (Patient taking differently: Take 1 g by mouth 3 (three) times daily as needed. Take for 10 days.)    [DISCONTINUED] thiamine 100 MG tablet Take 1 tablet (100 mg total) by mouth 2 (two) times daily.     Family History     Problem Relation (Age of Onset)    Arthritis Mother    Brain cancer Father    COPD Mother    Cancer Father    Diabetes Father    Diabetes type II Father    Heart failure Mother    Vision loss  Mother        Tobacco Use    Smoking status: Current Some Day Smoker     Packs/day: 0.50     Years: 33.00     Pack years: 16.50     Types: Cigarettes    Smokeless tobacco: Never Used   Substance and Sexual Activity    Alcohol use: No     Frequency: Never    Drug use: No    Sexual activity: Yes     Review of Systems   Constitutional: Negative for chills and fever.   HENT: Negative for congestion and rhinorrhea.    Eyes: Negative for photophobia and visual disturbance.   Respiratory: Negative for cough, shortness of breath and wheezing.    Cardiovascular: Negative for chest pain and palpitations.   Gastrointestinal: Positive for abdominal distention, abdominal pain, blood in stool, diarrhea, nausea and vomiting.   Endocrine: Negative for polyphagia and polyuria.   Genitourinary: Negative for dysuria and flank pain.   Musculoskeletal: Negative for back pain and gait problem.   Skin: Positive for pallor. Negative for rash.   Neurological: Positive for dizziness and light-headedness.   Hematological: Does not bruise/bleed easily.   Psychiatric/Behavioral: Negative for agitation and confusion.   All other systems reviewed and are negative.    Objective:     Vital Signs (Most Recent):  Temp: 99.9 °F (37.7 °C) (04/29/19 0615)  Pulse: (!) 111 (04/29/19 0615)  Resp: 17 (04/29/19 0615)  BP: (!) 123/59 (04/29/19 0615)  SpO2: 100 % (04/29/19 0615) Vital Signs (24h Range):  Temp:  [99.6 °F (37.6 °C)-99.9 °F (37.7 °C)] 99.9 °F (37.7 °C)  Pulse:  [111-122] 111  Resp:  [17] 17  SpO2:  [98 %-100 %] 100 %  BP: ()/(56-63) 123/59     Weight: 114.9 kg (253 lb 4.9 oz)  Body mass index is 47.86 kg/m².    Physical Exam   Constitutional: She is oriented to person, place, and time. She appears well-developed and well-nourished.   HENT:   Head: Normocephalic and atraumatic.   Eyes: Pupils are equal, round, and reactive to light. Conjunctivae and EOM are normal.   Neck: Normal range of motion. Neck supple. No JVD present.    Cardiovascular: Normal rate, regular rhythm and intact distal pulses.   Pulmonary/Chest: Effort normal and breath sounds normal. No respiratory distress.   Abdominal: Soft. Bowel sounds are normal. She exhibits distension. There is tenderness.   Genitourinary:   Genitourinary Comments: deferred   Musculoskeletal: Normal range of motion. She exhibits no tenderness.   Neurological: She is alert and oriented to person, place, and time.   Skin: Skin is warm and dry. There is pallor.   Psychiatric: She has a normal mood and affect. Her behavior is normal. Judgment and thought content normal.   Vitals reviewed.        CRANIAL NERVES     CN III, IV, VI   Pupils are equal, round, and reactive to light.  Extraocular motions are normal.        Significant Labs:   CBC:   Recent Labs   Lab 04/29/19  0350   WBC 13.80*   HGB 9.6*   HCT 30.2*        CMP:   Recent Labs   Lab 04/29/19  0350   *   K 4.2      CO2 21*   *   BUN 18   CREATININE 0.8   CALCIUM 8.3*   PROT 5.5*   ALBUMIN 2.4*   BILITOT 2.3*   ALKPHOS 170*   AST 26   ALT 17   ANIONGAP 8   EGFRNONAA >60           Assessment/Plan:     * Bleeding esophageal varices  Admit to ICU.  Keep patient NPO.  Follow H/H closely. T&S.  Currently H/H is at baseline.  Continue IV Protonix infusion 8 mg/hr.  Continue Octreotide infusion.  Consult Gastronetrologist.   Check Iron, TIBC, B12 and folic acid.   Continue IVF hydration.   Use IV anti-emetics as needed.       Malnutrition of moderate degree  Nutrition consulted. Encourage maximal PO intake when OK with GI. Will encourage PO and monitor closely for weight changes      Essential hypertension  Hold anti-HTN agents. Monitor BP and treat accordingly.      Chronic hepatitis C with cirrhosis  Noted. Under care by Dr. Whitehead.      Morbid obesity  Body mass index is 47.86 kg/m². Morbid obesity complicates all aspects of disease management from diagnostic modalities to treatment. Weight loss encouraged and  health benefits explained to patient.            VTE Risk Mitigation (From admission, onward)        Ordered     IP VTE HIGH RISK PATIENT  Once      04/29/19 0611     Reason for No Pharmacological VTE Prophylaxis  Once      04/29/19 0611     Place NICOLE hose  Until discontinued      04/29/19 0611     Place sequential compression device  Until discontinued      04/29/19 0611        Critical care time spent on the evaluation and treatment of severe organ dysfunction, review of pertinent labs and imaging studies, discussions with consulting providers and discussions with patient/family: 45 minutes.     ISABEL Tillman  Department of Hospital Medicine   [facility]

## 2019-04-29 NOTE — HPI
Karen Villarreal is a 53-year-old female with a past medical history of hepatitis C, cirrhosis, thrombocytopenia and ascites who presented to the emergency department for evaluation of 2-3 days of dark stools and hematemesis which began earlier this evening.  Patient states that she began having diarrhea 3 days ago which became tarry yesterday.  She vomited 3 times at home and once in the emergency department describing it as red with blood clots.  She has been out of her Protonix for several days.  She is complaining of abdominal distension, but no fever, chest pain, shortness of breath, or unusual weakness.  She is admitted to the service of hospital Medicine for further treatment.

## 2019-04-30 PROBLEM — F32.9 MAJOR DEPRESSIVE DISORDER WITH CURRENT ACTIVE EPISODE: Status: ACTIVE | Noted: 2019-04-30

## 2019-04-30 PROBLEM — K74.60 CHRONIC HEPATITIS C WITH CIRRHOSIS: Status: RESOLVED | Noted: 2017-02-16 | Resolved: 2019-04-30

## 2019-04-30 PROBLEM — B18.2 CHRONIC HEPATITIS C WITH CIRRHOSIS: Status: RESOLVED | Noted: 2017-02-16 | Resolved: 2019-04-30

## 2019-04-30 LAB
ALBUMIN SERPL BCP-MCNC: 2.2 G/DL (ref 3.5–5.2)
ALP SERPL-CCNC: 133 U/L (ref 55–135)
ALT SERPL W/O P-5'-P-CCNC: 18 U/L (ref 10–44)
ANION GAP SERPL CALC-SCNC: 5 MMOL/L (ref 8–16)
AST SERPL-CCNC: 34 U/L (ref 10–40)
BASOPHILS # BLD AUTO: 0.1 K/UL (ref 0–0.2)
BASOPHILS NFR BLD: 1.3 % (ref 0–1.9)
BILIRUB SERPL-MCNC: 1.5 MG/DL (ref 0.1–1)
BILIRUB UR QL STRIP: NEGATIVE
BLD PROD TYP BPU: NORMAL
BLOOD UNIT EXPIRATION DATE: NORMAL
BLOOD UNIT TYPE CODE: 5100
BLOOD UNIT TYPE: NORMAL
BUN SERPL-MCNC: 16 MG/DL (ref 6–20)
CALCIUM SERPL-MCNC: 7.8 MG/DL (ref 8.7–10.5)
CHLORIDE SERPL-SCNC: 108 MMOL/L (ref 95–110)
CLARITY UR: CLEAR
CO2 SERPL-SCNC: 22 MMOL/L (ref 23–29)
CODING SYSTEM: NORMAL
COLOR UR: YELLOW
CREAT SERPL-MCNC: 0.8 MG/DL (ref 0.5–1.4)
DIFFERENTIAL METHOD: ABNORMAL
DISPENSE STATUS: NORMAL
EOSINOPHIL # BLD AUTO: 0.2 K/UL (ref 0–0.5)
EOSINOPHIL NFR BLD: 2 % (ref 0–8)
ERYTHROCYTE [DISTWIDTH] IN BLOOD BY AUTOMATED COUNT: 23.1 % (ref 11.5–14.5)
EST. GFR  (AFRICAN AMERICAN): >60 ML/MIN/1.73 M^2
EST. GFR  (NON AFRICAN AMERICAN): >60 ML/MIN/1.73 M^2
GLUCOSE SERPL-MCNC: 105 MG/DL (ref 70–110)
GLUCOSE UR QL STRIP: NEGATIVE
HCT VFR BLD AUTO: 23.2 % (ref 37–48.5)
HCT VFR BLD AUTO: 24.2 % (ref 37–48.5)
HGB BLD-MCNC: 7.4 G/DL (ref 12–16)
HGB BLD-MCNC: 7.8 G/DL (ref 12–16)
HGB UR QL STRIP: ABNORMAL
KETONES UR QL STRIP: NEGATIVE
LEUKOCYTE ESTERASE UR QL STRIP: NEGATIVE
LYMPHOCYTES # BLD AUTO: 1.3 K/UL (ref 1–4.8)
LYMPHOCYTES NFR BLD: 13.1 % (ref 18–48)
MAGNESIUM SERPL-MCNC: 1.6 MG/DL (ref 1.6–2.6)
MCH RBC QN AUTO: 30.3 PG (ref 27–31)
MCHC RBC AUTO-ENTMCNC: 31.9 G/DL (ref 32–36)
MCV RBC AUTO: 95 FL (ref 82–98)
MONOCYTES # BLD AUTO: 0.6 K/UL (ref 0.3–1)
MONOCYTES NFR BLD: 6.3 % (ref 4–15)
NEUTROPHILS # BLD AUTO: 7.9 K/UL (ref 1.8–7.7)
NEUTROPHILS NFR BLD: 77.3 % (ref 38–73)
NITRITE UR QL STRIP: NEGATIVE
NUM UNITS TRANS PACKED RBC: NORMAL
PH UR STRIP: 6 [PH] (ref 5–8)
PHOSPHATE SERPL-MCNC: 1.8 MG/DL (ref 2.7–4.5)
PLATELET # BLD AUTO: 97 K/UL (ref 150–350)
PMV BLD AUTO: 9.9 FL (ref 9.2–12.9)
POTASSIUM SERPL-SCNC: 4.1 MMOL/L (ref 3.5–5.1)
PROT SERPL-MCNC: 4.7 G/DL (ref 6–8.4)
PROT UR QL STRIP: NEGATIVE
RBC # BLD AUTO: 2.44 M/UL (ref 4–5.4)
SODIUM SERPL-SCNC: 135 MMOL/L (ref 136–145)
SP GR UR STRIP: 1.02 (ref 1–1.03)
URN SPEC COLLECT METH UR: ABNORMAL
UROBILINOGEN UR STRIP-ACNC: NEGATIVE EU/DL
WBC # BLD AUTO: 10.2 K/UL (ref 3.9–12.7)

## 2019-04-30 PROCEDURE — 99232 SBSQ HOSP IP/OBS MODERATE 35: CPT | Mod: ,,, | Performed by: INTERNAL MEDICINE

## 2019-04-30 PROCEDURE — 25000003 PHARM REV CODE 250: Performed by: HOSPITALIST

## 2019-04-30 PROCEDURE — 85025 COMPLETE CBC W/AUTO DIFF WBC: CPT

## 2019-04-30 PROCEDURE — 36415 COLL VENOUS BLD VENIPUNCTURE: CPT

## 2019-04-30 PROCEDURE — 25000003 PHARM REV CODE 250: Performed by: INTERNAL MEDICINE

## 2019-04-30 PROCEDURE — 63600175 PHARM REV CODE 636 W HCPCS: Performed by: HOSPITALIST

## 2019-04-30 PROCEDURE — 97802 MEDICAL NUTRITION INDIV IN: CPT

## 2019-04-30 PROCEDURE — 85014 HEMATOCRIT: CPT

## 2019-04-30 PROCEDURE — 99233 PR SUBSEQUENT HOSPITAL CARE,LEVL III: ICD-10-PCS | Mod: ,,, | Performed by: INTERNAL MEDICINE

## 2019-04-30 PROCEDURE — 25000003 PHARM REV CODE 250: Performed by: NURSE PRACTITIONER

## 2019-04-30 PROCEDURE — 84100 ASSAY OF PHOSPHORUS: CPT

## 2019-04-30 PROCEDURE — 83735 ASSAY OF MAGNESIUM: CPT

## 2019-04-30 PROCEDURE — 81003 URINALYSIS AUTO W/O SCOPE: CPT

## 2019-04-30 PROCEDURE — 99232 PR SUBSEQUENT HOSPITAL CARE,LEVL II: ICD-10-PCS | Mod: ,,, | Performed by: INTERNAL MEDICINE

## 2019-04-30 PROCEDURE — 36430 TRANSFUSION BLD/BLD COMPNT: CPT

## 2019-04-30 PROCEDURE — P9016 RBC LEUKOCYTES REDUCED: HCPCS

## 2019-04-30 PROCEDURE — 94761 N-INVAS EAR/PLS OXIMETRY MLT: CPT

## 2019-04-30 PROCEDURE — 85018 HEMOGLOBIN: CPT

## 2019-04-30 PROCEDURE — 25000003 PHARM REV CODE 250: Performed by: EMERGENCY MEDICINE

## 2019-04-30 PROCEDURE — 20000000 HC ICU ROOM

## 2019-04-30 PROCEDURE — C9113 INJ PANTOPRAZOLE SODIUM, VIA: HCPCS | Performed by: EMERGENCY MEDICINE

## 2019-04-30 PROCEDURE — 80053 COMPREHEN METABOLIC PANEL: CPT

## 2019-04-30 PROCEDURE — 63600175 PHARM REV CODE 636 W HCPCS: Performed by: EMERGENCY MEDICINE

## 2019-04-30 PROCEDURE — 86920 COMPATIBILITY TEST SPIN: CPT

## 2019-04-30 PROCEDURE — 99233 SBSQ HOSP IP/OBS HIGH 50: CPT | Mod: ,,, | Performed by: INTERNAL MEDICINE

## 2019-04-30 RX ORDER — POTASSIUM CHLORIDE 20 MEQ/1
40 TABLET, EXTENDED RELEASE ORAL
Status: DISCONTINUED | OUTPATIENT
Start: 2019-04-30 | End: 2019-05-04 | Stop reason: HOSPADM

## 2019-04-30 RX ORDER — SODIUM,POTASSIUM PHOSPHATES 280-250MG
2 POWDER IN PACKET (EA) ORAL
Status: DISCONTINUED | OUTPATIENT
Start: 2019-04-30 | End: 2019-05-04 | Stop reason: HOSPADM

## 2019-04-30 RX ORDER — SODIUM CHLORIDE 0.9 % (FLUSH) 0.9 %
10 SYRINGE (ML) INJECTION
Status: DISCONTINUED | OUTPATIENT
Start: 2019-04-30 | End: 2019-05-04 | Stop reason: HOSPADM

## 2019-04-30 RX ORDER — LANOLIN ALCOHOL/MO/W.PET/CERES
800 CREAM (GRAM) TOPICAL
Status: DISCONTINUED | OUTPATIENT
Start: 2019-04-30 | End: 2019-05-04 | Stop reason: HOSPADM

## 2019-04-30 RX ORDER — HYDROCODONE BITARTRATE AND ACETAMINOPHEN 500; 5 MG/1; MG/1
TABLET ORAL
Status: DISCONTINUED | OUTPATIENT
Start: 2019-04-30 | End: 2019-05-04 | Stop reason: HOSPADM

## 2019-04-30 RX ORDER — POTASSIUM CHLORIDE 20 MEQ/1
60 TABLET, EXTENDED RELEASE ORAL
Status: DISCONTINUED | OUTPATIENT
Start: 2019-04-30 | End: 2019-05-04 | Stop reason: HOSPADM

## 2019-04-30 RX ADMIN — DEXTROSE 8 MG/HR: 50 INJECTION, SOLUTION INTRAVENOUS at 06:04

## 2019-04-30 RX ADMIN — GABAPENTIN 300 MG: 300 CAPSULE ORAL at 09:04

## 2019-04-30 RX ADMIN — DEXTROSE 8 MG/HR: 50 INJECTION, SOLUTION INTRAVENOUS at 07:04

## 2019-04-30 RX ADMIN — POTASSIUM & SODIUM PHOSPHATES POWDER PACK 280-160-250 MG 2 PACKET: 280-160-250 PACK at 03:04

## 2019-04-30 RX ADMIN — GABAPENTIN 300 MG: 300 CAPSULE ORAL at 03:04

## 2019-04-30 RX ADMIN — MAGNESIUM OXIDE TAB 400 MG (241.3 MG ELEMENTAL MG) 800 MG: 400 (241.3 MG) TAB at 10:04

## 2019-04-30 RX ADMIN — DEXTROSE 8 MG/HR: 50 INJECTION, SOLUTION INTRAVENOUS at 12:04

## 2019-04-30 RX ADMIN — POTASSIUM & SODIUM PHOSPHATES POWDER PACK 280-160-250 MG 2 PACKET: 280-160-250 PACK at 06:04

## 2019-04-30 RX ADMIN — SERTRALINE 25 MG: 25 TABLET, FILM COATED ORAL at 09:04

## 2019-04-30 RX ADMIN — OCTREOTIDE ACETATE 50 MCG/HR: 1000 INJECTION, SOLUTION INTRAVENOUS; SUBCUTANEOUS at 12:04

## 2019-04-30 RX ADMIN — POTASSIUM & SODIUM PHOSPHATES POWDER PACK 280-160-250 MG 2 PACKET: 280-160-250 PACK at 10:04

## 2019-04-30 RX ADMIN — HYDROMORPHONE HYDROCHLORIDE 0.2 MG: 2 INJECTION, SOLUTION INTRAMUSCULAR; INTRAVENOUS; SUBCUTANEOUS at 02:04

## 2019-04-30 RX ADMIN — MAGNESIUM OXIDE TAB 400 MG (241.3 MG ELEMENTAL MG) 800 MG: 400 (241.3 MG) TAB at 06:04

## 2019-04-30 RX ADMIN — OCTREOTIDE ACETATE 50 MCG/HR: 1000 INJECTION, SOLUTION INTRAVENOUS; SUBCUTANEOUS at 03:04

## 2019-04-30 RX ADMIN — SODIUM CHLORIDE: 0.9 INJECTION, SOLUTION INTRAVENOUS at 11:04

## 2019-04-30 RX ADMIN — DEXTROSE 8 MG/HR: 50 INJECTION, SOLUTION INTRAVENOUS at 02:04

## 2019-04-30 NOTE — PLAN OF CARE
Problem: Oral Intake Inadequate  Goal: Improved Oral Intake    Intervention: Promote and Optimize Oral Intake  Intervention: texture modified diet      Recommendation:   1) Change diet to cardiac, soft foods   2) Weigh pt weekly   3) Educate pt on cardiac diet for wt loss @ f/u     Goals: 1) PO intakes > 50% of meals at f/u   Nutrition Goal Status: new

## 2019-04-30 NOTE — CONSULTS
"  [facility]  Adult Nutrition  Consult Note    SUMMARY   Intervention: texture modified diet     Recommendation:   1) Change diet to cardiac, soft foods   2) Weigh pt weekly   3) Educate pt on cardiac diet for wt loss @ f/u     Goals: 1) PO intakes > 50% of meals at f/u   Nutrition Goal Status: new    Reason for Assessment    Reason For Assessment: consult  Diagnosis: (bleeding esophageal varices)  Relevant Medical History: hep. C., cirrhosis, ascites, thrombocytopenia  Interdisciplinary Rounds: attended    General Information Comments: 54 y/o female admitted with esophageal varices bleed, no longer bleeding, diet advanced and pt tolerating . s/p diarrhea x 3 days and N/V. No emesis today. PTA pt states she eats well, 2 meals and 1-2 snacks daily. Weight fluctuates likely r/t fluid shifts as pt on diuretic. NFPE done, no wasting seen.     Nutrition Discharge Planning: To be determined- Cardiac Diet    Nutrition Risk Screen    Nutrition Risk Screen: no indicators present    Nutrition/Diet History    Patient Reported Diet/Restrictions/Preferences: general  Spiritual, Cultural Beliefs, [employer] Practices, Values that Affect Care: no  Food Allergies: other (see comments)(Intolerant to Orange Juice)  Factors Affecting Nutritional Intake: None identified at this time    Anthropometrics    Temp: 98.3 °F (36.8 °C)(pt alert, watching TV. RN at bedside)  Height Method: Measured  Height: 5' 1"  Height (inches): 61 in  Weight Method: Bed Scale  Weight: 117.5 kg (259 lb 0.7 oz)  Weight (lb): 259.04 lb  Ideal Body Weight (IBW), Female: 105 lb  % Ideal Body Weight, Female (lb): 246.7 lb  BMI (Calculated): 49  BMI Grade: greater than 40 - morbid obesity  Usual Body Weight (UBW), k.3 kg(3/14/19)  Weight Change Amount: (116-123 kg fluctuations x 1 year)  % Usual Body Weight: 95.5  % Weight Change From Usual Weight: -4.7 %       Lab/Procedures/Meds    Pertinent Labs Reviewed: reviewed  BMP  Lab Results "   Component Value Date     (L) 04/30/2019    K 4.1 04/30/2019     04/30/2019    CO2 22 (L) 04/30/2019    BUN 16 04/30/2019    CREATININE 0.8 04/30/2019    CALCIUM 7.8 (L) 04/30/2019    ANIONGAP 5 (L) 04/30/2019    ESTGFRAFRICA >60 04/30/2019    EGFRNONAA >60 04/30/2019     Lab Results   Component Value Date    ALBUMIN 2.2 (L) 04/30/2019     Lab Results   Component Value Date    CALCIUM 7.8 (L) 04/30/2019    PHOS 1.8 (L) 04/30/2019       Pertinent Medications Reviewed: reviewed  Pertinent Medications Comments: NS @ 125 ml/hr, zofran, KCl, KNaPhos    Estimated/Assessed Needs    Weight Used For Calorie Calculations: 117.5 kg (259 lb 0.7 oz)  Energy Calorie Requirements (kcal): [place] Jeor ( no activity factor obesity) = 1722 kcal  Energy Need Method: [place] Jeor  Protein Requirements: 0.8-1.0 g protein/kg ( obesity) =  g protein  Weight Used For Protein Calculations: 117.5 kg (259 lb 0.7 oz)  Fluid Requirements (mL): 1775 ml  Estimated Fluid Requirement Method: RDA Method  CHO Requirement: N/A      Nutrition Prescription Ordered    Current Diet Order: Mechanical Soft  Nutrition Order Comments: Was clear liquids this morning- ate 100%    Evaluation of Received Nutrient/Fluid Intake    Energy Calories Required: not meeting needs  Protein Required: not meeting needs  Fluid Required: meeting needs  Tolerance: tolerating  % Intake of Estimated Energy Needs: 50-60%  % Meal Intake: 50% mechanical soft    Nutrition Risk    Level of Risk/Frequency of Follow-up: moderate 2 x weekly     Assessment and Plan    Morbid obesity  Contributing Nutrition Diagnosis  Obesity Stage 3    Related to (etiology):   Excessive energy intake PTA and inability to adjust caloric intake for activity level    Signs and Symptoms (as evidenced by):   BMI > 40 kg/m2    Interventions/Recommendations (treatment strategy):  1) Cardiac diet ( 1900 kcal) 2) Weigh loss education at f/u     Nutrition Diagnosis Status:   New        Inadequate energy intake  R/t decreased appetite and NPO/clear liquids x 1 day  As evidenced by PO intakes < 50% EEN x 2 days  Improving   Pt does not meet 2 qualifications for malnutrition at this time    Monitor and Evaluation    Food and Nutrient Intake: energy intake, food and beverage intake  Food and Nutrient Adminstration: diet order  Anthropometric Measurements: weight  Biochemical Data, Medical Tests and Procedures: electrolyte and renal panel  Nutrition-Focused Physical Findings: overall appearance     Malnutrition Assessment     Skin (Micronutrient): (Chance = 18)  Teeth (Micronutrient): (WDL)   Micronutrient Evaluation: suspected deficiency  Micronutrient Evaluation Comments: Phos   Weight Loss (Malnutrition): (116-123 kg weight fluctuation x 1 year likely d/t fluid shifts)           Edema (Fluid Accumulation): 2-->mild(ascites)   Subcutaneous Fat Loss (Final Summary): well nourished  Muscle Loss Evaluation (Final Summary): well nourished         Nutrition Follow-Up    RD Follow-up?: Yes

## 2019-04-30 NOTE — PROGRESS NOTES
Pt states she discussed receiving blood transfusion with Dr Saldana. She denies having any adverse reaction from her previous transfusions and expressed her willingness to receive blood transfusion today. Mechanical soft breakfast served to patient- good appetite.

## 2019-04-30 NOTE — PLAN OF CARE
Problem: Adult Inpatient Plan of Care  Goal: Plan of Care Review  Outcome: Ongoing (interventions implemented as appropriate)  Pt received one unit of packed cells today- improvement noted in repeat H/H after transfusion. Platelet transfusion held as ordered by GI. Pt ambulated to bathroom- gait steady. Pt had 1 black stool- moderate sized, passed flatus. No injuries noted this shift; denies c/o nausea with mechanical soft diet intake. Afebrile this shift.

## 2019-04-30 NOTE — ASSESSMENT & PLAN NOTE
Contributing Nutrition Diagnosis  Obesity Stage 3    Related to (etiology):   Excessive energy intake PTA and inability to adjust caloric intake for activity level    Signs and Symptoms (as evidenced by):   BMI > 40 kg/m2    Interventions/Recommendations (treatment strategy):  1) Cardiac diet ( 1900 kcal) 2) Weigh loss education at f/u     Nutrition Diagnosis Status:   New

## 2019-04-30 NOTE — PLAN OF CARE
Problem: Adult Inpatient Plan of Care  Goal: Plan of Care Review  Outcome: Ongoing (interventions implemented as appropriate)  Pt with uneventful night. Phosphorus and Magnesium replacements started this morning. H/H of 7.4/23.2 reported to night NP. Sandostatin, protonix, and NS running as ordered. Tolerating pills and clear liquid diet. Afebrile. VSS. Up to restroom [id number] without difficulty. No emesis or dark bloody stool this shift. Urinalysis collected. Safety maintained.

## 2019-04-30 NOTE — PROGRESS NOTES
"[facility] Note    CC: Hematemesis    HPI 53 y.o. 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 Whitehead with whom the case was discussed.  Patient has hematemesis, onset yesterday, x 2 episodes, dark red in color with associated epigastric pain and passage of stool with maroon blood x 1.  She had variceal bleeding about 2 months ago and had banding at that time.  She denies any other acute complaints at this time.  She states that last colonoscopy was many years ago.  Last EGD was done at this facility two months ago.    INTERVAL HISTORY:  Since yesterday, patient has done well with no further evidence of bleeding.  Tolerating liquids PO.  EGD done with variceal band ligation.  Per nursing, no BM overnight.  Patient denies any other complaints at this time.    Past Medical History:   Diagnosis Date    Acid reflux     Anemia     Arthritis     Ascites 03/08/2017    Cataract     Cirrhosis     Depression     Hepatitis C     Hiatal hernia     Ovary removal, prophylactic     Renal cyst 03/08/2017    Thrombocytopenia          Review of Systems  General ROS: negative for - chills, fever or weight loss  Cardiovascular ROS: no chest pain or dyspnea on exertion  Gastrointestinal ROS: no further bleeding    Physical Examination  /60   Pulse 93   Temp 97.8 °F (36.6 °C) (Oral)   Resp (!) 24   Ht 5' 1" (1.549 m)   Wt 117.5 kg (259 lb 0.7 oz)   LMP 08/06/2017 (Approximate)   SpO2 98%   Breastfeeding? No   BMI 48.95 kg/m²   General appearance: alert, cooperative, no distress  HENT: Normocephalic, atraumatic, neck symmetrical, no nasal discharge, sclera anicteric   Lungs: clear to auscultation bilaterally, symmetric chest wall expansion bilaterally  Heart: regular rate and rhythm without rub; no displacement of the PMI   Abdomen: obese, NT + BS  Extremities: extremities symmetric; no clubbing, cyanosis, or edema  Neurologic: Alert and oriented X 3, no sensory or " motor neurologic deficits      Labs:  Lab Results   Component Value Date    WBC 10.20 04/30/2019    HGB 7.4 (L) 04/30/2019    HCT 23.2 (L) 04/30/2019    MCV 95 04/30/2019    PLT 97 (L) 04/30/2019     CMP  Sodium   Date Value Ref Range Status   04/30/2019 135 (L) 136 - 145 mmol/L Final   04/17/2019 137 134 - 144 mmol/L      Potassium   Date Value Ref Range Status   04/30/2019 4.1 3.5 - 5.1 mmol/L Final     Chloride   Date Value Ref Range Status   04/30/2019 108 95 - 110 mmol/L Final   04/17/2019 105 98 - 110 mmol/L      CO2   Date Value Ref Range Status   04/30/2019 22 (L) 23 - 29 mmol/L Final     Glucose   Date Value Ref Range Status   04/30/2019 105 70 - 110 mg/dL Final   04/17/2019 110 (H) 70 - 99 mg/dL      BUN, Bld   Date Value Ref Range Status   04/30/2019 16 6 - 20 mg/dL Final     Creatinine   Date Value Ref Range Status   04/30/2019 0.8 0.5 - 1.4 mg/dL Final   04/17/2019 0.77 0.60 - 1.40 mg/dL      Calcium   Date Value Ref Range Status   04/30/2019 7.8 (L) 8.7 - 10.5 mg/dL Final     Total Protein   Date Value Ref Range Status   04/30/2019 4.7 (L) 6.0 - 8.4 g/dL Final     Albumin   Date Value Ref Range Status   04/30/2019 2.2 (L) 3.5 - 5.2 g/dL Final   04/17/2019 2.7 (L) 3.1 - 4.7 g/dL      Total Bilirubin   Date Value Ref Range Status   04/30/2019 1.5 (H) 0.1 - 1.0 mg/dL Final     Comment:     For infants and newborns, interpretation of results should be based  on gestational age, weight and in agreement with clinical  observations.  Premature Infant recommended reference ranges:  Up to 24 hours.............<8.0 mg/dL  Up to 48 hours............<12.0 mg/dL  3-5 days..................<15.0 mg/dL  6-29 days.................<15.0 mg/dL       Alkaline Phosphatase   Date Value Ref Range Status   04/30/2019 133 55 - 135 U/L Final     AST   Date Value Ref Range Status   04/30/2019 34 10 - 40 U/L Final     ALT   Date Value Ref Range Status   04/30/2019 18 10 - 44 U/L Final     Anion Gap   Date Value Ref Range Status    04/30/2019 5 (L) 8 - 16 mmol/L Final     eGFR if    Date Value Ref Range Status   04/30/2019 >60 >60 mL/min/1.73 m^2 Final     eGFR if non    Date Value Ref Range Status   04/30/2019 >60 >60 mL/min/1.73 m^2 Final     Comment:     Calculation used to obtain the estimated glomerular filtration  rate (eGFR) is the CKD-EPI equation.        Lab Results   Component Value Date    INR 1.3 (H) 04/29/2019    INR 1.1 02/14/2019    INR 1.2 02/07/2019           Assessment:   1.  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  Esophageal varices  3.  Hematemesis  4.  Blood in stool        Plan:  1.  Continue IV octreotide and PPI to complete 72 hour course.  After this, may discontinue octreotide and switch PPI to PO  2.  No need for platelet transfusion at this time.  3.  Transfuse 1 unit PRBCs for now.  Goal hemoglobin is 8.  4.  Advance to GI soft diet  5.  Watch for further evidence of bleeding  6.  OK to step down to floor.  Will follow.    Justin Saldana MD  [facility]  [address], [address]  [place] [address]  Office: [phone]  Fax: [phone]

## 2019-04-30 NOTE — PLAN OF CARE
04/30/19 0741   PRE-TX-O2   O2 Device (Oxygen Therapy) room air   SpO2 98 %   Pulse Oximetry Type Continuous   $ Pulse Oximetry - Multiple Charge Pulse Oximetry - Multiple   Pulse 93   Resp (!) 24

## 2019-04-30 NOTE — PROGRESS NOTES
"Pt awake, alert. Denies any c/o nausea- states she is having intermittent pain "cramp-like" that started after the EGD yesterday. Pt admits to passing "lots of" flatus.   "

## 2019-04-30 NOTE — CONSULTS
[facility]  Hematology/Oncology  Consult Note    Patient Name: Karen Villarreal  MRN: [id number]  Admission Date: 4/29/2019  Hospital Length of Stay: 1 days  Code Status: Full Code   Attending Provider: Carmen Glaser MD  Consulting Provider: Elizabet Valencia MD  Primary Care Physician: Shama Mccoy MD  Principal Problem:Bleeding esophageal varices  April 30   Consults  Subjective:     HPI:   This is a 53 yr old female with HCV , cirrhosis and chronic thrombocytopenia. She has a history of bleeding from esophageal varices. SHe was admitted with hematemesis and bloody stool. She was on treatment for Hep C being managed by DR Whitehead.   Gi has been consulted and has seen the patient       Medications:  Continuous Infusions:   sodium chloride 0.9% 125 mL/hr at 04/29/19 1455    octreotide (SANDOSTATIN) infusion 50 mcg/hr (04/30/19 0026)    pantoprazole 40 mg in dextrose 5 % 100 mL infusion (ready to mix system) 8 mg/hr (04/30/19 0200)     Scheduled Meds:   gabapentin  300 mg Oral TID    sertraline  25 mg Oral QHS     PRN Meds:acetaminophen, haloperidol lactate, HYDROmorphone, hyoscyamine, magnesium oxide, magnesium oxide, ondansetron, potassium chloride, potassium chloride, potassium chloride, potassium, sodium phosphates, potassium, sodium phosphates, potassium, sodium phosphates, sodium chloride 0.9%     Review of patient's allergies indicates:  No Known Allergies     Past Medical History:   Diagnosis Date    Acid reflux     Anemia     Arthritis     Ascites 03/08/2017    Cataract     Cirrhosis     Depression     Hepatitis C     Hiatal hernia     Ovary removal, prophylactic     Renal cyst 03/08/2017    Thrombocytopenia      Past Surgical History:   Procedure Laterality Date    ANKLE SURGERY      APPENDECTOMY      CATARACT EXTRACTION      EGD (ESOPHAGOGASTRODUODENOSCOPY) N/A 2/5/2019    [facility]    EGD  (ESOPHAGOGASTRODUODENOSCOPY) N/A 2/2/2019    Performed by [facility] at [facility]    EXTRACTION-CATARACT-IOL Right 1/5/2018    [facility]    EXTRACTION-CATARACT-IOL Left 12/1/2017    [facility] at [facility]     Family History     Problem Relation (Age of Onset)    Arthritis Mother    Brain cancer Father    COPD Mother    Cancer Father    Diabetes Father    Diabetes type II Father    Heart failure Mother    Vision loss Mother        Tobacco Use    Smoking status: Current Some Day Smoker     Packs/day: 0.50     Years: 33.00     Pack years: 16.50     Types: Cigarettes    Smokeless tobacco: Never Used   Substance and Sexual Activity    Alcohol use: No     Frequency: Never    Drug use: No    Sexual activity: Yes       Review of Systems   Constitutional: Negative for fatigue, fever and unexpected weight change.   HENT: Negative for rhinorrhea and sore throat.    Eyes: Negative for photophobia.   Respiratory: Negative for cough and shortness of breath.    Cardiovascular: Negative for chest pain and leg swelling.   Gastrointestinal: Negative for abdominal pain, constipation, diarrhea, nausea and vomiting.   Endocrine: Negative for cold intolerance and heat intolerance.   Genitourinary: Negative for dysuria, frequency, hematuria and urgency.   Musculoskeletal: Negative for arthralgias, back pain and neck pain.   Skin: Negative for pallor and rash.   Neurological: Negative for weakness, numbness and headaches.   Hematological: Negative for adenopathy. Does not bruise/bleed easily.   Psychiatric/Behavioral: Negative for agitation.   All other systems reviewed and are negative.    Objective:     Vital Signs (Most Recent):  Temp: 98.4 °F (36.9 °C) (04/30/19 0400)  Pulse: 87 (04/30/19 0630)  Resp: 18 (04/30/19 0630)  BP: (!) 161/67 (04/30/19 0630)  SpO2: 98 % (04/30/19 0630) Vital Signs (24h Range):  Temp:  [98.2 °F (36.8 °C)-99.6 °F (37.6 °C)] 98.4  °F (36.9 °C)  Pulse:  [] 87  Resp:  [11-29] 18  SpO2:  [91 %-100 %] 98 %  BP: (100-161)/(52-72) 161/67     Weight: 117.5 kg (259 lb 0.7 oz)  Body mass index is 48.95 kg/m².  Body surface area is 2.25 meters squared.      Intake/Output Summary (Last 24 hours) at 4/30/2019 0718  Last data filed at 4/30/2019 0600  Gross per 24 hour   Intake 3901.59 ml   Output 402 ml   Net 3499.59 ml       Physical Exam   Constitutional: She is oriented to person, place, and time. She appears well-developed and well-nourished.   HENT:   Head: Normocephalic and atraumatic.   Mouth/Throat: Oropharynx is clear and moist. No oropharyngeal exudate.   Eyes: Conjunctivae and EOM are normal. No scleral icterus.   Neck: Normal range of motion. Neck supple. No JVD present. No tracheal deviation present.   Cardiovascular: Normal rate, regular rhythm and normal heart sounds.   No murmur (2= capillary refill) heard.  Pulmonary/Chest: Effort normal and breath sounds normal. No respiratory distress. She has no wheezes.   Abdominal: Soft. Bowel sounds are normal. She exhibits no distension.   Musculoskeletal: Normal range of motion. She exhibits no edema.   Neurological: She is alert and oriented to person, place, and time. No cranial nerve deficit.   Steady gait   Skin: Skin is warm and dry. No rash noted. No erythema.   Psychiatric: She has a normal mood and affect. Thought content normal.   Nursing note and vitals reviewed.      Significant Labs:     Lab Results   Component Value Date    WBC 10.20 04/30/2019    HGB 7.4 (L) 04/30/2019    HCT 23.2 (L) 04/30/2019    MCV 95 04/30/2019    PLT 97 (L) 04/30/2019     CMP  Sodium   Date Value Ref Range Status   04/30/2019 135 (L) 136 - 145 mmol/L Final   04/17/2019 137 134 - 144 mmol/L      Potassium   Date Value Ref Range Status   04/30/2019 4.1 3.5 - 5.1 mmol/L Final     Chloride   Date Value Ref Range Status   04/30/2019 108 95 - 110 mmol/L Final   04/17/2019 105 98 - 110 mmol/L      CO2   Date  Value Ref Range Status   04/30/2019 22 (L) 23 - 29 mmol/L Final     Glucose   Date Value Ref Range Status   04/30/2019 105 70 - 110 mg/dL Final   04/17/2019 110 (H) 70 - 99 mg/dL      BUN, Bld   Date Value Ref Range Status   04/30/2019 16 6 - 20 mg/dL Final     Creatinine   Date Value Ref Range Status   04/30/2019 0.8 0.5 - 1.4 mg/dL Final   04/17/2019 0.77 0.60 - 1.40 mg/dL      Calcium   Date Value Ref Range Status   04/30/2019 7.8 (L) 8.7 - 10.5 mg/dL Final     Total Protein   Date Value Ref Range Status   04/30/2019 4.7 (L) 6.0 - 8.4 g/dL Final     Albumin   Date Value Ref Range Status   04/30/2019 2.2 (L) 3.5 - 5.2 g/dL Final   04/17/2019 2.7 (L) 3.1 - 4.7 g/dL      Total Bilirubin   Date Value Ref Range Status   04/30/2019 1.5 (H) 0.1 - 1.0 mg/dL Final     Comment:     For infants and newborns, interpretation of results should be based  on gestational age, weight and in agreement with clinical  observations.  Premature Infant recommended reference ranges:  Up to 24 hours.............<8.0 mg/dL  Up to 48 hours............<12.0 mg/dL  3-5 days..................<15.0 mg/dL  6-29 days.................<15.0 mg/dL       Alkaline Phosphatase   Date Value Ref Range Status   04/30/2019 133 55 - 135 U/L Final     AST   Date Value Ref Range Status   04/30/2019 34 10 - 40 U/L Final     ALT   Date Value Ref Range Status   04/30/2019 18 10 - 44 U/L Final     Anion Gap   Date Value Ref Range Status   04/30/2019 5 (L) 8 - 16 mmol/L Final     eGFR if    Date Value Ref Range Status   04/30/2019 >60 >60 mL/min/1.73 m^2 Final     eGFR if non    Date Value Ref Range Status   04/30/2019 >60 >60 mL/min/1.73 m^2 Final     Comment:     Calculation used to obtain the estimated glomerular filtration  rate (eGFR) is the CKD-EPI equation.        ptt reviewed normal  Mild elevated PT     Assessment/Plan:     Active Diagnoses:    Diagnosis Date Noted POA    PRINCIPAL PROBLEM:  Bleeding esophageal varices  [I85.01] 04/29/2019 Yes    Malnutrition of moderate degree [E44.0] 02/01/2019 Yes    Melena [K92.1] 01/26/2018 Yes    Ascites [R18.8] 04/19/2017 Yes    Chronic hepatitis C with cirrhosis [B18.2, K74.60] 02/16/2017 Yes    Morbid obesity [E66.01] 02/16/2017 Yes    Essential hypertension [I10] 02/16/2017 Yes      Problems Resolved During this Admission:     1. GI bleed: active  2. Thrombocytopenia  3. Anemia   4. Hep C on treatment  5. Cirrhosis   6. Esophageal varices    Defer to GI for management of bleed  Agree with transfusing PRBC s to keep hemoglobin 8 or higher  Would transfuse platelets to help with coagulation  Agree with octreotide and F/U with DR Whitehead as an outpatient  Unfortunately she will remain with thrombocytopenia due to cirrhosis    Elizabet Valencia MD  Hematology/Oncology  [facility]

## 2019-05-01 PROBLEM — D62 ACUTE BLOOD LOSS ANEMIA: Status: ACTIVE | Noted: 2019-05-01

## 2019-05-01 PROBLEM — I10 ESSENTIAL HYPERTENSION: Status: RESOLVED | Noted: 2017-02-16 | Resolved: 2019-05-01

## 2019-05-01 LAB
ALBUMIN SERPL BCP-MCNC: 2.1 G/DL (ref 3.5–5.2)
ALP SERPL-CCNC: 133 U/L (ref 55–135)
ALT SERPL W/O P-5'-P-CCNC: 19 U/L (ref 10–44)
ANION GAP SERPL CALC-SCNC: 4 MMOL/L (ref 8–16)
AST SERPL-CCNC: 34 U/L (ref 10–40)
BASOPHILS # BLD AUTO: 0 K/UL (ref 0–0.2)
BASOPHILS NFR BLD: 0.3 % (ref 0–1.9)
BILIRUB SERPL-MCNC: 1.7 MG/DL (ref 0.1–1)
BLD PROD TYP BPU: NORMAL
BLOOD UNIT EXPIRATION DATE: NORMAL
BLOOD UNIT TYPE CODE: 9500
BLOOD UNIT TYPE: NORMAL
BUN SERPL-MCNC: 11 MG/DL (ref 6–20)
CALCIUM SERPL-MCNC: 7.3 MG/DL (ref 8.7–10.5)
CHLORIDE SERPL-SCNC: 107 MMOL/L (ref 95–110)
CO2 SERPL-SCNC: 22 MMOL/L (ref 23–29)
CODING SYSTEM: NORMAL
CREAT SERPL-MCNC: 0.7 MG/DL (ref 0.5–1.4)
DIFFERENTIAL METHOD: ABNORMAL
DISPENSE STATUS: NORMAL
EOSINOPHIL # BLD AUTO: 0.2 K/UL (ref 0–0.5)
EOSINOPHIL NFR BLD: 2 % (ref 0–8)
ERYTHROCYTE [DISTWIDTH] IN BLOOD BY AUTOMATED COUNT: 24.5 % (ref 11.5–14.5)
EST. GFR  (AFRICAN AMERICAN): >60 ML/MIN/1.73 M^2
EST. GFR  (NON AFRICAN AMERICAN): >60 ML/MIN/1.73 M^2
GLUCOSE SERPL-MCNC: 102 MG/DL (ref 70–110)
HCT VFR BLD AUTO: 24 % (ref 37–48.5)
HGB BLD-MCNC: 7.8 G/DL (ref 12–16)
LYMPHOCYTES # BLD AUTO: 0.9 K/UL (ref 1–4.8)
LYMPHOCYTES NFR BLD: 11.1 % (ref 18–48)
MAGNESIUM SERPL-MCNC: 1.6 MG/DL (ref 1.6–2.6)
MCH RBC QN AUTO: 29.9 PG (ref 27–31)
MCHC RBC AUTO-ENTMCNC: 32.5 G/DL (ref 32–36)
MCV RBC AUTO: 92 FL (ref 82–98)
MONOCYTES # BLD AUTO: 0.5 K/UL (ref 0.3–1)
MONOCYTES NFR BLD: 6.8 % (ref 4–15)
NEUTROPHILS # BLD AUTO: 6.3 K/UL (ref 1.8–7.7)
NEUTROPHILS NFR BLD: 79.8 % (ref 38–73)
PHOSPHATE SERPL-MCNC: 1.9 MG/DL (ref 2.7–4.5)
PLATELET # BLD AUTO: 82 K/UL (ref 150–350)
PMV BLD AUTO: 10 FL (ref 9.2–12.9)
POTASSIUM SERPL-SCNC: 3.9 MMOL/L (ref 3.5–5.1)
PROT SERPL-MCNC: 4.5 G/DL (ref 6–8.4)
RBC # BLD AUTO: 2.61 M/UL (ref 4–5.4)
SODIUM SERPL-SCNC: 133 MMOL/L (ref 136–145)
TRANS PLATPHERESIS VOL PATIENT: NORMAL ML
WBC # BLD AUTO: 7.9 K/UL (ref 3.9–12.7)

## 2019-05-01 PROCEDURE — 63600175 PHARM REV CODE 636 W HCPCS: Performed by: INTERNAL MEDICINE

## 2019-05-01 PROCEDURE — 25000003 PHARM REV CODE 250: Performed by: HOSPITALIST

## 2019-05-01 PROCEDURE — 94761 N-INVAS EAR/PLS OXIMETRY MLT: CPT

## 2019-05-01 PROCEDURE — 85025 COMPLETE CBC W/AUTO DIFF WBC: CPT

## 2019-05-01 PROCEDURE — C9113 INJ PANTOPRAZOLE SODIUM, VIA: HCPCS | Performed by: EMERGENCY MEDICINE

## 2019-05-01 PROCEDURE — 25000003 PHARM REV CODE 250: Performed by: NURSE PRACTITIONER

## 2019-05-01 PROCEDURE — 83735 ASSAY OF MAGNESIUM: CPT

## 2019-05-01 PROCEDURE — 99232 SBSQ HOSP IP/OBS MODERATE 35: CPT | Mod: ,,, | Performed by: INTERNAL MEDICINE

## 2019-05-01 PROCEDURE — 99232 PR SUBSEQUENT HOSPITAL CARE,LEVL II: ICD-10-PCS | Mod: ,,, | Performed by: INTERNAL MEDICINE

## 2019-05-01 PROCEDURE — 84100 ASSAY OF PHOSPHORUS: CPT

## 2019-05-01 PROCEDURE — 63600175 PHARM REV CODE 636 W HCPCS: Performed by: EMERGENCY MEDICINE

## 2019-05-01 PROCEDURE — 25000003 PHARM REV CODE 250: Performed by: INTERNAL MEDICINE

## 2019-05-01 PROCEDURE — 12000002 HC ACUTE/MED SURGE SEMI-PRIVATE ROOM

## 2019-05-01 PROCEDURE — 25000003 PHARM REV CODE 250: Performed by: EMERGENCY MEDICINE

## 2019-05-01 PROCEDURE — 80053 COMPREHEN METABOLIC PANEL: CPT

## 2019-05-01 PROCEDURE — 36415 COLL VENOUS BLD VENIPUNCTURE: CPT

## 2019-05-01 RX ORDER — FUROSEMIDE 40 MG/1
40 TABLET ORAL DAILY
Status: DISCONTINUED | OUTPATIENT
Start: 2019-05-01 | End: 2019-05-02 | Stop reason: SDUPTHER

## 2019-05-01 RX ORDER — FUROSEMIDE 10 MG/ML
40 INJECTION INTRAMUSCULAR; INTRAVENOUS ONCE
Status: COMPLETED | OUTPATIENT
Start: 2019-05-01 | End: 2019-05-01

## 2019-05-01 RX ORDER — SPIRONOLACTONE 25 MG/1
25 TABLET ORAL DAILY
Status: DISCONTINUED | OUTPATIENT
Start: 2019-05-01 | End: 2019-05-04 | Stop reason: HOSPADM

## 2019-05-01 RX ADMIN — FUROSEMIDE 40 MG: 10 INJECTION, SOLUTION INTRAVENOUS at 10:05

## 2019-05-01 RX ADMIN — GABAPENTIN 300 MG: 300 CAPSULE ORAL at 08:05

## 2019-05-01 RX ADMIN — DEXTROSE 8 MG/HR: 50 INJECTION, SOLUTION INTRAVENOUS at 09:05

## 2019-05-01 RX ADMIN — MAGNESIUM OXIDE TAB 400 MG (241.3 MG ELEMENTAL MG) 800 MG: 400 (241.3 MG) TAB at 06:05

## 2019-05-01 RX ADMIN — POTASSIUM & SODIUM PHOSPHATES POWDER PACK 280-160-250 MG 2 PACKET: 280-160-250 PACK at 06:05

## 2019-05-01 RX ADMIN — SPIRONOLACTONE 25 MG: 25 TABLET ORAL at 10:05

## 2019-05-01 RX ADMIN — OCTREOTIDE ACETATE 50 MCG/HR: 1000 INJECTION, SOLUTION INTRAVENOUS; SUBCUTANEOUS at 12:05

## 2019-05-01 RX ADMIN — POTASSIUM & SODIUM PHOSPHATES POWDER PACK 280-160-250 MG 2 PACKET: 280-160-250 PACK at 10:05

## 2019-05-01 RX ADMIN — GABAPENTIN 300 MG: 300 CAPSULE ORAL at 03:05

## 2019-05-01 RX ADMIN — MAGNESIUM OXIDE TAB 400 MG (241.3 MG ELEMENTAL MG) 800 MG: 400 (241.3 MG) TAB at 10:05

## 2019-05-01 RX ADMIN — DEXTROSE 8 MG/HR: 50 INJECTION, SOLUTION INTRAVENOUS at 12:05

## 2019-05-01 RX ADMIN — DEXTROSE 8 MG/HR: 50 INJECTION, SOLUTION INTRAVENOUS at 03:05

## 2019-05-01 RX ADMIN — DEXTROSE 8 MG/HR: 50 INJECTION, SOLUTION INTRAVENOUS at 04:05

## 2019-05-01 RX ADMIN — SERTRALINE 25 MG: 25 TABLET, FILM COATED ORAL at 08:05

## 2019-05-01 RX ADMIN — OCTREOTIDE ACETATE 50 MCG/HR: 1000 INJECTION, SOLUTION INTRAVENOUS; SUBCUTANEOUS at 02:05

## 2019-05-01 NOTE — ASSESSMENT & PLAN NOTE
Noted. Under care by Dr. Whitehead.  On medication outpt with [facility]  MELD-Na score: 11 at 5/1/2019  4:12 AM  MELD score: 11 at 5/1/2019  4:12 AM  Calculated from:  Serum Creatinine: 0.7 mg/dL (Rounded to 1 mg/dL) at 5/1/2019  4:12 AM  Serum Sodium: 133 mmol/L at 5/1/2019  4:12 AM  Total Bilirubin: 1.7 mg/dL at 5/1/2019  4:12 AM  INR(ratio): 1.3 at 4/29/2019  3:50 AM  Age: 53 years

## 2019-05-01 NOTE — PROGRESS NOTES
Assisted to and from bathroom. Gait remains steady. Pt noted to have voided yellow urine in commode- missed specipan. Also had moderate soft/ loose green BM. Pt given pericare and returned to bed for now. Dr Barrera examined at bedside.

## 2019-05-01 NOTE — ASSESSMENT & PLAN NOTE
Hold anti-HTN agents. Monitor BP and treat accordingly.-using diuretics as for ascites  Hypertension Medications             furosemide (LASIX) 40 MG tablet Take 60 mg by mouth once daily.     spironolactone (ALDACTONE) 100 MG tablet Take 150 mg by mouth once daily.

## 2019-05-01 NOTE — ASSESSMENT & PLAN NOTE
Noted. Under care by Dr. Whitehead.  On medication outpt with Ger[facility]  MELD-Na score: 11 at 4/30/2019  3:57 AM  MELD score: 11 at 4/30/2019  3:57 AM  Calculated from:  Serum Creatinine: 0.8 mg/dL (Rounded to 1 mg/dL) at 4/30/2019  3:57 AM  Serum Sodium: 135 mmol/L at 4/30/2019  3:57 AM  Total Bilirubin: 1.5 mg/dL at 4/30/2019  3:57 AM  INR(ratio): 1.3 at 4/29/2019  3:50 AM  Age: 53 years

## 2019-05-01 NOTE — PLAN OF CARE
Problem: Adult Inpatient Plan of Care  Goal: Plan of Care Review  Outcome: Ongoing (interventions implemented as appropriate)  Pt with restful night. sandostatin and protonix running IV as well as IVF. Tolerating mechanical soft diet and meds. Started replacements of electrolytes phos and mag this morning. Likely will move to floor soon. Safety maintained. Bathed and linens changed.

## 2019-05-01 NOTE — HOSPITAL COURSE
Karen Villarreal is a 53-year-old female with a past medical history of hepatitis C, cirrhosis, thrombocytopenia and ascites who presented to the emergency department for evaluation of 2-3 days of dark stools and hematemesis which began earlier the kendra of admit.      She was admitted and  transfused 2 units of packed red blood cells, initiated on Protonix and octreotide drips.   EGD showed evidence of esophageal variceal bleed and the patient underwent banding x6.  She was monitored in the ICU.    She had no further bleeding and she was transferred to the floor.  She did have evidence of increasing ascites and peripheral edema.  She was treated with IV Lasix and a paracentesis was done May 3rd with removal of 5 L of ascitic fluid.  The patient's weight today is 253 lbs.    She will be discharged home today on her regular dose of Lasix and Aldactone.  She will weigh herself daily.    She will follow up with her PCP and GI.    The patient has been asked not to take aspirin or NSAIDs.

## 2019-05-01 NOTE — SUBJECTIVE & OBJECTIVE
Interval History:   Status post multiple banding yesterday during EGD.  No further hematemesis.  One bowel movement with melena.  Tolerating liquids.    Review of Systems   Constitutional: Negative for chills and fever.   HENT: Negative for congestion and rhinorrhea.    Respiratory: Negative for cough, shortness of breath and wheezing.    Cardiovascular: Negative for chest pain and palpitations.   Gastrointestinal: Positive for abdominal distention and blood in stool. Negative for abdominal pain, diarrhea, nausea and vomiting.   Genitourinary: Negative for dysuria and flank pain.   Musculoskeletal: Negative for back pain and gait problem.   Skin: Positive for pallor. Negative for rash.   Hematological: Bruises/bleeds easily.   Psychiatric/Behavioral: Negative for agitation and confusion.   All other systems reviewed and are negative.    Objective:     Vital Signs (Most Recent):  Temp: 98.4 °F (36.9 °C) (04/30/19 1503)  Pulse: 91 (04/30/19 1700)  Resp: 17 (04/30/19 1700)  BP: 128/66 (04/30/19 1700)  SpO2: 100 % (04/30/19 1700) Vital Signs (24h Range):  Temp:  [97.8 °F (36.6 °C)-98.6 °F (37 °C)] 98.4 °F (36.9 °C)  Pulse:  [] 91  Resp:  [13-35] 17  SpO2:  [91 %-100 %] 100 %  BP: ()/(54-71) 128/66     Weight: 117.5 kg (259 lb 0.7 oz)  Body mass index is 48.95 kg/m².    Intake/Output Summary (Last 24 hours) at 4/30/2019 2204  Last data filed at 4/30/2019 1810  Gross per 24 hour   Intake 4334.34 ml   Output 403 ml   Net 3931.34 ml      Physical Exam   Constitutional: She is oriented to person, place, and time. She appears well-developed and well-nourished.   HENT:   Head: Normocephalic and atraumatic.   Mouth/Throat: Oropharynx is clear and moist.   Eyes: Conjunctivae are normal. No scleral icterus.   Neck: Neck supple. No JVD present.   Cardiovascular: Normal rate, regular rhythm and intact distal pulses.   Pulmonary/Chest: Effort normal and breath sounds normal. No respiratory distress.   Abdominal: Soft.  Bowel sounds are normal. She exhibits distension. There is no tenderness.   Genitourinary:   Genitourinary Comments: deferred   Musculoskeletal: Normal range of motion. She exhibits no tenderness.   Neurological: She is alert and oriented to person, place, and time.   Skin: Skin is warm and dry. There is pallor.   Psychiatric: She has a normal mood and affect. Her behavior is normal. Judgment and thought content normal.   Nursing note and vitals reviewed.      Significant Labs:   BMP:   Recent Labs   Lab 04/30/19  0357      *   K 4.1      CO2 22*   BUN 16   CREATININE 0.8   CALCIUM 7.8*   MG 1.6     CBC:   Recent Labs   Lab 04/29/19  0350 04/29/19  1011 04/30/19  0357 04/30/19  1454   WBC 13.80* 13.10* 10.20  --    HGB 9.6* 8.3* 7.4* 7.8*   HCT 30.2* 25.9* 23.2* 24.2*    119* 97*  --        Significant Imaging: I have reviewed and interpreted all pertinent imaging results/findings within the past 24 hours.

## 2019-05-01 NOTE — NURSING TRANSFER
Nursing Transfer Note      5/1/2019     Transfer to [facility] [id number] from     Transfer via wheelchair    Transfer with clothing, glasses, and chart.    Transported by Ryan RN    Chart send with patient:Yes

## 2019-05-01 NOTE — PROGRESS NOTES
[facility] Hospital Medicine  Progress Note    Patient Name: Karen Villarreal  MRN: [id number]  Patient Class: IP- Inpatient   Admission Date: 4/29/2019  Length of Stay: 2 days  Attending Physician: Autumn Barrera MD  Primary Care Provider: Shama Mccoy MD        Subjective:     Principal Problem:Bleeding esophageal varices    HPI:  Karen Villarreal is a 53-year-old female with a past medical history of hepatitis C, cirrhosis, thrombocytopenia and ascites who presented to the emergency department for evaluation of 2-3 days of dark stools and hematemesis which began earlier this evening.   Patient states that she began having diarrhea 3 days ago which became tarry yesterday.  She vomited 3 times at home and once in the emergency department describing it as red with blood clots.  She has been out of her Protonix for several days.  She is complaining of abdominal distension, but no fever, chest pain, shortness of breath, or unusual weakness.  She is admitted to the service of hospital Medicine for further treatment.        Hospital Course:  Karen Villarreal is a 53-year-old female with a past medical history of hepatitis C, cirrhosis, thrombocytopenia and ascites who presented to the emergency department for evaluation of 2-3 days of dark stools and hematemesis which began earlier the kendra of admit- for monitoring.  Transfuse 2 units total initiated on Protonix and octreotide drips.  Diuretics were held  .  EGD was done and is multiple varices were clipped.  Patient started on clear liquids.    Interval History:  The patient has not had any nausea, abdominal pain, melena or hematochezia.  She does complain of increased abdominal girth.    Review of Systems   Respiratory: Negative for cough, chest tightness, shortness of breath and wheezing.    Cardiovascular: Negative for chest pain, palpitations and leg swelling.   Gastrointestinal: Positive for abdominal distention.  Negative for abdominal pain, constipation, diarrhea, nausea and vomiting.   Genitourinary: Negative for difficulty urinating, dysuria and flank pain.   Musculoskeletal: Negative for gait problem, joint swelling and neck pain.   Skin: Negative for rash and wound.   Neurological: Negative for dizziness, syncope, light-headedness and headaches.   Psychiatric/Behavioral: Negative for agitation, behavioral problems and confusion.     Objective:     Vital Signs (Most Recent):  Temp: 98.8 °F (37.1 °C) (05/01/19 1122)  Pulse: 96 (05/01/19 1122)  Resp: 16 (05/01/19 1122)  BP: (!) 141/66 (05/01/19 1122)  SpO2: 97 % (05/01/19 1122) Vital Signs (24h Range):  Temp:  [98 °F (36.7 °C)-98.8 °F (37.1 °C)] 98.8 °F (37.1 °C)  Pulse:  [86-97] 96  Resp:  [15-30] 16  SpO2:  [91 %-100 %] 97 %  BP: ()/(50-69) 141/66     Weight: 119.9 kg (264 lb 5.3 oz)  Body mass index is 49.94 kg/m².    Intake/Output Summary (Last 24 hours) at 5/1/2019 1336  Last data filed at 5/1/2019 1120  Gross per 24 hour   Intake 3722.01 ml   Output 1 ml   Net 3721.01 ml      Physical Exam   Cardiovascular: Normal rate, regular rhythm, normal heart sounds and intact distal pulses.   Pulmonary/Chest: Effort normal and breath sounds normal. No respiratory distress. She has no wheezes.   Abdominal: Soft. Bowel sounds are normal. She exhibits distension. There is no tenderness.   Musculoskeletal: Normal range of motion. She exhibits edema. She exhibits no tenderness.   Neurological: She is alert. No cranial nerve deficit. Coordination normal.   Skin: Skin is warm and dry. Capillary refill takes less than 2 seconds. No rash noted.   Nursing note and vitals reviewed.      Significant Labs:   CBC:   Recent Labs   Lab 04/30/19  0357 04/30/19  1454 05/01/19  0413   WBC 10.20  --  7.90   HGB 7.4* 7.8* 7.8*   HCT 23.2* 24.2* 24.0*   PLT 97*  --  82*     CMP:   Recent Labs   Lab 04/30/19  0357 05/01/19  0412   * 133*   K 4.1 3.9    107   CO2 22* 22*     102   BUN 16 11   CREATININE 0.8 0.7   CALCIUM 7.8* 7.3*   PROT 4.7* 4.5*   ALBUMIN 2.2* 2.1*   BILITOT 1.5* 1.7*   ALKPHOS 133 133   AST 34 34   ALT 18 19   ANIONGAP 5* 4*   EGFRNONAA >60 >60       Significant Imaging: I have reviewed all pertinent imaging results/findings within the past 24 hours.    Assessment/Plan:      * Bleeding esophageal varices  No evidence of further bleeding.  Continue octreotide and Protonix.  Transferred to the floor.      Chronic hepatitis C with cirrhosis  Noted. Under care by Dr. Whitehead.  On medication outpt with [facility]  MELD-Na score: 11 at 5/1/2019  4:12 AM  MELD score: 11 at 5/1/2019  4:12 AM  Calculated from:  Serum Creatinine: 0.7 mg/dL (Rounded to 1 mg/dL) at 5/1/2019  4:12 AM  Serum Sodium: 133 mmol/L at 5/1/2019  4:12 AM  Total Bilirubin: 1.7 mg/dL at 5/1/2019  4:12 AM  INR(ratio): 1.3 at 4/29/2019  3:50 AM  Age: 53 years    Ascites  I will give the patient 40 mg of IV Lasix and restart Lasix and Aldactone by mouth tomorrow      Acute blood loss anemia  The patient received a transfusion yesterday.  Hemoglobin is stable.      Malnutrition of moderate degree  Nutrition consulted.      Melena  See varices       Morbid obesity  Body mass index is 47.86 kg/m². Morbid obesity complicates all aspects of disease management from diagnostic modalities to treatment. Weight loss encouraged and health benefits explained to patient.          VTE Risk Mitigation (From admission, onward)        Ordered     IP VTE HIGH RISK PATIENT  Once      04/29/19 0611     Reason for No Pharmacological VTE Prophylaxis  Once      04/29/19 0611     Place NICOLE hose  Until discontinued      04/29/19 0611     Place sequential compression device  Until discontinued      04/29/19 0611              Autumn Barrera MD  Department of Hospital Medicine   [facility]

## 2019-05-01 NOTE — ASSESSMENT & PLAN NOTE
Admit to ICU.-kept  patient NPO. S/p EGD   Follow H/H closely. T&S.  --transfuse to hgb 8 -one unit today   Continue IV Protonix infusion 8 mg/hr. X 72 hours   Continue Octreotide infusion. X 72 hours  Consult Gastronetrologist. -had EGD --variceal bandginCheck Iron, TIBC, B12 and folic acid.   Diet advance to clear liquids   Use IV anti-emetics as needed.

## 2019-05-01 NOTE — PROGRESS NOTES
"[facility] Note    CC: Hematemesis    HPI 53 y.o. 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 Whitehead with whom the case was discussed.  Patient has hematemesis, onset yesterday, x 2 episodes, dark red in color with associated epigastric pain and passage of stool with maroon blood x 1.  She had variceal bleeding about 2 months ago and had banding at that time.  She denies any other acute complaints at this time.  She states that last colonoscopy was many years ago.  Last EGD was done at this facility two months ago.    INTERVAL HISTORY:  Since yesterday, patient has done well with no further evidence of bleeding.  Tolerating diet PO.  EGD done with variceal band ligation.  Per nursing, no BM overnight.  Patient denies any other complaints at this time.  She has been moved from the ICU to the floor.    Past Medical History:   Diagnosis Date    Acid reflux     Anemia     Arthritis     Ascites 03/08/2017    Cataract     Cirrhosis     Depression     Hepatitis C     Hiatal hernia     Ovary removal, prophylactic     Renal cyst 03/08/2017    Thrombocytopenia          Review of Systems  General ROS: negative for - chills, fever or weight loss  Cardiovascular ROS: no chest pain or dyspnea on exertion  Gastrointestinal ROS: no further bleeding    Physical Examination  BP (!) 127/58 (BP Location: Left arm, Patient Position: Lying)   Pulse 89   Temp 98.1 °F (36.7 °C) (Oral)   Resp 18   Ht 5' 1" (1.549 m)   Wt 119.9 kg (264 lb 5.3 oz)   LMP 08/06/2017 (Approximate)   SpO2 98%   Breastfeeding? No   BMI 49.94 kg/m²   General appearance: alert, cooperative, no distress  HENT: Normocephalic, atraumatic, neck symmetrical, no nasal discharge, sclera anicteric   Lungs: clear to auscultation bilaterally, symmetric chest wall expansion bilaterally  Heart: regular rate and rhythm without rub; no displacement of the PMI   Abdomen: obese, NT + BS  Extremities: extremities " symmetric; no clubbing, cyanosis, or edema  Neurologic: Alert and oriented X 3, no sensory or motor neurologic deficits      Labs:  Lab Results   Component Value Date    WBC 7.90 05/01/2019    HGB 7.8 (L) 05/01/2019    HCT 24.0 (L) 05/01/2019    MCV 92 05/01/2019    PLT 82 (L) 05/01/2019     CMP  Sodium   Date Value Ref Range Status   05/01/2019 133 (L) 136 - 145 mmol/L Final   04/17/2019 137 134 - 144 mmol/L      Potassium   Date Value Ref Range Status   05/01/2019 3.9 3.5 - 5.1 mmol/L Final     Chloride   Date Value Ref Range Status   05/01/2019 107 95 - 110 mmol/L Final   04/17/2019 105 98 - 110 mmol/L      CO2   Date Value Ref Range Status   05/01/2019 22 (L) 23 - 29 mmol/L Final     Glucose   Date Value Ref Range Status   05/01/2019 102 70 - 110 mg/dL Final   04/17/2019 110 (H) 70 - 99 mg/dL      BUN, Bld   Date Value Ref Range Status   05/01/2019 11 6 - 20 mg/dL Final     Creatinine   Date Value Ref Range Status   05/01/2019 0.7 0.5 - 1.4 mg/dL Final   04/17/2019 0.77 0.60 - 1.40 mg/dL      Calcium   Date Value Ref Range Status   05/01/2019 7.3 (L) 8.7 - 10.5 mg/dL Final     Total Protein   Date Value Ref Range Status   05/01/2019 4.5 (L) 6.0 - 8.4 g/dL Final     Albumin   Date Value Ref Range Status   05/01/2019 2.1 (L) 3.5 - 5.2 g/dL Final   04/17/2019 2.7 (L) 3.1 - 4.7 g/dL      Total Bilirubin   Date Value Ref Range Status   05/01/2019 1.7 (H) 0.1 - 1.0 mg/dL Final     Comment:     For infants and newborns, interpretation of results should be based  on gestational age, weight and in agreement with clinical  observations.  Premature Infant recommended reference ranges:  Up to 24 hours.............<8.0 mg/dL  Up to 48 hours............<12.0 mg/dL  3-5 days..................<15.0 mg/dL  6-29 days.................<15.0 mg/dL       Alkaline Phosphatase   Date Value Ref Range Status   05/01/2019 133 55 - 135 U/L Final     AST   Date Value Ref Range Status   05/01/2019 34 10 - 40 U/L Final     ALT   Date Value  Ref Range Status   05/01/2019 19 10 - 44 U/L Final     Anion Gap   Date Value Ref Range Status   05/01/2019 4 (L) 8 - 16 mmol/L Final     eGFR if    Date Value Ref Range Status   05/01/2019 >60 >60 mL/min/1.73 m^2 Final     eGFR if non    Date Value Ref Range Status   05/01/2019 >60 >60 mL/min/1.73 m^2 Final     Comment:     Calculation used to obtain the estimated glomerular filtration  rate (eGFR) is the CKD-EPI equation.        Lab Results   Component Value Date    INR 1.3 (H) 04/29/2019    INR 1.1 02/14/2019    INR 1.2 02/07/2019           Assessment:   1.  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  Esophageal varices  3.  Hematemesis  4.  Blood in stool        Plan:  1.  Continue IV octreotide and PPI to complete 72 hour course.  After this, may discontinue octreotide and switch PPI to PO which should be tomorrow.  If no further issues, can discharge home in AM with outpatient EGD in 2-3 weeks for repeat banding.    2.  Continue to monitor.    Justin Saldana MD  [facility]  [address], [address]  [place] [address]  Office: [phone]  Fax: [phone]

## 2019-05-01 NOTE — PROGRESS NOTES
Report called to LIO Gomez RN- pt transferring to room 308A. Pt placed on portable cardiac monitor. All belongings packed and sent with pt. Chart included.

## 2019-05-01 NOTE — NURSING
Dr. Valencia at bedside. Orders to stop IVF as to not overload pt. Order placed, fluid stopped. States pt can move out to floor from hematology standpoint.

## 2019-05-01 NOTE — PROGRESS NOTES
[facility]  Hematology/Oncology  Progress Note    Patient Name: Karen Villarreal  Admission Date: 4/29/2019  Hospital Length of Stay: 2 days  Code Status: Full Code   May 1  Subjective:     Interval History:     This is a 53 yr old female with HCV , cirrhosis and chronic thrombocytopenia. She has a history of bleeding from esophageal varices. SHe was admitted with hematemesis and bloody stool. She was on treatment for Hep C being managed by DR Whitehead. Pt with no further melena since admit and banding of varices by DR Montenegro    Tolerating gabapentin for pain and protonix for GERD  Abdomen is distended       Medications:  Continuous Infusions:   sodium chloride 0.9% 125 mL/hr at 04/30/19 1408    octreotide (SANDOSTATIN) infusion 50 mcg/hr (05/01/19 0243)    pantoprazole 40 mg in dextrose 5 % 100 mL infusion (ready to mix system) 8 mg/hr (05/01/19 4376)     Scheduled Meds:   gabapentin  300 mg Oral TID    sertraline  25 mg Oral QHS     PRN Meds:sodium chloride, sodium chloride, acetaminophen, haloperidol lactate, HYDROmorphone, hyoscyamine, magnesium oxide, magnesium oxide, ondansetron, potassium chloride, potassium chloride, potassium chloride, potassium, sodium phosphates, potassium, sodium phosphates, potassium, sodium phosphates, sodium chloride 0.9%, sodium chloride 0.9%     Review of Systems   Constitutional: Negative for fatigue, fever and unexpected weight change.   HENT: Negative for rhinorrhea and sore throat.    Eyes: Negative for photophobia.   Respiratory: Positive for shortness of breath. Negative for cough.    Cardiovascular: Negative for chest pain and leg swelling.   Gastrointestinal: Positive for abdominal distention. Negative for abdominal pain, constipation, diarrhea, nausea and vomiting.   Endocrine: Negative for cold intolerance and heat intolerance.   Genitourinary: Negative for dysuria, frequency, hematuria and urgency.   Musculoskeletal: Negative for arthralgias,  "back pain and neck pain.   Skin: Negative for pallor and rash.   Neurological: Negative for weakness, numbness and headaches.   Hematological: Negative for adenopathy. Does not bruise/bleed easily.   Psychiatric/Behavioral: Negative for agitation.   All other systems reviewed and are negative.  " she reports she feels like sjhe is filling up with fluid "  Objective:     Vital Signs (Most Recent):  Temp: 98.2 °F (36.8 °C) (05/01/19 0400)  Pulse: 89 (05/01/19 0430)  Resp: 18 (05/01/19 0430)  BP: (!) 94/53 (05/01/19 0400)  SpO2: 100 % (05/01/19 0430) Vital Signs (24h Range):  Temp:  [97.8 °F (36.6 °C)-98.4 °F (36.9 °C)] 98.2 °F (36.8 °C)  Pulse:  [] 89  Resp:  [14-35] 18  SpO2:  [91 %-100 %] 100 %  BP: ()/(50-71) 94/53     Weight: 119.9 kg (264 lb 5.3 oz)  Body mass index is 49.94 kg/m².  Body surface area is 2.27 meters squared.      Intake/Output Summary (Last 24 hours) at 5/1/2019 0706  Last data filed at 4/30/2019 1810  Gross per 24 hour   Intake 2396.84 ml   Output 3 ml   Net 2393.84 ml       Physical Exam   Constitutional: She is oriented to person, place, and time. She appears well-developed and well-nourished.   HENT:   Head: Normocephalic and atraumatic.   Mouth/Throat: Oropharynx is clear and moist. No oropharyngeal exudate.   Eyes: Conjunctivae and EOM are normal. No scleral icterus.   Neck: Normal range of motion. Neck supple. No JVD present. No tracheal deviation present.   Cardiovascular: Normal rate, regular rhythm and normal heart sounds.   No murmur (2= capillary refill) heard.  Pulmonary/Chest: Effort normal and breath sounds normal. No respiratory distress. She has no wheezes.   Abdominal: Soft. Bowel sounds are normal. She exhibits distension.   Musculoskeletal: Normal range of motion. She exhibits edema. She exhibits no tenderness.   Neurological: She is alert and oriented to person, place, and time. No cranial nerve deficit.   Steady gait   Skin: Skin is warm and dry. No rash noted. No " erythema.   Psychiatric: She has a normal mood and affect. Thought content normal.   Nursing note and vitals reviewed.      Significant Labs:   Lab Results   Component Value Date    WBC 7.90 05/01/2019    RBC 2.61 (L) 05/01/2019    HGB 7.8 (L) 05/01/2019    HCT 24.0 (L) 05/01/2019    MCV 92 05/01/2019    MCH 29.9 05/01/2019    MCHC 32.5 05/01/2019    RDW 24.5 (H) 05/01/2019    PLT 82 (L) 05/01/2019    MPV 10.0 05/01/2019    GRAN 6.3 05/01/2019    GRAN 79.8 (H) 05/01/2019    LYMPH 0.9 (L) 05/01/2019    LYMPH 11.1 (L) 05/01/2019    MONO 0.5 05/01/2019    MONO 6.8 05/01/2019    EOS 0.2 05/01/2019    BASO 0.00 05/01/2019    EOSINOPHIL 2.0 05/01/2019    BASOPHIL 0.3 05/01/2019             Assessment/Plan:     Active Diagnoses:    Diagnosis Date Noted POA    PRINCIPAL PROBLEM:  Bleeding esophageal varices [I85.01] 04/29/2019 Yes    Major depressive disorder with current active episode [F32.9] 04/30/2019 Yes    Malnutrition of moderate degree [E44.0] 02/01/2019 Yes    Melena [K92.1] 01/26/2018 Yes    Ascites [R18.8] 04/19/2017 Yes    Chronic hepatitis C with cirrhosis [B18.2, K74.60] 02/16/2017 Yes    Morbid obesity [E66.01] 02/16/2017 Yes    Essential hypertension [I10] 02/16/2017 Yes      Problems Resolved During this Admission:     Cont transfusions prn, DC IVFs  Pt has pitting edema and abdominal distention  SHe needs to get out of bed and mobilize  May need lasix   Goal Hb > 8 and platelets > 68011      Elizabet Valencia MD  Hematology/Oncology  [facility]

## 2019-05-01 NOTE — PLAN OF CARE
05/01/19 0731   PRE-TX-O2   O2 Device (Oxygen Therapy) room air   SpO2 100 %   Pulse Oximetry Type Continuous   $ Pulse Oximetry - Multiple Charge Pulse Oximetry - Multiple   Pulse 97   Resp 20

## 2019-05-01 NOTE — NURSING
Patient to room 308A at this time. Pt assisted to bathroom with standby assist. Pt with no complaints of pain at this time. IV Protonix and Sandostatin continue as ordered. Call light in easy reach.

## 2019-05-01 NOTE — MEDICAL/APP STUDENT
[facility] Hospital Medicine  Progress Note    Patient Name: Karen Villarreal  MRN: [id number]  Patient Class: IP- Inpatient   Admission Date: 4/29/2019  Length of Stay: 2 days  Attending Physician: Autumn Barrera MD  Primary Care Provider: Shama Mccoy MD      Subjective:     Principal Problem:Bleeding esophageal varices    HPI: Karen Villarreal is a 53-year-old female with a past medical history of hepatitis C, cirrhosis, thrombocytopenia and ascites who presented to the emergency department for evaluation of 2-3 days of dark stools and hematemesis which began earlier this evening.   Patient states that she began having diarrhea 3 days ago which became tarry yesterday.  She vomited 3 times at home and once in the emergency department describing it as red with blood clots.  She has been out of her Protonix for several days.  She is complaining of abdominal distension, but no fever, chest pain, shortness of breath, or unusual weakness.  She is admitted to the service of hospital Medicine for further treatment.    Hospital Course:   Started on octreotide, protonix, and rocephin. Band ligation performed during EGD with 6 bands. No further hematemesis. Given 1 unit of blood (according to pt, unable to locate on epic).    Interval History: Pt states she is feeling well. Complains of generalized swelling. Tolerating food and liquids. Had dark green, soft BM - no blood or melena. No difficulty urinating. Pt is able to ambulate. Denies pain, nausea, vomiting, shortness of breath, and chest pain.    Review of Systems   Constitutional: Negative for chills and diaphoresis.   HENT: Negative for sore throat and trouble swallowing.    Respiratory: Positive for shortness of breath. Negative for cough and chest tightness.    Cardiovascular: Positive for leg swelling. Negative for chest pain and palpitations.   Gastrointestinal: Positive for abdominal distention. Negative for blood in stool.    Genitourinary: Negative for difficulty urinating, dysuria and hematuria.   Musculoskeletal: Negative for back pain.   Skin: Negative.    Neurological: Positive for dizziness. Negative for numbness and headaches.   Psychiatric/Behavioral: Negative for confusion and sleep disturbance.     Objective:     Vital Signs (Most Recent):  Temp: 98 °F (36.7 °C) (05/01/19 0731)  Pulse: 91 (05/01/19 0800)  Resp: 16 (05/01/19 0800)  BP: 123/60 (05/01/19 0800)  SpO2: 100 % (05/01/19 0800) Vital Signs (24h Range):  Temp:  [98 °F (36.7 °C)-98.4 °F (36.9 °C)] 98 °F (36.7 °C)  Pulse:  [86-97] 91  Resp:  [15-35] 16  SpO2:  [91 %-100 %] 100 %  BP: ()/(50-71) 123/60     Weight: 119.9 kg (264 lb 5.3 oz)  Body mass index is 49.94 kg/m².    Intake/Output Summary (Last 24 hours) at 5/1/2019 1007  Last data filed at 5/1/2019 0800  Gross per 24 hour   Intake 4130.76 ml   Output 3 ml   Net 4127.76 ml      Physical Exam   Constitutional: She is oriented to person, place, and time. No distress.   HENT:   Mouth/Throat: Oropharyngeal exudate present.   Eyes: Pupils are equal, round, and reactive to light. EOM are normal. No scleral icterus.   Neck: No JVD present.   Cardiovascular: Normal rate and regular rhythm.   Pulmonary/Chest: Effort normal and breath sounds normal. She exhibits no tenderness.   Abdominal: Soft. Bowel sounds are normal. She exhibits distension. She exhibits no mass. There is no tenderness.   Musculoskeletal: She exhibits edema.   Neurological: She is oriented to person, place, and time.   Skin: Skin is warm and dry. Capillary refill takes less than 2 seconds. She is not diaphoretic.       Significant Labs:   Bilirubin:   Recent Labs   Lab 04/17/19  1334 04/29/19  0350 04/30/19  0357 05/01/19  0412   BILITOT 1.5* 2.3* 1.5* 1.7*     CBC:   Recent Labs   Lab 04/30/19  0357 04/30/19  1454 05/01/19  0413   WBC 10.20  --  7.90   HGB 7.4* 7.8* 7.8*   HCT 23.2* 24.2* 24.0*   PLT 97*  --  82*     CMP:   Recent Labs   Lab  04/30/19  0357 05/01/19  0412   * 133*   K 4.1 3.9    107   CO2 22* 22*    102   BUN 16 11   CREATININE 0.8 0.7   CALCIUM 7.8* 7.3*   PROT 4.7* 4.5*   ALBUMIN 2.2* 2.1*   BILITOT 1.5* 1.7*   ALKPHOS 133 133   AST 34 34   ALT 18 19   ANIONGAP 5* 4*   EGFRNONAA >60 >60       Lab Results   Component Value Date    IRON 159 04/29/2019    TIBC 246 (L) 04/29/2019    FERRITIN 168 05/16/2018       Assessment/Plan:      Pt is a 54 y/o female with PMH of hepatitis C, cirrhosis, thrombocytopenia, and ascites who is s/p esophageal band ligation for bleeding varices. Pt's current medical problems requiring attention are bleeding esophageal varices, ascites/edema, and iron deficiency anemia; chronic conditions include hypertension, chronic hepatitis C with cirrhosis, and obesity.    Bleeding esophageal varices  - Etiology: portal hypertension secondary to cirrhosis  - IV octreotide, IV Protonix, continuous (72 hour course started 4/29)  - Mechanical soft diet (advanced from clear liquids)  - Monitor H&H, transfuse with goal hemoglobin >8 (per GI consult)    Ascites/Edema (Anasarca)  - Etiology: pt's net I/O is +4L in 24 hours  - Start diuresis (furosemid + spironolactone)  - Monitor BP, electrolytes    Anemia  - Etiology: iron deficiency anemia secondary to acute blood loss +/- nutritional deficiency  - Hgb currently 7.8 with goal of 8.0  - Transfuse 1 unit packed RBC's  - Monitor H&H    Hypertension  - Essential hypertension  - Currently controlled NOT on anti-hypertensive meds  - Range/current  ()/(50-71) 123/60 mmHg   - Monitor BP    Chronic hepatitis C with cirrhosis  - MELD-Na score of 16 (5/1/209 using INR from 4/29)   - 2% Risk of 90-day mortality  - Continue hep C treatment per treating physician      VTE Risk Mitigation (From admission, onward)        Ordered     IP VTE HIGH RISK PATIENT  Once      04/29/19 0611     Reason for No Pharmacological VTE Prophylaxis  Once      04/29/19 0611     Place  NICOLE hose  Until discontinued      04/29/19 0611     Place sequential compression device  Until discontinued      04/29/19 0611          Willy Ventura  Department of Hospital Medicine   [facility]

## 2019-05-01 NOTE — SUBJECTIVE & OBJECTIVE
Interval History:  The patient has not had any nausea, abdominal pain, melena or hematochezia.  She does complain of increased abdominal girth.    Review of Systems   Respiratory: Negative for cough, chest tightness, shortness of breath and wheezing.    Cardiovascular: Negative for chest pain, palpitations and leg swelling.   Gastrointestinal: Positive for abdominal distention. Negative for abdominal pain, constipation, diarrhea, nausea and vomiting.   Genitourinary: Negative for difficulty urinating, dysuria and flank pain.   Musculoskeletal: Negative for gait problem, joint swelling and neck pain.   Skin: Negative for rash and wound.   Neurological: Negative for dizziness, syncope, light-headedness and headaches.   Psychiatric/Behavioral: Negative for agitation, behavioral problems and confusion.     Objective:     Vital Signs (Most Recent):  Temp: 98.8 °F (37.1 °C) (05/01/19 1122)  Pulse: 96 (05/01/19 1122)  Resp: 16 (05/01/19 1122)  BP: (!) 141/66 (05/01/19 1122)  SpO2: 97 % (05/01/19 1122) Vital Signs (24h Range):  Temp:  [98 °F (36.7 °C)-98.8 °F (37.1 °C)] 98.8 °F (37.1 °C)  Pulse:  [86-97] 96  Resp:  [15-30] 16  SpO2:  [91 %-100 %] 97 %  BP: ()/(50-69) 141/66     Weight: 119.9 kg (264 lb 5.3 oz)  Body mass index is 49.94 kg/m².    Intake/Output Summary (Last 24 hours) at 5/1/2019 1336  Last data filed at 5/1/2019 1120  Gross per 24 hour   Intake 3722.01 ml   Output 1 ml   Net 3721.01 ml      Physical Exam   Cardiovascular: Normal rate, regular rhythm, normal heart sounds and intact distal pulses.   Pulmonary/Chest: Effort normal and breath sounds normal. No respiratory distress. She has no wheezes.   Abdominal: Soft. Bowel sounds are normal. She exhibits distension. There is no tenderness.   Musculoskeletal: Normal range of motion. She exhibits edema. She exhibits no tenderness.   Neurological: She is alert. No cranial nerve deficit. Coordination normal.   Skin: Skin is warm and dry. Capillary refill  takes less than 2 seconds. No rash noted.   Nursing note and vitals reviewed.      Significant Labs:   CBC:   Recent Labs   Lab 04/30/19  0357 04/30/19  1454 05/01/19  0413   WBC 10.20  --  7.90   HGB 7.4* 7.8* 7.8*   HCT 23.2* 24.2* 24.0*   PLT 97*  --  82*     CMP:   Recent Labs   Lab 04/30/19  0357 05/01/19  0412   * 133*   K 4.1 3.9    107   CO2 22* 22*    102   BUN 16 11   CREATININE 0.8 0.7   CALCIUM 7.8* 7.3*   PROT 4.7* 4.5*   ALBUMIN 2.2* 2.1*   BILITOT 1.5* 1.7*   ALKPHOS 133 133   AST 34 34   ALT 18 19   ANIONGAP 5* 4*   EGFRNONAA >60 >60       Significant Imaging: I have reviewed all pertinent imaging results/findings within the past 24 hours.

## 2019-05-02 LAB
ANION GAP SERPL CALC-SCNC: 5 MMOL/L (ref 8–16)
BUN SERPL-MCNC: 8 MG/DL (ref 6–20)
CALCIUM SERPL-MCNC: 7.5 MG/DL (ref 8.7–10.5)
CHLORIDE SERPL-SCNC: 104 MMOL/L (ref 95–110)
CO2 SERPL-SCNC: 24 MMOL/L (ref 23–29)
CREAT SERPL-MCNC: 0.8 MG/DL (ref 0.5–1.4)
ERYTHROCYTE [DISTWIDTH] IN BLOOD BY AUTOMATED COUNT: 24.3 % (ref 11.5–14.5)
EST. GFR  (AFRICAN AMERICAN): >60 ML/MIN/1.73 M^2
EST. GFR  (NON AFRICAN AMERICAN): >60 ML/MIN/1.73 M^2
GLUCOSE SERPL-MCNC: 111 MG/DL (ref 70–110)
HCT VFR BLD AUTO: 24.8 % (ref 37–48.5)
HGB BLD-MCNC: 8.1 G/DL (ref 12–16)
MAGNESIUM SERPL-MCNC: 1.7 MG/DL (ref 1.6–2.6)
MCH RBC QN AUTO: 30.8 PG (ref 27–31)
MCHC RBC AUTO-ENTMCNC: 32.7 G/DL (ref 32–36)
MCV RBC AUTO: 94 FL (ref 82–98)
PLATELET # BLD AUTO: 78 K/UL (ref 150–350)
PMV BLD AUTO: 9.8 FL (ref 9.2–12.9)
POTASSIUM SERPL-SCNC: 3.7 MMOL/L (ref 3.5–5.1)
RBC # BLD AUTO: 2.63 M/UL (ref 4–5.4)
SODIUM SERPL-SCNC: 133 MMOL/L (ref 136–145)
WBC # BLD AUTO: 6.8 K/UL (ref 3.9–12.7)

## 2019-05-02 PROCEDURE — 25000003 PHARM REV CODE 250: Performed by: NURSE PRACTITIONER

## 2019-05-02 PROCEDURE — 25000003 PHARM REV CODE 250: Performed by: EMERGENCY MEDICINE

## 2019-05-02 PROCEDURE — 99232 PR SUBSEQUENT HOSPITAL CARE,LEVL II: ICD-10-PCS | Mod: ,,, | Performed by: INTERNAL MEDICINE

## 2019-05-02 PROCEDURE — 80048 BASIC METABOLIC PNL TOTAL CA: CPT

## 2019-05-02 PROCEDURE — 85027 COMPLETE CBC AUTOMATED: CPT

## 2019-05-02 PROCEDURE — 63600175 PHARM REV CODE 636 W HCPCS: Performed by: EMERGENCY MEDICINE

## 2019-05-02 PROCEDURE — 99233 SBSQ HOSP IP/OBS HIGH 50: CPT | Mod: ,,, | Performed by: INTERNAL MEDICINE

## 2019-05-02 PROCEDURE — 63600175 PHARM REV CODE 636 W HCPCS: Performed by: INTERNAL MEDICINE

## 2019-05-02 PROCEDURE — 99233 PR SUBSEQUENT HOSPITAL CARE,LEVL III: ICD-10-PCS | Mod: ,,, | Performed by: INTERNAL MEDICINE

## 2019-05-02 PROCEDURE — 25000003 PHARM REV CODE 250: Performed by: INTERNAL MEDICINE

## 2019-05-02 PROCEDURE — 36415 COLL VENOUS BLD VENIPUNCTURE: CPT

## 2019-05-02 PROCEDURE — 99232 SBSQ HOSP IP/OBS MODERATE 35: CPT | Mod: ,,, | Performed by: INTERNAL MEDICINE

## 2019-05-02 PROCEDURE — C9113 INJ PANTOPRAZOLE SODIUM, VIA: HCPCS | Performed by: EMERGENCY MEDICINE

## 2019-05-02 PROCEDURE — 83735 ASSAY OF MAGNESIUM: CPT

## 2019-05-02 PROCEDURE — 94761 N-INVAS EAR/PLS OXIMETRY MLT: CPT

## 2019-05-02 PROCEDURE — 12000002 HC ACUTE/MED SURGE SEMI-PRIVATE ROOM

## 2019-05-02 RX ORDER — POTASSIUM CHLORIDE 20 MEQ/1
40 TABLET, EXTENDED RELEASE ORAL ONCE
Status: COMPLETED | OUTPATIENT
Start: 2019-05-02 | End: 2019-05-02

## 2019-05-02 RX ORDER — SERTRALINE HYDROCHLORIDE 50 MG/1
50 TABLET, FILM COATED ORAL NIGHTLY
Status: DISCONTINUED | OUTPATIENT
Start: 2019-05-02 | End: 2019-05-04 | Stop reason: HOSPADM

## 2019-05-02 RX ORDER — FUROSEMIDE 10 MG/ML
40 INJECTION INTRAMUSCULAR; INTRAVENOUS 2 TIMES DAILY
Status: DISCONTINUED | OUTPATIENT
Start: 2019-05-02 | End: 2019-05-04 | Stop reason: HOSPADM

## 2019-05-02 RX ORDER — PANTOPRAZOLE SODIUM 40 MG/1
40 TABLET, DELAYED RELEASE ORAL 2 TIMES DAILY
Status: DISCONTINUED | OUTPATIENT
Start: 2019-05-02 | End: 2019-05-04 | Stop reason: HOSPADM

## 2019-05-02 RX ADMIN — FUROSEMIDE 40 MG: 10 INJECTION, SOLUTION INTRAVENOUS at 10:05

## 2019-05-02 RX ADMIN — FUROSEMIDE 40 MG: 10 INJECTION, SOLUTION INTRAVENOUS at 05:05

## 2019-05-02 RX ADMIN — POTASSIUM CHLORIDE 40 MEQ: 1500 TABLET, EXTENDED RELEASE ORAL at 10:05

## 2019-05-02 RX ADMIN — GABAPENTIN 300 MG: 300 CAPSULE ORAL at 10:05

## 2019-05-02 RX ADMIN — SERTRALINE HYDROCHLORIDE 50 MG: 50 TABLET ORAL at 08:05

## 2019-05-02 RX ADMIN — SPIRONOLACTONE 25 MG: 25 TABLET ORAL at 10:05

## 2019-05-02 RX ADMIN — PANTOPRAZOLE SODIUM 40 MG: 40 TABLET, DELAYED RELEASE ORAL at 08:05

## 2019-05-02 RX ADMIN — DEXTROSE 8 MG/HR: 50 INJECTION, SOLUTION INTRAVENOUS at 02:05

## 2019-05-02 RX ADMIN — GABAPENTIN 300 MG: 300 CAPSULE ORAL at 03:05

## 2019-05-02 RX ADMIN — GABAPENTIN 300 MG: 300 CAPSULE ORAL at 08:05

## 2019-05-02 RX ADMIN — PANTOPRAZOLE SODIUM 40 MG: 40 TABLET, DELAYED RELEASE ORAL at 10:05

## 2019-05-02 NOTE — NURSING
scd's removed r/t pt getting out of bed frequently and she stated the doctor told her she did not have to wear them while she was being given IV lasix.  Pt refused having them on at this time.

## 2019-05-02 NOTE — PLAN OF CARE
05/01/19 2013   Patient Assessment/Suction   Level of Consciousness (AVPU) alert   PRE-TX-O2   O2 Device (Oxygen Therapy) room air   SpO2 99 %   Pulse Oximetry Type Intermittent   Pulse 92   Resp 18   Temp 98.1 °F (36.7 °C)   BP (!) 145/75

## 2019-05-02 NOTE — PROGRESS NOTES
[facility]  Hematology/Oncology  Progress Note    Patient Name: Karen Villarreal  Admission Date: 4/29/2019  Hospital Length of Stay: 3 days  Code Status: Full Code   May 2  Subjective:     Interval History:   This is a 53 yr old female with HCV , cirrhosis and chronic thrombocytopenia. She has a history of bleeding from esophageal varices. SHe was admitted with hematemesis and bloody stool. She was on treatment for Hep C being managed by DR Whitehead. Pt with no further melena since admit and banding of varices by DR Montenegro. She is out of ICU on floor, Diet of chocolate pudding and lemon ice     Tolerating gabapentin for pain and protonix for GERD  Abdomen remains is distended , + edema     Medications:  Continuous Infusions:   octreotide (SANDOSTATIN) infusion 50 mcg/hr (05/01/19 1226)    pantoprazole 40 mg in dextrose 5 % 100 mL infusion (ready to mix system) 8 mg/hr (05/02/19 0210)     Scheduled Meds:   furosemide  40 mg Oral Daily    gabapentin  300 mg Oral TID    sertraline  25 mg Oral QHS    spironolactone  25 mg Oral Daily     PRN Meds:sodium chloride, sodium chloride, acetaminophen, haloperidol lactate, HYDROmorphone, hyoscyamine, magnesium oxide, magnesium oxide, ondansetron, potassium chloride, potassium chloride, potassium chloride, potassium, sodium phosphates, potassium, sodium phosphates, potassium, sodium phosphates, sodium chloride 0.9%, sodium chloride 0.9%     Review of Systems   Constitutional: Negative for fatigue, fever and unexpected weight change.   HENT: Negative for rhinorrhea and sore throat.    Eyes: Negative for photophobia.   Respiratory: Negative for cough and shortness of breath.    Cardiovascular: Positive for leg swelling. Negative for chest pain.   Gastrointestinal: Positive for abdominal distention. Negative for abdominal pain, constipation, diarrhea, nausea and vomiting.   Endocrine: Negative for cold intolerance and heat intolerance.    Genitourinary: Negative for dysuria, frequency, hematuria and urgency.   Musculoskeletal: Negative for arthralgias, back pain and neck pain.   Skin: Negative for pallor and rash.   Neurological: Negative for weakness, numbness and headaches.   Hematological: Negative for adenopathy. Does not bruise/bleed easily.   Psychiatric/Behavioral: Negative for agitation.   All other systems reviewed and are negative.    Objective:     Vital Signs (Most Recent):  Temp: 97.9 °F (36.6 °C) (05/02/19 0338)  Pulse: 91 (05/02/19 0338)  Resp: 18 (05/02/19 0338)  BP: (!) 108/51 (05/02/19 0338)  SpO2: 99 % (05/02/19 0338) Vital Signs (24h Range):  Temp:  [97.9 °F (36.6 °C)-98.8 °F (37.1 °C)] 97.9 °F (36.6 °C)  Pulse:  [86-96] 91  Resp:  [15-18] 18  SpO2:  [97 %-100 %] 99 %  BP: (108-145)/(51-75) 108/51     Weight: 119.9 kg (264 lb 5.3 oz)  Body mass index is 49.94 kg/m².  Body surface area is 2.27 meters squared.      Intake/Output Summary (Last 24 hours) at 5/2/2019 0731  Last data filed at 5/2/2019 0600  Gross per 24 hour   Intake 2040.01 ml   Output --   Net 2040.01 ml       Physical Exam   Constitutional: She is oriented to person, place, and time. She appears well-developed and well-nourished.   HENT:   Head: Normocephalic and atraumatic.   Mouth/Throat: Oropharynx is clear and moist. No oropharyngeal exudate.   Eyes: Conjunctivae and EOM are normal. No scleral icterus.   Neck: Normal range of motion. Neck supple. No JVD present. No tracheal deviation present.   Cardiovascular: Normal rate, regular rhythm and normal heart sounds.   No murmur (2= capillary refill) heard.  Pulmonary/Chest: Effort normal and breath sounds normal. No respiratory distress. She has no wheezes.   Abdominal: Soft. Bowel sounds are normal. She exhibits distension. There is no guarding.   Musculoskeletal: Normal range of motion. She exhibits edema.   Neurological: She is alert and oriented to person, place, and time. No cranial nerve deficit.   Steady  gait   Skin: Skin is warm and dry. No rash noted. No erythema.   Psychiatric: She has a normal mood and affect. Thought content normal.   Nursing note and vitals reviewed.      Significant Labs:     Lab Results   Component Value Date    WBC 6.80 05/02/2019    RBC 2.63 (L) 05/02/2019    HGB 8.1 (L) 05/02/2019    HCT 24.8 (L) 05/02/2019    MCV 94 05/02/2019    MCH 30.8 05/02/2019    MCHC 32.7 05/02/2019    RDW 24.3 (H) 05/02/2019    PLT 78 (L) 05/02/2019    MPV 9.8 05/02/2019    GRAN 6.3 05/01/2019    GRAN 79.8 (H) 05/01/2019    LYMPH 0.9 (L) 05/01/2019    LYMPH 11.1 (L) 05/01/2019    MONO 0.5 05/01/2019    MONO 6.8 05/01/2019    EOS 0.2 05/01/2019    BASO 0.00 05/01/2019    EOSINOPHIL 2.0 05/01/2019    BASOPHIL 0.3 05/01/2019     CMP  Sodium   Date Value Ref Range Status   05/02/2019 133 (L) 136 - 145 mmol/L Final   04/17/2019 137 134 - 144 mmol/L      Potassium   Date Value Ref Range Status   05/02/2019 3.7 3.5 - 5.1 mmol/L Final     Chloride   Date Value Ref Range Status   05/02/2019 104 95 - 110 mmol/L Final   04/17/2019 105 98 - 110 mmol/L      CO2   Date Value Ref Range Status   05/02/2019 24 23 - 29 mmol/L Final     Glucose   Date Value Ref Range Status   05/02/2019 111 (H) 70 - 110 mg/dL Final   04/17/2019 110 (H) 70 - 99 mg/dL      BUN, Bld   Date Value Ref Range Status   05/02/2019 8 6 - 20 mg/dL Final     Creatinine   Date Value Ref Range Status   05/02/2019 0.8 0.5 - 1.4 mg/dL Final   04/17/2019 0.77 0.60 - 1.40 mg/dL      Calcium   Date Value Ref Range Status   05/02/2019 7.5 (L) 8.7 - 10.5 mg/dL Final     Total Protein   Date Value Ref Range Status   05/01/2019 4.5 (L) 6.0 - 8.4 g/dL Final     Albumin   Date Value Ref Range Status   05/01/2019 2.1 (L) 3.5 - 5.2 g/dL Final   04/17/2019 2.7 (L) 3.1 - 4.7 g/dL      Total Bilirubin   Date Value Ref Range Status   05/01/2019 1.7 (H) 0.1 - 1.0 mg/dL Final     Comment:     For infants and newborns, interpretation of results should be based  on gestational age,  weight and in agreement with clinical  observations.  Premature Infant recommended reference ranges:  Up to 24 hours.............<8.0 mg/dL  Up to 48 hours............<12.0 mg/dL  3-5 days..................<15.0 mg/dL  6-29 days.................<15.0 mg/dL       Alkaline Phosphatase   Date Value Ref Range Status   05/01/2019 133 55 - 135 U/L Final     AST   Date Value Ref Range Status   05/01/2019 34 10 - 40 U/L Final     ALT   Date Value Ref Range Status   05/01/2019 19 10 - 44 U/L Final     Anion Gap   Date Value Ref Range Status   05/02/2019 5 (L) 8 - 16 mmol/L Final     eGFR if    Date Value Ref Range Status   05/02/2019 >60 >60 mL/min/1.73 m^2 Final     eGFR if non    Date Value Ref Range Status   05/02/2019 >60 >60 mL/min/1.73 m^2 Final     Comment:     Calculation used to obtain the estimated glomerular filtration  rate (eGFR) is the CKD-EPI equation.            Assessment/Plan:     Active Diagnoses:    Diagnosis Date Noted POA    PRINCIPAL PROBLEM:  Bleeding esophageal varices [I85.01] 04/29/2019 Yes    Acute blood loss anemia [D62] 05/01/2019 Yes    Major depressive disorder with current active episode [F32.9] 04/30/2019 Yes    Malnutrition of moderate degree [E44.0] 02/01/2019 Yes    Melena [K92.1] 01/26/2018 Yes    Ascites [R18.8] 04/19/2017 Yes    Chronic hepatitis C with cirrhosis [B18.2, K74.60] 02/16/2017 Yes    Morbid obesity [E66.01] 02/16/2017 Yes      Problems Resolved During this Admission:    Diagnosis Date Noted Date Resolved POA    Essential hypertension [I10] 02/16/2017 05/01/2019 Yes       Ascites and edema of extremities  Checking US abdomen   May need paracentesis  Hemoglobin and platelets are stable   No further evidence of bleeding  Likely D/C home soon    Elizabet Valencia MD  Hematology/Oncology  [facility]

## 2019-05-02 NOTE — NURSING
Situation-   Who are you? Who is the patient? What is going on with the patient currently?          I am Hetal Diaz calling   from 3rd floor   in regards to Karen Villarreal who is a 53 y.o. year old female admitted with Bleeding esophageal varices who is on IV Octreotide & PPI to be completed after 72 hr course. Per Dr. Montenegro note & may discontinue octreotide & switch PPI to PO which should start today.    Background- What is the patient's significant medical history (CAD, ESRD, HIV positive, history of recent surgery/chemo.transfusion) and recent labs Past Medical History:   Diagnosis Date    Acid reflux     Anemia     Arthritis     Ascites 03/08/2017    Cataract     Cirrhosis     Depression     Hepatitis C     Hiatal hernia     Ovary removal, prophylactic     Renal cyst 03/08/2017    Thrombocytopenia             Assessment-   Has the following issues (abnormal labs, abnormal vitals, change in status, uncontrolled symptoms, patient request) Does not have po PPI ordered.    Recommendation- What is the change in the plan of care requested? Do you want to order Po PPI?    Plan-  What did we decide to do?

## 2019-05-02 NOTE — PLAN OF CARE
"Problem: Adult Inpatient Plan of Care  Goal: Plan of Care Review  Outcome: Ongoing (interventions implemented as appropriate)  Plan of care review with pt, pt states "understanding,"no redness/swelling noted to IV site, no bloody emesis and BM this shift, teaching performed on 1.5L fluid restriction, pt is ambulating at [place], denies joint pain/discomfort at this time, will continue to monitor, observe and note any changes, safety maintain        "

## 2019-05-02 NOTE — PROGRESS NOTES
[facility] Hospital Medicine  Progress Note    Patient Name: Karen Villarreal  MRN: [id number]  Patient Class: IP- Inpatient   Admission Date: 4/29/2019  Length of Stay: 3 days  Attending Physician: Autumn Barrera MD  Primary Care Provider: Shama Mccoy MD        Subjective:     Principal Problem:Bleeding esophageal varices    HPI:  Karen Villarreal is a 53-year-old female with a past medical history of hepatitis C, cirrhosis, thrombocytopenia and ascites who presented to the emergency department for evaluation of 2-3 days of dark stools and hematemesis which began earlier this evening.   Patient states that she began having diarrhea 3 days ago which became tarry yesterday.  She vomited 3 times at home and once in the emergency department describing it as red with blood clots.  She has been out of her Protonix for several days.  She is complaining of abdominal distension, but no fever, chest pain, shortness of breath, or unusual weakness.  She is admitted to the service of hospital Medicine for further treatment.        Hospital Course:  Karen Villarreal is a 53-year-old female with a past medical history of hepatitis C, cirrhosis, thrombocytopenia and ascites who presented to the emergency department for evaluation of 2-3 days of dark stools and hematemesis which began earlier the kendra of admit- for monitoring.  Transfuse 2 units total initiated on Protonix and octreotide drips.  Diuretics were held  .  EGD was done and is multiple varices were clipped.  Patient started on clear liquids.    Interval History:  The patient is doing well.  She diuresed well yesterday with IV Lasix but she still has a large amount of ascites.  She denies abdominal pain, hematemesis, melena or hematochezia    Review of Systems   Respiratory: Negative for cough, chest tightness, shortness of breath and wheezing.    Cardiovascular: Negative for chest pain, palpitations and leg swelling.    Gastrointestinal: Negative for abdominal distention, abdominal pain, constipation, diarrhea, nausea and vomiting.   Genitourinary: Negative for difficulty urinating, dysuria and flank pain.   Musculoskeletal: Negative for gait problem, joint swelling and neck pain.   Skin: Negative for rash and wound.   Neurological: Negative for dizziness, syncope, light-headedness and headaches.   Psychiatric/Behavioral: Negative for agitation, behavioral problems and confusion.     Objective:     Vital Signs (Most Recent):  Temp: 98.4 °F (36.9 °C) (05/02/19 0809)  Pulse: 88 (05/02/19 0809)  Resp: 18 (05/02/19 0809)  BP: 133/62 (05/02/19 0809)  SpO2: 99 % (05/02/19 0809) Vital Signs (24h Range):  Temp:  [97.9 °F (36.6 °C)-98.8 °F (37.1 °C)] 98.4 °F (36.9 °C)  Pulse:  [88-96] 88  Resp:  [16-18] 18  SpO2:  [97 %-99 %] 99 %  BP: (108-145)/(51-75) 133/62     Weight: 119.9 kg (264 lb 5.3 oz)  Body mass index is 49.94 kg/m².    Intake/Output Summary (Last 24 hours) at 5/2/2019 1011  Last data filed at 5/2/2019 0600  Gross per 24 hour   Intake 1920.01 ml   Output --   Net 1920.01 ml      Physical Exam   Constitutional: She is oriented to person, place, and time.   Cardiovascular: Normal rate, regular rhythm, normal heart sounds and intact distal pulses.   Pulmonary/Chest: Effort normal and breath sounds normal. No respiratory distress. She has no wheezes.   Abdominal: Soft. Bowel sounds are normal. She exhibits distension. There is no tenderness.   Musculoskeletal: Normal range of motion. She exhibits edema. She exhibits no tenderness.   Neurological: She is alert and oriented to person, place, and time. No cranial nerve deficit. Coordination normal.   Skin: Skin is warm and dry. Capillary refill takes less than 2 seconds. No rash noted.   Psychiatric: She has a normal mood and affect. Her behavior is normal. Thought content normal.   Nursing note and vitals reviewed.      Significant Labs:   BMP:   Recent Labs   Lab 05/02/19  9198    *   *   K 3.7      CO2 24   BUN 8   CREATININE 0.8   CALCIUM 7.5*   MG 1.7     CBC:   Recent Labs   Lab 04/30/19  1454 05/01/19  0413 05/02/19  0541   WBC  --  7.90 6.80   HGB 7.8* 7.8* 8.1*   HCT 24.2* 24.0* 24.8*   PLT  --  82* 78*       Significant Imaging: I have reviewed all pertinent imaging results/findings within the past 24 hours.    Assessment/Plan:      * Bleeding esophageal varices  No evidence of further bleeding after banding.  Discontinue octreotide and IV Protonix today.        Chronic hepatitis C with cirrhosis  Noted. Under care by Dr. Whitehead.  On medication outpt with Mamarquez  MELD-Na score: 11 at 5/1/2019  4:12 AM  MELD score: 11 at 5/1/2019  4:12 AM  Calculated from:  Serum Creatinine: 0.7 mg/dL (Rounded to 1 mg/dL) at 5/1/2019  4:12 AM  Serum Sodium: 133 mmol/L at 5/1/2019  4:12 AM  Total Bilirubin: 1.7 mg/dL at 5/1/2019  4:12 AM  INR(ratio): 1.3 at 4/29/2019  3:50 AM  Age: 53 years    Ascites  Treat with IV Lasix and fluid restriction      Acute blood loss anemia  Hemoglobin is stable.      Malnutrition of moderate degree  Nutrition consulted.      Melena  See varices       Morbid obesity  Body mass index is 47.86 kg/m². Morbid obesity complicates all aspects of disease management from diagnostic modalities to treatment. Weight loss encouraged and health benefits explained to patient.            VTE Risk Mitigation (From admission, onward)        Ordered     IP VTE HIGH RISK PATIENT  Once      04/29/19 0611     Reason for No Pharmacological VTE Prophylaxis  Once      04/29/19 0611     Place NICOLE hose  Until discontinued      04/29/19 0611     Place sequential compression device  Until discontinued      04/29/19 0611              Autumn Barrera MD  Department of Hospital Medicine   [facility]

## 2019-05-02 NOTE — PLAN OF CARE
Problem: Adult Inpatient Plan of Care  Goal: Plan of Care Review  Outcome: Ongoing (interventions implemented as appropriate)  Pt continues with protonix and sandostatin as ordered. Patient assisted to the bathroom with standby assist hourly throughout this shift. Pt tolerating mechanical soft diet well this pm. Telemetry monitoring continues with pt in sinus rhythm. Call light in easy reach.

## 2019-05-02 NOTE — PLAN OF CARE
Problem: Adult Inpatient Plan of Care  Goal: Plan of Care Review  Plan of care reviewed with pt, pt verbalized understanding. IV site clean, dry, intact, no redness or swelling noted. Pt ambulates with stand by assist, remains free of fall/trauma. Pain is controlled with current regimen. VSS, pt remained afebrile this shift. Cardiac monitoring in progress. Safety maintained throughout shift, call light in reach, bed locked and in lowest position, side rails up x2. Will continue to monitor, observe and report any changes.

## 2019-05-02 NOTE — PROGRESS NOTES
"[facility] Note    CC: Hematemesis    HPI 53 y.o. 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 Whitehead with whom the case was discussed.  Patient has hematemesis, onset yesterday, x 2 episodes, dark red in color with associated epigastric pain and passage of stool with maroon blood x 1.  She had variceal bleeding about 2 months ago and had banding at that time.  She denies any other acute complaints at this time.  She states that last colonoscopy was many years ago.  Last EGD was done at this facility two months ago.    INTERVAL HISTORY:  Since yesterday, patient has done well with no further evidence of bleeding.  Tolerating soft diet PO.  EGD done with variceal band ligation.  Patient denies any other complaints at this time.  Still undergoing diuresis per primary team.    Past Medical History:   Diagnosis Date    Acid reflux     Anemia     Arthritis     Ascites 03/08/2017    Cataract     Cirrhosis     Depression     Hepatitis C     Hiatal hernia     Ovary removal, prophylactic     Renal cyst 03/08/2017    Thrombocytopenia          Review of Systems  General ROS: negative for - chills, fever or weight loss  Cardiovascular ROS: no chest pain or dyspnea on exertion  Gastrointestinal ROS: no further bleeding    Physical Examination  BP (!) 128/58 (BP Location: Left arm, Patient Position: Lying)   Pulse 93   Temp 98.7 °F (37.1 °C) (Oral)   Resp 18   Ht 5' 1" (1.549 m)   Wt 119.9 kg (264 lb 5.3 oz)   LMP 08/06/2017 (Approximate)   SpO2 96%   Breastfeeding? No   BMI 49.94 kg/m²   General appearance: alert, cooperative, no distress  HENT: Normocephalic, atraumatic, neck symmetrical, no nasal discharge, sclera anicteric   Lungs: clear to auscultation bilaterally, symmetric chest wall expansion bilaterally  Heart: regular rate and rhythm without rub; no displacement of the PMI   Abdomen: obese, NT + BS + edema  Extremities: extremities symmetric; no clubbing, " cyanosis, or edema  Neurologic: Alert and oriented X 3, no sensory or motor neurologic deficits      Labs:  Lab Results   Component Value Date    WBC 6.80 05/02/2019    HGB 8.1 (L) 05/02/2019    HCT 24.8 (L) 05/02/2019    MCV 94 05/02/2019    PLT 78 (L) 05/02/2019     CMP  Sodium   Date Value Ref Range Status   05/02/2019 133 (L) 136 - 145 mmol/L Final   04/17/2019 137 134 - 144 mmol/L      Potassium   Date Value Ref Range Status   05/02/2019 3.7 3.5 - 5.1 mmol/L Final     Chloride   Date Value Ref Range Status   05/02/2019 104 95 - 110 mmol/L Final   04/17/2019 105 98 - 110 mmol/L      CO2   Date Value Ref Range Status   05/02/2019 24 23 - 29 mmol/L Final     Glucose   Date Value Ref Range Status   05/02/2019 111 (H) 70 - 110 mg/dL Final   04/17/2019 110 (H) 70 - 99 mg/dL      BUN, Bld   Date Value Ref Range Status   05/02/2019 8 6 - 20 mg/dL Final     Creatinine   Date Value Ref Range Status   05/02/2019 0.8 0.5 - 1.4 mg/dL Final   04/17/2019 0.77 0.60 - 1.40 mg/dL      Calcium   Date Value Ref Range Status   05/02/2019 7.5 (L) 8.7 - 10.5 mg/dL Final     Total Protein   Date Value Ref Range Status   05/01/2019 4.5 (L) 6.0 - 8.4 g/dL Final     Albumin   Date Value Ref Range Status   05/01/2019 2.1 (L) 3.5 - 5.2 g/dL Final   04/17/2019 2.7 (L) 3.1 - 4.7 g/dL      Total Bilirubin   Date Value Ref Range Status   05/01/2019 1.7 (H) 0.1 - 1.0 mg/dL Final     Comment:     For infants and newborns, interpretation of results should be based  on gestational age, weight and in agreement with clinical  observations.  Premature Infant recommended reference ranges:  Up to 24 hours.............<8.0 mg/dL  Up to 48 hours............<12.0 mg/dL  3-5 days..................<15.0 mg/dL  6-29 days.................<15.0 mg/dL       Alkaline Phosphatase   Date Value Ref Range Status   05/01/2019 133 55 - 135 U/L Final     AST   Date Value Ref Range Status   05/01/2019 34 10 - 40 U/L Final     ALT   Date Value Ref Range Status    05/01/2019 19 10 - 44 U/L Final     Anion Gap   Date Value Ref Range Status   05/02/2019 5 (L) 8 - 16 mmol/L Final     eGFR if    Date Value Ref Range Status   05/02/2019 >60 >60 mL/min/1.73 m^2 Final     eGFR if non    Date Value Ref Range Status   05/02/2019 >60 >60 mL/min/1.73 m^2 Final     Comment:     Calculation used to obtain the estimated glomerular filtration  rate (eGFR) is the CKD-EPI equation.        Lab Results   Component Value Date    INR 1.3 (H) 04/29/2019    INR 1.1 02/14/2019    INR 1.2 02/07/2019           Assessment:   1.  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  Esophageal varices  3.  Hematemesis  4.  Blood in stool        Plan:  1.  Change PPI to oral  2.  Discontinue octreotide  3.  Advance diet  4.  Repeat EGD in 2-3 weeks for repeat banding.  Please call my office for scheduling this.  5.  GI to sign off for now.  Call for questions.    Justin Saldana MD  [facility]  [address], [address]  [place] [address]  Office: [phone]  Fax: [phone]

## 2019-05-02 NOTE — SUBJECTIVE & OBJECTIVE
Interval History:  The patient is doing well.  She diuresed well yesterday with IV Lasix but she still has a large amount of ascites.  She denies abdominal pain, hematemesis, melena or hematochezia    Review of Systems   Respiratory: Negative for cough, chest tightness, shortness of breath and wheezing.    Cardiovascular: Negative for chest pain, palpitations and leg swelling.   Gastrointestinal: Negative for abdominal distention, abdominal pain, constipation, diarrhea, nausea and vomiting.   Genitourinary: Negative for difficulty urinating, dysuria and flank pain.   Musculoskeletal: Negative for gait problem, joint swelling and neck pain.   Skin: Negative for rash and wound.   Neurological: Negative for dizziness, syncope, light-headedness and headaches.   Psychiatric/Behavioral: Negative for agitation, behavioral problems and confusion.     Objective:     Vital Signs (Most Recent):  Temp: 98.4 °F (36.9 °C) (05/02/19 0809)  Pulse: 88 (05/02/19 0809)  Resp: 18 (05/02/19 0809)  BP: 133/62 (05/02/19 0809)  SpO2: 99 % (05/02/19 0809) Vital Signs (24h Range):  Temp:  [97.9 °F (36.6 °C)-98.8 °F (37.1 °C)] 98.4 °F (36.9 °C)  Pulse:  [88-96] 88  Resp:  [16-18] 18  SpO2:  [97 %-99 %] 99 %  BP: (108-145)/(51-75) 133/62     Weight: 119.9 kg (264 lb 5.3 oz)  Body mass index is 49.94 kg/m².    Intake/Output Summary (Last 24 hours) at 5/2/2019 1011  Last data filed at 5/2/2019 0600  Gross per 24 hour   Intake 1920.01 ml   Output --   Net 1920.01 ml      Physical Exam   Constitutional: She is oriented to person, place, and time.   Cardiovascular: Normal rate, regular rhythm, normal heart sounds and intact distal pulses.   Pulmonary/Chest: Effort normal and breath sounds normal. No respiratory distress. She has no wheezes.   Abdominal: Soft. Bowel sounds are normal. She exhibits distension. There is no tenderness.   Musculoskeletal: Normal range of motion. She exhibits edema. She exhibits no tenderness.   Neurological: She is  alert and oriented to person, place, and time. No cranial nerve deficit. Coordination normal.   Skin: Skin is warm and dry. Capillary refill takes less than 2 seconds. No rash noted.   Psychiatric: She has a normal mood and affect. Her behavior is normal. Thought content normal.   Nursing note and vitals reviewed.      Significant Labs:   BMP:   Recent Labs   Lab 05/02/19  0541   *   *   K 3.7      CO2 24   BUN 8   CREATININE 0.8   CALCIUM 7.5*   MG 1.7     CBC:   Recent Labs   Lab 04/30/19  1454 05/01/19  0413 05/02/19  0541   WBC  --  7.90 6.80   HGB 7.8* 7.8* 8.1*   HCT 24.2* 24.0* 24.8*   PLT  --  82* 78*       Significant Imaging: I have reviewed all pertinent imaging results/findings within the past 24 hours.

## 2019-05-03 ENCOUNTER — TELEPHONE (OUTPATIENT)
Dept: GASTROENTEROLOGY | Facility: CLINIC | Age: 54
End: 2019-05-03

## 2019-05-03 LAB
ANION GAP SERPL CALC-SCNC: 5 MMOL/L (ref 8–16)
BUN SERPL-MCNC: 7 MG/DL (ref 6–20)
CALCIUM SERPL-MCNC: 7.9 MG/DL (ref 8.7–10.5)
CHLORIDE SERPL-SCNC: 103 MMOL/L (ref 95–110)
CO2 SERPL-SCNC: 26 MMOL/L (ref 23–29)
CREAT SERPL-MCNC: 0.8 MG/DL (ref 0.5–1.4)
ERYTHROCYTE [DISTWIDTH] IN BLOOD BY AUTOMATED COUNT: 24.2 % (ref 11.5–14.5)
EST. GFR  (AFRICAN AMERICAN): >60 ML/MIN/1.73 M^2
EST. GFR  (NON AFRICAN AMERICAN): >60 ML/MIN/1.73 M^2
GLUCOSE SERPL-MCNC: 97 MG/DL (ref 70–110)
HCT VFR BLD AUTO: 27.7 % (ref 37–48.5)
HGB BLD-MCNC: 9 G/DL (ref 12–16)
MAGNESIUM SERPL-MCNC: 1.8 MG/DL (ref 1.6–2.6)
MCH RBC QN AUTO: 30.7 PG (ref 27–31)
MCHC RBC AUTO-ENTMCNC: 32.5 G/DL (ref 32–36)
MCV RBC AUTO: 95 FL (ref 82–98)
PLATELET # BLD AUTO: 98 K/UL (ref 150–350)
PMV BLD AUTO: 9.9 FL (ref 9.2–12.9)
POTASSIUM SERPL-SCNC: 3.7 MMOL/L (ref 3.5–5.1)
RBC # BLD AUTO: 2.93 M/UL (ref 4–5.4)
SODIUM SERPL-SCNC: 134 MMOL/L (ref 136–145)
WBC # BLD AUTO: 7.1 K/UL (ref 3.9–12.7)

## 2019-05-03 PROCEDURE — 12000002 HC ACUTE/MED SURGE SEMI-PRIVATE ROOM

## 2019-05-03 PROCEDURE — 83735 ASSAY OF MAGNESIUM: CPT

## 2019-05-03 PROCEDURE — 85027 COMPLETE CBC AUTOMATED: CPT

## 2019-05-03 PROCEDURE — 25000003 PHARM REV CODE 250: Performed by: NURSE PRACTITIONER

## 2019-05-03 PROCEDURE — 80048 BASIC METABOLIC PNL TOTAL CA: CPT

## 2019-05-03 PROCEDURE — 25000003 PHARM REV CODE 250: Performed by: INTERNAL MEDICINE

## 2019-05-03 PROCEDURE — 63600175 PHARM REV CODE 636 W HCPCS: Performed by: INTERNAL MEDICINE

## 2019-05-03 PROCEDURE — 36415 COLL VENOUS BLD VENIPUNCTURE: CPT

## 2019-05-03 PROCEDURE — 99232 PR SUBSEQUENT HOSPITAL CARE,LEVL II: ICD-10-PCS | Mod: ,,, | Performed by: INTERNAL MEDICINE

## 2019-05-03 PROCEDURE — 99232 SBSQ HOSP IP/OBS MODERATE 35: CPT | Mod: ,,, | Performed by: INTERNAL MEDICINE

## 2019-05-03 RX ORDER — CYCLOBENZAPRINE HCL 10 MG
10 TABLET ORAL NIGHTLY PRN
Status: DISCONTINUED | OUTPATIENT
Start: 2019-05-03 | End: 2019-05-04 | Stop reason: HOSPADM

## 2019-05-03 RX ORDER — POTASSIUM CHLORIDE 20 MEQ/1
40 TABLET, EXTENDED RELEASE ORAL ONCE
Status: COMPLETED | OUTPATIENT
Start: 2019-05-03 | End: 2019-05-03

## 2019-05-03 RX ADMIN — FUROSEMIDE 40 MG: 10 INJECTION, SOLUTION INTRAVENOUS at 09:05

## 2019-05-03 RX ADMIN — PANTOPRAZOLE SODIUM 40 MG: 40 TABLET, DELAYED RELEASE ORAL at 08:05

## 2019-05-03 RX ADMIN — SPIRONOLACTONE 25 MG: 25 TABLET ORAL at 09:05

## 2019-05-03 RX ADMIN — CYCLOBENZAPRINE HYDROCHLORIDE 10 MG: 10 TABLET, FILM COATED ORAL at 10:05

## 2019-05-03 RX ADMIN — PANTOPRAZOLE SODIUM 40 MG: 40 TABLET, DELAYED RELEASE ORAL at 09:05

## 2019-05-03 RX ADMIN — POTASSIUM CHLORIDE 40 MEQ: 20 TABLET, EXTENDED RELEASE ORAL at 11:05

## 2019-05-03 RX ADMIN — GABAPENTIN 300 MG: 300 CAPSULE ORAL at 09:05

## 2019-05-03 RX ADMIN — GABAPENTIN 300 MG: 300 CAPSULE ORAL at 02:05

## 2019-05-03 RX ADMIN — FUROSEMIDE 40 MG: 10 INJECTION, SOLUTION INTRAVENOUS at 05:05

## 2019-05-03 RX ADMIN — GABAPENTIN 300 MG: 300 CAPSULE ORAL at 08:05

## 2019-05-03 RX ADMIN — SERTRALINE HYDROCHLORIDE 50 MG: 50 TABLET ORAL at 08:05

## 2019-05-03 NOTE — PROGRESS NOTES
[facility]  Hematology/Oncology  Progress Note    Patient Name: Karen Villarreal  Admission Date: 4/29/2019  Hospital Length of Stay: 4 days  Code Status: Full Code   May 3  Pt seen May 2 as well  Subjective:     Interval History:   + ascites on US, NO further evidence of bleeding     Medications:  Continuous Infusions:  Scheduled Meds:   furosemide  40 mg Intravenous BID    gabapentin  300 mg Oral TID    pantoprazole  40 mg Oral BID    sertraline  50 mg Oral QHS    spironolactone  25 mg Oral Daily     PRN Meds:sodium chloride, sodium chloride, acetaminophen, haloperidol lactate, HYDROmorphone, hyoscyamine, magnesium oxide, magnesium oxide, ondansetron, potassium chloride, potassium chloride, potassium chloride, potassium, sodium phosphates, potassium, sodium phosphates, potassium, sodium phosphates, sodium chloride 0.9%, sodium chloride 0.9%     Review of Systems     HEENTNegative for rhinorrhea and sore throat.    Eyes: Negative for photophobia.   Respiratory: Positive for shortness of breath. Negative for cough.    Cardiovascular: Negative for chest pain and leg swelling.   Gastrointestinal: Positive for abdominal distention. Negative for abdominal pain, constipation, diarrhea, nausea and vomiting.   Endocrine: Negative for cold intolerance and heat intolerance.   Genitourinary: Negative for dysuria, frequency, hematuria and urgency.   Musculoskeletal: Negative for arthralgias, back pain and neck pain.   Skin: Negative for pallor and rash.   Neurological: Negative for weakness, numbness and headaches.   Hematological: Negative for adenopathy. Does not bruise/bleed easily.   Psychiatric/Behavioral: Negative for agitation.       Objective:     Vital Signs (Most Recent):  Temp: 97.1 °F (36.2 °C) (05/03/19 0740)  Pulse: 83 (05/03/19 0740)  Resp: 16 (05/03/19 0740)  BP: 131/61 (05/03/19 0740)  SpO2: 100 % (05/03/19 0740) Vital Signs (24h Range):  Temp:  [96.9 °F (36.1 °C)-98.7 °F (37.1  °C)] 97.1 °F (36.2 °C)  Pulse:  [79-93] 83  Resp:  [14-18] 16  SpO2:  [95 %-100 %] 100 %  BP: (108-133)/(52-62) 131/61     Weight: 119.9 kg (264 lb 5.3 oz)  Body mass index is 49.94 kg/m².  Body surface area is 2.27 meters squared.      Intake/Output Summary (Last 24 hours) at 5/3/2019 0752  Last data filed at 5/3/2019 0600  Gross per 24 hour   Intake 940 ml   Output --   Net 940 ml       Physical Exam  Constitutional: She is oriented to person, place, and time. She appears well-developed and well-nourished.   HENT:   Head: Normocephalic and atraumatic.   Mouth/Throat: Oropharynx is clear and moist. No oropharyngeal exudate.   Eyes: Conjunctivae and EOM are normal. No scleral icterus.   Neck: Normal range of motion. Neck supple. No JVD present. No tracheal deviation present.   Cardiovascular: Normal rate, regular rhythm and normal heart sounds.   No murmur (2= capillary refill) heard.  Pulmonary/Chest: Effort normal and breath sounds normal. No respiratory distress. She has no wheezes.   Abdominal: Soft. Bowel sounds are normal. She exhibits distension.   Musculoskeletal: Normal range of motion. She exhibits edema. She exhibits no tenderness.   Neurological: She is alert and oriented to person, place, and time. No cranial nerve deficit.   Steady gait   Skin: Skin is warm and dry. No rash noted. No erythema.   Psychiatric: She has a normal mood and affect. Thought content normal.   Nursing note and vitals reviewed.    Lab Results   Component Value Date    WBC 7.10 05/03/2019    RBC 2.93 (L) 05/03/2019    HGB 9.0 (L) 05/03/2019    HCT 27.7 (L) 05/03/2019    MCV 95 05/03/2019    MCH 30.7 05/03/2019    MCHC 32.5 05/03/2019    RDW 24.2 (H) 05/03/2019    PLT 98 (L) 05/03/2019    MPV 9.9 05/03/2019    GRAN 6.3 05/01/2019    GRAN 79.8 (H) 05/01/2019    LYMPH 0.9 (L) 05/01/2019    LYMPH 11.1 (L) 05/01/2019    MONO 0.5 05/01/2019    MONO 6.8 05/01/2019    EOS 0.2 05/01/2019    BASO 0.00 05/01/2019    EOSINOPHIL 2.0 05/01/2019     BASOPHIL 0.3 05/01/2019           Assessment/Plan:     Active Diagnoses:    Diagnosis Date Noted POA    PRINCIPAL PROBLEM:  Bleeding esophageal varices [I85.01] 04/29/2019 Yes    Acute blood loss anemia [D62] 05/01/2019 Yes    Major depressive disorder with current active episode [F32.9] 04/30/2019 Yes    Malnutrition of moderate degree [E44.0] 02/01/2019 Yes    Melena [K92.1] 01/26/2018 Yes    Ascites [R18.8] 04/19/2017 Yes    Chronic hepatitis C with cirrhosis [B18.2, K74.60] 02/16/2017 Yes    Morbid obesity [E66.01] 02/16/2017 Yes      Problems Resolved During this Admission:    Diagnosis Date Noted Date Resolved POA    Essential hypertension [I10] 02/16/2017 05/01/2019 Yes     Anemia and thrombocytopenia stable  No further evidence of GI bleed   Paracentesis today  Likely home if no further complications    Elizabet Valencia MD  Hematology/Oncology  [facility]

## 2019-05-03 NOTE — NURSING
Situation-   Who are you? Who is the patient? What is going on with the patient currently?         I am Etienne Campos calling ***  from [facility]  in regards to Karen Villarreal who is a 53 y.o. year old female admitted with Bleeding esophageal varices who is requesting home medication Flexeril 10mg.   Background- What is the patient's significant medical history (CAD, ESRD, HIV positive, history of recent surgery/chemo.transfusion) and recent labs Has a past medical history of   Past Medical History:   Diagnosis Date    Acid reflux     Anemia     Arthritis     Ascites 03/08/2017    Cataract     Cirrhosis     Depression     Hepatitis C     Hiatal hernia     Ovary removal, prophylactic     Renal cyst 03/08/2017    Thrombocytopenia    . Most recent vitals include  Vitals:    05/02/19 1938   BP: (!) 108/59   Pulse: 90   Resp: 14   Temp: 96.9 °F (36.1 °C)              Assessment-   Has the following issues (abnormal labs, abnormal vitals, change in status, uncontrolled symptoms, patient request) Who is complaining of back pain. Pt states she usually takes flexeril twice a day at home. Reviewed home medication, Flexeril 10mg BID.    Recommendation- What is the change in the plan of care requested? Do you think we should- order PRN for back pain?   Plan-  What did we decide to do? This issue is considered, I have notified physician. MD states he does not recommend flexeril due to hx of cirrhosis. No order at this time.

## 2019-05-03 NOTE — PLAN OF CARE
"Problem: Adult Inpatient Plan of Care  Goal: Plan of Care Review  Outcome: Ongoing (interventions implemented as appropriate)  Plan of care review with pt, pt states "understanding,"no redness/swelling noted to IV site, paracentesis performed on today, 5L of fluid removed, pt states "I feel so much better,"no bloody emesis or bloody BM this shift, pt is compliant with 1.5L fluid restriction, pt is ambulating at [place], denies joint pain/discomfort at this time, will continue to monitor, observe and note any changes, safety maintain      "

## 2019-05-03 NOTE — NURSING
Ultrasound guided paracentesis performed by Dr. Boone- 4900 mLs removed-VS remained stable for duration of procedure- Access site cleaned with peroxide, derma bond then covered with sterile gauze and tape. Pt transferred back to floor via w/c.

## 2019-05-03 NOTE — PROGRESS NOTES
[facility] Hospital Medicine  Progress Note    Patient Name: Karen Villarreal  MRN: [id number]  Patient Class: IP- Inpatient   Admission Date: 4/29/2019  Length of Stay: 4 days  Attending Physician: Autumn Barrera MD  Primary Care Provider: Shama Mccoy MD        Subjective:     Principal Problem:Bleeding esophageal varices    HPI:  Karen Villarreal is a 53-year-old female with a past medical history of hepatitis C, cirrhosis, thrombocytopenia and ascites who presented to the emergency department for evaluation of 2-3 days of dark stools and hematemesis which began earlier this evening.   Patient states that she began having diarrhea 3 days ago which became tarry yesterday.  She vomited 3 times at home and once in the emergency department describing it as red with blood clots.  She has been out of her Protonix for several days.  She is complaining of abdominal distension, but no fever, chest pain, shortness of breath, or unusual weakness.  She is admitted to the service of hospital Medicine for further treatment.        Hospital Course:  Karen Villarreal is a 53-year-old female with a past medical history of hepatitis C, cirrhosis, thrombocytopenia and ascites who presented to the emergency department for evaluation of 2-3 days of dark stools and hematemesis which began earlier the kendra of admit- for monitoring.  Transfuse 2 units total initiated on Protonix and octreotide drips.  Diuretics were held  .  EGD was done and is multiple varices were clipped.  Patient started on clear liquids.    Interval History:  The patient is doing well and has no new complaints.  She denies being short of breath.  She is still having more abdominal distension than usual.    Review of Systems   Respiratory: Negative for cough, chest tightness, shortness of breath and wheezing.    Cardiovascular: Negative for chest pain, palpitations and leg swelling.   Gastrointestinal: Negative for  abdominal distention, abdominal pain, constipation, diarrhea, nausea and vomiting.   Genitourinary: Negative for difficulty urinating, dysuria and flank pain.   Musculoskeletal: Negative for gait problem, joint swelling and neck pain.   Skin: Negative for rash and wound.   Neurological: Negative for dizziness, syncope, light-headedness and headaches.   Psychiatric/Behavioral: Negative for agitation, behavioral problems and confusion.     Objective:     Vital Signs (Most Recent):  Temp: 97.1 °F (36.2 °C) (05/03/19 0740)  Pulse: 83 (05/03/19 0740)  Resp: 16 (05/03/19 0740)  BP: 131/61 (05/03/19 0740)  SpO2: 100 % (05/03/19 0740) Vital Signs (24h Range):  Temp:  [96.9 °F (36.1 °C)-98.7 °F (37.1 °C)] 97.1 °F (36.2 °C)  Pulse:  [79-93] 83  Resp:  [14-18] 16  SpO2:  [95 %-100 %] 100 %  BP: (108-131)/(52-61) 131/61     Weight: 121.7 kg (268 lb 4.8 oz)  Body mass index is 50.69 kg/m².    Intake/Output Summary (Last 24 hours) at 5/3/2019 1100  Last data filed at 5/3/2019 0600  Gross per 24 hour   Intake 820 ml   Output --   Net 820 ml      Physical Exam   Constitutional: She is oriented to person, place, and time.   Cardiovascular: Normal rate, regular rhythm, normal heart sounds and intact distal pulses.   Pulmonary/Chest: Effort normal and breath sounds normal. No respiratory distress. She has no wheezes.   Abdominal: Soft. Bowel sounds are normal. She exhibits distension. There is no tenderness.   Musculoskeletal: Normal range of motion. She exhibits edema. She exhibits no tenderness.   Neurological: She is alert and oriented to person, place, and time. No cranial nerve deficit. Coordination normal.   Skin: Skin is warm and dry. Capillary refill takes less than 2 seconds. No rash noted.   Psychiatric: She has a normal mood and affect. Her behavior is normal. Thought content normal.   Nursing note and vitals reviewed.      Significant Labs:   BMP:   Recent Labs   Lab 05/03/19  0603   GLU 97   *   K 3.7      CO2  26   BUN 7   CREATININE 0.8   CALCIUM 7.9*   MG 1.8     CBC:   Recent Labs   Lab 05/02/19  0541 05/03/19  0603   WBC 6.80 7.10   HGB 8.1* 9.0*   HCT 24.8* 27.7*   PLT 78* 98*       Significant Imaging: I have reviewed all pertinent imaging results/findings within the past 24 hours.    Assessment/Plan:      * Bleeding esophageal varices  No evidence of further bleeding after banding.  She is on an oral PPI.        Chronic hepatitis C with cirrhosis  Noted. Under care by Dr. Whitehead.  On medication outpt with [facility]  MELD-Na score: 11 at 5/1/2019  4:12 AM  MELD score: 11 at 5/1/2019  4:12 AM  Calculated from:  Serum Creatinine: 0.7 mg/dL (Rounded to 1 mg/dL) at 5/1/2019  4:12 AM  Serum Sodium: 133 mmol/L at 5/1/2019  4:12 AM  Total Bilirubin: 1.7 mg/dL at 5/1/2019  4:12 AM  INR(ratio): 1.3 at 4/29/2019  3:50 AM  Age: 53 years    Ascites  Continue IV Lasix and fluid restriction.  Dr. Valencia ordered a paracentesis for today.      Acute blood loss anemia  Hemoglobin is stable.      Malnutrition of moderate degree  Nutrition consulted.      Melena  See varices       Morbid obesity  Body mass index is 47.86 kg/m². Morbid obesity complicates all aspects of disease management from diagnostic modalities to treatment. Weight loss encouraged and health benefits explained to patient.            VTE Risk Mitigation (From admission, onward)        Ordered     IP VTE HIGH RISK PATIENT  Once      04/29/19 0611     Reason for No Pharmacological VTE Prophylaxis  Once      04/29/19 0611     Place NICOLE hose  Until discontinued      04/29/19 0611     Place sequential compression device  Until discontinued      04/29/19 0611              Autumn Barrera MD  Department of Hospital Medicine   [facility]

## 2019-05-03 NOTE — TELEPHONE ENCOUNTER
Called pt.in hospital and informed her to call office when discharged to schedule EGD in 2-3 weeks.

## 2019-05-03 NOTE — SUBJECTIVE & OBJECTIVE
Interval History:  The patient is doing well and has no new complaints.  She denies being short of breath.  She is still having more abdominal distension than usual.    Review of Systems   Respiratory: Negative for cough, chest tightness, shortness of breath and wheezing.    Cardiovascular: Negative for chest pain, palpitations and leg swelling.   Gastrointestinal: Negative for abdominal distention, abdominal pain, constipation, diarrhea, nausea and vomiting.   Genitourinary: Negative for difficulty urinating, dysuria and flank pain.   Musculoskeletal: Negative for gait problem, joint swelling and neck pain.   Skin: Negative for rash and wound.   Neurological: Negative for dizziness, syncope, light-headedness and headaches.   Psychiatric/Behavioral: Negative for agitation, behavioral problems and confusion.     Objective:     Vital Signs (Most Recent):  Temp: 97.1 °F (36.2 °C) (05/03/19 0740)  Pulse: 83 (05/03/19 0740)  Resp: 16 (05/03/19 0740)  BP: 131/61 (05/03/19 0740)  SpO2: 100 % (05/03/19 0740) Vital Signs (24h Range):  Temp:  [96.9 °F (36.1 °C)-98.7 °F (37.1 °C)] 97.1 °F (36.2 °C)  Pulse:  [79-93] 83  Resp:  [14-18] 16  SpO2:  [95 %-100 %] 100 %  BP: (108-131)/(52-61) 131/61     Weight: 121.7 kg (268 lb 4.8 oz)  Body mass index is 50.69 kg/m².    Intake/Output Summary (Last 24 hours) at 5/3/2019 1100  Last data filed at 5/3/2019 0600  Gross per 24 hour   Intake 820 ml   Output --   Net 820 ml      Physical Exam   Constitutional: She is oriented to person, place, and time.   Cardiovascular: Normal rate, regular rhythm, normal heart sounds and intact distal pulses.   Pulmonary/Chest: Effort normal and breath sounds normal. No respiratory distress. She has no wheezes.   Abdominal: Soft. Bowel sounds are normal. She exhibits distension. There is no tenderness.   Musculoskeletal: Normal range of motion. She exhibits edema. She exhibits no tenderness.   Neurological: She is alert and oriented to person, place, and  time. No cranial nerve deficit. Coordination normal.   Skin: Skin is warm and dry. Capillary refill takes less than 2 seconds. No rash noted.   Psychiatric: She has a normal mood and affect. Her behavior is normal. Thought content normal.   Nursing note and vitals reviewed.      Significant Labs:   BMP:   Recent Labs   Lab 05/03/19  0603   GLU 97   *   K 3.7      CO2 26   BUN 7   CREATININE 0.8   CALCIUM 7.9*   MG 1.8     CBC:   Recent Labs   Lab 05/02/19  0541 05/03/19  0603   WBC 6.80 7.10   HGB 8.1* 9.0*   HCT 24.8* 27.7*   PLT 78* 98*       Significant Imaging: I have reviewed all pertinent imaging results/findings within the past 24 hours.

## 2019-05-04 VITALS
BODY MASS INDEX: 47.99 KG/M2 | TEMPERATURE: 99 F | DIASTOLIC BLOOD PRESSURE: 61 MMHG | OXYGEN SATURATION: 97 % | HEART RATE: 98 BPM | WEIGHT: 254.19 LBS | SYSTOLIC BLOOD PRESSURE: 123 MMHG | RESPIRATION RATE: 18 BRPM | HEIGHT: 61 IN

## 2019-05-04 LAB
ANION GAP SERPL CALC-SCNC: 6 MMOL/L (ref 8–16)
BUN SERPL-MCNC: 9 MG/DL (ref 6–20)
CALCIUM SERPL-MCNC: 7.6 MG/DL (ref 8.7–10.5)
CHLORIDE SERPL-SCNC: 103 MMOL/L (ref 95–110)
CO2 SERPL-SCNC: 28 MMOL/L (ref 23–29)
CREAT SERPL-MCNC: 0.8 MG/DL (ref 0.5–1.4)
ERYTHROCYTE [DISTWIDTH] IN BLOOD BY AUTOMATED COUNT: 23.7 % (ref 11.5–14.5)
EST. GFR  (AFRICAN AMERICAN): >60 ML/MIN/1.73 M^2
EST. GFR  (NON AFRICAN AMERICAN): >60 ML/MIN/1.73 M^2
GLUCOSE SERPL-MCNC: 103 MG/DL (ref 70–110)
HCT VFR BLD AUTO: 25.3 % (ref 37–48.5)
HGB BLD-MCNC: 8.2 G/DL (ref 12–16)
MAGNESIUM SERPL-MCNC: 1.8 MG/DL (ref 1.6–2.6)
MCH RBC QN AUTO: 30.5 PG (ref 27–31)
MCHC RBC AUTO-ENTMCNC: 32.6 G/DL (ref 32–36)
MCV RBC AUTO: 94 FL (ref 82–98)
PLATELET # BLD AUTO: 86 K/UL (ref 150–350)
PMV BLD AUTO: 10 FL (ref 9.2–12.9)
POTASSIUM SERPL-SCNC: 3.5 MMOL/L (ref 3.5–5.1)
RBC # BLD AUTO: 2.7 M/UL (ref 4–5.4)
SODIUM SERPL-SCNC: 137 MMOL/L (ref 136–145)
WBC # BLD AUTO: 6.9 K/UL (ref 3.9–12.7)

## 2019-05-04 PROCEDURE — 25000003 PHARM REV CODE 250: Performed by: INTERNAL MEDICINE

## 2019-05-04 PROCEDURE — 36415 COLL VENOUS BLD VENIPUNCTURE: CPT

## 2019-05-04 PROCEDURE — 25000003 PHARM REV CODE 250: Performed by: NURSE PRACTITIONER

## 2019-05-04 PROCEDURE — 83735 ASSAY OF MAGNESIUM: CPT

## 2019-05-04 PROCEDURE — 63600175 PHARM REV CODE 636 W HCPCS: Performed by: INTERNAL MEDICINE

## 2019-05-04 PROCEDURE — 80048 BASIC METABOLIC PNL TOTAL CA: CPT

## 2019-05-04 PROCEDURE — 85027 COMPLETE CBC AUTOMATED: CPT

## 2019-05-04 RX ORDER — POTASSIUM CHLORIDE 20 MEQ/1
40 TABLET, EXTENDED RELEASE ORAL ONCE
Status: COMPLETED | OUTPATIENT
Start: 2019-05-04 | End: 2019-05-04

## 2019-05-04 RX ORDER — LANOLIN ALCOHOL/MO/W.PET/CERES
100 CREAM (GRAM) TOPICAL 2 TIMES DAILY
Qty: 30 TABLET | Refills: 0 | Status: SHIPPED | OUTPATIENT
Start: 2019-05-04 | End: 2019-07-01

## 2019-05-04 RX ADMIN — PANTOPRAZOLE SODIUM 40 MG: 40 TABLET, DELAYED RELEASE ORAL at 09:05

## 2019-05-04 RX ADMIN — SPIRONOLACTONE 25 MG: 25 TABLET ORAL at 09:05

## 2019-05-04 RX ADMIN — GABAPENTIN 300 MG: 300 CAPSULE ORAL at 09:05

## 2019-05-04 RX ADMIN — FUROSEMIDE 40 MG: 10 INJECTION, SOLUTION INTRAVENOUS at 09:05

## 2019-05-04 RX ADMIN — POTASSIUM CHLORIDE 40 MEQ: 20 TABLET, EXTENDED RELEASE ORAL at 10:05

## 2019-05-04 NOTE — PLAN OF CARE
Problem: Adult Inpatient Plan of Care  Goal: Plan of Care Review  Plan of care reviewed with pt, pt verbalized understanding. IV site clean, dry, intact, no redness or swelling noted. Paracentesis site dressing c/d/i. Pt ambulates with stand by assist, remains free of fall/trauma. Pt reports no bloody emesis or BM this shift. Pain controlled with current regimen. VSS. Safety maintained throughout shift, call light in reach, bed locked and in lowest position, side rails up x2. Will continue to monitor, observe and report any changes.

## 2019-05-04 NOTE — NURSING
Discharge instructions given, verbalized understanding, PIV removed, pressure applied and bandaged appropriately. Prescriptions sent to pharmacy of pts choice.,Education provided, verbalized understanding. Pt belongings with the family at bedside.  Pt assisted off the floor safely via wheelchair.

## 2019-05-04 NOTE — DISCHARGE SUMMARY
[facility] Hospital Medicine  Discharge Summary      Patient Name: Karen Villarreal  MRN: [id number]  Admission Date: 4/29/2019  Hospital Length of Stay: 5 days  Discharge Date and Time:  05/04/2019 1:15 PM  Attending Physician: Autumn Barrera MD   Discharging Provider: Autumn Barrera MD  Primary Care Provider: Shama Mccoy MD      HPI:   Karen Villarreal is a 53-year-old female with a past medical history of hepatitis C, cirrhosis, thrombocytopenia and ascites who presented to the emergency department for evaluation of 2-3 days of dark stools and hematemesis which began earlier this evening.   Patient states that she began having diarrhea 3 days ago which became tarry yesterday.  She vomited 3 times at home and once in the emergency department describing it as red with blood clots.  She has been out of her Protonix for several days.  She is complaining of abdominal distension, but no fever, chest pain, shortness of breath, or unusual weakness.  She is admitted to the service of hospital Medicine for further treatment.        Procedure(s) (LRB):  EGD (ESOPHAGOGASTRODUODENOSCOPY) (N/A)      Hospital Course:   Karen Villarreal is a 53-year-old female with a past medical history of hepatitis C, cirrhosis, thrombocytopenia and ascites who presented to the emergency department for evaluation of 2-3 days of dark stools and hematemesis which began earlier the kendra of admit.      She was admitted and  transfused 2 units of packed red blood cells, initiated on Protonix and octreotide drips.   EGD showed evidence of esophageal variceal bleed and the patient underwent banding x6.  She was monitored in the ICU.    She had no further bleeding and she was transferred to the floor.  She did have evidence of increasing ascites and peripheral edema.  She was treated with IV Lasix and a paracentesis was done May 3rd with removal of 5 L of ascitic fluid.  The patient's weight today is 253  lbs.    She will be discharged home today on her regular dose of Lasix and Aldactone.  She will weigh herself daily.    She will follow up with her PCP and GI.    The patient has been asked not to take aspirin or NSAIDs.     Consults:   Consults (From admission, onward)        Status Ordering Provider     Gastroenterology  Once     Provider:  Justin Montenegro MD    Completed MARTIN PRAKASH     Inpatient consult to Hematology  Once     Provider:  Elizabet Valencia MD    Acknowledged ART LOPEZ     Inpatient consult to Registered Dietitian/Nutritionist  Once     Provider:  (Not yet assigned)    MARTIN Sol            Final Active Diagnoses:    Diagnosis Date Noted POA    PRINCIPAL PROBLEM:  Bleeding esophageal varices [I85.01] 04/29/2019 Yes    Chronic hepatitis C with cirrhosis [B18.2, K74.60] 02/16/2017 Yes    Ascites [R18.8] 04/19/2017 Yes    Acute blood loss anemia [D62] 05/01/2019 Yes    Major depressive disorder with current active episode [F32.9] 04/30/2019 Yes    Malnutrition of moderate degree [E44.0] 02/01/2019 Yes    Melena [K92.1] 01/26/2018 Yes    Morbid obesity [E66.01] 02/16/2017 Yes      Problems Resolved During this Admission:    Diagnosis Date Noted Date Resolved POA    Essential hypertension [I10] 02/16/2017 05/01/2019 Yes       Discharged Condition: stable    Disposition: Home or Self Care    Follow Up:  Follow-up Information     Shama Mccoy MD In 1 week.    Specialty:  Family Medicine  Contact information:  [address]  [address]  [facility] [address] [phone]             Justin Montenegro MD In 2 weeks.    Specialty:  Gastroenterology  Contact information:  [address] [facility]  [address]  [facility] [id number] [phone]                 Patient Instructions:      Diet Cardiac     Activity as tolerated       Significant Diagnostic Studies: Labs:   BMP:   Recent Labs   Lab 05/03/19  0603 05/04/19  0522   GLU 97  103   * 137   K 3.7 3.5    103   CO2 26 28   BUN 7 9   CREATININE 0.8 0.8   CALCIUM 7.9* 7.6*   MG 1.8 1.8   , CMP   Recent Labs   Lab 05/03/19  0603 05/04/19  0522   * 137   K 3.7 3.5    103   CO2 26 28   GLU 97 103   BUN 7 9   CREATININE 0.8 0.8   CALCIUM 7.9* 7.6*   ANIONGAP 5* 6*   ESTGFRAFRICA >60 >60   EGFRNONAA >60 >60    and CBC   Recent Labs   Lab 05/03/19  0603 05/04/19 0522   WBC 7.10 6.90   HGB 9.0* 8.2*   HCT 27.7* 25.3*   PLT 98* 86*       Pending Diagnostic Studies:     None         Medications:  Reconciled Home Medications:      Medication List      CHANGE how you take these medications    pantoprazole 40 MG tablet  Commonly known as:  PROTONIX  Take 1 tablet (40 mg total) by mouth once daily.  What changed:  when to take this        CONTINUE taking these medications    cyclobenzaprine 10 MG tablet  Commonly known as:  FLEXERIL  Take 10 mg by mouth 3 (three) times daily as needed for Muscle spasms.     ferrous sulfate 325 (65 FE) MG EC tablet  Take 325 mg by mouth once daily.     furosemide 40 MG tablet  Commonly known as:  LASIX  Take 60 mg by mouth once daily.     gabapentin 300 MG capsule  Commonly known as:  NEURONTIN  Take 300 mg by mouth 3 (three) times daily.     MAALOX MAXIMUM STRENGTH 400-400-40 mg/5 mL suspension  Generic drug:  aluminum & magnesium hydroxide-simethicone  Take by mouth every 6 (six) hours as needed for Indigestion.     MAVYRET 100-40 mg Tab  Generic drug:  glecaprevir-pibrentasvir  TAKE THREE TABLETS BY MOUTH EVERY DAY WITH FOOD - pt has not started this medication yet (02/14/19)     sertraline 25 MG tablet  Commonly known as:  ZOLOFT  Take 1 tablet by mouth once daily.     spironolactone 100 MG tablet  Commonly known as:  ALDACTONE  Take 150 mg by mouth once daily.     thiamine 100 MG tablet  Take 1 tablet (100 mg total) by mouth 2 (two) times daily.     VENTOLIN HFA 90 mcg/actuation inhaler  Generic drug:  albuterol  Ventolin HFA 90 mcg/actuation  aerosol inhaler   take 2 puffs every 4hrs as needed for cough        STOP taking these medications    sucralfate 100 mg/mL suspension  Commonly known as:  CARAFATE            Indwelling Lines/Drains at time of discharge:   Lines/Drains/Airways          None          Time spent on the discharge of patient: 30 minutes  Patient was seen and examined on the date of discharge and determined to be suitable for discharge.         Autumn Barrera MD  Department of Hospital Medicine  [facility]

## 2019-05-04 NOTE — PLAN OF CARE
05/04/19 1211   Final Note   Assessment Type Final Discharge Note   Anticipated Discharge Disposition Home

## 2019-05-06 ENCOUNTER — TELEPHONE (OUTPATIENT)
Dept: GASTROENTEROLOGY | Facility: CLINIC | Age: 54
End: 2019-05-06

## 2019-05-06 ENCOUNTER — TELEPHONE (OUTPATIENT)
Dept: MEDSURG UNIT | Facility: HOSPITAL | Age: 54
End: 2019-05-06

## 2019-05-06 DIAGNOSIS — I85.00 ESOPHAGEAL VARICES WITHOUT BLEEDING, UNSPECIFIED ESOPHAGEAL VARICES TYPE: Primary | ICD-10-CM

## 2019-05-06 RX ORDER — PANTOPRAZOLE SODIUM 40 MG/1
40 TABLET, DELAYED RELEASE ORAL 2 TIMES DAILY
Qty: 180 TABLET | Refills: 3 | Status: SHIPPED | OUTPATIENT
Start: 2019-05-06 | End: 2019-01-01 | Stop reason: SDUPTHER

## 2019-05-06 NOTE — TELEPHONE ENCOUNTER
Returned call to pt. Pt needed to schedule a f/u EGD, scheduled procedure for 5/22/19 at 9am. Pt understands.

## 2019-05-06 NOTE — TELEPHONE ENCOUNTER
Returned call to pt. Pt stated she never received the Rx for the medication Pantoprazole. Informed pt that she should have a previous Rx from 2/2019 that was sent to her pharmacy from a previous hosp stay. Pt stated she never got that medication. Informed pt that I will ask  to advise.

## 2019-05-06 NOTE — TELEPHONE ENCOUNTER
----- Message from Bruce Sparks sent at 5/6/2019  9:53 AM CDT -----  Contact: same  Patient called in and stated Dr. Montenegro did an EGD on 4/29/19 & she was told to call office to schedule a f/u EGD in office.    Patient call back number is [phone]

## 2019-05-06 NOTE — TELEPHONE ENCOUNTER
----- Message from Deon Comer sent at 5/6/2019 11:44 AM CDT -----  Contact: Patient  Type: Needs Medical Advice    Who Called:  Patient  Best Call Back Number: [phone]  Additional Information: Patient has questions regarding medication. Please call to advise. Thanks!

## 2019-05-10 ENCOUNTER — TELEPHONE (OUTPATIENT)
Dept: HEMATOLOGY/ONCOLOGY | Facility: CLINIC | Age: 54
End: 2019-05-10

## 2019-05-10 ENCOUNTER — LAB VISIT (OUTPATIENT)
Dept: LAB | Facility: HOSPITAL | Age: 54
End: 2019-05-10
Attending: INTERNAL MEDICINE
Payer: MEDICAID

## 2019-05-10 ENCOUNTER — OFFICE VISIT (OUTPATIENT)
Dept: HEMATOLOGY/ONCOLOGY | Facility: CLINIC | Age: 54
End: 2019-05-10
Payer: MEDICAID

## 2019-05-10 VITALS
HEIGHT: 71 IN | WEIGHT: 262.56 LBS | HEART RATE: 105 BPM | TEMPERATURE: 99 F | SYSTOLIC BLOOD PRESSURE: 122 MMHG | OXYGEN SATURATION: 97 % | RESPIRATION RATE: 20 BRPM | DIASTOLIC BLOOD PRESSURE: 71 MMHG | BODY MASS INDEX: 36.76 KG/M2

## 2019-05-10 DIAGNOSIS — R60.9 EDEMA, UNSPECIFIED TYPE: Primary | ICD-10-CM

## 2019-05-10 DIAGNOSIS — E66.01 MORBID OBESITY: ICD-10-CM

## 2019-05-10 DIAGNOSIS — B18.2 CHRONIC HEPATITIS C WITH CIRRHOSIS: ICD-10-CM

## 2019-05-10 DIAGNOSIS — Z87.42 HISTORY OF DYSFUNCTIONAL UTERINE BLEEDING: ICD-10-CM

## 2019-05-10 DIAGNOSIS — R18.8 OTHER ASCITES: ICD-10-CM

## 2019-05-10 DIAGNOSIS — D50.9 IRON DEFICIENCY ANEMIA, UNSPECIFIED IRON DEFICIENCY ANEMIA TYPE: Primary | ICD-10-CM

## 2019-05-10 DIAGNOSIS — K74.60 CHRONIC HEPATITIS C WITH CIRRHOSIS: ICD-10-CM

## 2019-05-10 DIAGNOSIS — I85.01 BLEEDING ESOPHAGEAL VARICES, UNSPECIFIED ESOPHAGEAL VARICES TYPE: ICD-10-CM

## 2019-05-10 DIAGNOSIS — D62 ACUTE BLOOD LOSS ANEMIA: ICD-10-CM

## 2019-05-10 DIAGNOSIS — Z86.39 HISTORY OF IRON DEFICIENCY: ICD-10-CM

## 2019-05-10 DIAGNOSIS — S81.802D WOUND OF LEFT LOWER EXTREMITY, SUBSEQUENT ENCOUNTER: ICD-10-CM

## 2019-05-10 DIAGNOSIS — D64.9 ANEMIA, UNSPECIFIED TYPE: ICD-10-CM

## 2019-05-10 LAB
BASOPHILS # BLD AUTO: 0 K/UL (ref 0–0.2)
BASOPHILS NFR BLD: 0.5 % (ref 0–1.9)
DIFFERENTIAL METHOD: ABNORMAL
EOSINOPHIL # BLD AUTO: 0.1 K/UL (ref 0–0.5)
EOSINOPHIL NFR BLD: 1.5 % (ref 0–8)
ERYTHROCYTE [DISTWIDTH] IN BLOOD BY AUTOMATED COUNT: 21.9 % (ref 11.5–14.5)
HCT VFR BLD AUTO: 31 % (ref 37–48.5)
HGB BLD-MCNC: 9.8 G/DL (ref 12–16)
IRON SERPL-MCNC: 23 UG/DL (ref 30–160)
LYMPHOCYTES # BLD AUTO: 0.6 K/UL (ref 1–4.8)
LYMPHOCYTES NFR BLD: 7.4 % (ref 18–48)
MCH RBC QN AUTO: 29.1 PG (ref 27–31)
MCHC RBC AUTO-ENTMCNC: 31.6 G/DL (ref 32–36)
MCV RBC AUTO: 92 FL (ref 82–98)
MONOCYTES # BLD AUTO: 0.5 K/UL (ref 0.3–1)
MONOCYTES NFR BLD: 6.5 % (ref 4–15)
NEUTROPHILS # BLD AUTO: 7 K/UL (ref 1.8–7.7)
NEUTROPHILS NFR BLD: 84.1 % (ref 38–73)
PLATELET # BLD AUTO: 153 K/UL (ref 150–350)
PLATELET BLD QL SMEAR: ABNORMAL
PMV BLD AUTO: 9.6 FL (ref 9.2–12.9)
RBC # BLD AUTO: 3.37 M/UL (ref 4–5.4)
SATURATED IRON: 8 % (ref 20–50)
TOTAL IRON BINDING CAPACITY: 278 UG/DL (ref 250–450)
TRANSFERRIN SERPL-MCNC: 188 MG/DL (ref 200–375)
WBC # BLD AUTO: 8.3 K/UL (ref 3.9–12.7)

## 2019-05-10 PROCEDURE — 36415 COLL VENOUS BLD VENIPUNCTURE: CPT

## 2019-05-10 PROCEDURE — 99214 OFFICE O/P EST MOD 30 MIN: CPT | Mod: S$PBB,,, | Performed by: INTERNAL MEDICINE

## 2019-05-10 PROCEDURE — 99999 PR PBB SHADOW E&M-EST. PATIENT-LVL IV: ICD-10-PCS | Mod: PBBFAC,,, | Performed by: INTERNAL MEDICINE

## 2019-05-10 PROCEDURE — 99214 PR OFFICE/OUTPT VISIT, EST, LEVL IV, 30-39 MIN: ICD-10-PCS | Mod: S$PBB,,, | Performed by: INTERNAL MEDICINE

## 2019-05-10 PROCEDURE — 99999 PR PBB SHADOW E&M-EST. PATIENT-LVL IV: CPT | Mod: PBBFAC,,, | Performed by: INTERNAL MEDICINE

## 2019-05-10 PROCEDURE — 99214 OFFICE O/P EST MOD 30 MIN: CPT | Mod: PBBFAC,PO | Performed by: INTERNAL MEDICINE

## 2019-05-10 PROCEDURE — 85025 COMPLETE CBC W/AUTO DIFF WBC: CPT

## 2019-05-10 PROCEDURE — 83540 ASSAY OF IRON: CPT

## 2019-05-10 NOTE — PROGRESS NOTES
CC:Hospital follow up    Karen Villarreal is a 53 y.o.  Pt is here for evaluation of thrombocytopenia and iron deficiency with intermittent vaginal bleeding. The patient has been having menometrorrhagia with low platelets.  She has a history of hepatitis C with documented cirrhosis and splenomegaly.  She is seeing a GI specialist Dr Whitehead SHe ahs been denied her meds per her conversation with [place]   No further GYN bleeding episodes he is exhausted.  She is taking Protonix to help with gastritis and remains on Aldactone for control of fluid retention.     She was admitted to the hospital for GI bleeding , She did undergo a paracentesis for therapeutic reasons. SHe is on lasix to help with such   She has her Hep c meds Dr Whitehead  Started zoloft for depression    SHe is asking about a peritoneal shunt for recurring ascites    Past Medical History:   Diagnosis Date    Acid reflux     Anemia     Arthritis     Ascites 03/08/2017    Cataract     Cirrhosis     Depression     Hepatitis C     Hiatal hernia     Ovary removal, prophylactic     Renal cyst 03/08/2017    Thrombocytopenia        Current Outpatient Medications:     albuterol (VENTOLIN HFA) 90 mcg/actuation inhaler, Ventolin HFA 90 mcg/actuation aerosol inhaler  take 2 puffs every 4hrs as needed for cough, Disp: , Rfl:     aluminum & magnesium hydroxide-simethicone (MAALOX MAXIMUM STRENGTH) 400-400-40 mg/5 mL suspension, Take by mouth every 6 (six) hours as needed for Indigestion., Disp: , Rfl:     cyclobenzaprine (FLEXERIL) 10 MG tablet, Take 10 mg by mouth 3 (three) times daily as needed for Muscle spasms., Disp: , Rfl:     ferrous sulfate 325 (65 FE) MG EC tablet, Take 325 mg by mouth once daily. , Disp: , Rfl:     furosemide (LASIX) 40 MG tablet, Take 60 mg by mouth once daily. , Disp: , Rfl: 3    gabapentin (NEURONTIN) 300 MG capsule, Take 300 mg by mouth 3 (three) times daily., Disp: , Rfl:     MAVYRET 100-40 mg Tab, TAKE THREE  "TABLETS BY MOUTH EVERY DAY WITH FOOD - pt has not started this medication yet (02/14/19), Disp: , Rfl: 2    pantoprazole (PROTONIX) 40 MG tablet, Take 1 tablet (40 mg total) by mouth 2 (two) times daily., Disp: 180 tablet, Rfl: 3    sertraline (ZOLOFT) 25 MG tablet, Take 1 tablet by mouth once daily., Disp: , Rfl: 1    spironolactone (ALDACTONE) 100 MG tablet, Take 150 mg by mouth once daily. , Disp: , Rfl: 3    thiamine 100 MG tablet, Take 1 tablet (100 mg total) by mouth 2 (two) times daily., Disp: 30 tablet, Rfl: 0    She is tolerating aldactone for edema and neurontin for paresthesias    CONSTITUTIONAL: No fevers, chills, night sweats, wt. loss  SKIN: intact   ENT: No headaches, head trauma, or eye pain  LYMPH NODES: None noticed   CV: No chest pain, palpitations.   RESP: + sob and dyspnea on exertion, no cough, wheezing  GI: No nausea, emesis, diarrhea, constipation, melena, hematochezia ++ distention progressing   : No dysuria, hematuria, urgency, or frequency   HEME: Continued  easy bruising, history of bleeding problems  PSYCHIATRIC: Positive depression, no anxiety  NEURO: No seizures, memory loss, dizziness or difficulty sleeping  MSK: No arthralgias or joint swelling  Legs getting more swollen Now red with ulcer and scab on left lower leg   No further bleeding   Cervical abnormality on pap smear followed by gyn        /71   Pulse 105   Temp 98.7 °F (37.1 °C)   Resp 20   Ht 5' 11" (1.803 m)   Wt 119.1 kg (262 lb 9.1 oz)   LMP 08/06/2017 (Approximate)   SpO2 97%   BMI 36.62 kg/m²   Gen: NAD, A and O x3,  Conversant   Psych: pleasant yet somewhat flat affect, normal thought process  Eyes: Pupils round and non dilated, EOM intact  Nose: Nares patent  OP clear, mucosa patent  Neck: suppple, no JVD, trachea midline, no adenopathy  Lungs: decreased BS bilateral bases No fremitus  CV: S1S2 with RR R  No mrg  Abd: soft, NTND, obese + ascites  Today   Extr: No CC positive  3 + pitting edema and " sore noted on leg  POSITIVE distention , hard   Left leg with bandaid but erythema evident   Neuro: steady gait, CNs grossly intact  Skin: intact, no lesions noted  Rheum: No joint swelling        Lab Results   Component Value Date    WBC 6.90 05/04/2019    HGB 8.2 (L) 05/04/2019    HCT 25.3 (L) 05/04/2019    MCV 94 05/04/2019    PLT 86 (L) 05/04/2019   genotype 1 a HCV    CMP  Sodium   Date Value Ref Range Status   05/04/2019 137 136 - 145 mmol/L Final   04/17/2019 137 134 - 144 mmol/L      Potassium   Date Value Ref Range Status   05/04/2019 3.5 3.5 - 5.1 mmol/L Final     Chloride   Date Value Ref Range Status   05/04/2019 103 95 - 110 mmol/L Final   04/17/2019 105 98 - 110 mmol/L      CO2   Date Value Ref Range Status   05/04/2019 28 23 - 29 mmol/L Final     Glucose   Date Value Ref Range Status   05/04/2019 103 70 - 110 mg/dL Final   04/17/2019 110 (H) 70 - 99 mg/dL      BUN, Bld   Date Value Ref Range Status   05/04/2019 9 6 - 20 mg/dL Final     Creatinine   Date Value Ref Range Status   05/04/2019 0.8 0.5 - 1.4 mg/dL Final   04/17/2019 0.77 0.60 - 1.40 mg/dL      Calcium   Date Value Ref Range Status   05/04/2019 7.6 (L) 8.7 - 10.5 mg/dL Final     Total Protein   Date Value Ref Range Status   05/01/2019 4.5 (L) 6.0 - 8.4 g/dL Final     Albumin   Date Value Ref Range Status   05/01/2019 2.1 (L) 3.5 - 5.2 g/dL Final   04/17/2019 2.7 (L) 3.1 - 4.7 g/dL      Total Bilirubin   Date Value Ref Range Status   05/01/2019 1.7 (H) 0.1 - 1.0 mg/dL Final     Comment:     For infants and newborns, interpretation of results should be based  on gestational age, weight and in agreement with clinical  observations.  Premature Infant recommended reference ranges:  Up to 24 hours.............<8.0 mg/dL  Up to 48 hours............<12.0 mg/dL  3-5 days..................<15.0 mg/dL  6-29 days.................<15.0 mg/dL       Alkaline Phosphatase   Date Value Ref Range Status   05/01/2019 133 55 - 135 U/L Final     AST   Date Value  Ref Range Status   05/01/2019 34 10 - 40 U/L Final     ALT   Date Value Ref Range Status   05/01/2019 19 10 - 44 U/L Final     Anion Gap   Date Value Ref Range Status   05/04/2019 6 (L) 8 - 16 mmol/L Final     eGFR if    Date Value Ref Range Status   05/04/2019 >60 >60 mL/min/1.73 m^2 Final     eGFR if non    Date Value Ref Range Status   05/04/2019 >60 >60 mL/min/1.73 m^2 Final     Comment:     Calculation used to obtain the estimated glomerular filtration  rate (eGFR) is the CKD-EPI equation.            Ref Range & Dlzom4j ago  Iron30 - 160 ug/dL16 Abnormally low  Blatyxcjcsy794 - 375 mg/dL230 LZST427 - 450 ug/dL340 Saturated Iron20 - 50 %5 Abnormally low        Edema, unspecified type  -     Ambulatory consult to Gastroenterology  -     Ambulatory referral to Internal Medicine    Other ascites    Chronic hepatitis C with cirrhosis  -     Ambulatory consult to Gastroenterology  -     Ambulatory referral to Internal Medicine    Bleeding esophageal varices, unspecified esophageal varices type  -     Ambulatory consult to Gastroenterology  -     Ambulatory referral to Internal Medicine    Morbid obesity    Acute blood loss anemia  -     Ambulatory consult to Gastroenterology  -     Ambulatory referral to Internal Medicine    Wound of left lower extremity, subsequent encounter  -     Ambulatory consult to Gastroenterology  -     Ambulatory referral to Internal Medicine      Diuresed appropriately in the hospital AGAIN; records were reviewed     1.  Cirrhosis and splenomegaly   2.  Iron deficiency anemia: post bleed  Seeing GI for rescope soon: recheck cbc and iron today  3.  Hepatitis C to start treatment  See Dr whitehead   4.  Thrombocytopenia with varices that were bleeding   5.  ascities  6.  GRIMALDO   7.  Tobacco use  8. Ascites : drain per Dr Whitehead prn   9. Open skin lesion left leg No one is managing her wound on her leg : see a new PCP , she states hers is gone      High risk for  thromboembolism due to morbid obesity and sedentary lifestyle   To GI for monitoring of varices today and vaices   treatment for her hep C followed by Dr Whitehead  She is to continue her PPI for intermittent GERD and she is not to stop taking Zoloft without letting her physician know.   Call with lab results assess today and in a month      Thank you for allowing me to evaluate and participate in the care of this pleasant patient. Please fell free to call me with any questions or concerns.    Warmly,  Elizabet Valencia MD    This note was dictated with Dragon and briefly proofread. Please excuse any sentences that may be unclear or words misspelled

## 2019-05-16 ENCOUNTER — HOSPITAL ENCOUNTER (EMERGENCY)
Facility: HOSPITAL | Age: 54
Discharge: HOME | End: 2019-05-16
Attending: EMERGENCY MEDICINE
Payer: MEDICAID

## 2019-05-16 VITALS
BODY MASS INDEX: 52.07 KG/M2 | DIASTOLIC BLOOD PRESSURE: 64 MMHG | HEART RATE: 98 BPM | WEIGHT: 275.81 LBS | OXYGEN SATURATION: 99 % | TEMPERATURE: 98 F | SYSTOLIC BLOOD PRESSURE: 141 MMHG | RESPIRATION RATE: 14 BRPM | HEIGHT: 61 IN

## 2019-05-16 DIAGNOSIS — R18.8 OTHER ASCITES: Primary | ICD-10-CM

## 2019-05-16 LAB
ALBUMIN SERPL BCP-MCNC: 2.5 G/DL (ref 3.5–5.2)
ALP SERPL-CCNC: 284 U/L (ref 55–135)
ALT SERPL W/O P-5'-P-CCNC: 18 U/L (ref 10–44)
ANION GAP SERPL CALC-SCNC: 6 MMOL/L (ref 8–16)
AST SERPL-CCNC: 29 U/L (ref 10–40)
BASOPHILS # BLD AUTO: 0.2 K/UL (ref 0–0.2)
BASOPHILS NFR BLD: 1.9 % (ref 0–1.9)
BILIRUB SERPL-MCNC: 1.7 MG/DL (ref 0.1–1)
BUN SERPL-MCNC: 8 MG/DL (ref 6–20)
CALCIUM SERPL-MCNC: 8.2 MG/DL (ref 8.7–10.5)
CHLORIDE SERPL-SCNC: 99 MMOL/L (ref 95–110)
CO2 SERPL-SCNC: 29 MMOL/L (ref 23–29)
CREAT SERPL-MCNC: 0.9 MG/DL (ref 0.5–1.4)
DIFFERENTIAL METHOD: ABNORMAL
EOSINOPHIL # BLD AUTO: 0.2 K/UL (ref 0–0.5)
EOSINOPHIL NFR BLD: 2.1 % (ref 0–8)
ERYTHROCYTE [DISTWIDTH] IN BLOOD BY AUTOMATED COUNT: 20.9 % (ref 11.5–14.5)
EST. GFR  (AFRICAN AMERICAN): >60 ML/MIN/1.73 M^2
EST. GFR  (NON AFRICAN AMERICAN): >60 ML/MIN/1.73 M^2
GLUCOSE SERPL-MCNC: 118 MG/DL (ref 70–110)
HCT VFR BLD AUTO: 32.3 % (ref 37–48.5)
HGB BLD-MCNC: 10.2 G/DL (ref 12–16)
INR PPP: 1.1 (ref 0.8–1.2)
LYMPHOCYTES # BLD AUTO: 0.6 K/UL (ref 1–4.8)
LYMPHOCYTES NFR BLD: 7 % (ref 18–48)
MCH RBC QN AUTO: 28.9 PG (ref 27–31)
MCHC RBC AUTO-ENTMCNC: 31.7 G/DL (ref 32–36)
MCV RBC AUTO: 91 FL (ref 82–98)
MONOCYTES # BLD AUTO: 0.4 K/UL (ref 0.3–1)
MONOCYTES NFR BLD: 5.3 % (ref 4–15)
NEUTROPHILS # BLD AUTO: 6.9 K/UL (ref 1.8–7.7)
NEUTROPHILS NFR BLD: 83.7 % (ref 38–73)
PLATELET # BLD AUTO: 124 K/UL (ref 150–350)
PMV BLD AUTO: 9 FL (ref 9.2–12.9)
POTASSIUM SERPL-SCNC: 3.4 MMOL/L (ref 3.5–5.1)
PROT SERPL-MCNC: 6.3 G/DL (ref 6–8.4)
PROTHROMBIN TIME: 11.8 SEC (ref 9–12.5)
RBC # BLD AUTO: 3.54 M/UL (ref 4–5.4)
SODIUM SERPL-SCNC: 134 MMOL/L (ref 136–145)
WBC # BLD AUTO: 8.2 K/UL (ref 3.9–12.7)

## 2019-05-16 PROCEDURE — 36415 COLL VENOUS BLD VENIPUNCTURE: CPT

## 2019-05-16 PROCEDURE — P9047 ALBUMIN (HUMAN), 25%, 50ML: HCPCS | Performed by: RADIOLOGY

## 2019-05-16 PROCEDURE — 85025 COMPLETE CBC W/AUTO DIFF WBC: CPT

## 2019-05-16 PROCEDURE — 99284 EMERGENCY DEPT VISIT MOD MDM: CPT | Mod: 25

## 2019-05-16 PROCEDURE — 63600175 PHARM REV CODE 636 W HCPCS: Performed by: RADIOLOGY

## 2019-05-16 PROCEDURE — 96365 THER/PROPH/DIAG IV INF INIT: CPT | Mod: 59

## 2019-05-16 PROCEDURE — 85610 PROTHROMBIN TIME: CPT

## 2019-05-16 PROCEDURE — 80053 COMPREHEN METABOLIC PANEL: CPT

## 2019-05-16 RX ORDER — ALBUMIN HUMAN 250 G/1000ML
25 SOLUTION INTRAVENOUS ONCE
Status: COMPLETED | OUTPATIENT
Start: 2019-05-16 | End: 2019-05-16

## 2019-05-16 RX ADMIN — ALBUMIN HUMAN 25 G: 0.25 SOLUTION INTRAVENOUS at 04:05

## 2019-05-16 NOTE — ED PROVIDER NOTES
Encounter Date: 5/16/2019    SCRIBE #1 NOTE: I, Jimmie Tee, am scribing for, and in the presence of, Dr. Gentile.       History     Chief Complaint   Patient presents with    Ascites     Last paracentesis 1 week ago.     Time seen by provider: 12:41 PM on 05/16/2019      Karen Villarreal is a 54 y.o. female with a PMHx of Hep C, acid reflux, cirrhosis, and ascites who presents to the ED for abdominal distension that has been worsening over the last 7 days. She admits that she is also having a abdominal pressure like pain that has been intermittent. She relays that the abdominal distension has been gradual since onset. Per patient, her last therapeutic paracentesis was one week ago while admitted.She states that she is in the process of deciding who her GI physician will be. Patient denies fever, vomiting, diarrhea, and chest pain. The patient has a PSHx of EGD, ankle surgery, and appendectomy.    The history is provided by the patient.     Review of patient's allergies indicates:  No Known Allergies  Past Medical History:   Diagnosis Date    Acid reflux     Anemia     Arthritis     Ascites 03/08/2017    Cataract     Cirrhosis     Depression     Hepatitis C     Hiatal hernia     Ovary removal, prophylactic     Renal cyst 03/08/2017    Thrombocytopenia      Past Surgical History:   Procedure Laterality Date    ANKLE SURGERY      APPENDECTOMY      CATARACT EXTRACTION      EGD (ESOPHAGOGASTRODUODENOSCOPY) N/A 4/29/2019    Performed by [facility]ro [facility] at [facility]    EGD (ESOPHAGOGASTRODUODENOSCOPY) N/A 2/5/2019    Performed by Leigha Whitehead MD at [facility]    EGD (ESOPHAGOGASTRODUODENOSCOPY) N/A 2/2/2019    [facility] by [facility]head [facility] at [facility]    EXTRACTION-CATARACT-IOL Right 1/5/2018    [facility] at [facility]    EXTRACTION-CATARACT-IOL Left 12/1/2017    [facility] by [facility]g [facility] at [facility] OR     Family History   Problem Relation Age of  Onset    Heart failure Mother     COPD Mother     Arthritis Mother     Vision loss Mother     Diabetes Father     Cancer Father     Brain cancer Father     Diabetes type II Father      Social History     Tobacco Use    Smoking status: Current Some Day Smoker     Packs/day: 0.50     Years: 33.00     Pack years: 16.50     Types: Cigarettes    Smokeless tobacco: Never Used   Substance Use Topics    Alcohol use: No     Frequency: Never    Drug use: No     Review of Systems  REVIEW OF SYSTEMS  CONSTITUTIONAL: Negative for fever.  HEENT:  Negative for sore throat.   HEART:   Negative for chest pain..  LUNG:  Negative for shortness of breath.  ABDOMEN:  Negative for nausea, diarrhea, and vomiting. Positive for abdominal distension and abdominal pain.  :  No discharge, dysuria  EXTREMITIES:  No swelling  NEURO:  Negative for weakness.   SKIN:  Negative for rash.  Psych: No depression  HEME: Does not bruise/bleed easily.       Physical Exam     Initial Vitals [05/16/19 1136]   BP Pulse Resp Temp SpO2   134/61 108 14 98.1 °F (36.7 °C) 99 %      MAP       --         Physical Exam    Nursing note and vitals reviewed.  Constitutional: She appears well-developed and well-nourished.  Non-toxic appearance. No distress.   HENT:   Head: Normocephalic and atraumatic.   Eyes: EOM are normal. Pupils are equal, round, and reactive to light.   Neck: Normal range of motion. Neck supple. No neck rigidity. No JVD present.   Cardiovascular: Normal rate, regular rhythm, normal heart sounds and intact distal pulses. Exam reveals no gallop and no friction rub.    No murmur heard.  Pulmonary/Chest: Breath sounds normal. No respiratory distress. She has no wheezes. She has no rhonchi. She has no rales.   Speaking in full sentences.   Abdominal: Soft. Bowel sounds are normal. She exhibits distension. There is tenderness (mild diffuse). There is no rigidity, no rebound and no guarding.   Musculoskeletal: Normal range of motion.    Neurological: She is alert and oriented to person, place, and time. She has normal strength and normal reflexes. No cranial nerve deficit or sensory deficit. She exhibits normal muscle tone. Coordination normal. GCS eye subscore is 4. GCS verbal subscore is 5. GCS motor subscore is 6.   Skin: Skin is warm and dry.   Psychiatric: She has a normal mood and affect. Her speech is normal and behavior is normal. She is not actively hallucinating.         ED Course   Procedures  Labs Reviewed   CBC W/ AUTO DIFFERENTIAL - Abnormal; Notable for the following components:       Result Value    RBC 3.54 (*)     Hemoglobin 10.2 (*)     Hematocrit 32.3 (*)     Mean Corpuscular Hemoglobin Conc 31.7 (*)     RDW 20.9 (*)     Platelets 124 (*)     MPV 9.0 (*)     Lymph # 0.6 (*)     Gran% 83.7 (*)     Lymph% 7.0 (*)     All other components within normal limits   COMPREHENSIVE METABOLIC PANEL - Abnormal; Notable for the following components:    Sodium 134 (*)     Potassium 3.4 (*)     Glucose 118 (*)     Calcium 8.2 (*)     Albumin 2.5 (*)     Total Bilirubin 1.7 (*)     Alkaline Phosphatase 284 (*)     Anion Gap 6 (*)     All other components within normal limits   PROTIME-INR          Imaging Results          US Guided Paracentesis (Final result)  Result time 05/16/19 16:48:13    Final result by Yogesh Denny MD (05/16/19 16:48:13)                 Impression:      Successful ultrasound-guided paracentesis.      Electronically signed by: Yogesh Denny  Date:    05/16/2019  Time:    16:48             Narrative:    EXAMINATION:  US GUIDED PARACENTESIS INC IMAGING    CLINICAL HISTORY:  severe ascites, sob;    TECHNIQUE:  Limited ultrasound images of the abdomen for procedural guidance    COMPARISON:  None    FINDINGS:  Preprocedure  imaging was performed of the abdomen and appropriate site was marked at the left lower abdominal quadrant.  Informed consent was obtained. Patient was prepped and draped in the usual sterile  fashion. Anesthesia was obtained with local lidocaine.  A catheter drainage device was then advanced into the abdomen. Stylette was removed and catheter left in place.  Initial fluid was straw-colored.  A total of 9.7 L was removed.   Patient tolerated procedure well without immediate complication.    A sample of fluid was collected and sent to lab, if analyses were ordered.  IV albumin was administered per protocol.                                 Medical Decision Making:   History:   Old Medical Records: I decided to obtain old medical records.  Initial Assessment:   54-year-old woman with history of hepatitis-C with cirrhosis and presents emergency department requesting paracentesis for gradually worsening shortness of breath and abdominal distention. Denies fever.  Abdomen does appear to be severely distended with a large amount of ascites on examination. She is afebrile well-appearing and nontoxic.  Laboratory evaluation performed and therapeutic paracentesis performed by Radiology with over 9 L of fluid removed.  I have low suspicion for SBP or hepatic encephalopathy.  Low suspicion for decompensated congestive heart failure are cardiopulmonary related reasons for her shortness of breath. This has resolved after fluid was removed.  Patient feels significantly improved.  She is informed that she should be set not outpatient paracentesis through her GI physician instead of using the emergency department and that further therapeutic paracentesis without be performed unless it is emergent through the emergency department.  Patient is discharged significantly improved in no acute distress to follow up with GI.  Clinical Tests:   Lab Tests: Reviewed and Ordered  Radiological Study: Ordered and Reviewed            Scribe Attestation:   Scribe #1: I performed the above scribed service and the documentation accurately describes the services I performed. I attest to the accuracy of the note.    I, Seferino Caceres,  personally performed the services described in this documentation. All medical record entries made by the scribe were at my direction and in my presence.  I have reviewed the chart and agree that the record reflects my personal performance and is accurate and complete. Wood Gentile MD.       DISCLAIMER: This note was prepared with [employer] Direct voice recognition transcription software. Garbled syntax, mangled pronouns, and other bizarre constructions may be attributed to that software system.             Clinical Impression:       ICD-10-CM ICD-9-CM   1. Other ascites R18.8 789.59         Disposition:   Disposition: Discharged  Condition: Stable                        Wood Gentile MD  05/21/19 1950

## 2019-05-16 NOTE — ED NOTES
Care assumed.  Pt is awake, alert and oriented. Resp even and unlabored. Pt denies chest pain or sob. Abd extremely large and distended. Pt here for ascites. Pt has >5000cc drained from paracentesis 1 1/2 weeks ago.

## 2019-05-22 ENCOUNTER — ANESTHESIA (OUTPATIENT)
Dept: ENDOSCOPY | Facility: HOSPITAL | Age: 54
End: 2019-05-22
Payer: MEDICAID

## 2019-05-22 ENCOUNTER — TELEPHONE (OUTPATIENT)
Dept: GASTROENTEROLOGY | Facility: CLINIC | Age: 54
End: 2019-05-22

## 2019-05-22 ENCOUNTER — ANESTHESIA EVENT (OUTPATIENT)
Dept: ENDOSCOPY | Facility: HOSPITAL | Age: 54
End: 2019-05-22
Payer: MEDICAID

## 2019-05-22 ENCOUNTER — HOSPITAL ENCOUNTER (OUTPATIENT)
Facility: HOSPITAL | Age: 54
Discharge: HOME | End: 2019-05-22
Attending: INTERNAL MEDICINE | Admitting: INTERNAL MEDICINE
Payer: MEDICAID

## 2019-05-22 DIAGNOSIS — I85.00 VARICES, ESOPHAGEAL: ICD-10-CM

## 2019-05-22 PROCEDURE — 00813 ANES UPR LWR GI NDSC PX: CPT | Performed by: INTERNAL MEDICINE

## 2019-05-22 PROCEDURE — D9220A PRA ANESTHESIA: ICD-10-PCS | Mod: CRNA,,, | Performed by: NURSE ANESTHETIST, CERTIFIED REGISTERED

## 2019-05-22 PROCEDURE — 25000003 PHARM REV CODE 250: Performed by: INTERNAL MEDICINE

## 2019-05-22 PROCEDURE — D9220A PRA ANESTHESIA: Mod: CRNA,,, | Performed by: NURSE ANESTHETIST, CERTIFIED REGISTERED

## 2019-05-22 PROCEDURE — 37000008 HC ANESTHESIA 1ST 15 MINUTES: Performed by: INTERNAL MEDICINE

## 2019-05-22 PROCEDURE — 37000009 HC ANESTHESIA EA ADD 15 MINS: Performed by: INTERNAL MEDICINE

## 2019-05-22 PROCEDURE — 27201022: Performed by: INTERNAL MEDICINE

## 2019-05-22 PROCEDURE — 43244 PR EGD, FLEX, W/BAND LIGATION, ESOPH/GASTR VARICES: ICD-10-PCS | Mod: ,,, | Performed by: INTERNAL MEDICINE

## 2019-05-22 PROCEDURE — 43244 EGD VARICES LIGATION: CPT | Performed by: INTERNAL MEDICINE

## 2019-05-22 PROCEDURE — D9220A PRA ANESTHESIA: ICD-10-PCS | Mod: ANES,,, | Performed by: ANESTHESIOLOGY

## 2019-05-22 PROCEDURE — D9220A PRA ANESTHESIA: Mod: ANES,,, | Performed by: ANESTHESIOLOGY

## 2019-05-22 PROCEDURE — 63600175 PHARM REV CODE 636 W HCPCS: Performed by: NURSE ANESTHETIST, CERTIFIED REGISTERED

## 2019-05-22 PROCEDURE — 43244 EGD VARICES LIGATION: CPT | Mod: ,,, | Performed by: INTERNAL MEDICINE

## 2019-05-22 RX ORDER — PROPOFOL 10 MG/ML
VIAL (ML) INTRAVENOUS
Status: DISCONTINUED | OUTPATIENT
Start: 2019-05-22 | End: 2019-05-22

## 2019-05-22 RX ORDER — SODIUM CHLORIDE 9 MG/ML
INJECTION, SOLUTION INTRAVENOUS CONTINUOUS
Status: DISCONTINUED | OUTPATIENT
Start: 2019-05-22 | End: 2019-05-22 | Stop reason: HOSPADM

## 2019-05-22 RX ORDER — LIDOCAINE HCL/PF 100 MG/5ML
SYRINGE (ML) INTRAVENOUS
Status: DISCONTINUED | OUTPATIENT
Start: 2019-05-22 | End: 2019-05-22

## 2019-05-22 RX ADMIN — PROPOFOL 50 MG: 10 INJECTION, EMULSION INTRAVENOUS at 08:05

## 2019-05-22 RX ADMIN — PROPOFOL 50 MG: 10 INJECTION, EMULSION INTRAVENOUS at 09:05

## 2019-05-22 RX ADMIN — PROPOFOL 100 MG: 10 INJECTION, EMULSION INTRAVENOUS at 08:05

## 2019-05-22 RX ADMIN — SODIUM CHLORIDE: 0.9 INJECTION, SOLUTION INTRAVENOUS at 08:05

## 2019-05-22 RX ADMIN — LIDOCAINE HYDROCHLORIDE 100 MG: 20 INJECTION, SOLUTION INTRAVENOUS at 08:05

## 2019-05-22 NOTE — PLAN OF CARE
Patient seen by Dr Montenegro and is ok to discharge to home.  Patient instructed that his office would contact her to schedule US guided Paracentesis.  discharge instructions given to patient and sister.  Both verbalized understanding.

## 2019-05-22 NOTE — [employer] PATIENT INSTRUCTIONS
Discharge Summary/Instructions after an Endoscopic Procedure  Patient Name: Karen Villarreal  Patient MRN: [id number]  Patient YOB: 1965  Wednesday, May 22, 2019  Justin Saldana MD  RESTRICTIONS:  During your procedure today, you received medications for sedation.  These   medications may affect your judgment, balance and coordination.  Therefore,   for 24 hours, you have the following restrictions:   - DO NOT drive a car, operate machinery, make legal/financial decisions,   sign important papers or drink alcohol.    ACTIVITY:  Today: no heavy lifting, straining or running due to procedural   sedation/anesthesia.  The following day: return to full activity including work.  DIET:  Eat and drink normally unless instructed otherwise.     TREATMENT FOR COMMON SIDE EFFECTS:  - Mild abdominal pain, nausea, belching, bloating or excessive gas:  rest,   eat lightly and use a heating pad.  - Sore Throat: treat with throat lozenges and/or gargle with warm salt   water.  - Because air was used during the procedure, expelling large amounts of air   from your rectum or belching is normal.  - If a bowel prep was taken, you may not have a bowel movement for 1-3 days.    This is normal.  SYMPTOMS TO WATCH FOR AND REPORT TO YOUR PHYSICIAN:  1. Abdominal pain or bloating, other than gas cramps.  2. Chest pain.  3. Back pain.  4. Signs of infection such as: chills or fever occurring within 24 hours   after the procedure.  5. Rectal bleeding, which would show as bright red, maroon, or black stools.   (A tablespoon of blood from the rectum is not serious, especially if   hemorrhoids are present.)  6. Vomiting.  7. Weakness or dizziness.  GO DIRECTLY TO THE NEAREST EMERGENCY ROOM IF YOU HAVE ANY OF THE FOLLOWING:      Difficulty breathing              Chills and/or fever over 101 F   Persistent vomiting and/or vomiting blood   Severe abdominal pain   Severe chest pain   Black, tarry stools   Bleeding- more than one  tablespoon   Any other symptom or condition that you feel may need urgent attention  Your doctor recommends these additional instructions:  If any biopsies were taken, your doctors clinic will contact you in 1 to 2   weeks with any results.  - Patient has a contact number available for emergencies.  The signs and   symptoms of potential delayed complications were discussed with the   patient.  Return to normal activities tomorrow.  Written discharge   instructions were provided to the patient.   - Resume previous diet.   - Continue present medications.   - No aspirin, ibuprofen, naproxen, or other non-steroidal anti-inflammatory   drugs.   - Repeat upper endoscopy in 2 weeks for retreatment.   - Discharge patient to home (ambulatory).   - Perform an abdominal ultrasound for therapeutic paracentesis at   appointment to be scheduled.   - Return to my office after studies are complete.   - Refer to hepatology at [facility]. at appointment to be scheduled.  For questions, problems or results please call your physician - Justin Saldana MD at Work:  [phone].  [facility], EMERGENCY ROOM PHONE NUMBER: [phone]  IF A COMPLICATION OR EMERGENCY SITUATION ARISES AND YOU ARE UNABLE TO REACH   YOUR PHYSICIAN - GO DIRECTLY TO THE EMERGENCY ROOM.  Justin Saldana MD  5/22/2019 9:10:07 AM  This report has been verified and signed electronically.  [employer]

## 2019-05-22 NOTE — DISCHARGE INSTRUCTIONS
Esophageal Varices     With esophageal varices, blood vessels in the esophagus become abnormally enlarged. They may then burst (rupture) and bleed.   Esophageal varices are enlarged veins at the lower end of the esophagus. The esophagus is the tube that carries food from your mouth to your stomach. Varices most often occur because of problems with blood flow in the liver caused by chronic liver disease. Normally, a blood vessel called the portal vein carries blood from the digestive organs to the liver. But with liver disease, blood flow can be blocked due to scarring of the liver. This increases the blood pressure in the portal vein (a condition known as portal hypertension). Blood then backs up in nearby veins in the esophagus and stomach, causing varices. Varices are a serious and deadly problem. Treatment is needed to prevent them from bursting (rupturing) and bleeding. If bleeding occurs, it can be fatal.  Symptoms of esophageal varices  Symptoms do not occur unless the varices are bleeding. This is an emergency problem. If you have any of the following symptoms, get medical attention right away:  · Vomiting blood  · Black, tarry, or bloody stools  · Feeling lightheaded, or fainting (loss of consciousness)  Diagnosing esophageal \varices  Youll likely be checked for varices if you have liver disease or other health problems that can cause them. Your healthcare provider will ask about your symptoms and health history. Youll also be examined. Tests are then done to confirm the problem. Tests can include:  · Upper endoscopy. This is done to see inside the upper digestive tract. During the test, an endoscope is used. This is a thin, flexible tube with a tiny camera on the end. Its inserted through your mouth. Its then guided down through your esophagus, stomach, and first part of your small intestine. This allows the provider to check for varices and find any bleeding.  · Imaging tests. These provide pictures  of the liver or blood flow in the liver. They allow the provider to check for enlarged veins around the liver and assess the risk of bleeding. Common imaging tests done include ultrasound and CT scans.  Treating esophageal varices  The goal of treatment is to reduce the risk of bleeding or to control bleeding. Treatment can include 1 or more of the following:  · Medicines. These may be prescribed to lower the blood pressure inside the enlarged veins. This reduces the risk of bleeding. Beta-blockers are the most common medicine used.  · Endoscopic therapy. These are treatments for enlarged or bleeding veins that are done using an endoscope. With ligation, small rubber bands are placed around the veins to close them off and stop any bleeding. With sclerotherapy, a blood-clotting medicine is injected into the veins to cause scarring and shrink them.  · Balloon tamponade. A tube with a balloon is guided down into your esophagus and stomach. The balloon is then filled with air. This puts pressure on enlarged or bleeding veins to control bleeding. This is a short-term (temporary) way to control bleeding until other treatments are available.   · Surgery. This may be done to place a tubelike device (stent) in the liver. The stent helps redirect blood flow in the liver to lower the blood pressure in enlarged veins. Sometimes, the enlarged veins may be connected to other nearby veins to redirect blood flow. In severe cases, a liver transplant may be needed. For this surgery, a diseased liver is replaced with a healthy liver from another person.   Follow-up  Regular visits with your provider are needed to check for bleeding of the varices. If bleeding occurs, it is likely to occur again. More treatments will then be needed in the future. Once endoscopic therapy (banding) is performed, regular follow-up endoscopic scans with banding are done to completely get rid of the varices. If you are given medicines to take by mouth, be  sure to take them as directed. Work closely with your provider to manage your condition. Know when to seek emergency care.  Date Last Reviewed: 7/1/2016 © 2000-2017 [employer]. [address] [place] [address], [place] [address]. All rights reserved. This information is not intended as a substitute for professional medical care. Always follow your healthcare professional's instructions.        Gastritis (Adult)    Gastritis is inflammation and irritation of the stomach lining. It can be present for a short time (acute) or be long lasting (chronic). Gastritis is often caused by infection with bacteria called H pylori. More than a third of people in the [place] have this bacteria in their bodies. In many cases, H pylori causes no problems or symptoms. In some people, though, the infection irritates the stomach lining and causes gastritis. Other causes of stomach irritation include drinking alcohol or taking pain-relieving medicines called NSAIDs (such as aspirin or ibuprofen).   Symptoms of gastritis can include:  · Abdominal pain or bloating  · Loss of appetite  · Nausea or vomiting  · Vomiting blood or having black stools  · Feeling more tired than usual  An inflamed and irritated stomach lining is more likely to develop a sore called an ulcer. To help prevent this, gastritis should be treated.  Home care  If needed, medicines may be prescribed. If you have H pylori infection, treating it will likely relieve your symptoms. Other changes can help reduce stomach irritation and help it heal.  · If you have been prescribed medicines for H pylori infection, take them as directed. Take all of the medicine until it is finished or your healthcare provider tells you to stop, even if you feel better.  · Your healthcare provider may recommend avoiding NSAIDs. If you take daily aspirin for your heart or other medical reasons, do not stop without talking to your healthcare provider first.  · Avoid drinking alcohol.  · Stop  smoking. Smoking can irritate the stomach and delay healing. As much as possible, stay away from second hand smoke.  Follow-up care  Follow up with your healthcare provider, or as advised by our staff. Testing may be needed to check for inflammation or an ulcer.  When to seek medical advice  Call your healthcare provider for any of the following:  · Stomach pain that gets worse or moves to the lower right abdomen (appendix area)  · Chest pain that appears or gets worse, or spreads to the back, neck, shoulder, or arm  · Frequent vomiting (cant keep down liquids)  · Blood in the stool or vomit (red or black in color)  · Feeling weak or dizzy  · Fever of 100.4ºF (38ºC) or higher, or as directed by your healthcare provider  Date Last Reviewed: 6/22/2015 © 2000-2017 [employer]. [address] [place] [address]. All rights reserved. This information is not intended as a substitute for professional medical care. Always follow your healthcare professional's instructions.        What Is a Hiatal Hernia?    Hiatal hernia is when the area where the stomach and esophagus meet bulges up through the diaphragm into the chest cavity. In some cases, part of the stomach may bulge above the diaphragm. Stomach acid may move up into the esophagus and cause symptoms. The symptoms are often blamed on gastroesophageal reflux disease (GERD). You may only know about the hernia when it shows up on an X-ray taken for other reasons.   What you may feel  The hiatus is a normal hole in the diaphragm. The esophagus passes through this hole and leads to the stomach. In some cases, part of the stomach may bulge above the diaphragm. This bulge is called a hernia. Stomach acid may move up into the esophagus and cause symptoms.  When you eat, the muscle at the hiatus relaxes to allow food to pass into the stomach. It tightens again to keep food and digestive acids in the stomach.  Many people with hiatal hernias have mild  symptoms. You may notice the following GERD symptoms:  · Heartburn or other chest discomfort  · A feeling of chest fullness after a meal  · Frequent burping  · Acid taste in the mouth  · Trouble swallowing  Treating symptoms  If you have been diagnosed with hiatal hernia, these suggestions may help improve symptoms:  · Lose excess weight. Extra weight puts pressure on the stomach and esophagus.  · Dont lie down after eating. Sit up for at least an hour after eating. Lying down after eating can increase symptoms.  · Avoid certain foods and drinks. These include fatty foods, chocolate, coffee, mint, and other foods that cause symptoms for you.  · Dont smoke or drink alcohol. These can worsen symptoms.  · Look at your medicines. Discuss your medicines with your healthcare provider. Many medicines can cause symptoms.  · Consider an antacid medicine. Ask your healthcare provider about over-the-counter and prescription medicines that may help.  · Ask about surgery, if needed. Surgery is a treatment choice for some people. Your healthcare provider can determine if surgery is an option for you.    Date Last Reviewed: 10/1/2016  © 8064-1567 [employer]. [address] [place] [address]. All rights reserved. This information is not intended as a substitute for professional medical care. Always follow your healthcare professional's instructions.        Upper GI Endoscopy     During endoscopy, a long, flexible tube is used to view the inside of your upper GI tract.      Upper GI endoscopy allows your healthcare provider to look directly into the beginning of your gastrointestinal (GI) tract. The esophagus, stomach, and duodenum (the first part of the small intestine) make up the upper GI tract.   Before the exam  Follow these and any other instructions you are given before your endoscopy. If you dont follow the healthcare providers instructions carefully, the test may need to be canceled or done  over:  · Don't eat or drink anything after midnight the night before your exam. If your exam is in the afternoon, drink only clear liquids in the morning. Don't eat or drink anything for 8 hours before the exam. In some cases, you may be able to take medicines with sips of water until 2 hours before the procedure. Speak with your healthcare provider about this.   · Bring your X-rays and any other test results you have.  · Because you will be sedated, arrange for an adult to drive you home after the exam.  · Tell your healthcare provider before the exam if you are taking any medicines or have any medical problems.  The procedure  Here is what to expect:  · You will lie on the endoscopy table. Usually patients lie on the left side.  · You will be monitored and given oxygen.  · Your throat may be numbed with a spray or gargle. You are given medicine through an intravenous (IV) line that will help you relax and remain comfortable. You may be awake or asleep during the procedure.  · The healthcare provider will put the endoscope in your mouth and down your esophagus. It is thinner than most pieces of food that you swallow. It will not affect your breathing. The medicine helps keep you from gagging.  · Air is put into your GI tract to expand it. It can make you burp.  · During the procedure, the healthcare provider can take biopsies (tissue samples), remove abnormalities, such as polyps, or treat abnormalities through a variety of devices placed through the endoscope. You will not feel this.   · The endoscope carries images of your upper GI tract to a video screen. If you are awake, you may be able to look at the images.  · After the procedure is done, you will rest for a time. An adult must drive you home.  When to call your healthcare provider  Contact your healthcare provider if you have:  · Black or tarry stools, or blood in your stool  · Fever  · Pain in your belly that does not go away  · Nausea and vomiting, or  vomiting blood   Date Last Reviewed: 7/1/2016  © 5423-6952 [employer]. [address] [place] [address], [place] [address]. All rights reserved. This information is not intended as a substitute for professional medical care. Always follow your healthcare professional's instructions.

## 2019-05-22 NOTE — ANESTHESIA PREPROCEDURE EVALUATION
05/22/2019  Karen Villarreal is a 54 y.o., female.    Pre-op Assessment    I have reviewed the Patient Summary Reports.     I have reviewed the Nursing Notes.   I have reviewed the Medications.     Review of Systems  Anesthesia Hx:  Denies Family Hx of Anesthesia complications.   Denies Personal Hx of Anesthesia complications.   Hematology/Oncology:         -- Anemia: Hematology Comments: Hypersplenism/thrombocytopenia   Cardiovascular:   Hypertension    Renal/:   Chronic Renal Disease    Hepatic/GI:   GERD Liver Disease, Hepatitis, C Hematemesis and melana  Current nausea  Cirrhosis, portal HTN   Neurological:   Denies Neuromuscular Disease.    Psych:   Psychiatric History          Physical Exam  General:  Morbid Obesity, Malnutrition Protein malnutrition    Airway/Jaw/Neck:  Airway Findings: Mouth Opening: Normal Tongue: Normal  General Airway Assessment: Adult  Mallampati: III  Improves to II with phonation.  TM Distance: Normal, at least 6 cm  Jaw/Neck Findings:  Neck ROM: Normal ROM  Neck Findings:  Girth Increased      Dental:  DENTAL FINDINGS: Normal   Chest/Lungs:  Chest/Lungs Findings: Clear to auscultation, Normal Respiratory Rate     Heart/Vascular:  Heart Findings: Rate: Normal  Rhythm: Regular Rhythm  Sounds: Normal             Anesthesia Plan  Type of Anesthesia, risks & benefits discussed:  Anesthesia Type:  general  Patient's Preference:   Intra-op Monitoring Plan: standard ASA monitors  Intra-op Monitoring Plan Comments:   Post Op Pain Control Plan:   Post Op Pain Control Plan Comments:   Induction:   IV  Beta Blocker:  Patient is not currently on a Beta-Blocker (No further documentation required).       Informed Consent: Patient understands risks and agrees with Anesthesia plan.  Questions answered. Anesthesia consent signed with patient.  ASA Score: 3     Day of Surgery Review of  History & Physical:    H&P update referred to the provider.         Ready For Surgery From Anesthesia Perspective.

## 2019-05-22 NOTE — H&P (VIEW-ONLY)
CC: Esophageal varices    54 year old female with above. States that symptoms are absent, no alleviating/exacerbating factors. No family history of CA. No bleeding or weight loss.     ROS:  No headache, no fever/chills, no chest pain/SOB, no nausea/vomiting/diarrhea/constipation/GI bleeding/abdominal pain, no dysuria/hematuria.    VSSAF   Exam:   Alert and oriented x 3; no apparent distress   PERRLA, sclera anicteric  CV: Regular rate/rhythm, normal PMI   Lungs: Clear bilaterally with no wheeze/rales   Abdomen: Soft, NT/ND, normal bowel sounds   Ext: No cyanosis, clubbing     Impression:   As above    Plan:   Proceed with endoscopy. Further recs to follow.

## 2019-05-22 NOTE — TRANSFER OF CARE
"Anesthesia Transfer of Care Note    Patient: Karen Villarreal    Procedure(s) Performed: Procedure(s) (LRB):  EGD (ESOPHAGOGASTRODUODENOSCOPY) (N/A)    Patient location: PACU    Anesthesia Type: general    Transport from OR: Transported from OR on 2-3 L/min O2 by NC with adequate spontaneous ventilation    Post pain: adequate analgesia    Post assessment: no apparent anesthetic complications and tolerated procedure well    Post vital signs: stable    Level of consciousness: awake and alert    Nausea/Vomiting: no nausea/vomiting    Complications: none    Transfer of care protocol was followed      Last vitals:   Visit Vitals  BP (!) 158/108 (BP Location: Left arm, Patient Position: Sitting)   Pulse 107   Temp 37.1 °C (98.8 °F) (Skin)   Resp 20   Ht 5' 1" (1.549 m)   Wt 115.7 kg (255 lb)   LMP 08/06/2017 (Approximate)   SpO2 98%   Breastfeeding? No   BMI 48.18 kg/m²     "

## 2019-05-22 NOTE — ANESTHESIA POSTPROCEDURE EVALUATION
Anesthesia Post Evaluation    Patient: Karen Villarreal    Procedure(s) Performed: Procedure(s) (LRB):  EGD (ESOPHAGOGASTRODUODENOSCOPY) (N/A)    Final Anesthesia Type: general  Patient location during evaluation: PACU  Patient participation: Yes- Able to Participate  Level of consciousness: awake and alert  Post-procedure vital signs: reviewed and stable  Pain management: adequate  Airway patency: patent  PONV status at discharge: No PONV  Anesthetic complications: no      Cardiovascular status: blood pressure returned to baseline  Respiratory status: unassisted  Hydration status: euvolemic  Follow-up not needed.          Vitals Value Taken Time   /80 5/22/2019  9:45 AM   Temp 37.1 °C (98.7 °F) 5/22/2019  9:12 AM   Pulse 103 5/22/2019  9:45 AM   Resp 18 5/22/2019  9:45 AM   SpO2 100 % 5/22/2019  9:45 AM         Event Time     Out of Recovery 10:08:00          Pain/Kang Score: Kang Score: 10 (5/22/2019 10:00 AM)

## 2019-05-23 VITALS
WEIGHT: 255 LBS | DIASTOLIC BLOOD PRESSURE: 80 MMHG | OXYGEN SATURATION: 100 % | SYSTOLIC BLOOD PRESSURE: 154 MMHG | TEMPERATURE: 99 F | RESPIRATION RATE: 18 BRPM | HEART RATE: 103 BPM | HEIGHT: 61 IN | BODY MASS INDEX: 48.15 KG/M2

## 2019-05-23 NOTE — TELEPHONE ENCOUNTER
Patient notified and understands ultrasound scheduled 6/6 at 11am and will call main campus and reschedule hepatology appointment

## 2019-05-29 ENCOUNTER — TELEPHONE (OUTPATIENT)
Dept: GASTROENTEROLOGY | Facility: CLINIC | Age: 54
End: 2019-05-29

## 2019-05-29 DIAGNOSIS — I85.00 ESOPHAGEAL VARICES WITHOUT BLEEDING, UNSPECIFIED ESOPHAGEAL VARICES TYPE: Primary | ICD-10-CM

## 2019-05-29 DIAGNOSIS — B18.2 CHRONIC HEPATITIS C WITHOUT HEPATIC COMA: Primary | ICD-10-CM

## 2019-05-29 NOTE — TELEPHONE ENCOUNTER
----- Message from Jacob Sharma sent at 5/29/2019  9:18 AM CDT -----  Contact: patient  Type: Needs Medical Advice    Who Called:  patient  Symptoms (please be specific):    How long has patient had these symptoms:    Pharmacy name and phone #:    Best Call Back Number: [phone]  Additional Information: requesting a call back regarding EDG test

## 2019-05-29 NOTE — TELEPHONE ENCOUNTER
----- Message from Yue Saucedo sent at 5/29/2019  9:55 AM CDT -----  Contact: self  Pt has started her last month of HCV meds.  She is coming by tomorrow, 5/30/19, to  lab orders.  There are none out there for me to print.

## 2019-05-30 ENCOUNTER — HOSPITAL ENCOUNTER (OUTPATIENT)
Dept: RADIOLOGY | Facility: HOSPITAL | Age: 54
Discharge: HOME | End: 2019-05-30
Attending: INTERNAL MEDICINE
Payer: MEDICAID

## 2019-05-30 VITALS — HEART RATE: 103 BPM | DIASTOLIC BLOOD PRESSURE: 56 MMHG | SYSTOLIC BLOOD PRESSURE: 117 MMHG | OXYGEN SATURATION: 100 %

## 2019-05-30 DIAGNOSIS — I85.00 VARICES, ESOPHAGEAL: ICD-10-CM

## 2019-05-30 PROCEDURE — 63600175 PHARM REV CODE 636 W HCPCS: Performed by: RADIOLOGY

## 2019-05-30 PROCEDURE — P9047 ALBUMIN (HUMAN), 25%, 50ML: HCPCS | Performed by: RADIOLOGY

## 2019-05-30 PROCEDURE — 49083 ABD PARACENTESIS W/IMAGING: CPT

## 2019-05-30 PROCEDURE — 49083 US GUIDED PARACENTESIS INC IMAGING: ICD-10-PCS | Mod: ,,, | Performed by: RADIOLOGY

## 2019-05-30 PROCEDURE — 49083 ABD PARACENTESIS W/IMAGING: CPT | Mod: ,,, | Performed by: RADIOLOGY

## 2019-05-30 PROCEDURE — A7048 VACUUM DRAIN BOTTLE/TUBE KIT: HCPCS

## 2019-05-30 RX ORDER — ALBUMIN HUMAN 250 G/1000ML
25 SOLUTION INTRAVENOUS
Status: DISCONTINUED | OUTPATIENT
Start: 2019-05-30 | End: 2019-05-31 | Stop reason: HOSPADM

## 2019-05-30 RX ADMIN — ALBUMIN HUMAN 25 G: 0.25 SOLUTION INTRAVENOUS at 01:05

## 2019-05-30 NOTE — PROGRESS NOTES
Radiology Post-Procedure Note    Pre Op Diagnosis: ASCITES  Post Op Diagnosis: Same    Procedure: ULTRASOUND guided paracentesis    Procedure performed by: Dr. Alyssa Hong    Written Informed Consent Obtained: Yes  Specimen Removed: 9000 L ascites , clear yellow  Estimated Blood Loss: Minimal    Findings:   9000 L clear yellow ascites. Patient given albumin per protocol    Patient tolerated procedure well.    @SIG@

## 2019-05-30 NOTE — NURSING
Ultrasound guided paracentesis performed by Dr. Hong- 9 L removed- Patient received 50g albumin IV- VS remained stable for duration of procedure- specimens sent off to lab per order-  IV d/c'd, with tip intact. Access site cleaned with peroxide, derma bond and steri-strips applied, then covered with sterile gauze and tape. Pt discharge home.

## 2019-05-30 NOTE — NURSING
Ultrasound guided paracentesis performed by - 9750 mLs removed- Patient received 25g albumin IV- VS remained stable for duration of procedure-  IV d/c'd, with tip intact. Access site cleaned with peroxide, derma bond and steri-strips applied, then covered with sterile gauze and tape. Pt transferred back to ED via w/c    No

## 2019-05-31 ENCOUNTER — TELEPHONE (OUTPATIENT)
Dept: GASTROENTEROLOGY | Facility: CLINIC | Age: 54
End: 2019-05-31

## 2019-05-31 NOTE — TELEPHONE ENCOUNTER
----- Message from Nura Pierce sent at 5/31/2019 11:43 AM CDT -----  Contact: Patient  Type: Needs Medical Advice    Who Called:  Patient  Best Call Back Number: [phone]  Additional Information: Patient is requesting to speak with office about upcoming procedure EGD. She was told to ask about being put on a rolling schedule for paraesthesias. Please advise patient

## 2019-06-04 ENCOUNTER — HOSPITAL ENCOUNTER (OUTPATIENT)
Facility: HOSPITAL | Age: 54
Discharge: HOME | End: 2019-06-04
Attending: INTERNAL MEDICINE | Admitting: INTERNAL MEDICINE
Payer: MEDICAID

## 2019-06-04 ENCOUNTER — ANESTHESIA EVENT (OUTPATIENT)
Dept: ENDOSCOPY | Facility: HOSPITAL | Age: 54
End: 2019-06-04
Payer: MEDICAID

## 2019-06-04 ENCOUNTER — ANESTHESIA (OUTPATIENT)
Dept: ENDOSCOPY | Facility: HOSPITAL | Age: 54
End: 2019-06-04
Payer: MEDICAID

## 2019-06-04 DIAGNOSIS — I85.00 VARICES, ESOPHAGEAL: ICD-10-CM

## 2019-06-04 PROCEDURE — 37000009 HC ANESTHESIA EA ADD 15 MINS: Performed by: INTERNAL MEDICINE

## 2019-06-04 PROCEDURE — 37000008 HC ANESTHESIA 1ST 15 MINUTES: Performed by: INTERNAL MEDICINE

## 2019-06-04 PROCEDURE — 43244 EGD VARICES LIGATION: CPT | Performed by: INTERNAL MEDICINE

## 2019-06-04 PROCEDURE — D9220A PRA ANESTHESIA: ICD-10-PCS | Mod: ANES,,, | Performed by: ANESTHESIOLOGY

## 2019-06-04 PROCEDURE — 25000003 PHARM REV CODE 250: Performed by: INTERNAL MEDICINE

## 2019-06-04 PROCEDURE — D9220A PRA ANESTHESIA: ICD-10-PCS | Mod: CRNA,,, | Performed by: NURSE ANESTHETIST, CERTIFIED REGISTERED

## 2019-06-04 PROCEDURE — D9220A PRA ANESTHESIA: Mod: ANES,,, | Performed by: ANESTHESIOLOGY

## 2019-06-04 PROCEDURE — D9220A PRA ANESTHESIA: Mod: CRNA,,, | Performed by: NURSE ANESTHETIST, CERTIFIED REGISTERED

## 2019-06-04 PROCEDURE — 00813 ANES UPR LWR GI NDSC PX: CPT | Performed by: INTERNAL MEDICINE

## 2019-06-04 PROCEDURE — 43244 PR EGD, FLEX, W/BAND LIGATION, ESOPH/GASTR VARICES: ICD-10-PCS | Mod: ,,, | Performed by: INTERNAL MEDICINE

## 2019-06-04 PROCEDURE — 63600175 PHARM REV CODE 636 W HCPCS: Performed by: NURSE ANESTHETIST, CERTIFIED REGISTERED

## 2019-06-04 PROCEDURE — 43244 EGD VARICES LIGATION: CPT | Mod: ,,, | Performed by: INTERNAL MEDICINE

## 2019-06-04 PROCEDURE — 27201022: Performed by: INTERNAL MEDICINE

## 2019-06-04 RX ORDER — PROPOFOL 10 MG/ML
VIAL (ML) INTRAVENOUS
Status: DISCONTINUED | OUTPATIENT
Start: 2019-06-04 | End: 2019-06-04

## 2019-06-04 RX ORDER — LIDOCAINE HCL/PF 100 MG/5ML
SYRINGE (ML) INTRAVENOUS
Status: DISCONTINUED | OUTPATIENT
Start: 2019-06-04 | End: 2019-06-04

## 2019-06-04 RX ORDER — SODIUM CHLORIDE 9 MG/ML
INJECTION, SOLUTION INTRAVENOUS CONTINUOUS
Status: DISCONTINUED | OUTPATIENT
Start: 2019-06-04 | End: 2019-06-04 | Stop reason: HOSPADM

## 2019-06-04 RX ADMIN — LIDOCAINE HYDROCHLORIDE 100 MG: 20 INJECTION, SOLUTION INTRAVENOUS at 09:06

## 2019-06-04 RX ADMIN — SODIUM CHLORIDE: 0.9 INJECTION, SOLUTION INTRAVENOUS at 08:06

## 2019-06-04 RX ADMIN — PROPOFOL 80 MG: 10 INJECTION, EMULSION INTRAVENOUS at 09:06

## 2019-06-04 RX ADMIN — PROPOFOL 40 MG: 10 INJECTION, EMULSION INTRAVENOUS at 09:06

## 2019-06-04 NOTE — [employer] PATIENT INSTRUCTIONS
Discharge Summary/Instructions after an Endoscopic Procedure  Patient Name: Karen Villarreal  Patient MRN: [id number]  Patient YOB: 1965  Tuesday, June 04, 2019  Justin Saldana MD  RESTRICTIONS:  During your procedure today, you received medications for sedation.  These   medications may affect your judgment, balance and coordination.  Therefore,   for 24 hours, you have the following restrictions:   - DO NOT drive a car, operate machinery, make legal/financial decisions,   sign important papers or drink alcohol.    ACTIVITY:  Today: no heavy lifting, straining or running due to procedural   sedation/anesthesia.  The following day: return to full activity including work.  DIET:  Eat and drink normally unless instructed otherwise.     TREATMENT FOR COMMON SIDE EFFECTS:  - Mild abdominal pain, nausea, belching, bloating or excessive gas:  rest,   eat lightly and use a heating pad.  - Sore Throat: treat with throat lozenges and/or gargle with warm salt   water.  - Because air was used during the procedure, expelling large amounts of air   from your rectum or belching is normal.  - If a bowel prep was taken, you may not have a bowel movement for 1-3 days.    This is normal.  SYMPTOMS TO WATCH FOR AND REPORT TO YOUR PHYSICIAN:  1. Abdominal pain or bloating, other than gas cramps.  2. Chest pain.  3. Back pain.  4. Signs of infection such as: chills or fever occurring within 24 hours   after the procedure.  5. Rectal bleeding, which would show as bright red, maroon, or black stools.   (A tablespoon of blood from the rectum is not serious, especially if   hemorrhoids are present.)  6. Vomiting.  7. Weakness or dizziness.  GO DIRECTLY TO THE NEAREST EMERGENCY ROOM IF YOU HAVE ANY OF THE FOLLOWING:      Difficulty breathing              Chills and/or fever over 101 F   Persistent vomiting and/or vomiting blood   Severe abdominal pain   Severe chest pain   Black, tarry stools   Bleeding- more than one  tablespoon   Any other symptom or condition that you feel may need urgent attention  Your doctor recommends these additional instructions:  If any biopsies were taken, your doctors clinic will contact you in 1 to 2   weeks with any results.  - Patient has a contact number available for emergencies.  The signs and   symptoms of potential delayed complications were discussed with the   patient.  Return to normal activities tomorrow.  Written discharge   instructions were provided to the patient.   - Resume previous diet.   - Continue present medications.   - No aspirin, ibuprofen, naproxen, or other non-steroidal anti-inflammatory   drugs.   - Await pathology results.   - Repeat upper endoscopy in 3 weeks for retreatment.   - Discharge patient to home (ambulatory).   - Follow an antireflux regimen.   - Return to my office after studies are complete.  For questions, problems or results please call your physician - Justin Saldana MD at Work:  [phone].  [facility], EMERGENCY ROOM PHONE NUMBER: [phone]  IF A COMPLICATION OR EMERGENCY SITUATION ARISES AND YOU ARE UNABLE TO REACH   YOUR PHYSICIAN - GO DIRECTLY TO THE EMERGENCY ROOM.  Justin Saldana MD  6/4/2019 9:36:00 AM  This report has been verified and signed electronically.  [employer]

## 2019-06-04 NOTE — DISCHARGE INSTRUCTIONS

## 2019-06-04 NOTE — ANESTHESIA PREPROCEDURE EVALUATION
06/04/2019  Karen Villarreal is a 54 y.o., female.    Pre-op Assessment    I have reviewed the Patient Summary Reports.     I have reviewed the Nursing Notes.   I have reviewed the Medications.     Review of Systems  Anesthesia Hx:  Denies Family Hx of Anesthesia complications.   Denies Personal Hx of Anesthesia complications.   Hematology/Oncology:         -- Anemia: Hematology Comments: Hypersplenism/thrombocytopenia   Cardiovascular:   Hypertension    Renal/:   Chronic Renal Disease    Hepatic/GI:   GERD Liver Disease, Hepatitis, C Hematemesis and melana  Current nausea  Cirrhosis, portal HTN   Neurological:   Denies Neuromuscular Disease.    Psych:   Psychiatric History          Physical Exam  General:  Morbid Obesity, Malnutrition Protein malnutrition    Airway/Jaw/Neck:  Airway Findings: Mouth Opening: Normal Tongue: Normal  General Airway Assessment: Adult  Mallampati: III  Improves to II with phonation.  TM Distance: Normal, at least 6 cm  Jaw/Neck Findings:  Neck ROM: Normal ROM  Neck Findings:  Girth Increased      Dental:  DENTAL FINDINGS: Normal   Chest/Lungs:  Chest/Lungs Findings: Clear to auscultation, Normal Respiratory Rate     Heart/Vascular:  Heart Findings: Rate: Normal  Rhythm: Regular Rhythm  Sounds: Normal             Anesthesia Plan  Type of Anesthesia, risks & benefits discussed:  Anesthesia Type:  general  Patient's Preference:   Intra-op Monitoring Plan: standard ASA monitors  Intra-op Monitoring Plan Comments:   Post Op Pain Control Plan:   Post Op Pain Control Plan Comments:   Induction:   IV  Beta Blocker:  Patient is not currently on a Beta-Blocker (No further documentation required).       Informed Consent: Patient understands risks and agrees with Anesthesia plan.  Questions answered. Anesthesia consent signed with patient.  ASA Score: 3     Day of Surgery Review of  History & Physical:    H&P update referred to the provider.         Ready For Surgery From Anesthesia Perspective.

## 2019-06-04 NOTE — TRANSFER OF CARE
"Anesthesia Transfer of Care Note    Patient: Karen Villarreal    Procedure(s) Performed: Procedure(s) (LRB):  EGD (ESOPHAGOGASTRODUODENOSCOPY) (N/A)    Patient location: PACU    Anesthesia Type: general    Transport from OR: Transported from OR on room air with adequate spontaneous ventilation    Post pain: adequate analgesia    Post assessment: no apparent anesthetic complications and tolerated procedure well    Post vital signs: stable    Level of consciousness: awake, alert and oriented    Nausea/Vomiting: no nausea/vomiting    Complications: none    Transfer of care protocol was followed      Last vitals:   Visit Vitals  /70 (BP Location: Left arm, Patient Position: Lying)   Pulse 98   Temp 36.8 °C (98.2 °F) (Skin)   Resp 16   Ht 5' 1" (1.549 m)   Wt 115.7 kg (255 lb)   LMP 08/06/2017 (Approximate)   SpO2 97%   Breastfeeding? No   BMI 48.18 kg/m²     "

## 2019-06-04 NOTE — ANESTHESIA POSTPROCEDURE EVALUATION
Anesthesia Post Evaluation    Patient: Karen Villarreal    Procedure(s) Performed: Procedure(s) (LRB):  EGD (ESOPHAGOGASTRODUODENOSCOPY) (N/A)    Final Anesthesia Type: general  Patient location during evaluation: PACU  Patient participation: Yes- Able to Participate  Level of consciousness: awake and alert  Post-procedure vital signs: reviewed and stable  Pain management: adequate  Airway patency: patent  PONV status at discharge: No PONV  Anesthetic complications: no      Cardiovascular status: blood pressure returned to baseline  Respiratory status: unassisted  Hydration status: euvolemic  Follow-up not needed.          Vitals Value Taken Time   /76 6/4/2019  9:33 AM   Temp 36.8 °C (98.2 °F) 6/4/2019  8:24 AM   Pulse 96 6/4/2019  9:33 AM   Resp 16 6/4/2019  9:33 AM   SpO2 99 % 6/4/2019  9:33 AM         No case tracking events are documented in the log.      Pain/Kang Score: No data recorded

## 2019-06-04 NOTE — INTERVAL H&P NOTE
The patient has been examined and the H&P has been reviewed:    I concur with the findings and no changes have occurred since H&P was written.    Anesthesia/Surgery risks, benefits and alternative options discussed and understood by patient/family.          Active Hospital Problems    Diagnosis  POA    Varices, esophageal [I85.00]  Yes      Resolved Hospital Problems   No resolved problems to display.

## 2019-06-04 NOTE — PLAN OF CARE
Patient is waiting in a wheelchair to speak with Dr Montenegro. Her sister is driving her home. She has no additional questions. Sje will be brought to the car via wheelchair by patient transport.

## 2019-06-05 VITALS
WEIGHT: 255 LBS | HEIGHT: 61 IN | OXYGEN SATURATION: 100 % | RESPIRATION RATE: 18 BRPM | SYSTOLIC BLOOD PRESSURE: 110 MMHG | BODY MASS INDEX: 48.15 KG/M2 | TEMPERATURE: 98 F | HEART RATE: 94 BPM | DIASTOLIC BLOOD PRESSURE: 59 MMHG

## 2019-06-06 ENCOUNTER — TELEPHONE (OUTPATIENT)
Dept: GASTROENTEROLOGY | Facility: CLINIC | Age: 54
End: 2019-06-06

## 2019-06-06 DIAGNOSIS — I85.00 ESOPHAGEAL VARICES WITHOUT BLEEDING, UNSPECIFIED ESOPHAGEAL VARICES TYPE: Primary | ICD-10-CM

## 2019-06-06 NOTE — TELEPHONE ENCOUNTER
----- Message from Luz Jordan sent at 6/6/2019 11:51 AM CDT -----  Contact: patient  Type: Needs Medical Advice    Who Called:  patient  Best Call Back Number: [phone]  Additional Information: calling to schedule scope and paracentesis. Please give call back

## 2019-06-07 DIAGNOSIS — K74.60 CIRRHOSIS OF LIVER WITH ASCITES, UNSPECIFIED HEPATIC CIRRHOSIS TYPE: ICD-10-CM

## 2019-06-07 DIAGNOSIS — R18.8 CIRRHOSIS OF LIVER WITH ASCITES, UNSPECIFIED HEPATIC CIRRHOSIS TYPE: ICD-10-CM

## 2019-06-07 DIAGNOSIS — K74.60 HEPATIC CIRRHOSIS, UNSPECIFIED HEPATIC CIRRHOSIS TYPE, UNSPECIFIED WHETHER ASCITES PRESENT: Primary | ICD-10-CM

## 2019-06-07 DIAGNOSIS — R18.8 OTHER ASCITES: ICD-10-CM

## 2019-06-10 ENCOUNTER — LAB VISIT (OUTPATIENT)
Dept: LAB | Facility: HOSPITAL | Age: 54
End: 2019-06-10
Attending: INTERNAL MEDICINE
Payer: MEDICAID

## 2019-06-10 DIAGNOSIS — D50.9 IRON DEFICIENCY ANEMIA, UNSPECIFIED IRON DEFICIENCY ANEMIA TYPE: ICD-10-CM

## 2019-06-10 DIAGNOSIS — K74.60 CHRONIC HEPATITIS C WITH CIRRHOSIS: ICD-10-CM

## 2019-06-10 DIAGNOSIS — B18.2 CHRONIC HEPATITIS C WITH CIRRHOSIS: ICD-10-CM

## 2019-06-10 DIAGNOSIS — D64.9 SYMPTOMATIC ANEMIA: ICD-10-CM

## 2019-06-10 LAB
BASOPHILS # BLD AUTO: 0 K/UL (ref 0–0.2)
BASOPHILS NFR BLD: 0.6 % (ref 0–1.9)
DIFFERENTIAL METHOD: ABNORMAL
EOSINOPHIL # BLD AUTO: 0.1 K/UL (ref 0–0.5)
EOSINOPHIL NFR BLD: 3.4 % (ref 0–8)
ERYTHROCYTE [DISTWIDTH] IN BLOOD BY AUTOMATED COUNT: 19.2 % (ref 11.5–14.5)
HCT VFR BLD AUTO: 31.6 % (ref 37–48.5)
HGB BLD-MCNC: 10.3 G/DL (ref 12–16)
LYMPHOCYTES # BLD AUTO: 0.5 K/UL (ref 1–4.8)
LYMPHOCYTES NFR BLD: 12.4 % (ref 18–48)
MCH RBC QN AUTO: 28.8 PG (ref 27–31)
MCHC RBC AUTO-ENTMCNC: 32.4 G/DL (ref 32–36)
MCV RBC AUTO: 89 FL (ref 82–98)
MONOCYTES # BLD AUTO: 0.4 K/UL (ref 0.3–1)
MONOCYTES NFR BLD: 11.2 % (ref 4–15)
NEUTROPHILS # BLD AUTO: 2.9 K/UL (ref 1.8–7.7)
NEUTROPHILS NFR BLD: 72.4 % (ref 38–73)
PLATELET # BLD AUTO: 93 K/UL (ref 150–350)
PMV BLD AUTO: 9.5 FL (ref 9.2–12.9)
RBC # BLD AUTO: 3.56 M/UL (ref 4–5.4)
WBC # BLD AUTO: 4 K/UL (ref 3.9–12.7)

## 2019-06-10 PROCEDURE — 83540 ASSAY OF IRON: CPT

## 2019-06-10 PROCEDURE — 36415 COLL VENOUS BLD VENIPUNCTURE: CPT

## 2019-06-10 PROCEDURE — 85025 COMPLETE CBC W/AUTO DIFF WBC: CPT

## 2019-06-11 DIAGNOSIS — K74.60 HEPATIC CIRRHOSIS, UNSPECIFIED HEPATIC CIRRHOSIS TYPE: Primary | ICD-10-CM

## 2019-06-11 LAB
IRON SERPL-MCNC: 27 UG/DL (ref 30–160)
SATURATED IRON: 10 % (ref 20–50)
TOTAL IRON BINDING CAPACITY: 283 UG/DL (ref 250–450)
TRANSFERRIN SERPL-MCNC: 191 MG/DL (ref 200–375)

## 2019-06-13 ENCOUNTER — TELEPHONE (OUTPATIENT)
Dept: HEMATOLOGY/ONCOLOGY | Facility: CLINIC | Age: 54
End: 2019-06-13

## 2019-06-13 ENCOUNTER — HOSPITAL ENCOUNTER (EMERGENCY)
Facility: HOSPITAL | Age: 54
Discharge: HOME | End: 2019-06-13
Attending: EMERGENCY MEDICINE
Payer: MEDICAID

## 2019-06-13 VITALS
RESPIRATION RATE: 16 BRPM | DIASTOLIC BLOOD PRESSURE: 65 MMHG | WEIGHT: 260.38 LBS | TEMPERATURE: 99 F | HEART RATE: 99 BPM | OXYGEN SATURATION: 97 % | SYSTOLIC BLOOD PRESSURE: 119 MMHG | BODY MASS INDEX: 49.16 KG/M2 | HEIGHT: 61 IN

## 2019-06-13 DIAGNOSIS — M54.6 ACUTE LEFT-SIDED THORACIC BACK PAIN: Primary | ICD-10-CM

## 2019-06-13 PROCEDURE — 99283 EMERGENCY DEPT VISIT LOW MDM: CPT | Mod: 25

## 2019-06-13 RX ORDER — DICLOFENAC SODIUM 10 MG/G
2 GEL TOPICAL 4 TIMES DAILY PRN
Qty: 1 TUBE | Refills: 0 | Status: SHIPPED | OUTPATIENT
Start: 2019-06-13 | End: 2019-01-01

## 2019-06-13 NOTE — ED PROVIDER NOTES
Encounter Date: 6/13/2019    SCRIBE #1 NOTE: Emily MATA am scribing for, and in the presence of, Dr. Diallo.       History     Chief Complaint   Patient presents with    L posterior rib pain     with some sob. + ascites.     6/13/2019  8:11 AM     The patient is a 54 y.o. female  who presents with back pain. Patient c/o gradual onset of worsening left back pain which has been constant for several days. Her pain is exacerbated with movement. Pt awoke this morning with shortness of breath due to her back pain. She is unable to get comfortable while laying down. No fall or direct injury to the back. Pt states she might have strained her back while positioning her abdomen due to distension and fluid.  No hx of blood clots or cardiac disease. No recent cough, fevers, abdominal pain, nausea, vomiting or diarrhea. PMHx of hepatitis C, hiatal hernia, acid reflux, cirrhosis, thrombocytopenia, anemia, arthritis. SHx of EGD, appendectomy, ankle surgery, oophorectomy.    The history is provided by the patient.     Review of patient's allergies indicates:  No Known Allergies  Past Medical History:   Diagnosis Date    Acid reflux     Anemia     Arthritis     Ascites 03/08/2017    Cataract     Cirrhosis     Depression     Encounter for blood transfusion     Hepatitis C     Hiatal hernia     Ovary removal, prophylactic     Renal cyst 03/08/2017    Thrombocytopenia     Varices, esophageal      Past Surgical History:   Procedure Laterality Date    ANKLE SURGERY      APPENDECTOMY      CATARACT EXTRACTION      EGD (ESOPHAGOGASTRODUODENOSCOPY) N/A 6/4/2019    [facility]    EGD (ESOPHAGOGASTRODUODENOSCOPY) N/A 5/22/2019    [facility]    EGD (ESOPHAGOGASTRODUODENOSCOPY) N/A 4/29/2019    [facility] END[facility]    EGD (ESOPHAGOGASTRODUODENOSCOPY) N/A 2/5/2019    [facility] at [facility] END[facility]     EGD (ESOPHAGOGASTRODUODENOSCOPY) N/A 2/2/2019    [facility]    EXTRACTION-CATARACT-IOL Right 1/5/2018    [facility] OR    EXTRACTION-CATARACT-IOL Left 12/1/2017    [facility] OR    OOPHORECTOMY       Family History   Problem Relation Age of Onset    Heart failure Mother     COPD Mother     Arthritis Mother     Vision loss Mother     Diabetes Father     Cancer Father     Brain cancer Father     Diabetes type II Father      Social History     Tobacco Use    Smoking status: Current Some Day Smoker     Packs/day: 0.50     Years: 33.00     Pack years: 16.50     Types: Cigarettes    Smokeless tobacco: Never Used   Substance Use Topics    Alcohol use: No     Frequency: Never     Comment: quit 2017    Drug use: No     Review of Systems   Constitutional: Negative for appetite change, chills and fever.   HENT: Negative for congestion, rhinorrhea and sore throat.    Respiratory: Positive for shortness of breath. Negative for cough and wheezing.    Cardiovascular: Negative for chest pain, palpitations and leg swelling.   Gastrointestinal: Positive for abdominal distention. Negative for abdominal pain, diarrhea, nausea and vomiting.   Genitourinary: Negative for dysuria.   Musculoskeletal: Positive for back pain. Negative for myalgias.   Skin: Negative for rash.   Neurological: Negative for weakness and numbness.   Hematological: Does not bruise/bleed easily.       Physical Exam     Initial Vitals [06/13/19 0804]   BP Pulse Resp Temp SpO2   119/65 108 16 98.9 °F (37.2 °C) 97 %      MAP       --         Physical Exam    Nursing note and vitals reviewed.  Constitutional: She is Obese . No distress.   HENT:   Head: Normocephalic and atraumatic.   Eyes: EOM are normal. Pupils are equal, round, and reactive to light.   Neck: Normal range of motion.   Cardiovascular: Normal rate, regular rhythm, normal heart  sounds, intact distal pulses and normal pulses. Exam reveals no gallop and no friction rub.    No murmur heard.  Pulses:       Radial pulses are 2+ on the right side, and 2+ on the left side.        Dorsalis pedis pulses are 2+ on the right side, and 2+ on the left side.        Posterior tibial pulses are 2+ on the right side, and 2+ on the left side.   Pulmonary/Chest: Breath sounds normal. She has no wheezes. She has no rhonchi. She has no rales.   No tachypnea.   Abdominal: Soft. She exhibits distension and fluid wave. There is no tenderness.   Musculoskeletal: Normal range of motion. She exhibits edema and tenderness.   Reproducible lateral left soft tissue pain over the flank. Hurts with twisting or torsion and flexion with sitting up. No flank or back TTP. +2 pitting edema to the bilateral lower extremities. +2 radial, DP and PT pulses.   Neurological: She is alert and oriented to person, place, and time. She has normal strength.   Skin: Skin is dry. No rash noted.   Psychiatric: She has a normal mood and affect.         ED Course   Procedures  Labs Reviewed - No data to display       Imaging Results          X-Ray Chest PA And Lateral (Final result)  Result time 06/13/19 08:42:56    Final result by Alyssa Hong MD (06/13/19 08:42:56)                 Impression:      No acute cardiopulmonary disease.      Electronically signed by: Alyssa Hong MD  Date:    06/13/2019  Time:    08:42             Narrative:    EXAMINATION:  XR CHEST PA AND LATERAL    CLINICAL HISTORY:  L back pain;    TECHNIQUE:  PA and lateral views of the chest were performed.    COMPARISON:  12/27/2018    FINDINGS:  The heart does not appear enlarged.  The lungs are clear of infiltrate.  There is no pleural effusion.                                 Medical Decision Making:   History:   Old Medical Records: I decided to obtain old medical records.  Clinical Tests:   Lab Tests: Ordered and Reviewed  Radiological Study: Reviewed and  Ordered            Scribe Attestation:   Scribe #1: I performed the above scribed service and the documentation accurately describes the services I performed. I attest to the accuracy of the note.    I, Dr. Yogesh Diallo, personally performed the services described in this documentation. All medical record entries made by the scribe were at my direction and in my presence.  I have reviewed the chart and agree that the record reflects my personal performance and is accurate and complete. Yogesh Diallo MD.  12:44 PM 06/13/2019    Karen Villarreal is a 54 y.o. female presenting with left thoracic reproducible back pain worse with movement.  She denies injury. I doubt fracture or dislocation.  Chest x-ray reviewed with no sign of pneumothorax or pulmonary edema.  She has chronic ascites for which she has already has planned follow-up.  There is no abdominal pain or tenderness and I doubt emergent process such as SBP.  I do not think diagnostic paracentesis is indicated in the ED.  I doubt pneumonia.  I have very low suspicion for other emergent process such as PE.  I do not think further observation or more extensive diagnostic testing is indicated.  Voltaren gel as necessary to the area as tenderness primarily over muscular region above the flank recommended.  Follow up with PCP.  No urinary complaints or isolated flank tenderness and I doubt pyelonephritis.  Obstructive uropathy is very unlikely.  Detailed return precautions reviewed.        ED Course as of Jun 13 1244   Thu Jun 13, 2019   0841 CXR:  Poor inspiration, NAD. (my read)    [MR]      ED Course User Index  [MR] Yogesh Diallo MD     Clinical Impression:       ICD-10-CM ICD-9-CM   1. Acute left-sided thoracic back pain M54.6 724.1                                Yogesh Diallo MD  06/13/19 1244

## 2019-06-13 NOTE — DISCHARGE INSTRUCTIONS
[facility]    [facility] [phone]    - Dr. De Paz  - Dr. Jama  - Dr. Mercado  - Dr. Benson  - Dr. Gorman  - Dr. Villegas  - Dr. Villegas  - Dr. Celeste ([place])    [facility] [phone]    - Dr. De Guzman  - Dr. Erazo  - Dr. Chin  - Dr. Mckeon    [facility]    - Dr. Jordan [phone]  - Dr. Limon [phone]  - Dr. Yuan [phone]    Dr. Galloway [phone]  Dr. Ball [phone]  Dr. Pabon [phone]  Dr. Jordan [phone]  Dr. Lott [phone]  Dr. Soto [phone]  Dr. Loera [phone]  Dr. Diego [phone]  Dr. Ortega [phone]    [facility]    [facility] [phone]  Dr. Payne [phone]  Dr. Talbert [phone]  Dr. Downing [phone]  Dr. Coronado [phone]  Dr. Camejo [phone]  Dr. Osorio [phone]          [facility]  - [address] [place] [address]  - [phone]    [facility]  - [address] [place] [address]  - [phone]    [facility]  - [address], [address], [place]  - [phone]    [facility]  - [address] [facility] [place] [address]  - [phone]    [facility]  - [address], [place] [address]   [phone]    [facility]  - [address] [place] [address] - [phone]    [facility]  - [address] [place] [address]  - [phone]

## 2019-06-13 NOTE — TELEPHONE ENCOUNTER
----- Message from Kristen Erazo sent at 6/13/2019 10:09 AM CDT -----  Contact: Patient  Type: Needs Medical Advice    Who Called:  Patient  Best Call Back Number:   Additional Information: calling to cancel her appt today, 6/13/19.

## 2019-06-17 ENCOUNTER — TELEPHONE (OUTPATIENT)
Dept: HEMATOLOGY/ONCOLOGY | Facility: CLINIC | Age: 54
End: 2019-06-17

## 2019-06-17 NOTE — TELEPHONE ENCOUNTER
Spoke to pt to r/s missed apt with dr. Valencia. Pt confirmed apt date and time r/s and verbalized understanding.

## 2019-06-17 NOTE — TELEPHONE ENCOUNTER
----- Message from Bonny Herbert sent at 6/17/2019  9:35 AM CDT -----  Type: Needs Medical Advice    Who Called:  Patient  Best Call Back Number: [phone] (home)     Additional Information: Would like to reschedule the appointment she missed on 6/13/19. Please call with availability.

## 2019-06-19 ENCOUNTER — HOSPITAL ENCOUNTER (OUTPATIENT)
Dept: RADIOLOGY | Facility: HOSPITAL | Age: 54
Discharge: HOME | End: 2019-06-19
Attending: INTERNAL MEDICINE
Payer: MEDICAID

## 2019-06-19 VITALS — OXYGEN SATURATION: 99 % | DIASTOLIC BLOOD PRESSURE: 58 MMHG | SYSTOLIC BLOOD PRESSURE: 118 MMHG | HEART RATE: 99 BPM

## 2019-06-19 DIAGNOSIS — K74.60 CIRRHOSIS OF LIVER WITH ASCITES, UNSPECIFIED HEPATIC CIRRHOSIS TYPE: ICD-10-CM

## 2019-06-19 DIAGNOSIS — R18.8 OTHER ASCITES: ICD-10-CM

## 2019-06-19 DIAGNOSIS — K74.60 HEPATIC CIRRHOSIS, UNSPECIFIED HEPATIC CIRRHOSIS TYPE, UNSPECIFIED WHETHER ASCITES PRESENT: ICD-10-CM

## 2019-06-19 DIAGNOSIS — R18.8 CIRRHOSIS OF LIVER WITH ASCITES, UNSPECIFIED HEPATIC CIRRHOSIS TYPE: ICD-10-CM

## 2019-06-19 PROCEDURE — 49083 US GUIDED PARACENTESIS INC IMAGING: ICD-10-PCS | Mod: ,,, | Performed by: RADIOLOGY

## 2019-06-19 PROCEDURE — A7048 VACUUM DRAIN BOTTLE/TUBE KIT: HCPCS

## 2019-06-19 PROCEDURE — 49083 ABD PARACENTESIS W/IMAGING: CPT | Mod: ,,, | Performed by: RADIOLOGY

## 2019-06-19 PROCEDURE — 49083 ABD PARACENTESIS W/IMAGING: CPT

## 2019-06-19 PROCEDURE — 63600175 PHARM REV CODE 636 W HCPCS: Performed by: RADIOLOGY

## 2019-06-19 PROCEDURE — P9047 ALBUMIN (HUMAN), 25%, 50ML: HCPCS | Performed by: RADIOLOGY

## 2019-06-19 RX ORDER — ALBUMIN HUMAN 250 G/1000ML
25 SOLUTION INTRAVENOUS
Status: DISCONTINUED | OUTPATIENT
Start: 2019-06-19 | End: 2019-06-20 | Stop reason: HOSPADM

## 2019-06-19 RX ADMIN — ALBUMIN (HUMAN) 25 G: 25 SOLUTION INTRAVENOUS at 01:06

## 2019-06-19 NOTE — NURSING
Ultrasound guided paracentesis performed by Dr. Denny- 8350 mLs removed- Patient received 25g albumin IV- VS remained stable for duration of procedure- specimens sent off to lab per order-  IV d/c'd, with tip intact. Access site cleaned with peroxide, derma bond and steri-strips applied, then covered with sterile gauze and tape. Pt discharge home.

## 2019-06-25 ENCOUNTER — HOSPITAL ENCOUNTER (OUTPATIENT)
Facility: HOSPITAL | Age: 54
Discharge: HOME | End: 2019-06-25
Attending: INTERNAL MEDICINE | Admitting: INTERNAL MEDICINE
Payer: MEDICAID

## 2019-06-25 ENCOUNTER — ANESTHESIA (OUTPATIENT)
Dept: ENDOSCOPY | Facility: HOSPITAL | Age: 54
End: 2019-06-25
Payer: MEDICAID

## 2019-06-25 ENCOUNTER — ANESTHESIA EVENT (OUTPATIENT)
Dept: ENDOSCOPY | Facility: HOSPITAL | Age: 54
End: 2019-06-25
Payer: MEDICAID

## 2019-06-25 DIAGNOSIS — I85.00 VARICES, ESOPHAGEAL: ICD-10-CM

## 2019-06-25 PROCEDURE — 88305 TISSUE EXAM BY PATHOLOGIST: CPT | Performed by: PATHOLOGY

## 2019-06-25 PROCEDURE — 43244 EGD VARICES LIGATION: CPT | Mod: ,,, | Performed by: INTERNAL MEDICINE

## 2019-06-25 PROCEDURE — 37000008 HC ANESTHESIA 1ST 15 MINUTES: Performed by: INTERNAL MEDICINE

## 2019-06-25 PROCEDURE — 00813 ANES UPR LWR GI NDSC PX: CPT | Performed by: INTERNAL MEDICINE

## 2019-06-25 PROCEDURE — 43239 EGD BIOPSY SINGLE/MULTIPLE: CPT | Performed by: INTERNAL MEDICINE

## 2019-06-25 PROCEDURE — 27201012 HC FORCEPS, HOT/COLD, DISP: Performed by: INTERNAL MEDICINE

## 2019-06-25 PROCEDURE — D9220A PRA ANESTHESIA: Mod: ANES,,, | Performed by: ANESTHESIOLOGY

## 2019-06-25 PROCEDURE — 37000009 HC ANESTHESIA EA ADD 15 MINS: Performed by: INTERNAL MEDICINE

## 2019-06-25 PROCEDURE — 43239 PR EGD, FLEX, W/BIOPSY, SGL/MULTI: ICD-10-PCS | Mod: 59,,, | Performed by: INTERNAL MEDICINE

## 2019-06-25 PROCEDURE — 88305 TISSUE SPECIMEN TO PATHOLOGY - SURGERY: ICD-10-PCS | Mod: 26,,, | Performed by: PATHOLOGY

## 2019-06-25 PROCEDURE — 63600175 PHARM REV CODE 636 W HCPCS: Performed by: NURSE ANESTHETIST, CERTIFIED REGISTERED

## 2019-06-25 PROCEDURE — 25000003 PHARM REV CODE 250: Performed by: INTERNAL MEDICINE

## 2019-06-25 PROCEDURE — 27201089 HC SNARE, DISP (ANY): Performed by: INTERNAL MEDICINE

## 2019-06-25 PROCEDURE — 27201022: Performed by: INTERNAL MEDICINE

## 2019-06-25 PROCEDURE — 88342 IMHCHEM/IMCYTCHM 1ST ANTB: CPT | Mod: 26,,, | Performed by: PATHOLOGY

## 2019-06-25 PROCEDURE — 43244 EGD VARICES LIGATION: CPT | Performed by: INTERNAL MEDICINE

## 2019-06-25 PROCEDURE — D9220A PRA ANESTHESIA: Mod: CRNA,,, | Performed by: NURSE ANESTHETIST, CERTIFIED REGISTERED

## 2019-06-25 PROCEDURE — 88342 TISSUE SPECIMEN TO PATHOLOGY - SURGERY: ICD-10-PCS | Mod: 26,,, | Performed by: PATHOLOGY

## 2019-06-25 PROCEDURE — 43244 PR EGD, FLEX, W/BAND LIGATION, ESOPH/GASTR VARICES: ICD-10-PCS | Mod: ,,, | Performed by: INTERNAL MEDICINE

## 2019-06-25 PROCEDURE — D9220A PRA ANESTHESIA: ICD-10-PCS | Mod: CRNA,,, | Performed by: NURSE ANESTHETIST, CERTIFIED REGISTERED

## 2019-06-25 PROCEDURE — 43251 EGD REMOVE LESION SNARE: CPT | Mod: 51,,, | Performed by: INTERNAL MEDICINE

## 2019-06-25 PROCEDURE — 43251 PR EGD, FLEX, W/REMOVAL, TUMOR/POLYP/LESION(S), SNARE: ICD-10-PCS | Mod: 51,,, | Performed by: INTERNAL MEDICINE

## 2019-06-25 PROCEDURE — 43239 EGD BIOPSY SINGLE/MULTIPLE: CPT | Mod: 59,,, | Performed by: INTERNAL MEDICINE

## 2019-06-25 PROCEDURE — D9220A PRA ANESTHESIA: ICD-10-PCS | Mod: ANES,,, | Performed by: ANESTHESIOLOGY

## 2019-06-25 PROCEDURE — 43251 EGD REMOVE LESION SNARE: CPT | Performed by: INTERNAL MEDICINE

## 2019-06-25 RX ORDER — PROPOFOL 10 MG/ML
INJECTION, EMULSION INTRAVENOUS
Status: DISCONTINUED | OUTPATIENT
Start: 2019-06-25 | End: 2019-06-25

## 2019-06-25 RX ORDER — SODIUM CHLORIDE 9 MG/ML
INJECTION, SOLUTION INTRAVENOUS CONTINUOUS
Status: DISCONTINUED | OUTPATIENT
Start: 2019-06-25 | End: 2019-06-25 | Stop reason: HOSPADM

## 2019-06-25 RX ORDER — SODIUM CHLORIDE 0.9 % (FLUSH) 0.9 %
10 SYRINGE (ML) INJECTION
Status: DISCONTINUED | OUTPATIENT
Start: 2019-06-25 | End: 2019-06-25 | Stop reason: HOSPADM

## 2019-06-25 RX ORDER — LIDOCAINE HCL/PF 100 MG/5ML
SYRINGE (ML) INTRAVENOUS
Status: DISCONTINUED | OUTPATIENT
Start: 2019-06-25 | End: 2019-06-25

## 2019-06-25 RX ADMIN — PROPOFOL 50 MG: 10 INJECTION, EMULSION INTRAVENOUS at 09:06

## 2019-06-25 RX ADMIN — SODIUM CHLORIDE: 0.9 INJECTION, SOLUTION INTRAVENOUS at 08:06

## 2019-06-25 RX ADMIN — PROPOFOL 100 MG: 10 INJECTION, EMULSION INTRAVENOUS at 09:06

## 2019-06-25 RX ADMIN — LIDOCAINE HYDROCHLORIDE 100 MG: 20 INJECTION, SOLUTION INTRAVENOUS at 09:06

## 2019-06-25 NOTE — H&P
CC: Esophageal varices    54 year old female with above. States that symptoms are stable, no alleviating/exacerbating factors. No family history of CA. No bleeding or weight loss.     ROS:  No headache, no fever/chills, no chest pain/SOB, no nausea/vomiting/diarrhea/constipation/GI bleeding/abdominal pain, no dysuria/hematuria.    VSSAF   Exam:   Alert and oriented x 3; no apparent distress   PERRLA, sclera anicteric  CV: Regular rate/rhythm, normal PMI   Lungs: Clear bilaterally with no wheeze/rales   Abdomen: Soft, NT/ND, normal bowel sounds   Ext: No cyanosis, clubbing     Impression:   As above    Plan:   Proceed with endoscopy. Further recs to follow.

## 2019-06-25 NOTE — TRANSFER OF CARE
"Anesthesia Transfer of Care Note    Patient: Karen Villarreal    Procedure(s) Performed: Procedure(s) (LRB):  EGD (ESOPHAGOGASTRODUODENOSCOPY) (N/A)    Patient location: GI    Anesthesia Type: general    Transport from OR: Transported from OR on 2-3 L/min O2 by NC with adequate spontaneous ventilation    Post pain: adequate analgesia    Post assessment: no apparent anesthetic complications and tolerated procedure well    Post vital signs: stable    Level of consciousness: sedated and responds to stimulation    Nausea/Vomiting: no nausea/vomiting    Complications: none    Transfer of care protocol was followed      Last vitals:   Visit Vitals  /74 (BP Location: Left arm, Patient Position: Lying)   Pulse 98   Temp 36.9 °C (98.4 °F) (Skin)   Resp 16   Ht 5' 1" (1.549 m)   Wt 117.9 kg (260 lb)   LMP 08/06/2017 (Approximate)   SpO2 98%   Breastfeeding? No   BMI 49.13 kg/m²     "

## 2019-06-25 NOTE — DISCHARGE INSTRUCTIONS
Understanding Colon and Rectal Polyps    The colon (also called the large intestine) is a muscular tube that forms the last part of the digestive tract. It absorbs water and stores food waste. The colon is about 4 to 6 feet long. The rectum is the last 6 inches of the colon. The colon and rectum have a smooth lining composed of millions of cells. Changes in these cells can lead to growths in the colon that can become cancerous and should be removed. Multiple tests are available to screen for colon cancer, but the colonoscopy is the most recommended test. During colonoscopy, these polyps can be removed. How often you need this test depends on many things including your condition, your family history, symptoms, and what the findings were at the previous colonoscopy.   When the colon lining changes  Changes that happen in the cells that line the colon or rectum can lead to growths called polyps. Over a period of years, polyps can turn cancerous. Removing polyps early may prevent cancer from ever forming.  Polyps  Polyps are fleshy clumps of tissue that form on the lining of the colon or rectum. Small polyps are usually benign (not cancerous). However, over time, cells in a polyp can change and become cancerous. Certain types of polyps known as adenomatous polyps are premalignant. The risk for invasive cancer increases with the size of the polyp and certain cell and gene features. This means that they can become cancerous if they're not removed. Hyperplastic polyps are benign. They can grow quite large and not turn cancerous.   Cancer  Almost all colorectal cancers start when polyp cells begin growing abnormally. As a cancerous tumor grows, it may involve more and more of the colon or rectum. In time, cancer can also grow beyond the colon or rectum and spread to nearby organs or to glands called lymph nodes. The cells can also travel to other parts of the body. This is known as metastasis. The earlier a cancerous  tumor is removed, the better the chance of preventing its spread.    Date Last Reviewed: 8/1/2016  © 6745-8086 [employer] [place] [facility] [employer]. [address] [place] [address], [place] [address]. All rights reserved. This information is not intended as a substitute for professional medical care. Always follow your healthcare professional's instructions.        Gastritis (Adult)    Gastritis is inflammation and irritation of the stomach lining. It can be present for a short time (acute) or be long lasting (chronic). Gastritis is often caused by infection with bacteria called H pylori. More than a third of people in the [place] have this bacteria in their bodies. In many cases, H pylori causes no problems or symptoms. In some people, though, the infection irritates the stomach lining and causes gastritis. Other causes of stomach irritation include drinking alcohol or taking pain-relieving medicines called NSAIDs (such as aspirin or ibuprofen).   Symptoms of gastritis can include:  · Abdominal pain or bloating  · Loss of appetite  · Nausea or vomiting  · Vomiting blood or having black stools  · Feeling more tired than usual  An inflamed and irritated stomach lining is more likely to develop a sore called an ulcer. To help prevent this, gastritis should be treated.  Home care  If needed, medicines may be prescribed. If you have H pylori infection, treating it will likely relieve your symptoms. Other changes can help reduce stomach irritation and help it heal.  · If you have been prescribed medicines for H pylori infection, take them as directed. Take all of the medicine until it is finished or your healthcare provider tells you to stop, even if you feel better.  · Your healthcare provider may recommend avoiding NSAIDs. If you take daily aspirin for your heart or other medical reasons, do not stop without talking to your healthcare provider first.  · Avoid drinking alcohol.  · Stop smoking. Smoking can irritate the stomach and delay  healing. As much as possible, stay away from second hand smoke.  Follow-up care  Follow up with your healthcare provider, or as advised by our staff. Testing may be needed to check for inflammation or an ulcer.  When to seek medical advice  Call your healthcare provider for any of the following:  · Stomach pain that gets worse or moves to the lower right abdomen (appendix area)  · Chest pain that appears or gets worse, or spreads to the back, neck, shoulder, or arm  · Frequent vomiting (cant keep down liquids)  · Blood in the stool or vomit (red or black in color)  · Feeling weak or dizzy  · Fever of 100.4ºF (38ºC) or higher, or as directed by your healthcare provider  Date Last Reviewed: 6/22/2015 © 2000-2017 [employer]. [address] [place] [address], [place] [address]. All rights reserved. This information is not intended as a substitute for professional medical care. Always follow your healthcare professional's instructions.        Upper GI Endoscopy     During endoscopy, a long, flexible tube is used to view the inside of your upper GI tract.      Upper GI endoscopy allows your healthcare provider to look directly into the beginning of your gastrointestinal (GI) tract. The esophagus, stomach, and duodenum (the first part of the small intestine) make up the upper GI tract.   Before the exam  Follow these and any other instructions you are given before your endoscopy. If you dont follow the healthcare providers instructions carefully, the test may need to be canceled or done over:  · Don't eat or drink anything after midnight the night before your exam. If your exam is in the afternoon, drink only clear liquids in the morning. Don't eat or drink anything for 8 hours before the exam. In some cases, you may be able to take medicines with sips of water until 2 hours before the procedure. Speak with your healthcare provider about this.   · Bring your X-rays and any other test results you have.  · Because  you will be sedated, arrange for an adult to drive you home after the exam.  · Tell your healthcare provider before the exam if you are taking any medicines or have any medical problems.  The procedure  Here is what to expect:  · You will lie on the endoscopy table. Usually patients lie on the left side.  · You will be monitored and given oxygen.  · Your throat may be numbed with a spray or gargle. You are given medicine through an intravenous (IV) line that will help you relax and remain comfortable. You may be awake or asleep during the procedure.  · The healthcare provider will put the endoscope in your mouth and down your esophagus. It is thinner than most pieces of food that you swallow. It will not affect your breathing. The medicine helps keep you from gagging.  · Air is put into your GI tract to expand it. It can make you burp.  · During the procedure, the healthcare provider can take biopsies (tissue samples), remove abnormalities, such as polyps, or treat abnormalities through a variety of devices placed through the endoscope. You will not feel this.   · The endoscope carries images of your upper GI tract to a video screen. If you are awake, you may be able to look at the images.  · After the procedure is done, you will rest for a time. An adult must drive you home.  When to call your healthcare provider  Contact your healthcare provider if you have:  · Black or tarry stools, or blood in your stool  · Fever  · Pain in your belly that does not go away  · Nausea and vomiting, or vomiting blood   Date Last Reviewed: 7/1/2016 © 2000-2017 [employer]. [address] [place] [address], [place] [address]. All rights reserved. This information is not intended as a substitute for professional medical care. Always follow your healthcare professional's instructions.      Discharge Instructions: After Your Surgery/Procedure  Youve just had surgery. During surgery you were given medicine called anesthesia to keep  "you relaxed and free of pain. After surgery you may have some pain or nausea. This is common. Here are some tips for feeling better and getting well after surgery.     Stay on schedule with your medication.   Going home  Your doctor or nurse will show you how to take care of yourself when you go home. He or she will also answer your questions. Have an adult family member or friend drive you home.      For your safety we recommend these precaution for the first 24 hours after your procedure:  · Do not drive or use heavy equipment.  · Do not make important decisions or sign legal papers.  · Do not drink alcohol.  · Have someone stay with you, if needed. He or she can watch for problems and help keep you safe.  · Your concentration, balance, coordination, and judgement may be impaired for many hours after anesthesia.  Use caution when ambulating or standing up.     · You may feel weak and "washed out" after anesthesia and surgery.      Subtle residual effects of general anesthesia or sedation with regional / local anesthesia can last more than 24 hours.  Rest for the remainder of the day or longer if your Doctor/Surgeon has advised you to do so.  Although you may feel normal within the first 24 hours, your reflexes and mental ability may be impaired without you realizing it.  You may feel dizzy, lightheaded or sleepy for 24 hours or longer.      Be sure to go to all follow-up visits with your doctor. And rest after your surgery for as long as your doctor tells you to.  Coping with pain  If you have pain after surgery, pain medicine will help you feel better. Take it as told, before pain becomes severe. Also, ask your doctor or pharmacist about other ways to control pain. This might be with heat, ice, or relaxation. And follow any other instructions your surgeon or nurse gives you.  Tips for taking pain medicine  To get the best relief possible, remember these points:  · Pain medicines can upset your stomach. Taking " them with a little food may help.  · Most pain relievers taken by mouth need at least 20 to 30 minutes to start to work.  · Taking medicine on a schedule can help you remember to take it. Try to time your medicine so that you can take it before starting an activity. This might be before you get dressed, go for a walk, or sit down for dinner.  · Constipation is a common side effect of pain medicines. Call your doctor before taking any medicines such as laxatives or stool softeners to help ease constipation. Also ask if you should skip any foods. Drinking lots of fluids and eating foods such as fruits and vegetables that are high in fiber can also help. Remember, do not take laxatives unless your surgeon has prescribed them.  · Drinking alcohol and taking pain medicine can cause dizziness and slow your breathing. It can even be deadly. Do not drink alcohol while taking pain medicine.  · Pain medicine can make you react more slowly to things. Do not drive or run machinery while taking pain medicine.  Your health care provider may tell you to take acetaminophen to help ease your pain. Ask him or her how much you are supposed to take each day. Acetaminophen or other pain relievers may interact with your prescription medicines or other over-the-counter (OTC) drugs. Some prescription medicines have acetaminophen and other ingredients. Using both prescription and OTC acetaminophen for pain can cause you to overdose. Read the labels on your OTC medicines with care. This will help you to clearly know the list of ingredients, how much to take, and any warnings. It may also help you not take too much acetaminophen. If you have questions or do not understand the information, ask your pharmacist or health care provider to explain it to you before you take the OTC medicine.  Managing nausea  Some people have an upset stomach after surgery. This is often because of anesthesia, pain, or pain medicine, or the stress of surgery. These  tips will help you handle nausea and eat healthy foods as you get better. If you were on a special food plan before surgery, ask your doctor if you should follow it while you get better. These tips may help:  · Do not push yourself to eat. Your body will tell you when to eat and how much.  · Start off with clear liquids and soup. They are easier to digest.  · Next try semi-solid foods, such as mashed potatoes, applesauce, and gelatin, as you feel ready.  · Slowly move to solid foods. Dont eat fatty, rich, or spicy foods at first.  · Do not force yourself to have 3 large meals a day. Instead eat smaller amounts more often.  · Take pain medicines with a small amount of solid food, such as crackers or toast, to avoid nausea.     Call your surgeon if  · You still have pain an hour after taking medicine. The medicine may not be strong enough.  · You feel too sleepy, dizzy, or groggy. The medicine may be too strong.  · You have side effects like nausea, vomiting, or skin changes, such as rash, itching, or hives.       If you have obstructive sleep apnea  You were given anesthesia medicine during surgery to keep you comfortable and free of pain. After surgery, you may have more apnea spells because of this medicine and other medicines you were given. The spells may last longer than usual.   At home:  · Keep using the continuous positive airway pressure (CPAP) device when you sleep. Unless your health care provider tells you not to, use it when you sleep, day or night. CPAP is a common device used to treat obstructive sleep apnea.  · Talk with your provider before taking any pain medicine, muscle relaxants, or sedatives. Your provider will tell you about the possible dangers of taking these medicines.  © 0872-1636 [employer]. [address] [place] [address], [place] [address]. All rights reserved. This information is not intended as a substitute for professional medical care. Always follow your healthcare  professional's instructions.

## 2019-06-25 NOTE — PLAN OF CARE
Patient seen by Dr Medrano and is ok to discharge to home.  Discharge instructions given to patient and sibling.  Both verbalized understanding.

## 2019-06-25 NOTE — ANESTHESIA POSTPROCEDURE EVALUATION
Anesthesia Post Evaluation    Patient: Karen Villarreal    Procedure(s) Performed: Procedure(s) (LRB):  EGD (ESOPHAGOGASTRODUODENOSCOPY) (N/A)    Final Anesthesia Type: general  Patient location during evaluation: PACU  Patient participation: Yes- Able to Participate  Level of consciousness: sedated and awake  Post-procedure vital signs: reviewed and stable  Pain management: adequate  Airway patency: patent  PONV status at discharge: No PONV  Anesthetic complications: no      Cardiovascular status: hypertensive and blood pressure returned to baseline  Respiratory status: spontaneous ventilation  Hydration status: euvolemic  Follow-up not needed.          Vitals Value Taken Time   /74 6/25/2019  8:28 AM   Temp 36.9 °C (98.4 °F) 6/25/2019  8:28 AM   Pulse 98 6/25/2019  8:28 AM   Resp 16 6/25/2019  8:28 AM   SpO2 98 % 6/25/2019  8:28 AM         No case tracking events are documented in the log.      Pain/Kang Score: No data recorded

## 2019-06-25 NOTE — [employer] PATIENT INSTRUCTIONS
Discharge Summary/Instructions after an Endoscopic Procedure  Patient Name: Karen Villarreal  Patient MRN: [id number]  Patient YOB: 1965  Tuesday, June 25, 2019  Justin Saldana MD  RESTRICTIONS:  During your procedure today, you received medications for sedation.  These   medications may affect your judgment, balance and coordination.  Therefore,   for 24 hours, you have the following restrictions:   - DO NOT drive a car, operate machinery, make legal/financial decisions,   sign important papers or drink alcohol.    ACTIVITY:  Today: no heavy lifting, straining or running due to procedural   sedation/anesthesia.  The following day: return to full activity including work.  DIET:  Eat and drink normally unless instructed otherwise.     TREATMENT FOR COMMON SIDE EFFECTS:  - Mild abdominal pain, nausea, belching, bloating or excessive gas:  rest,   eat lightly and use a heating pad.  - Sore Throat: treat with throat lozenges and/or gargle with warm salt   water.  - Because air was used during the procedure, expelling large amounts of air   from your rectum or belching is normal.  - If a bowel prep was taken, you may not have a bowel movement for 1-3 days.    This is normal.  SYMPTOMS TO WATCH FOR AND REPORT TO YOUR PHYSICIAN:  1. Abdominal pain or bloating, other than gas cramps.  2. Chest pain.  3. Back pain.  4. Signs of infection such as: chills or fever occurring within 24 hours   after the procedure.  5. Rectal bleeding, which would show as bright red, maroon, or black stools.   (A tablespoon of blood from the rectum is not serious, especially if   hemorrhoids are present.)  6. Vomiting.  7. Weakness or dizziness.  GO DIRECTLY TO THE NEAREST EMERGENCY ROOM IF YOU HAVE ANY OF THE FOLLOWING:      Difficulty breathing              Chills and/or fever over 101 F   Persistent vomiting and/or vomiting blood   Severe abdominal pain   Severe chest pain   Black, tarry stools   Bleeding- more than one  tablespoon   Any other symptom or condition that you feel may need urgent attention  Your doctor recommends these additional instructions:  If any biopsies were taken, your doctors clinic will contact you in 1 to 2   weeks with any results.  - Patient has a contact number available for emergencies.  The signs and   symptoms of potential delayed complications were discussed with the   patient.  Return to normal activities tomorrow.  Written discharge   instructions were provided to the patient.   - Resume previous diet.   - Continue present medications.   - No aspirin, ibuprofen, naproxen, or other non-steroidal anti-inflammatory   drugs.   - Await pathology results.   - Repeat upper endoscopy in 6 weeks for retreatment.   - Discharge patient to home (ambulatory).   - Return to my office [facility].  For questions, problems or results please call your physician - Justin Saldana MD at Work:  [phone].  [facility], EMERGENCY ROOM PHONE NUMBER: [phone]  IF A COMPLICATION OR EMERGENCY SITUATION ARISES AND YOU ARE UNABLE TO REACH   YOUR PHYSICIAN - GO DIRECTLY TO THE EMERGENCY ROOM.  Justin Saldana MD  6/25/2019 9:47:22 AM  This report has been verified and signed electronically.  [employer]

## 2019-06-25 NOTE — ANESTHESIA PREPROCEDURE EVALUATION
06/25/2019  Karen Villarreal is a 54 y.o., female.    Anesthesia Evaluation    I have reviewed the Patient Summary Reports.    I have reviewed the Nursing Notes.   I have reviewed the Medications.     Review of Systems  Renal/:  Kidney Function/Disease    Hepatic/GI:   Hiatal Hernia, GERD Liver Disease, Hepatitis, C Cirrhosis with esophageal varices Liver Disease, Hepatitis , Cirrhosis Portal Hypertension, Ascites, Esophageal Varices    Psych:   anxiety depression          Physical Exam  General:  Morbid Obesity    Airway/Jaw/Neck:  Airway Findings: Mouth Opening: Small, but > 3cm Tongue: Normal  General Airway Assessment: Adult  Mallampati: IV  Improves to IV with phonation.  Jaw/Neck Findings:  Neck ROM: Normal ROM      Dental:  Dental Findings: Prominent Incisors, In tact        Mental Status:  Mental Status Findings:  Cooperative, Alert and Oriented         Anesthesia Plan  Type of Anesthesia, risks & benefits discussed:  Anesthesia Type:  general  Patient's Preference:   Intra-op Monitoring Plan: standard ASA monitors  Intra-op Monitoring Plan Comments:   Post Op Pain Control Plan:   Post Op Pain Control Plan Comments:   Induction:   IV  Beta Blocker:  Patient is not currently on a Beta-Blocker (No further documentation required).       Informed Consent: Patient understands risks and agrees with Anesthesia plan.  Questions answered. Anesthesia consent signed with patient.  ASA Score: 3     Day of Surgery Review of History & Physical:            Ready For Surgery From Anesthesia Perspective.

## 2019-06-26 ENCOUNTER — TELEPHONE (OUTPATIENT)
Dept: GASTROENTEROLOGY | Facility: CLINIC | Age: 54
End: 2019-06-26

## 2019-06-26 VITALS
WEIGHT: 260 LBS | SYSTOLIC BLOOD PRESSURE: 123 MMHG | RESPIRATION RATE: 18 BRPM | DIASTOLIC BLOOD PRESSURE: 57 MMHG | TEMPERATURE: 98 F | OXYGEN SATURATION: 98 % | HEIGHT: 61 IN | BODY MASS INDEX: 49.09 KG/M2 | HEART RATE: 99 BPM

## 2019-06-26 DIAGNOSIS — B18.2 CHRONIC HEPATITIS C WITHOUT HEPATIC COMA: ICD-10-CM

## 2019-06-26 DIAGNOSIS — K74.60 HEPATIC CIRRHOSIS, UNSPECIFIED HEPATIC CIRRHOSIS TYPE, UNSPECIFIED WHETHER ASCITES PRESENT: Primary | ICD-10-CM

## 2019-06-27 ENCOUNTER — OFFICE VISIT (OUTPATIENT)
Dept: HEMATOLOGY/ONCOLOGY | Facility: CLINIC | Age: 54
End: 2019-06-27
Payer: MEDICAID

## 2019-06-27 ENCOUNTER — DOCUMENTATION ONLY (OUTPATIENT)
Dept: TRANSPLANT | Facility: CLINIC | Age: 54
End: 2019-06-27

## 2019-06-27 ENCOUNTER — TELEPHONE (OUTPATIENT)
Dept: HEPATOLOGY | Facility: CLINIC | Age: 54
End: 2019-06-27

## 2019-06-27 VITALS
HEIGHT: 61 IN | SYSTOLIC BLOOD PRESSURE: 119 MMHG | BODY MASS INDEX: 49.49 KG/M2 | TEMPERATURE: 99 F | DIASTOLIC BLOOD PRESSURE: 58 MMHG | HEART RATE: 104 BPM | WEIGHT: 262.13 LBS | RESPIRATION RATE: 20 BRPM

## 2019-06-27 DIAGNOSIS — R18.8 OTHER ASCITES: ICD-10-CM

## 2019-06-27 DIAGNOSIS — R21 RASH: ICD-10-CM

## 2019-06-27 DIAGNOSIS — D64.9 ANEMIA, UNSPECIFIED TYPE: ICD-10-CM

## 2019-06-27 DIAGNOSIS — I85.00 ESOPHAGEAL VARICES WITHOUT BLEEDING, UNSPECIFIED ESOPHAGEAL VARICES TYPE: ICD-10-CM

## 2019-06-27 DIAGNOSIS — B19.20 HEPATITIS C VIRUS INFECTION WITHOUT HEPATIC COMA, UNSPECIFIED CHRONICITY: Primary | ICD-10-CM

## 2019-06-27 PROCEDURE — 99215 OFFICE O/P EST HI 40 MIN: CPT | Mod: S$PBB,,, | Performed by: INTERNAL MEDICINE

## 2019-06-27 PROCEDURE — 99999 PR PBB SHADOW E&M-EST. PATIENT-LVL III: ICD-10-PCS | Mod: PBBFAC,,, | Performed by: INTERNAL MEDICINE

## 2019-06-27 PROCEDURE — 99999 PR PBB SHADOW E&M-EST. PATIENT-LVL III: CPT | Mod: PBBFAC,,, | Performed by: INTERNAL MEDICINE

## 2019-06-27 PROCEDURE — 99213 OFFICE O/P EST LOW 20 MIN: CPT | Mod: PBBFAC,PO | Performed by: INTERNAL MEDICINE

## 2019-06-27 PROCEDURE — 99215 PR OFFICE/OUTPT VISIT, EST, LEVL V, 40-54 MIN: ICD-10-PCS | Mod: S$PBB,,, | Performed by: INTERNAL MEDICINE

## 2019-06-27 RX ORDER — PREDNISONE 10 MG/1
10 TABLET ORAL 2 TIMES DAILY
Qty: 6 TABLET | Refills: 0 | Status: SHIPPED | OUTPATIENT
Start: 2019-06-27 | End: 2019-06-30

## 2019-06-27 RX ORDER — HYDROXYZINE HYDROCHLORIDE 50 MG/1
50 TABLET, FILM COATED ORAL EVERY 12 HOURS PRN
Qty: 10 TABLET | Refills: 0 | Status: SHIPPED | OUTPATIENT
Start: 2019-06-27 | End: 2019-01-01

## 2019-06-27 NOTE — NURSING
Pt records reviewed.   Pt will be referred to Hepatitis C clinic  Hepatitis C virus infection without hepatic coma, unspecified chronicity  Initial referral received  from the [facility].   Referring Provider/diagnosis  Elizabet Valencia MD    Referral letter sent to  patient.    .

## 2019-06-27 NOTE — PROGRESS NOTES
CC: I keep itching     Karen Villarreal is a 54 y.o.  Pt is here for evaluation of thrombocytopenia and iron deficiency with no further vaginal bleeding. The patient has been having menometrorrhagia with low platelets.  She has a history of hepatitis C with documented cirrhosis and splenomegaly.  She is seeing a GI specialist Dr Whitehead  No further GYN bleeding episodes he is exhausted.  She is taking Protonix to help with gastritis and remains on Aldactone for control of fluid retention.   She was admitted to the hospital for GI bleeding , She did undergo a paracentesis for therapeutic reasons. SHe is on lasix to help with such   She has her Hep c meds Dr Whitehead  Started zoloft for depression  She has had to have paracentesis regularly   Started thiamine   New rash diffuse     Past Medical History:   Diagnosis Date    Acid reflux     Anemia     Arthritis     Ascites 03/08/2017    Cataract     Cirrhosis     Depression     Encounter for blood transfusion     Hepatitis C     Hiatal hernia     Ovary removal, prophylactic     Renal cyst 03/08/2017    Thrombocytopenia     Varices, esophageal        Current Outpatient Medications:     albuterol (VENTOLIN HFA) 90 mcg/actuation inhaler, Ventolin HFA 90 mcg/actuation aerosol inhaler  take 2 puffs every 4hrs as needed for cough, Disp: , Rfl:     cyclobenzaprine (FLEXERIL) 10 MG tablet, Take 10 mg by mouth 3 (three) times daily as needed for Muscle spasms., Disp: , Rfl:     diclofenac sodium (VOLTAREN) 1 % Gel, Apply 2 g topically 4 (four) times daily as needed. For pain, Disp: 1 Tube, Rfl: 0    ferrous sulfate 325 (65 FE) MG EC tablet, Take 325 mg by mouth once daily. , Disp: , Rfl:     furosemide (LASIX) 40 MG tablet, Take 60 mg by mouth once daily. , Disp: , Rfl: 3    gabapentin (NEURONTIN) 300 MG capsule, Take 300 mg by mouth 3 (three) times daily., Disp: , Rfl:     pantoprazole (PROTONIX) 40 MG tablet, Take 1 tablet (40 mg total) by mouth 2  "(two) times daily., Disp: 180 tablet, Rfl: 3    sertraline (ZOLOFT) 25 MG tablet, Take 1 tablet by mouth once daily., Disp: , Rfl: 1    spironolactone (ALDACTONE) 100 MG tablet, Take 150 mg by mouth once daily. , Disp: , Rfl: 3    aluminum & magnesium hydroxide-simethicone (MAALOX MAXIMUM STRENGTH) 400-400-40 mg/5 mL suspension, Take by mouth every 6 (six) hours as needed for Indigestion., Disp: , Rfl:     thiamine 100 MG tablet, Take 1 tablet (100 mg total) by mouth 2 (two) times daily., Disp: 30 tablet, Rfl: 0    She is tolerating aldactone for edema and neurontin for paresthesias    CONSTITUTIONAL: No fevers, chills, night sweats, wt. loss  SKIN:HIVES  ENT: No headaches, head trauma, or eye pain  LYMPH NODES: None noticed   CV: No chest pain, palpitations.   RESP: + sob and dyspnea on exertion, no cough, wheezing  GI: No nausea, emesis, diarrhea, constipation, melena, hematochezia ++ distention progressing   : No dysuria, hematuria, urgency, or frequency   HEME: Continued  easy bruising, history of bleeding problems  PSYCHIATRIC: Positive depression, no anxiety  NEURO: No seizures, memory loss, dizziness or difficulty sleeping  MSK: No arthralgias or joint swelling  Legs getting more swollen Now red with ulcer and scab on left lower leg   No further bleeding   Cervical abnormality on pap smear followed by gyn        BP (!) 119/58   Pulse 104   Temp 98.7 °F (37.1 °C) (Oral)   Resp 20   Ht 5' 1" (1.549 m)   Wt 118.9 kg (262 lb 2 oz)   LMP 08/06/2017 (Approximate)   BMI 49.53 kg/m²   Gen: NAD, A and O x3,  Conversant   Psych: pleasant yet somewhat flat affect, normal thought process  Eyes: Pupils round and non dilated, EOM intact  Nose: Nares patent  OP clear, mucosa patent  Neck: suppple, no JVD, trachea midline, no adenopathy  Lungs: decreased BS bilateral bases No fremitus  CV: S1S2 with RR Tachycardia   Abd: soft, NTND, obese + ascites  Today   Extr: No CC positive  3 + pitting edema  POSITIVE " distention , hard   Left leg with bandaid but erythema evident   Neuro: steady gait, CNs grossly intact  Skin: raised plaques   Rheum: No joint swelling    genotype 1 a HCV  Lab Results   Component Value Date    WBC 4.00 06/10/2019    RBC 3.56 (L) 06/10/2019    HGB 10.3 (L) 06/10/2019    HCT 31.6 (L) 06/10/2019    MCV 89 06/10/2019    MCH 28.8 06/10/2019    MCHC 32.4 06/10/2019    RDW 19.2 (H) 06/10/2019    PLT 93 (L) 06/10/2019    MPV 9.5 06/10/2019    GRAN 2.9 06/10/2019    GRAN 72.4 06/10/2019    LYMPH 0.5 (L) 06/10/2019    LYMPH 12.4 (L) 06/10/2019    MONO 0.4 06/10/2019    MONO 11.2 06/10/2019    EOS 0.1 06/10/2019    BASO 0.00 06/10/2019    EOSINOPHIL 3.4 06/10/2019    BASOPHIL 0.6 06/10/2019       Ref Range & Cxbaw5u ago  Iron30 - 160 ug/dL16 Abnormally low  Npviixxylvf195 - 375 mg/dL230 YFPW825 - 450 ug/dL340 Saturated Iron20 - 50 %5 Abnormally low      Hepatitis C virus infection without hepatic coma, unspecified chronicity  -     Ambulatory referral to Transplant, Liver    Anemia, unspecified type  -     CBC auto differential; Standing  -     CMP; Future; Expected date: 06/27/2019    Esophageal varices without bleeding, unspecified esophageal varices type    Other ascites    Rash  -     predniSONE (DELTASONE) 10 MG tablet; Take 1 tablet (10 mg total) by mouth 2 (two) times daily. for 3 days  Dispense: 6 tablet; Refill: 0  -     hydrOXYzine (ATARAX) 50 MG tablet; Take 1 tablet (50 mg total) by mouth every 12 (twelve) hours as needed for Itching.  Dispense: 10 tablet; Refill: 0        1.  Cirrhosis and splenomegaly and new rash   2.  Normocytic anemia   3.  Hepatitis C completed  treatment  See Dr harvey   4.  Thrombocytopenia with varices with no recent bleeding   5.  ascites cont paracentesis  6.  GRIMALDO   7.  Tobacco use  Stop B1   Start atarax   Prednisone for 3 days    High risk for thromboembolism due to morbid obesity and sedentary lifestyle   To GI for monitoring of varices today and vaices    treatment for her hep C followed by Dr Whitehead  [facility] 2 months with labs to assess the need for IV iron     Thank you for allowing me to evaluate and participate in the care of this pleasant patient. Please fell free to call me with any questions or concerns.    Warmly,  Elizabet Valencia MD    This note was dictated with Dragon and briefly proofread. Please excuse any sentences that may be unclear or words misspelled

## 2019-06-27 NOTE — TELEPHONE ENCOUNTER
----- Message from Nely Carlos LPN sent at 6/27/2019 12:38 PM CDT -----  New referral placed for the pateint     Pt will be referred to Hepatitis C clinic  Hepatitis C virus infection without hepatic coma, unspecified chronicity  Initial referral received  from the [facility]que.   Referring Provider/diagnosis  Elizabet Valencia MD

## 2019-06-27 NOTE — LETTER
June 27, 2019    Karen Villarreal  [address] [place] [id number]      Dear Karen Villarreal:    Your doctor has referred you to the [facility]. We are sending this letter to remind you to make an appointment with us to complete the referral process.     Please call us at [phone] to schedule an appointment. We look forward to seeing you soon.     If you received a call and have been scheduled, please disregard this letter.       Sincerely,        [facility]   [address] [place] [id number]  [phone]

## 2019-07-01 ENCOUNTER — OFFICE VISIT (OUTPATIENT)
Dept: GASTROENTEROLOGY | Facility: CLINIC | Age: 54
End: 2019-07-01
Payer: MEDICAID

## 2019-07-01 ENCOUNTER — PATIENT MESSAGE (OUTPATIENT)
Dept: GASTROENTEROLOGY | Facility: CLINIC | Age: 54
End: 2019-07-01

## 2019-07-01 ENCOUNTER — HOSPITAL ENCOUNTER (OUTPATIENT)
Dept: RADIOLOGY | Facility: HOSPITAL | Age: 54
Discharge: HOME | End: 2019-07-01
Attending: INTERNAL MEDICINE
Payer: MEDICAID

## 2019-07-01 VITALS
TEMPERATURE: 99 F | RESPIRATION RATE: 18 BRPM | WEIGHT: 252 LBS | DIASTOLIC BLOOD PRESSURE: 72 MMHG | HEART RATE: 105 BPM | BODY MASS INDEX: 47.58 KG/M2 | SYSTOLIC BLOOD PRESSURE: 105 MMHG | HEIGHT: 61 IN

## 2019-07-01 VITALS
SYSTOLIC BLOOD PRESSURE: 125 MMHG | DIASTOLIC BLOOD PRESSURE: 58 MMHG | OXYGEN SATURATION: 98 % | RESPIRATION RATE: 18 BRPM | HEART RATE: 92 BPM

## 2019-07-01 DIAGNOSIS — M19.90 ARTHRITIS: ICD-10-CM

## 2019-07-01 DIAGNOSIS — K76.6 PORTAL HYPERTENSION: ICD-10-CM

## 2019-07-01 DIAGNOSIS — R18.8 CIRRHOSIS OF LIVER WITH ASCITES, UNSPECIFIED HEPATIC CIRRHOSIS TYPE: ICD-10-CM

## 2019-07-01 DIAGNOSIS — I85.00 ESOPHAGEAL VARICES WITHOUT BLEEDING, UNSPECIFIED ESOPHAGEAL VARICES TYPE: ICD-10-CM

## 2019-07-01 DIAGNOSIS — I10 ESSENTIAL HYPERTENSION: ICD-10-CM

## 2019-07-01 DIAGNOSIS — K74.60 HEPATIC CIRRHOSIS, UNSPECIFIED HEPATIC CIRRHOSIS TYPE, UNSPECIFIED WHETHER ASCITES PRESENT: ICD-10-CM

## 2019-07-01 DIAGNOSIS — B18.2 CHRONIC HEPATITIS C WITHOUT HEPATIC COMA: ICD-10-CM

## 2019-07-01 DIAGNOSIS — R18.8 OTHER ASCITES: ICD-10-CM

## 2019-07-01 DIAGNOSIS — K74.60 CIRRHOSIS OF LIVER WITH ASCITES, UNSPECIFIED HEPATIC CIRRHOSIS TYPE: ICD-10-CM

## 2019-07-01 DIAGNOSIS — E66.01 MORBID OBESITY: ICD-10-CM

## 2019-07-01 DIAGNOSIS — R10.9 ABDOMINAL PAIN, UNSPECIFIED ABDOMINAL LOCATION: ICD-10-CM

## 2019-07-01 DIAGNOSIS — K21.9 GASTROESOPHAGEAL REFLUX DISEASE, ESOPHAGITIS PRESENCE NOT SPECIFIED: ICD-10-CM

## 2019-07-01 DIAGNOSIS — B19.20 HEPATITIS C VIRUS INFECTION WITHOUT HEPATIC COMA, UNSPECIFIED CHRONICITY: Primary | ICD-10-CM

## 2019-07-01 PROCEDURE — 63600175 PHARM REV CODE 636 W HCPCS: Performed by: RADIOLOGY

## 2019-07-01 PROCEDURE — 99215 PR OFFICE/OUTPT VISIT, EST, LEVL V, 40-54 MIN: ICD-10-PCS | Mod: ,,, | Performed by: INTERNAL MEDICINE

## 2019-07-01 PROCEDURE — 49083 ABD PARACENTESIS W/IMAGING: CPT

## 2019-07-01 PROCEDURE — 49083 US GUIDED PARACENTESIS INC IMAGING: ICD-10-PCS | Mod: ,,, | Performed by: RADIOLOGY

## 2019-07-01 PROCEDURE — P9047 ALBUMIN (HUMAN), 25%, 50ML: HCPCS | Performed by: RADIOLOGY

## 2019-07-01 PROCEDURE — 99215 OFFICE O/P EST HI 40 MIN: CPT | Mod: ,,, | Performed by: INTERNAL MEDICINE

## 2019-07-01 PROCEDURE — 49083 ABD PARACENTESIS W/IMAGING: CPT | Mod: ,,, | Performed by: RADIOLOGY

## 2019-07-01 PROCEDURE — A7048 VACUUM DRAIN BOTTLE/TUBE KIT: HCPCS

## 2019-07-01 RX ORDER — ALBUMIN HUMAN 250 G/1000ML
25 SOLUTION INTRAVENOUS
Status: DISCONTINUED | OUTPATIENT
Start: 2019-07-01 | End: 2019-07-01

## 2019-07-01 RX ADMIN — ALBUMIN (HUMAN) 25 G: 12.5 SOLUTION INTRAVENOUS at 10:07

## 2019-07-01 NOTE — NURSING
Ultrasound guided paracentesis performed by - 8.5 L removed- Patient received 25 g albumin IV- VS remained stable for duration of procedure- IV d/c'd, with tip intact. Access site cleaned with peroxide, derma bond and steri-strips applied, then covered with sterile gauze and tape. Pt discharge home in stable condition.

## 2019-07-01 NOTE — PROGRESS NOTES
[facility] Established Patient Visit    Subjective:           PCP: Shama Mccoy    Chief Complaint: Follow-up and Hepatitis C (med )       HPI:  Karen Villarreal is a 54 y.o. female here for follow-up of decompensated liver disease and hepatitis C. Patient is negative for the virus and hopefully will have a sustained virological response. She had paracentesis done today and had a 8/2 L of fluid removed. She is going to have an appointment with [facility] for transplant evaluation. She needs to lose some weight and will be on a sodium restricted diet and fluid restriction to 1500 cc. Recent lab work from the last 2 months have been reviewed in detail. Patient has had endoscopies with band ligation and periodic endoscopies will be done for surveillance purposes.    ROS:   Constitutional: No fevers, chills, weight loss  ENT: No allergies, sore throat, rhinorrhea  CV: No chest pain, palpitations, edema  Pulm: No cough, shortness of breath, wheezing  Ophtho: No vision changes  GI: No blood in stools, change in bowel habits, nausea/vomiting  Denies alternating diarrhea/constipation.   Derm: No rash  Heme: No easy bruising or lymphadenopathy  MSK: No arthralgias or myalgias  : No dysuria, hematuria, frequency, polyuria, or flank tenderness  Endo: No hot or cold intolerance  Neuro: No syncope or seizure, or dizziness  Psych: No hallucination, depression or suicidal ideation      Medical History:  has a past medical history of Acid reflux, Anemia, Arthritis, Ascites (03/08/2017), Cataract, Cirrhosis, Depression, Encounter for blood transfusion, Hepatitis C, Hiatal hernia, Ovary removal, prophylactic, Renal cyst (03/08/2017), Thrombocytopenia, and Varices, esophageal.      Surgical History:  has a past surgical history that includes Appendectomy; Ankle surgery; Cataract extraction; Esophagogastroduodenoscopy (N/A, 2/2/2019); Esophagogastroduodenoscopy (N/A, 2/5/2019);  Esophagogastroduodenoscopy (N/A, 4/29/2019); Oophorectomy; Esophagogastroduodenoscopy (N/A, 5/22/2019); Esophagogastroduodenoscopy (N/A, 6/4/2019); and Esophagogastroduodenoscopy (N/A, 6/25/2019).    Family History:   Family History   Problem Relation Age of Onset    Heart failure Mother     COPD Mother     Arthritis Mother     Vision loss Mother     Diabetes Father     Cancer Father     Brain cancer Father     Diabetes type II Father        Social History:   Social History     Tobacco Use    Smoking status: Current Some Day Smoker     Packs/day: 0.50     Years: 33.00     Pack years: 16.50     Types: Cigarettes    Smokeless tobacco: Never Used   Substance Use Topics    Alcohol use: No     Frequency: Never     Comment: quit 2017    Drug use: No       The patient's social and family histories were reviewed by me and updated in the appropriate section of the electronic medical record.    Allergies:   Review of patient's allergies indicates:   Allergen Reactions    Adhesive Blisters     Clear tape leaves blisters and tears skin. Paper tape is ok.          Medications:   Current Outpatient Medications   Medication Sig Dispense Refill    albuterol (VENTOLIN HFA) 90 mcg/actuation inhaler Ventolin HFA 90 mcg/actuation aerosol inhaler   take 2 puffs every 4hrs as needed for cough      aluminum & magnesium hydroxide-simethicone (MAALOX MAXIMUM STRENGTH) 400-400-40 mg/5 mL suspension Take by mouth every 6 (six) hours as needed for Indigestion.      cyclobenzaprine (FLEXERIL) 10 MG tablet Take 10 mg by mouth 3 (three) times daily as needed for Muscle spasms.      diclofenac sodium (VOLTAREN) 1 % Gel Apply 2 g topically 4 (four) times daily as needed. For pain 1 Tube 0    ferrous sulfate 325 (65 FE) MG EC tablet Take 325 mg by mouth once daily.       furosemide (LASIX) 40 MG tablet Take 60 mg by mouth once daily.   3    gabapentin (NEURONTIN) 300 MG capsule Take 300 mg by mouth 3 (three) times daily.       "hydrOXYzine (ATARAX) 50 MG tablet Take 1 tablet (50 mg total) by mouth every 12 (twelve) hours as needed for Itching. 10 tablet 0    pantoprazole (PROTONIX) 40 MG tablet Take 1 tablet (40 mg total) by mouth 2 (two) times daily. 180 tablet 3    sertraline (ZOLOFT) 25 MG tablet Take 1 tablet by mouth once daily.  1    spironolactone (ALDACTONE) 100 MG tablet Take 150 mg by mouth once daily.   3     No current facility-administered medications for this visit.      All medications and past history have been reviewed.        Objective:        Vital Signs:  Blood pressure 105/72, pulse 105, temperature 99 °F (37.2 °C), resp. rate 18, height 5' 1" (1.549 m), weight 114.3 kg (252 lb), last menstrual period 08/06/2017. Body mass index is 47.61 kg/m².    Physical Exam:   General Appearance: Well appearing in no acute distress, well developed, well                nourished  Head: Normocephalic, without obvious abnormality  Eyes:  Pupils equal, round and reactive to light  Throat: Lips, mucosa, and tongue normal; teeth and gums normal  Lungs: CTA bilaterally in anterior and posterior fields, no wheezes, no crackles  Heart:  Regular rate and rhythm, no murmurs heard  Abdomen: Soft, non tender, non distended with positive bowel sounds in all four quadrants. No hepatosplenomegaly, ascites, or mass. Negative for succusion splash  : female   Extremities: No cyanosis, edema or deformity  Skin: No rash  Neurologic: Normal exam    Labs:  Lab Results   Component Value Date    WBC 4.00 06/10/2019    HGB 10.3 (L) 06/10/2019    HCT 31.6 (L) 06/10/2019    PLT 93 (L) 06/10/2019    ALT 18 05/16/2019    AST 29 05/16/2019     (L) 05/16/2019    K 3.4 (L) 05/16/2019    CL 99 05/16/2019    CREATININE 0.9 05/16/2019    BUN 8 05/16/2019    CO2 29 05/16/2019    TSH 3.23 04/17/2019    INR 1.2 06/19/2019       Imaging:     All lab results and imaging results have been reviewed and discussed with the patient      Assessment:       1. " Hepatitis C virus infection without hepatic coma, unspecified chronicity    2. Hepatic cirrhosis, unspecified hepatic cirrhosis type, unspecified whether ascites present    3. Chronic hepatitis C without hepatic coma    4. Abdominal pain, unspecified abdominal location    5. Esophageal varices without bleeding, unspecified esophageal varices type    6. Portal hypertension    7. Gastroesophageal reflux disease, esophagitis presence not specified    8. Morbid obesity    9. Essential hypertension    10. Arthritis        Plan:       Karen was seen today for follow-up and hepatitis c.    Diagnoses and all orders for this visit:    Hepatitis C virus infection without hepatic coma, unspecified chronicity  -     Hepatitis B Surface Antibody, Qual/Quant; Future  -     Hepatitis C RNA, quantitative, PCR; Future  -     Hepatitis B core antibody, total; Future  -     Hepatitis A antibody, total; Future  -     Hepatitis B Surface Antibody, Qual/Quant  -     Hepatitis C RNA, quantitative, PCR  -     Hepatitis B core antibody, total  -     Hepatitis A antibody, total    Hepatic cirrhosis, unspecified hepatic cirrhosis type, unspecified whether ascites present    Chronic hepatitis C without hepatic coma    Abdominal pain, unspecified abdominal location    Esophageal varices without bleeding, unspecified esophageal varices type    Portal hypertension    Gastroesophageal reflux disease, esophagitis presence not specified    Morbid obesity    Essential hypertension    Arthritis        See HPI    No follow-ups on file.    The plan was discussed with the patient and all questions/concerns have been answered to the patient's satisfaction.        Electronically signed by Leigha Whitehead MD    This note was dictated using voice recognition software and may contain grammatical errors.

## 2019-07-03 DIAGNOSIS — K74.60 HEPATIC CIRRHOSIS, UNSPECIFIED HEPATIC CIRRHOSIS TYPE, UNSPECIFIED WHETHER ASCITES PRESENT: Primary | ICD-10-CM

## 2019-07-15 NOTE — NURSING
Ultrasound guided paracentesis performed by - 9050 mLs removed- Patient received 75g albumin IV- VS remained stable for duration of procedure- IV d/c'd, with tip intact. Access site cleaned with peroxide, derma bond and steri-strips applied, then covered with sterile gauze and tape. Pt discharge home.

## 2019-07-15 NOTE — PROGRESS NOTES
Radiology Post-Procedure Note    Pre Op Diagnosis: ascites  Post Op Diagnosis: Same    Procedure: paracentesis under ultrasound guidance    Procedure performed by: Dr. Alyssa Hong    Written Informed Consent Obtained: Yes  Specimen Removed: YES 9100 cc clear yellow ascites  Estimated Blood Loss: Minimal    Findings:   Successful paracentesis under ultrasound guidance. Patient to follow up wit her physician. Dr. Montenegro    Patient tolerated procedure well.    @SIG@

## 2019-07-26 NOTE — LETTER
July 26, 2019      Elizabet Valencia MD  [address] [facility]  [place] [address] [place] [address]           Nakul Huizar - Hepatitis C  [address]  [facility] [address]  Phone: [phone]  Fax: [phone]          Patient: Karen Villarreal   MR Number: [id number]   YOB: 1965   Date of Visit: 7/26/2019       Dear Dr. Elizabet Valencia:    Thank you for referring Karen Villarreal to me for evaluation. Attached you will find relevant portions of my assessment and plan of care.    If you have questions, please do not hesitate to call me. I look forward to following Karen Villarreal along with you.    Sincerely,    Jennifer B. Scheuermann, PA    Enclosure  CC:  No Recipients    If you would like to receive this communication electronically, please contact [email or web address] or [phone] to request more information on [employer] access.    For providers and/or their staff who would like to refer a patient to [facility], please contact us through our one-stop-shop provider referral line, [facility], at [phone].    If you feel you have received this communication in error or would no longer like to receive these types of communications, please e-mail [email or web address]

## 2019-07-26 NOTE — PROGRESS NOTES
HEPATOLOGY CLINIC VISIT NOTE - HCV clinic    REFERRING PROVIDER: Elizabet Valencia MD    CHIEF COMPLAINT: Hepatitis C   (here w/ sister)    HISTORY: This is a 54 y.o. White female w/ decompensated HCV cirrhosis (seen by Suzette Smith NP in 2017, referred to HCV clinic but lost to f/u; more recently followed by Dr Whitehead for GI / Liver.)  Recently referred by hematology for liver transplant eval and somehow scheduled with me in HCV clinic.      Appears cirrhosis first identified in 2017 when pt seen in ER w/ abdominal distention. Found to have small amt ascites that was managed w/ diuretics.     Epic notes late 2018 indicate increasing fluid retention and Fe def anemia. Ultimately admitted to [facility] w/ variceal bleed 2/2/19 and MELD score up to 17. (Has had several scopes w/ banding since then, finally achieving complete eradication 6/25/19). Required paracentesis w/ 5.2L removed shortly after discharge,  2/20/19.     Began 12 weeks Mavyret ~ late 3/2019; subsequently developed refractory ascites requiring large volume paracentesis every 2 weeks (6 michelle since 5/2/19; next one scheduled Mon).    Presently pt c/o increasing abdominal distention w/ bilat DOLORES.    Had weeping of lower legs couple weeks ago but not now.    Currently on Spirolonactone 150mg & Furosemide 60mg daily.    Denies knowledge of LAURY due to diuretics.    Reports she's trying to follow low Na diet.    No slowed mentation, confusion, memory trouble, jaundice, dark urine.  No recent hematemesis, melena  No f/c or significant abdominal pain      Cirrhosis history:  Decompensated, complicated by portal HTN w/ recent (2/2019) variceal bleeding / banding      MELD up to 17 during variceal bleed (2/2019)  Most recent MELD 5/2019 - 10    -- Refractory ascites requiring large volume paracenteses  / DOLORES / heme notes mentioned anasarca?  -- Mild TBili elevations up to 1.5 - 2.3  -- Plt decreased 70s-120s  -- INR up to 1.5 at time of variceal bleed;  most recent down to 1.2  -- Albumin decreased 2.1-2.6 last few months  No HE    Cirrhosis monitoring:  · HCC screening overdue   6/2018 CT abdomen w/ contrast - no liver lesions   Last AFP 2017  · Varices surveillance   Variceal bleed / banding 2/2019, s/p several EGDs w/ repeat banding   6/25/2019 EGD (Dr Whitehead) - grade II esoph varices, banded w/ complete eradication.    F/U EGD recommended after 6 weeks - not scheduled yet      HCV history:  Originally diagnosed 1990s  Genotype 1a  Prior treatments:   SIFN + RBV x 6 months - nonresponder.    Changed to PegIFN monotherapy x 9 months - nonresponder.   Completed 12 weeks mavyret ~ 1 month ago.  4/17/19 HCV negative (wk 4?).       Past Medical History:   Diagnosis Date    Acid reflux     Anemia     Arthritis     Ascites 03/08/2017    Cataract     Cirrhosis     Depression     Encounter for blood transfusion     Hepatitis C     Hiatal hernia     Ovary removal, prophylactic     Renal cyst 03/08/2017    Thrombocytopenia     Varices, esophageal        Past Surgical History:   Procedure Laterality Date    ANKLE SURGERY      APPENDECTOMY      CATARACT EXTRACTION      EGD (ESOPHAGOGASTRODUODENOSCOPY) N/A 6/25/2019    Performed by Justin [facility]egro [facility] at [facility] ENDO    EGD (ESOPHAGOGASTRODUODENOSCOPY) N/A 6/4/2019    Per[facility] by [facility] at [facility] END[facility]    EGD (ESOPHAGOGASTRODUODENOSCOPY) N/A 5/22/2019    Performed by [facility] at [facility] END[facility]    EGD (ESOPHAGOGASTRODUODENOSCOPY) N/A 4/29/2019    Performed by [facility] at [facility] END[facility]    EGD (ESOPHAGOGASTRODUODENOSCOPY) N/A 2/5/2019    Performed by [facility]a [facility]head [facility] at [facility] ENDO    EGD (ESOPHAGOGASTRODUODENOSCOPY) N/A 2/2/2019    Performed by [facility]head [facility] at [facility] END[facility]    EXTRACTION-CATARACT-IOL Right 1/5/2018    Performed by Dg [facility] at [facility] OR    EXTRACTION-CATARACT-IOL Left 12/1/2017    Performed by [facility]g [facility] at [facility] OR     OOPHORECTOMY         FAMILY HISTORY: Negative for liver disease    SOCIAL HISTORY:   Resides w/ sister in [place]  Social History     Tobacco Use   Smoking Status Current Some Day Smoker    Packs/day: 0.50    Years: 33.00    Pack years: 16.50    Types: Cigarettes   Smokeless Tobacco Never Used     Alcohol - none in 2 years. Describes periods of heavy alcohol in past      ROS:   No fever, chills, weight loss, (+) fatigue  No chest pain, palpitations, dyspnea, cough  (+) abdominal distention. No change in bowel pattern, nausea, vomiting, GERD  No dysuria, hematuria   No skin rashes   No headaches  (+) bilat lower extremity edema  No depression or anxiety      PHYSICAL EXAM:  Friendly White female, in no acute distress; alert and oriented to person, place and time  VITALS: reviewed  HEENT: Sclerae anicteric.   NECK: Supple  CVS: Regular rate and rhythm. No murmurs  LUNGS: Normal respiratory effort. Clear bilaterally  ABDOMEN: Protuberant. Nontender. No organomegaly or masses appreciated but exam limited by ascites. Good bowel sounds.    SKIN: Warm and dry. No jaundice, No obvious rashes.   EXTREMITIES: Pitting bilat lower extremity edema  NEURO/PSYCH: Normal gate. Memory intact. Thought and speech pattern appropriate. Behavior normal. No depression or anxiety noted. No asterixis     RECENT LABS:  Lab Results   Component Value Date    WBC 4.95 07/15/2019    HGB 10.4 (L) 07/15/2019    PLT 74 (L) 07/15/2019     Lab Results   Component Value Date    INR 1.2 07/15/2019     Lab Results   Component Value Date    AST 29 05/16/2019    ALT 18 05/16/2019    BILITOT 1.7 (H) 05/16/2019    ALBUMIN 2.5 (L) 05/16/2019    ALKPHOS 284 (H) 05/16/2019    CREATININE 0.9 05/16/2019    BUN 8 05/16/2019     (L) 05/16/2019    K 3.4 (L) 05/16/2019    AFP 5.1 07/19/2017         RECENT IMAGING:  None recent.    Results for orders placed in visit on 06/07/18   CT Abdomen Pelvis W Wo Contrast    Narrative CMS MANDATED QUALITY DATA - CT  RADIATION ? 436    All CT scans at this facility utilize dose modulation, iterative reconstruction,  and/or weight based dosing when appropriate to reduce radiation dose to as low  as reasonably achievable.    CLINICAL HISTORY:  53 years (1965) Female Cirrhosis of liver , hepatitis C with no symptoms.    TECHNIQUE:  MDI ABDOMEN AND PELVIS W/WO CONTRAST CT. 653 images obtained. Axial CT images  of the abdomen and pelvis were obtained from the dome of the diaphragm to the  proximal thigh.    CONTRAST:  100 mL of IV Omni 350 was administered uneventfully by IV    COMPARISON:  Ultrasound from 06/07/2018    FINDINGS:.  Lower Thorax:  The lung bases are clear. The heart is normal in size and there is no  pericardial effusion.    CT Abdomen:  Liver: There are morphologic features of cirrhosis. The liver measures 15.3 cm  (sagittal right lobe) and has a nodular parenchymal contour with volume  redistribution (atrophy of the right lobe with hypertrophy of the caudate and  left lobe). Hepatic and portal veins appear patent where visualized. There are  findings in keeping with portal hypertension to include, recanalization of the  umbilical vein, and splenomegaly. There is trace ascites. Hepatic veins and  portal veins are patent. There are several periportal lymph nodes, likely  reactive in nature.    Gallbladder: The gallbladder is partially filled with stones but without wall  thickening or pericholecystic fluid to suggest acute cholecystitis.  Biliary Tree: There is no intra or extrahepatic duct dilation.  Spleen: The spleen is enlarged and homogeneous measuring 16.9 x 13.2 x 9.7 cm  (CC X AP X TR)  Pancreas: The pancreas is normal.  Adrenal Glands: The adrenal glands appear within normal limits.  Kidneys: The kidneys are normal in imaging appearance without hydronephrosis or  hydroureter.  Vasculature: The aorta is normal in course and caliber.  Lymph nodes: No abdominal lymphadenopathy is seen.  Intraperitoneal  structures: There is trace pelvic ascites.  Bowel: The partially opacified bowel is within normal limits. The appendix is  surgically absent.  Abdominal wall: There is a small fat-containing umbilical hernia.  Musculoskeletal: No acute osseous abnormality is identified.    CT Pelvis:  Bladder: The urinary bladder is within normal limits.  Reproductive Organs: The uterus appears within normal limits. The ovaries are  not well seen.  Pelvic Lymph nodes: No pelvic lymphadenopathy or pelvic mass is identified.    IMPRESSION:  1. Morphologic features of advanced cirrhosis with no findings to suggest  metastatic adenocarcinoma. Consider correlation with AFP and LFTs.    2. Findings suggestive of portal hypertension with moderate splenomegaly.    3. Trace ascites in the pelvis.    4. Cholelithiasis without acute cholecystitis.      Read and electronically signed by: Bettie Liu MD on 6/7/2018 1:48 PM CDT  BETTIE LIU MD           ASSESSMENT  54 y.o. White female with decompensated HCV Cirrhosis complicated by refractory ascites requiring repeat large volume paracenteses as well as recent variceal bleeding / banding. Labs from May revealed low MELD of 10, but this needs to be updated to better determine whether pt needs Transplant or General Hepatology follow up.    1. DECOMPENSATED CIRRHOSIS  -- HCC screening - overdue  -- MELD score 5/2019 - 10; needs to be updated  -- (+) immunity to HAV  -- s/p HBV vaccine: 8/28/2018, 7/26/2018, 6/27/2018  2. PORTAL HYPERTENSION W/ ESOPHAGEAL VARICES  -- Variceal bleed / banding 2/2019, s/p several EGDs w/ repeat banding  -- 6/25/2019 EGD (Dr Whitehead) - grade II esoph varices, banded w/ complete eradication.   3. REFRACTORY ASCITES  -- undergoing regular large volume paracenteses  -- on diuretics but may be able to increase dose if labs stable  4. CHRONIC HCV, GENOTYPE 1A  -- SIFN + RBV and PIFN nonresponder  -- s/p Mavyret x 12 weeks (ended Rx ~ 4 weeks  ago)      EDUCATION:  -- Discussed the basics about liver fibrosis /scarring and how that relates to liver function. Reviewed the significance of the MELD scoring system as it relates to indication for transplant.  -- Signs and symptoms of hepatic decompensation were reviewed, including jaundice, ascites, and slowed mentation due to hepatic encephalopathy. The patient should seek medical attention if any of these things occur.   --  We discussed the potential for bleeding from esophageal varices with symptoms of hematemesis and melena. The patient should report to the Emergency Department for these symptoms.   -- We discussed the increased risk of hepatocellular carcinoma due to cirrhosis. Lifelong screening every six months with ultrasound and AFP is recommended.   -- Discussed portal hypertension, including potential development of esophageal varices.      Dietary recommendations:  -- Avoid alcohol  -- Avoid raw seafood  -- Limit Na to 2,000mg   -- Avoid prolonged fasting. Encouraged smaller more frequent meals with increased protein      PLAN:  1. Labs today: CBC, CMP, INR, AFP, HCV RNA.   - Will adjust diuretics after lab review if possible  - Will require continued HCV monitroing to assess for SVR12  2. Proceed w/ therapeutic paracentesis Monday.   - Will add fluid ascitic fluid analysis to include cytology, cell count and diff, culture, albumin, total protein  3. U/S abdomen to be done early next week before ascitic fluid has re accumulated.  4. Increase protein in diet, limit Na to 2000mg    Will determine if f/u needed in General hepatology or Liver Transplant after lab review.     ADDENDUM:  Lab Results   Component Value Date    BUN 4 (L) 07/26/2019    CREATININE 0.7 07/26/2019     (L) 07/26/2019    K 3.5 07/26/2019     Left VM for pt to increase diuretics to aldactone 200mg and furosemide 80mg daily

## 2019-07-29 NOTE — NURSING
Ultrasound guided paracentesis performed by Dr. Boone- 6.5 L removed- Patient received 25g albumin IV- VS remained stable for duration of procedure- specimens sent off to lab per order-  IV d/c'd, with tip intact. Access site cleaned with peroxide, derma bond and steri-strips applied, then covered with sterile gauze and tape. Pt discharge home.

## 2019-07-31 NOTE — TELEPHONE ENCOUNTER
Recent tests reviewed:  Paracentesis 7/29/19 - 6.5 L ascitic fluid removed  · No SBP  · SAAG 2.3 - confirms portal HTN  · Total protein < 1.0 - indicates transudate  · Cytology pending  · Cultures pending, neg so far    MELD-Na score: 10 at 7/26/2019  3:00 PM  MELD score: 10 at 7/26/2019  3:00 PM  Calculated from:  Serum Creatinine: 0.7 mg/dL (Rounded to 1 mg/dL) at 7/26/2019  3:00 PM  Serum Sodium: 134 mmol/L at 7/26/2019  3:00 PM  Total Bilirubin: 1.9 mg/dL at 7/26/2019  3:00 PM  INR(ratio): 1.1 at 7/26/2019  3:00 PM  Age: 54 years    7/26/19 - HCV neg (~ 4 weeks after ending HCV rx) - SVR4  7/26/19 - AFP 4.0. U/S is scheduled today 7/31/19.    Diuretics were increased day of visit (left ) 7/26/19: aldactone 200mg and furosemide 80mg daily  ______________________________________________________________  pls call pt:  1. Tell her I have most of fluid analysis tests back and they look okay so far. No evidence of infection. I'll be in touch again when rest are back.   2. Make sure she got msg to increase fluid pills (I left  Friday) - aldactone 200mg and furosemide 80mg daily. We will need to monitor blood to make sure the new dose is safe for kidneys. If she does well with it we may be able to increase them a little more but I'll let her know.  3. Tell her HCV is negative as of 7/26. There is still a chance virus can return over next 2 months. We will monitor blood for this.  4. MELD score (liver sickness score) was 10, indicating that right now there is no reason for pt to be seen in transplant clinic.    Keep u/s appt today wed 7/31/19  Schedule BMP Thurs of this week - 8/1/19  Tell her to call us if fluid is building up again so we can schedule next paracentesis if/when needed  Continue low sodium diet and trying to increase protein in diet to help decrease fluid accumulation

## 2019-08-01 NOTE — TELEPHONE ENCOUNTER
I spoke with patient and msg from PA Scheuermann relayed.  She confirms increasing diuretics as ordered.  US complete and bmp scheduled 8/2/19.

## 2019-08-02 NOTE — TELEPHONE ENCOUNTER
Pt was supposed to have BMP done 8/1 or 8/2 so I could monitor labs after increasing her fluid pills.  Doesn't look like it was done.    pls r/s ASAP    Also, tell her I'm still waiting for the rest of the tests on the fluid to come back    thanks

## 2019-08-05 NOTE — TELEPHONE ENCOUNTER
Pt was supposed to have BMP done 8/1 or 8/2 so I could monitor labs after increasing her fluid pills.  Labs weren't done  I asked for someone to call pt to r/s to 8/5.  Not sure if pt was called or not but ultimately pt did not have labs today 8/5    PLEASE CALL pt:  She needs labs ASAP so I can monitor things since I recently increased diuretics.    Also, pls find out fluid status. Does she think she will need another paracentesis soon??

## 2019-08-05 NOTE — TELEPHONE ENCOUNTER
Spoke to pt  She has enough diuretics to get through next couple days.  Will wait for labs to see if dose can be increased before refilling

## 2019-08-05 NOTE — TELEPHONE ENCOUNTER
Patient did not have blood drawn on 8/2 as ordered.  I spoke with patient who states that she moved draw to 8/5 and did not go for draw because of the weather and her not feeling well.  Msg from PA Scheuermann relayed and lab draw scheduled 8/6.  I stressed the importance of draw.    Patient states that she notice a small change in fluid retention in her legs but her belly has not changed since diuretics increased.  She states that she's already scheduled to have michelle done every 2 wks and the next one is scheduled 8/13/19 and she feels she can wait for the scheduled appt.      Patient states that she's about to run out of diuretics and wants PA Scheuermann to call in new Rxs for furosemide and spironolactone.

## 2019-08-08 NOTE — TELEPHONE ENCOUNTER
Spoke to pt:  Remainder of ascitic fluid analysis complete:  · Cytology neg for malignancy  · Cultures pending    Pt still on spriro 200 and lasix 80 w/ no change in fluid accumulation.   · Still w/ notable ascites and DOLORES  · Has paracentesis scheduled locally every 2 weeks right now  Will increase diuretics to radu 300 & lasix 160mg (new rx sent) and check BMP in 1 week    Case reviewed w/ Dr Webster:  Currently pt at SVR4  Will monitor fluid status over next 3-6 months as there is a chance it will improve if she achieves full SVR12  Will hold off on considering TIPS for now    nicho Galarza schedule:  · BMP wed 8/14  · Visit w/ me in [place] (Oct is fine. She said she will come to sooner appt in Sept if we get cancellation. pls add to list)  · Can you make sure she realizes the refills I sent are for a higher strength furosemide - so she only needs to take two pills to get to 160mg?     thanks

## 2019-08-08 NOTE — TELEPHONE ENCOUNTER
I spoke to patient and stressed that she only needs to take two pills to get 160 mg of furosemide.  Msg from provider also mailed to her to stress instructions.  Patient asked that lab be scheduled on 8/13 since she has a para on that day; done.  F/u visit scheduled 10/15; reminder notice mailed.

## 2019-08-13 NOTE — DISCHARGE SUMMARY
Radiology Discharge Summary      Hospital Course: No complications    Admit Date: 8/13/2019  Discharge Date: 08/13/2019     Instructions Given to Patient: Yes  Diet: Resume prior diet  Activity: activity as tolerated    Description of Condition on Discharge: Stable  Vital Signs (Most Recent): Pulse: 99 (08/13/19 1339)  BP: (!) 121/57 (08/13/19 1339)  SpO2: 100 % (08/13/19 1339)    Discharge Disposition: Home    Discharge Diagnosis: ascites status post US guided paracentesis     Follow-up: as per referring provider    @SIG@

## 2019-08-13 NOTE — TELEPHONE ENCOUNTER
8/13/19 labs reviewed  BUN 6, Cr 0.8, Na 136, K+ 3.4 (down from 4.1)    Tried to call pt - no answer  (noted she was having paracentesis today - report reveals 9L ascitic fluid removed)    pls call pt  · Should be taking spironolactone 300 & furosemide 160mg   · Tell her kidney numbers look fine  · Potassium is a little low. Have her increase potassium in diet (oranges, OJ, banana, melons, green veggies)  · Schedule BMP in 1 week    thanks

## 2019-08-13 NOTE — NURSING
Ultrasound guided paracentesis performed by Dr. Masters- 9030 mLs removed- Patient received 75g albumin IV- VS remained stable for duration of procedure- IV d/c'd, with tip intact. Access site cleaned with peroxide, derma bond and steri-strips applied, then covered with sterile gauze and tape. Pt discharge home.

## 2019-08-20 NOTE — TELEPHONE ENCOUNTER
Attempt made to reach patient at both numbers listed.  Msg from PA Scheuermann left on her VM.  BMP scheduled 8/20 as ordered and appt info stressed.

## 2019-08-22 NOTE — TELEPHONE ENCOUNTER
8/21/19 labs reviewed  BUN 11, Cr 1.1, Na 136, K+ 4.0    Last para 8/13/19 - 9L fluid removed    pls call pt  · Labs are stable. Potassium is normal. Kidney numbers have increased a little but are stable. We will keep monitoring them  · Continue spironolactone 300 & furosemide 160mg   · Continue w/ paracentesis as needed for fluid. Keep f/u appt w/ me in couple months.  · Schedule BMP in 7-10 days    thanks

## 2019-08-22 NOTE — TELEPHONE ENCOUNTER
Attempt made to reach patient.  Msg from PA Scheuermann left on her VM and mailed to her.  CMP already scheduled by oncology MD on 8/28/19.

## 2019-08-27 NOTE — PROGRESS NOTES
Radiology Post-Procedure Note    Pre Op Diagnosis: ascites  Post Op Diagnosis: Same    Procedure: ultrasound guided paracentesis    Procedure performed by: Dr. Alyssa Hong    Written Informed Consent Obtained: Yes  Specimen Removed: YES 8000 Liters of clear yellow ascites  Estimated Blood Loss: Minimal    Findings:   Successful paracentesis under ultrasound guidance.  Patient to follow up with her MD    Patient tolerated procedure well.    @SIG@

## 2019-08-27 NOTE — NURSING
Ultrasound guided paracentesis performed by Dr. Hong- 8 L removed- Patient received 50g albumin IV- VS remained stable for duration of procedure-  IV d/c'd, with tip intact. Access site cleaned with peroxide, derma bond and steri-strips applied, then covered with sterile gauze and tape. Pt discharge home.

## 2019-08-29 NOTE — TELEPHONE ENCOUNTER
8/28/19 labs reviewed  BUN71, Cr 0.8, Na 136, K+ 3.4    Last para 8/27/19 - 8L fluid removed    pls call pt  · Labs are stable  · Continue spironolactone 300 & furosemide 160mg   · Continue w/ paracentesis as needed for fluid. Keep f/u appt w/ me in couple months.  · Schedule CMP, INR in 1 month    thanks

## 2019-09-10 NOTE — NURSING
Ultrasound guided paracentesis performed by Dr. Denny- 6600 mLs removed- Patient received 25g albumin IV- VS remained stable for duration of procedure- IV d/c'd, with tip intact. Access site cleaned with peroxide, derma bond and steri-strips applied, then covered with sterile gauze and tape. Pt discharge home.

## 2019-09-12 NOTE — PROGRESS NOTES
CC: I saw the liver assistant and she started me on fluid pills    Karen Villarreal is a 54 y.o.  Pt is here for evaluation of thrombocytopenia and iron deficiency with no further vaginal bleeding. The patient has been having menometrorrhagia with low platelets.  She has a history of hepatitis C with documented cirrhosis and splenomegaly.  She is seeing a GI specialist Dr Whitehead  No further GYN bleeding episodes he is exhausted.  She is taking Protonix to help with gastritis and remains on Aldactone for control of fluid retention.   She was admitted to the hospital for GI bleeding , She did undergo a paracentesis for therapeutic reasons again recently   She has her Hep c meds Dr Whitehead  Started zoloft for depression  She has had to have paracentesis regularly   Started thiamine   New rash diffuse     Past Medical History:   Diagnosis Date    Acid reflux     Anemia     Arthritis     Ascites 03/08/2017    Cataract     Cirrhosis     Depression     Encounter for blood transfusion     Hepatitis C     Hiatal hernia     Ovary removal, prophylactic     Renal cyst 03/08/2017    Thrombocytopenia     Varices, esophageal        Current Outpatient Medications:     albuterol (VENTOLIN HFA) 90 mcg/actuation inhaler, Ventolin HFA 90 mcg/actuation aerosol inhaler  take 2 puffs every 4hrs as needed for cough, Disp: , Rfl:     aluminum & magnesium hydroxide-simethicone (MAALOX MAXIMUM STRENGTH) 400-400-40 mg/5 mL suspension, Take by mouth every 6 (six) hours as needed for Indigestion., Disp: , Rfl:     cyclobenzaprine (FLEXERIL) 10 MG tablet, Take 10 mg by mouth 3 (three) times daily as needed for Muscle spasms., Disp: , Rfl:     diclofenac sodium (VOLTAREN) 1 % Gel, Apply 2 g topically 4 (four) times daily as needed. For pain, Disp: 1 Tube, Rfl: 0    ferrous sulfate 325 (65 FE) MG EC tablet, Take 325 mg by mouth once daily. , Disp: , Rfl:     furosemide (LASIX) 80 MG tablet, Take 2 tablets (160 mg total)  "by mouth once daily., Disp: 60 tablet, Rfl: 1    gabapentin (NEURONTIN) 300 MG capsule, Take 300 mg by mouth 3 (three) times daily., Disp: , Rfl:     hydrOXYzine (ATARAX) 50 MG tablet, Take 1 tablet (50 mg total) by mouth every 12 (twelve) hours as needed for Itching., Disp: 10 tablet, Rfl: 0    pantoprazole (PROTONIX) 40 MG tablet, Take 1 tablet (40 mg total) by mouth 2 (two) times daily., Disp: 180 tablet, Rfl: 3    sertraline (ZOLOFT) 25 MG tablet, Take 1 tablet by mouth once daily., Disp: , Rfl: 1    spironolactone (ALDACTONE) 100 MG tablet, Take 3 tablets (300 mg total) by mouth once daily., Disp: 90 tablet, Rfl: 1    She is tolerating aldactone for edema and neurontin for paresthesias    CONSTITUTIONAL: No fevers, chills, night sweats, wt. loss  SKINfacial redness  ENT: No headaches, head trauma, or eye pain  LYMPH NODES: None noticed   CV: No chest pain, palpitations.   RESP: + sob and dyspnea on exertion, no cough, wheezing  GI: No nausea, emesis, diarrhea, constipation, melena, hematochezia ++ distention progressing   : No dysuria, hematuria, urgency, or frequency   HEME: Continued  easy bruising, history of bleeding problems  PSYCHIATRIC: Positive depression, no anxiety  NEURO: No seizures, memory loss, dizziness or difficulty sleeping  MSK: No arthralgias or joint swelling  Legs getting more swollen Now red with ulcer and scab on left lower leg   No further bleeding   Cervical abnormality on pap smear followed by gyn        /61   Pulse 102   Temp 99.1 °F (37.3 °C)   Resp 20   Ht 5' 1" (1.549 m)   Wt 107.8 kg (237 lb 10.5 oz)   LMP 08/06/2017 (Approximate)   SpO2 (!) 79%   BMI 44.90 kg/m²   Gen: NAD, A and O x3,  Conversant LOW grade temp   Psych: pleasant yet somewhat flat affect, normal thought process  Eyes: Pupils round and non dilated, EOM intact  Nose: Nares patent  OP clear, mucosa patent  Neck: suppple, no JVD, trachea midline, no adenopathy  Lungs: decreased BS bilateral bases " No fremitus  CV: S1S2 with RR Tachycardia   Abd: soft, NTND, obese + ascites Distention  Extr: No CC positive  3 + pitting edema  POSITIVE distention , hard   Left leg with bandaid but erythema evident   Neuro: steady gait, CNs grossly intact  Skin: raised plaques  And buterfly facial rash   Chronic non healing lesion on her left leg   Rheum: No joint swelling    genotype 1 a HCV  Lab Results   Component Value Date    WBC 5.31 08/28/2019    RBC 3.70 (L) 08/28/2019    HGB 10.7 (L) 08/28/2019    HCT 33.7 (L) 08/28/2019    MCV 91 08/28/2019    MCH 28.9 08/28/2019    MCHC 31.8 (L) 08/28/2019    RDW 17.4 (H) 08/28/2019    PLT 96 (L) 08/28/2019    MPV 12.0 08/28/2019    GRAN 3.8 08/28/2019    GRAN 70.8 08/28/2019    LYMPH 0.7 (L) 08/28/2019    LYMPH 13.6 (L) 08/28/2019    MONO 0.6 08/28/2019    MONO 11.1 08/28/2019    EOS 0.2 08/28/2019    BASO 0.05 08/28/2019    EOSINOPHIL 3.4 08/28/2019    BASOPHIL 0.9 08/28/2019       Ref Range & Kvuon7k ago  Iron30 - 160 ug/dL16 Abnormally low  [id number] - 375 mg/dL230 KOLN481 - 450 ug/dL340 Saturated Iron20 - 50 %5 Abnormally low      Butterfly rash  -     CORTES SCREEN/PROFILE; Future; Expected date: 09/12/2019  -     Anti-DNA antibody, double-stranded; Future; Expected date: 09/12/2019  -     Anti-scleroderma antibody; Future; Expected date: 09/12/2019  -     RNA polymerase III Ab, IgG; Future; Expected date: 09/12/2019  -     PM-Scl Antibody by Immunodiffusion; Future; Expected date: 09/12/2019  -     Anti Sm/RNP Antibody; Future; Expected date: 09/12/2019  -     Th/To Antibody; Future; Expected date: 09/12/2019  -     MyoMarker Panel 3; Future; Expected date: 09/12/2019  -     ANTI SM/RNP ANTIBODY; Future; Expected date: 09/12/2019  -     ANTI-LIVER, KIDNEY, MICROSOME AB; Future; Expected date: 09/12/2019    Venous stasis dermatitis of left lower extremity  -     CORTES SCREEN/PROFILE; Future; Expected date: 09/12/2019  -     Anti-DNA antibody, double-stranded; Future; Expected  date: 09/12/2019  -     Anti-scleroderma antibody; Future; Expected date: 09/12/2019  -     RNA polymerase III Ab, IgG; Future; Expected date: 09/12/2019  -     PM-Scl Antibody by Immunodiffusion; Future; Expected date: 09/12/2019  -     Anti Sm/RNP Antibody; Future; Expected date: 09/12/2019  -     Th/To Antibody; Future; Expected date: 09/12/2019  -     MyoMarker Panel 3; Future; Expected date: 09/12/2019  -     ANTI SM/RNP ANTIBODY; Future; Expected date: 09/12/2019  -     ANTI-LIVER, KIDNEY, MICROSOME AB; Future; Expected date: 09/12/2019  -     IGE; Future; Expected date: 09/12/2019    Other ascites  -     CORTES SCREEN/PROFILE; Future; Expected date: 09/12/2019  -     Anti-DNA antibody, double-stranded; Future; Expected date: 09/12/2019  -     Anti-scleroderma antibody; Future; Expected date: 09/12/2019  -     RNA polymerase III Ab, IgG; Future; Expected date: 09/12/2019  -     PM-Scl Antibody by Immunodiffusion; Future; Expected date: 09/12/2019  -     Anti Sm/RNP Antibody; Future; Expected date: 09/12/2019  -     Th/To Antibody; Future; Expected date: 09/12/2019  -     MyoMarker Panel 3; Future; Expected date: 09/12/2019  -     ANTI SM/RNP ANTIBODY; Future; Expected date: 09/12/2019  -     ANTI-LIVER, KIDNEY, MICROSOME AB; Future; Expected date: 09/12/2019  -     IGE; Future; Expected date: 09/12/2019    Anemia, unspecified type  -     ANTI-LIVER, KIDNEY, MICROSOME AB; Future; Expected date: 09/12/2019        1.  Cirrhosis and splenomegaly   2.  Normocytic anemia from chronic disease  3.  Hepatitis C completed  treatment  See Dr harvey   4.  Thrombocytopenia with varices with no recent bleeding   5.  ascites cont paracentesis and see liver doctor   6.  Chronic stasis on left leg with lesions   7. Butterfly rash with ascites and chronic leg breakdown and erythema      Rule out autoimmune disorder  Rule out hyper IGE syndrome   High risk for thromboembolism due to morbid obesity and sedentary lifestyle   To GI  for monitoring of varices today and vaices   treatment for her hep C followed by Dr Whitehead  RTC after above for further recs  Decrease protonix to once a day   She just had paracentesis and she has a low grade temp  If temp increases go to ER immediately   No pain today  No falls  No depression  Thank you for allowing me to evaluate and participate in the care of this pleasant patient. Please fell free to call me with any questions or concerns.    Warmly,  Elizabet Valencia MD    This note was dictated with Dragon and briefly proofread. Please excuse any sentences that may be unclear or words misspelled

## 2019-09-13 NOTE — TELEPHONE ENCOUNTER
----- Message from Jhoana Cooper sent at 9/13/2019 11:42 AM CDT -----  Contact: self [phone]  Please call her she needs to reschedule the appt on 9/26.  Thank you!

## 2019-09-25 NOTE — NURSING
Ultrasound guided paracentesis performed by Dr. Denny- 8100 mLs removed- Patient received 25g albumin IV- VS remained stable for duration of procedure-  IV d/c'd, with tip intact. Access site cleaned with peroxide, derma bond and steri-strips applied, then covered with sterile gauze and tape. Pt discharge home.

## 2019-09-30 NOTE — PROGRESS NOTES
CC: I feel ok     Karen Villarreal is a 54 y.o.  Pt is here for evaluation of thrombocytopenia and iron deficiency with no further vaginal bleeding. The patient has been having menometrorrhagia with low platelets.  She has a history of hepatitis C with documented cirrhosis and splenomegaly.  She is seeing a GI specialist Dr Whitehead  No further GYN bleeding episodes he is exhausted.  She is taking Protonix to help with gastritis and remains on Aldactone for control of fluid retention.   She did undergo a paracentesis for therapeutic reasons   She has her Hep c meds Dr Whitehead  Started zoloft for depression  She has had to have paracentesis regularly     Past Medical History:   Diagnosis Date    Acid reflux     Anemia     Arthritis     Ascites 03/08/2017    Cataract     Cirrhosis     Depression     Encounter for blood transfusion     Hepatitis C     Hiatal hernia     Ovary removal, prophylactic     Renal cyst 03/08/2017    Thrombocytopenia     Varices, esophageal        Current Outpatient Medications:     albuterol (VENTOLIN HFA) 90 mcg/actuation inhaler, Ventolin HFA 90 mcg/actuation aerosol inhaler  take 2 puffs every 4hrs as needed for cough, Disp: , Rfl:     aluminum & magnesium hydroxide-simethicone (MAALOX MAXIMUM STRENGTH) 400-400-40 mg/5 mL suspension, Take by mouth every 6 (six) hours as needed for Indigestion., Disp: , Rfl:     cyclobenzaprine (FLEXERIL) 10 MG tablet, Take 10 mg by mouth 3 (three) times daily as needed for Muscle spasms., Disp: , Rfl:     diclofenac sodium (VOLTAREN) 1 % Gel, Apply 2 g topically 4 (four) times daily as needed. For pain, Disp: 1 Tube, Rfl: 0    ferrous sulfate 325 (65 FE) MG EC tablet, Take 325 mg by mouth once daily. , Disp: , Rfl:     furosemide (LASIX) 80 MG tablet, Take 2 tablets (160 mg total) by mouth once daily., Disp: 60 tablet, Rfl: 1    gabapentin (NEURONTIN) 300 MG capsule, Take 300 mg by mouth 3 (three) times daily., Disp: , Rfl:      "pantoprazole (PROTONIX) 40 MG tablet, Take 1 tablet (40 mg total) by mouth 2 (two) times daily., Disp: 180 tablet, Rfl: 3    sertraline (ZOLOFT) 25 MG tablet, Take 1 tablet by mouth once daily., Disp: , Rfl: 1    spironolactone (ALDACTONE) 100 MG tablet, Take 3 tablets (300 mg total) by mouth once daily., Disp: 90 tablet, Rfl: 1    hydrOXYzine (ATARAX) 50 MG tablet, Take 1 tablet (50 mg total) by mouth every 12 (twelve) hours as needed for Itching. (Patient not taking: Reported on 9/30/2019), Disp: 10 tablet, Rfl: 0    She is tolerating aldactone for edema and neurontin for paresthesias    CONSTITUTIONAL: No fevers, chills, night sweats, wt. loss  SKINfacial redness  ENT: No headaches, head trauma, or eye pain  LYMPH NODES: None noticed   CV: No chest pain, palpitations.   RESP: + sob and dyspnea on exertion, no cough, wheezing  GI: No nausea, emesis, diarrhea, constipation, melena, hematochezia ++ distention progressing   : No dysuria, hematuria, urgency, or frequency   HEME: Continued  easy bruising, history of bleeding problems  PSYCHIATRIC: Positive depression, no anxiety  NEURO: No seizures, memory loss, dizziness or difficulty sleeping  MSK: No arthralgias or joint swelling  Legs getting more swollen Now red with ulcer and scab on left lower leg   No further bleeding   Cervical abnormality on pap smear followed by gyn        BP (!) 117/55   Pulse 109   Temp 98.3 °F (36.8 °C)   Resp 16   Ht 5' 1" (1.549 m)   Wt 108.5 kg (239 lb 3.2 oz)   LMP 08/06/2017 (Approximate)   SpO2 96%   BMI 45.20 kg/m²   Gen: NAD, A and O x3,  Conversant LOW grade temp   Psych: pleasant yet somewhat flat affect, normal thought process  Eyes: Pupils round and non dilated, EOM intact  Nose: Nares patent  OP clear, mucosa patent  Neck: suppple, no JVD, trachea midline, no adenopathy  Lungs: decreased BS bilateral bases No fremitus  CV: S1S2 with RR Tachycardia   Abd: soft, NTND, obese + ascites Distention  Extr: No CC " positive  3 + pitting edema  POSITIVE distention , hard   Left leg with bandaid but erythema evident   Neuro: steady gait, CNs grossly intact  Skin: raised plaques  And buterfly facial rash   Chronic non healing lesion on her left leg   Rheum: No joint swelling    genotype 1 a HCV  Lab Results   Component Value Date    WBC 5.31 08/28/2019    RBC 3.70 (L) 08/28/2019    HGB 10.7 (L) 08/28/2019    HCT 33.7 (L) 08/28/2019    MCV 91 08/28/2019    MCH 28.9 08/28/2019    MCHC 31.8 (L) 08/28/2019    RDW 17.4 (H) 08/28/2019    PLT 96 (L) 08/28/2019    MPV 12.0 08/28/2019    GRAN 3.8 08/28/2019    GRAN 70.8 08/28/2019    LYMPH 0.7 (L) 08/28/2019    LYMPH 13.6 (L) 08/28/2019    MONO 0.6 08/28/2019    MONO 11.1 08/28/2019    EOS 0.2 08/28/2019    BASO 0.05 08/28/2019    EOSINOPHIL 3.4 08/28/2019    BASOPHIL 0.9 08/28/2019       Ref Range & Jcjpu3h ago  Iron30 - 160 ug/dL16 Abnormally low  Dxlzpzxpjzz360 - 375 mg/dL230 QXLV139 - 450 ug/dL340 Saturated Iron20 - 50 %5 Abnormally low      History of iron deficiency  -     CBC auto differential; Standing  -     Iron and TIBC; Future; Expected date: 09/30/2019    Symptomatic anemia  -     CBC auto differential; Standing  -     Iron and TIBC; Future; Expected date: 09/30/2019    Chronic hepatitis C with cirrhosis    Other ascites      1.  Cirrhosis and splenomegaly   2.  Normocytic anemia from chronic disease  3.  Hepatitis C completed  treatment  See Dr harvey   4.  Thrombocytopenia with varices with no recent bleeding   5.  ascites cont paracentesis and see liver doctor   6.  Chronic stasis on left leg with lesions     High risk for thromboembolism due to morbid obesity and sedentary lifestyle   To GI for monitoring of varices today and vaices   treatment for her hep C followed by Dr Harvey  RTC  2 months for continued f/U for cytopenias related to Gi disorder  NO need for Iv iron ior any transfusion at this time   Continue  protonix to once a day   No pain today  No falls  No  depression  Thank you for allowing me to evaluate and participate in the care of this pleasant patient. Please fell free to call me with any questions or concerns.    Warmly,  Elizabet Valencia MD    This note was dictated with Dragon and briefly proofread. Please excuse any sentences that may be unclear or words misspelled

## 2019-10-07 NOTE — TELEPHONE ENCOUNTER
----- Message from Ashley Lubin sent at 10/7/2019 11:13 AM CDT -----  Patients epic orders for recurring US Paracentesis is used up and will need a new set of recurring orders placed in epic to continue booking these procedure for our patient into the month of November. Please let me know when you have placed them and I will continue with the booking out our patients appointments. Thanking you in advance.    Ashley Lubin    [phone]

## 2019-10-09 NOTE — TELEPHONE ENCOUNTER
10/7 labs reviewed  MELD-Na score: 10 at 10/7/2019  3:04 PM  MELD score: 10 at 10/7/2019  3:04 PM  Calculated from:  Serum Creatinine: 0.8 mg/dL (Rounded to 1 mg/dL) at 10/7/2019  3:03 PM  Serum Sodium: 135 mmol/L at 10/7/2019  3:03 PM  Total Bilirubin: 1.7 mg/dL at 10/7/2019  3:03 PM  INR(ratio): 1.1 at 10/7/2019  3:04 PM  Age: 54 years    BMP stable    pls tell pt labs are stable   keep visit w/ me next week  thanks

## 2019-10-11 NOTE — DISCHARGE SUMMARY
Radiology Discharge Summary      Hospital Course: No complications    Admit Date: 10/11/2019  Discharge Date: 10/11/2019     Instructions Given to Patient: Yes  Diet: Resume prior diet  Activity: activity as tolerated    Description of Condition on Discharge: Stable  Vital Signs (Most Recent): Pulse: 99 (10/11/19 1427)  BP: (!) 117/58 (10/11/19 1427)  SpO2: 99 % (10/11/19 1427)    Discharge Disposition: Home    Discharge Diagnosis: Ascites status post US guided paracentesis     Follow-up: as per referring provider      @SIG@

## 2019-10-11 NOTE — NURSING
Ultrasound guided paracentesis performed by Dr. Masters- 9750 mLs removed- Patient received 75g albumin IV- VS remained stable for duration of procedure-IV d/c'd, with tip intact. Access site cleaned with peroxide, derma bond and steri-strips applied, then covered with sterile gauze and tape. Pt discharge home.

## 2019-10-15 NOTE — Clinical Note
Hi Dr Montenegro, This pt is overdue for her next surveillance EGD. I've entered orders (hopefully I did it correctly) for it to be done with you again. I've also taken over ordering her repeat paracenteses. Jen Scheuermann PA ([facility])

## 2019-10-15 NOTE — PROGRESS NOTES
HEPATOLOGY CLINIC VISIT NOTE - HCV clinic    CHIEF COMPLAINT: HCV Cirrhosis    HISTORY: This is a 54 y.o. White female w/ decompensated HCV cirrhosis (s/p treatment w/ SVR4 - 7/2019; needs labs for SVR12) complicated by portal HTN w/ refractory ascites requiring large volume paracentesis every 2 weeks, here for f/u    Still on radu 300 & lasix 160mg, undergoing large volume paracentesis every ~ 2 weeks.   No real decrease in volume of fluid removed: 6-10 L based on time between paracenteses.    C/o tiredness  Down ~ 30 lbs since last visit. Swelling in legs has decreased. Still w/ ascites per above.  No melena, hematemesis, confusion, jaundice      Cirrhosis history:  Decompensated, complicated by portal HTN w/ variceal bleed 2/2019, s/p banding      MELD up to 17 during variceal bleed (2/2019)  MELD 5/2019 - 10/2019 - 10    -- Refractory ascites requiring large volume paracenteses  / DOLORES / heme notes mentioned anasarca? No SBP or evidence of malignancy  -- Mild TBili elevations up to 1.5 - 2.3  -- Plt decreased 70s-120s  -- INR up to 1.5 at time of variceal bleed; most recent down to 1.1  -- Albumin low - 2.7  No HE    MELD-Na score: 10 at 10/7/2019  3:04 PM  MELD score: 10 at 10/7/2019  3:04 PM  Calculated from:  Serum Creatinine: 0.8 mg/dL (Rounded to 1 mg/dL) at 10/7/2019  3:03 PM  Serum Sodium: 135 mmol/L at 10/7/2019  3:03 PM  Total Bilirubin: 1.7 mg/dL at 10/7/2019  3:03 PM  INR(ratio): 1.1 at 10/7/2019  3:04 PM  Age: 54 years      Cirrhosis maintenance:  - HCC screening - U/S w/ no lesions and normal AFP 7/2019  - Varices screening - Variceal bleed / banding 2/2019, s/p several EGDs w/ repeat banding   6/25/2019 EGD (Dr Saldana) - grade II esoph varices, banded w/ complete eradication.    EGD recommended after 6 weeks - not scheduled yet  - HAV immunity - (+) immunity 4/19/17  - HBV immunity - s/p HBV vaccine: 8/28/2018, 7/26/2018, 6/27/2018      HCV history:  Completed 12 weeks Mavyret 6/2019 (Dr Whitehead)  - SVR4 documented 7/2019  Genotype 1a  Prior treatments:   SIFN + RBV x 6 months - nonresponder.    Changed to PegIFN monotherapy x 9 months - nonresponder.      Past Medical History:   Diagnosis Date    Acid reflux     Anemia     Arthritis     Ascites 03/08/2017    Cataract     Cirrhosis     Depression     Encounter for blood transfusion     Hepatitis C     Hiatal hernia     Ovary removal, prophylactic     Renal cyst 03/08/2017    Thrombocytopenia     Varices, esophageal        Past Surgical History:   Procedure Laterality Date    ANKLE SURGERY      APPENDECTOMY      CATARACT EXTRACTION      ESOPHAGOGASTRODUODENOSCOPY N/A 2/2/2019    Procedure: EGD (ESOPHAGOGASTRODUODENOSCOPY);  Surgeon: Leigha Whitehead MD;  Location: [facility];  Service: Endoscopy;  Laterality: N/A;  Procedure for 1100AM 2/2/2019    ESOPHAGOGASTRODUODENOSCOPY N/A 2/5/2019    Procedure: EGD (ESOPHAGOGASTRODUODENOSCOPY);  Surgeon: Leigha Whitehead MD;  Location: [facility] ENDO;  Service: Endoscopy;  Laterality: N/A;    ESOPHAGOGASTRODUODENOSCOPY N/A 4/29/2019    Procedure: EGD (ESOPHAGOGASTRODUODENOSCOPY);  Surgeon: Justin Montenegro MD;  Location: [facility];  Service: Endoscopy;  Laterality: N/A;    ESOPHAGOGASTRODUODENOSCOPY N/A 5/22/2019    Procedure: EGD (ESOPHAGOGASTRODUODENOSCOPY);  Surgeon: Justin Montenegro MD;  Location: [facility] ENDO;  Service: Endoscopy;  Laterality: N/A;    ESOPHAGOGASTRODUODENOSCOPY N/A 6/4/2019    Procedure: EGD (ESOPHAGOGASTRODUODENOSCOPY);  Surgeon: Justin Montenegro MD;  Location: [facility];  Service: Endoscopy;  Laterality: N/A;    ESOPHAGOGASTRODUODENOSCOPY N/A 6/25/2019    Procedure: EGD (ESOPHAGOGASTRODUODENOSCOPY);  Surgeon: Justin Montenegro MD;  Location: [facility] ENDO;  Service: Endoscopy;  Laterality: N/A;    OOPHORECTOMY         FAMILY HISTORY: Negative for liver disease    SOCIAL HISTORY:   Resides w/ sister in [place]  Social History     Tobacco Use   Smoking Status Current Some Day  Smoker    Packs/day: 0.50    Years: 33.00    Pack years: 16.50    Types: Cigarettes   Smokeless Tobacco Never Used     Alcohol - none in 2 years. Describes periods of heavy alcohol in past      ROS:   No fever, chills, weight loss, (+) fatigue  No chest pain, palpitations, dyspnea, cough  (+) abdominal distention. No nausea, vomiting, GERD  No skin rashes   No headaches  (+) bilat lower extremity edema  No depression or anxiety      PHYSICAL EXAM:  Friendly White female, in no acute distress; alert and oriented to person, place and time  VITALS: reviewed  HEENT: Sclerae anicteric.   NECK: Supple  CVS: Regular rate and rhythm. No murmurs  LUNGS: Normal respiratory effort. Clear bilaterally  ABDOMEN: Protuberant. Nontender.   SKIN: Warm and dry. No jaundice, No obvious rashes.   EXTREMITIES: Pitting bilat lower extremity edema but less than at last visit  NEURO/PSYCH: Normal gate. Memory intact. Thought and speech pattern appropriate. Behavior normal. No depression or anxiety noted. No asterixis     RECENT LABS:  Lab Results   Component Value Date    WBC 4.56 10/07/2019    HGB 10.4 (L) 10/07/2019    PLT 85 (L) 10/07/2019     Lab Results   Component Value Date    INR 1.1 10/07/2019     Lab Results   Component Value Date    AST 31 10/07/2019    ALT 16 10/07/2019    BILITOT 1.7 (H) 10/07/2019    ALBUMIN 2.7 (L) 10/07/2019    ALKPHOS 243 (H) 10/07/2019    CREATININE 0.8 10/07/2019    CREATININE 0.8 10/07/2019    BUN 8 10/07/2019    BUN 8 10/07/2019     (L) 10/07/2019     (L) 10/07/2019    K 4.0 10/07/2019    K 4.0 10/07/2019    AFP 4.0 07/26/2019         RECENT IMAGING:  Results for orders placed during the hospital encounter of 07/31/19   US Abdomen Complete    Narrative EXAMINATION:  US ABDOMEN COMPLETE    CLINICAL HISTORY:  Chronic viral hepatitis C    TECHNIQUE:  Complete abdominal ultrasound (including pancreas, aorta, liver, gallbladder, common bile duct, IVC, kidneys, and spleen) was  performed.    COMPARISON:  CT abdomen of June 7, 2018.    FINDINGS:  Pancreas: The visualized portions of pancreas appear normal.    Aorta: No aneurysm.    Liver: 15.6 cm, normal in size.  It is however quite heterogeneous in echotexture small and nodular consistent with cirrhosis.  There is hepatopetal flow in the main portal vein.    Gallbladder: Multiple gallstones are identified.  There is mild thickening of the gallbladder wall at 3.2 mm.    Biliary system: 3.4 mm common bile duct.  No intrahepatic ductal dilatation.    Inferior vena cava: Normal in appearance.    Right kidney: 10.8 cm. No hydronephrosis.    Left kidney: 9.9 cm. No hydronephrosis.    Spleen: 13.5 cm., mildly increased in size    Miscellaneous: There is small amount of ascites seen around the liver and in the left lower quadrant.      Impression Cirrhosis with small amount of ascites identified today.  Cholelithiasis.  Mild splenomegaly.      Electronically signed by: Jim Bonoe MD  Date:    07/31/2019  Time:    10:43         ASSESSMENT  54 y.o. White female with decompensated HCV Cirrhosis complicated by refractory ascites requiring repeat large volume paracenteses as well as recent (2/2019) variceal bleeding s/p banding. MELD has remained low at 10 so transplant not an option at present. Case reviewed w/ Dr Webster recently w/ plan to monitor for SVR and assess for possible improvement in fluid status over next 3-6 months to see if TIPS could be avoided.     1. DECOMPENSATED CIRRHOSIS  -- HCC screening - up to date 7/2019  -- MELD score 10/2019 - 10  -- (+) immunity to HAV  -- s/p HBV vaccine: 8/28/2018, 7/26/2018, 6/27/2018  2. PORTAL HYPERTENSION W/ ESOPHAGEAL VARICES  -- Variceal bleed / banding 2/2019, s/p several EGDs w/ repeat banding  -- 6/25/2019 EGD (Dr Saldana) - grade II esoph varices, banded w/ complete eradication.   -- overdue for repeat surveillance EGD  3. REFRACTORY ASCITES  -- undergoing regular large volume  paracenteses & on high dose diuretics  4. CHRONIC HCV, GENOTYPE 1A - has SVR4, needs labs for SVR12  -- SIFN + RBV and PIFN nonresponder  -- s/p Mavyret x 12 weeks (ended 6/2019)    PLAN:  1. HCV RNA now to document SVR12  2. EGD for varices surveillance. Can be done w/ Dr Saldana. Order entered and Dr Saldana messaged.  3. Continue paracentesis as needed for ascites, orders entered.  4. CBC, CMP, INR, AFP, U/S - 1/2020

## 2019-10-15 NOTE — TELEPHONE ENCOUNTER
----- Message from Jennifer B. Scheuermann, PA sent at 10/15/2019  1:31 PM CDT -----  Hi Dr Montenegro, This pt is overdue for her next surveillance EGD. I've entered orders (hopefully I did it correctly) for it to be done with you again. I've also taken over ordering her repeat paracenteses. Jen Scheuermann PA ([facility])

## 2019-10-16 NOTE — TELEPHONE ENCOUNTER
----- Message from Macy Vizcaino sent at 10/16/2019 12:40 PM CDT -----  Contact: pt [phone]  Patient called to find out about her EGD what date and time please call back with information.

## 2019-10-18 PROBLEM — Z86.19 HISTORY OF HEPATITIS C: Status: ACTIVE | Noted: 2017-02-16

## 2019-10-18 NOTE — TELEPHONE ENCOUNTER
10/15/19 HCV RNA neg  Confirms SVR12 (Cure) following 12 weeks Mavyret.   Relapse not expected.  No immunity, could be reinfected  Next HCV 1/2020 w/ HCC screening  Pt notified    Case reviewed w/ Dr Webster  Continuing to require every other week paracentesis w/ 6-10 L removed each week. maxed out on diuretics. Low MELD of 10.  Recommend visit w/ hepatologist to discuss mngmt options.  Pt verbalized understanding.

## 2019-10-22 NOTE — TELEPHONE ENCOUNTER
MA called patient to inform her that she is scheduled to see Dr. Webster on 12/13 at 2:20 pm. She is unable to reached left her VM to please give us a callback.

## 2019-10-25 NOTE — PROGRESS NOTES
Radiology Post-Procedure Note    Pre Op Diagnosis: ascites  Post Op Diagnosis: Same    Procedure: ultrasound guided parcenteis    Procedure performed by: Dr. Hong    Written Informed Consent Obtained: Yes  Specimen Removed: YES 8000 cc yellow ascites  Estimated Blood Loss: Minimal  Patient given albumin 50 grams IV,  To follow up with her physician Dr. Montenegro    Findings:   Paracentesis under ultrasound guidance    Patient tolerated procedure well.    @SIG@

## 2019-10-30 PROBLEM — K74.60 CIRRHOSIS: Status: ACTIVE | Noted: 2019-01-01

## 2019-10-30 NOTE — DISCHARGE INSTRUCTIONS
High-Fiber Diet  Fiber is in fruits, vegetables, cereals, and grains. Fiber passes through your body undigested. A high-fiber diet helps food move through your intestinal tract. The added bulk is helpful in preventing constipation. In people with diverticulosis, fiber helps clean out the pouches along the colon wall. It also prevents new pouches from forming. A high-fiber diet reduces the risk of colon cancer. It also lowers blood cholesterol and prevents high blood sugar in people with diabetes.    The fiber-rich foods listed below should be part of your diet. If you are not used to high-fiber foods, start with 1 or 2 foods from this list. Every 3 to 4 days add a new one to your diet. Do this until you are eating 4 high-fiber foods per day. This should give you 20 to 35 grams of fiber a day. It is also important to drink a lot of water when you are on this diet. You should have 6 to 8 glasses of water a day. Water makes the fiber swell and increases the benefit.  Foods high in dietary fiber  The following foods are high in dietary fiber:  · Breads. Breads made with 100% whole-wheat flour; marisela, wheat, or rye crackers; whole-grain tortillas, bran muffins.  · Cereals. Whole-grain and bran cereals with bran (shredded wheat, wheat flakes, raisin bran, corn bran); oatmeal, rolled oats, granola, and brown rice.  · Fruits. Fresh fruits and their edible skins (pears, prunes, raisins, berries, apples, and apricots); bananas, citrus fruit, mangoes, pineapple; and prune juice.  · Nuts. Any nuts and seeds.  · Vegetables. Best served raw or lightly cooked. All types, especially: green peas, celery, eggplant, potatoes, spinach, broccoli, [place] sprouts, winter squash, carrots, cauliflower, soybeans, lentils, and fresh and dried beans of all kinds.  · Other. Popcorn, any spices.  Date Last Reviewed: 8/1/2016  © 2352-5260 [employer]. [address] [place] [address], [place] [address]. All rights reserved. This  information is not intended as a substitute for professional medical care. Always follow your healthcare professional's instructions.        Discharge Instructions: After Your Surgery  Youve just had surgery. During surgery, you were given medicine called anesthesia to keep you relaxed and free of pain. After surgery, you may have some pain or nausea. This is common. Here are some tips for feeling better and getting well after surgery.     Stay on schedule with your medicine.   Going home  Your healthcare provider will show you how to take care of yourself when you go home. He or she will also answer your questions. Have an adult family member or friend drive you home. For the first 24 hours after your surgery:  · Do not drive or use heavy equipment.  · Do not make important decisions or sign legal papers.  · Do not drink alcohol.  · Have someone stay with you, if needed. He or she can watch for problems and help keep you safe.  Be sure to go to all follow-up visits with your healthcare provider. And rest after your surgery for as long as your healthcare provider tells you to.  Coping with pain  If you have pain after surgery, pain medicine will help you feel better. Take it as told, before pain becomes severe. Also, ask your healthcare provider or pharmacist about other ways to control pain. This might be with heat, ice, or relaxation. And follow any other instructions your surgeon or nurse gives you.  Tips for taking pain medicine  To get the best relief possible, remember these points:  · Pain medicines can upset your stomach. Taking them with a little food may help.  · Most pain relievers taken by mouth need at least 20 to 30 minutes to start to work.  · Taking medicine on a schedule can help you remember to take it. Try to time your medicine so that you can take it before starting an activity. This might be before you get dressed, go for a walk, or sit down for dinner.  · Constipation is a common side effect  of pain medicines. Call your healthcare provider before taking any medicines such as laxatives or stool softeners to help ease constipation. Also ask if you should skip any foods. Drinking lots of fluids and eating foods such as fruits and vegetables that are high in fiber can also help. Remember, do not take laxatives unless your surgeon has prescribed them.  · Drinking alcohol and taking pain medicine can cause dizziness and slow your breathing. It can even be deadly. Do not drink alcohol while taking pain medicine.  · Pain medicine can make you react more slowly to things. Do not drive or run machinery while taking pain medicine.  Your healthcare provider may tell you to take acetaminophen to help ease your pain. Ask him or her how much you are supposed to take each day. Acetaminophen or other pain relievers may interact with your prescription medicines or other over-the-counter (OTC) medicines. Some prescription medicines have acetaminophen and other ingredients. Using both prescription and OTC acetaminophen for pain can cause you to overdose. Read the labels on your OTC medicines with care. This will help you to clearly know the list of ingredients, how much to take, and any warnings. It may also help you not take too much acetaminophen. If you have questions or do not understand the information, ask your pharmacist or healthcare provider to explain it to you before you take the OTC medicine.  Managing nausea  Some people have an upset stomach after surgery. This is often because of anesthesia, pain, or pain medicine, or the stress of surgery. These tips will help you handle nausea and eat healthy foods as you get better. If you were on a special food plan before surgery, ask your healthcare provider if you should follow it while you get better. These tips may help:  · Do not push yourself to eat. Your body will tell you when to eat and how much.  · Start off with clear liquids and soup. They are easier to  digest.  · Next try semi-solid foods, such as mashed potatoes, applesauce, and gelatin, as you feel ready.  · Slowly move to solid foods. Dont eat fatty, rich, or spicy foods at first.  · Do not force yourself to have 3 large meals a day. Instead eat smaller amounts more often.  · Take pain medicines with a small amount of solid food, such as crackers or toast, to avoid nausea.     Call your surgeon if  · You still have pain an hour after taking medicine. The medicine may not be strong enough.  · You feel too sleepy, dizzy, or groggy. The medicine may be too strong.  · You have side effects like nausea, vomiting, or skin changes, such as rash, itching, or hives.       If you have obstructive sleep apnea  You were given anesthesia medicine during surgery to keep you comfortable and free of pain. After surgery, you may have more apnea spells because of this medicine and other medicines you were given. The spells may last longer than usual.   At home:  · Keep using the continuous positive airway pressure (CPAP) device when you sleep. Unless your healthcare provider tells you not to, use it when you sleep, day or night. CPAP is a common device used to treat obstructive sleep apnea.  · Talk with your provider before taking any pain medicine, muscle relaxants, or sedatives. Your provider will tell you about the possible dangers of taking these medicines.  Date Last Reviewed: 12/1/2016  © 4034-8232 [employer]. [address] [place] [address]. All rights reserved. This information is not intended as a substitute for professional medical care. Always follow your healthcare professional's instructions.      Gastritis (Adult)    Gastritis is inflammation and irritation of the stomach lining. It can be present for a short time (acute) or be long lasting (chronic). Gastritis is often caused by infection with bacteria called H pylori. More than a third of people in the [place] have this bacteria in their  bodies. In many cases, H pylori causes no problems or symptoms. In some people, though, the infection irritates the stomach lining and causes gastritis. Other causes of stomach irritation include drinking alcohol or taking pain-relieving medicines called NSAIDs (such as aspirin or ibuprofen).   Symptoms of gastritis can include:  · Abdominal pain or bloating  · Loss of appetite  · Nausea or vomiting  · Vomiting blood or having black stools  · Feeling more tired than usual  An inflamed and irritated stomach lining is more likely to develop a sore called an ulcer. To help prevent this, gastritis should be treated.  Home care  If needed, medicines may be prescribed. If you have H pylori infection, treating it will likely relieve your symptoms. Other changes can help reduce stomach irritation and help it heal.  · If you have been prescribed medicines for H pylori infection, take them as directed. Take all of the medicine until it is finished or your healthcare provider tells you to stop, even if you feel better.  · Your healthcare provider may recommend avoiding NSAIDs. If you take daily aspirin for your heart or other medical reasons, do not stop without talking to your healthcare provider first.  · Avoid drinking alcohol.  · Stop smoking. Smoking can irritate the stomach and delay healing. As much as possible, stay away from second hand smoke.  Follow-up care  Follow up with your healthcare provider, or as advised by our staff. Testing may be needed to check for inflammation or an ulcer.  When to seek medical advice  Call your healthcare provider for any of the following:  · Stomach pain that gets worse or moves to the lower right abdomen (appendix area)  · Chest pain that appears or gets worse, or spreads to the back, neck, shoulder, or arm  · Frequent vomiting (cant keep down liquids)  · Blood in the stool or vomit (red or black in color)  · Feeling weak or dizzy  · Fever of 100.4ºF (38ºC) or higher, or as directed  by your healthcare provider  Date Last Reviewed: 6/22/2015  © 2130-5612 [employer]. [address] [place] [address], [place] [address]. All rights reserved. This information is not intended as a substitute for professional medical care. Always follow your healthcare professional's instructions.        Esophageal Varices     With esophageal varices, blood vessels in the esophagus become abnormally enlarged. They may then burst (rupture) and bleed.   Esophageal varices are enlarged veins at the lower end of the esophagus. The esophagus is the tube that carries food from your mouth to your stomach. Varices most often occur because of problems with blood flow in the liver caused by chronic liver disease. Normally, a blood vessel called the portal vein carries blood from the digestive organs to the liver. But with liver disease, blood flow can be blocked due to scarring of the liver. This increases the blood pressure in the portal vein (a condition known as portal hypertension). Blood then backs up in nearby veins in the esophagus and stomach, causing varices. Varices are a serious and deadly problem. Treatment is needed to prevent them from bursting (rupturing) and bleeding. If bleeding occurs, it can be fatal.  Symptoms of esophageal varices  Symptoms do not occur unless the varices are bleeding. This is an emergency problem. If you have any of the following symptoms, get medical attention right away:  · Vomiting blood  · Black, tarry, or bloody stools  · Feeling lightheaded, or fainting (loss of consciousness)  Diagnosing esophageal \varices  Youll likely be checked for varices if you have liver disease or other health problems that can cause them. Your healthcare provider will ask about your symptoms and health history. Youll also be examined. Tests are then done to confirm the problem. Tests can include:  · Upper endoscopy. This is done to see inside the upper digestive tract. During the test, an endoscope  is used. This is a thin, flexible tube with a tiny camera on the end. Its inserted through your mouth. Its then guided down through your esophagus, stomach, and first part of your small intestine. This allows the provider to check for varices and find any bleeding.  · Imaging tests. These provide pictures of the liver or blood flow in the liver. They allow the provider to check for enlarged veins around the liver and assess the risk of bleeding. Common imaging tests done include ultrasound and CT scans.  Treating esophageal varices  The goal of treatment is to reduce the risk of bleeding or to control bleeding. Treatment can include 1 or more of the following:  · Medicines. These may be prescribed to lower the blood pressure inside the enlarged veins. This reduces the risk of bleeding. Beta-blockers are the most common medicine used.  · Endoscopic therapy. These are treatments for enlarged or bleeding veins that are done using an endoscope. With ligation, small rubber bands are placed around the veins to close them off and stop any bleeding. With sclerotherapy, a blood-clotting medicine is injected into the veins to cause scarring and shrink them.  · Balloon tamponade. A tube with a balloon is guided down into your esophagus and stomach. The balloon is then filled with air. This puts pressure on enlarged or bleeding veins to control bleeding. This is a short-term (temporary) way to control bleeding until other treatments are available.   · Surgery. This may be done to place a tubelike device (stent) in the liver. The stent helps redirect blood flow in the liver to lower the blood pressure in enlarged veins. Sometimes, the enlarged veins may be connected to other nearby veins to redirect blood flow. In severe cases, a liver transplant may be needed. For this surgery, a diseased liver is replaced with a healthy liver from another person.   Follow-up  Regular visits with your provider are needed to check for bleeding  of the varices. If bleeding occurs, it is likely to occur again. More treatments will then be needed in the future. Once endoscopic therapy (banding) is performed, regular follow-up endoscopic scans with banding are done to completely get rid of the varices. If you are given medicines to take by mouth, be sure to take them as directed. Work closely with your provider to manage your condition. Know when to seek emergency care.  Date Last Reviewed: 7/1/2016 © 2000-2017 [employer]. [address] [place] [address]. All rights reserved. This information is not intended as a substitute for professional medical care. Always follow your healthcare professional's instructions.        What Is a Hiatal Hernia?    Hiatal hernia is when the area where the stomach and esophagus meet bulges up through the diaphragm into the chest cavity. In some cases, part of the stomach may bulge above the diaphragm. Stomach acid may move up into the esophagus and cause symptoms. The symptoms are often blamed on gastroesophageal reflux disease (GERD). You may only know about the hernia when it shows up on an X-ray taken for other reasons.   What you may feel  The hiatus is a normal hole in the diaphragm. The esophagus passes through this hole and leads to the stomach. In some cases, part of the stomach may bulge above the diaphragm. This bulge is called a hernia. Stomach acid may move up into the esophagus and cause symptoms.  When you eat, the muscle at the hiatus relaxes to allow food to pass into the stomach. It tightens again to keep food and digestive acids in the stomach.  Many people with hiatal hernias have mild symptoms. You may notice the following GERD symptoms:  · Heartburn or other chest discomfort  · A feeling of chest fullness after a meal  · Frequent burping  · Acid taste in the mouth  · Trouble swallowing  Treating symptoms  If you have been diagnosed with hiatal hernia, these suggestions may help  improve symptoms:  · Lose excess weight. Extra weight puts pressure on the stomach and esophagus.  · Dont lie down after eating. Sit up for at least an hour after eating. Lying down after eating can increase symptoms.  · Avoid certain foods and drinks. These include fatty foods, chocolate, coffee, mint, and other foods that cause symptoms for you.  · Dont smoke or drink alcohol. These can worsen symptoms.  · Look at your medicines. Discuss your medicines with your healthcare provider. Many medicines can cause symptoms.  · Consider an antacid medicine. Ask your healthcare provider about over-the-counter and prescription medicines that may help.  · Ask about surgery, if needed. Surgery is a treatment choice for some people. Your healthcare provider can determine if surgery is an option for you.    Date Last Reviewed: 10/1/2016  © 5940-9665 [employer]. [address] [place] [address], [place] [address]. All rights reserved. This information is not intended as a substitute for professional medical care. Always follow your healthcare professional's instructions.

## 2019-10-30 NOTE — [employer] PATIENT INSTRUCTIONS
Discharge Summary/Instructions after an Endoscopic Procedure  Patient Name: Karen Villarreal  Patient MRN: [id number]  Patient YOB: 1965  Wednesday, October 30, 2019  Justin Saldana MD  RESTRICTIONS:  During your procedure today, you received medications for sedation.  These   medications may affect your judgment, balance and coordination.  Therefore,   for 24 hours, you have the following restrictions:   - DO NOT drive a car, operate machinery, make legal/financial decisions,   sign important papers or drink alcohol.    ACTIVITY:  Today: no heavy lifting, straining or running due to procedural   sedation/anesthesia.  The following day: return to full activity including work.  DIET:  Eat and drink normally unless instructed otherwise.     TREATMENT FOR COMMON SIDE EFFECTS:  - Mild abdominal pain, nausea, belching, bloating or excessive gas:  rest,   eat lightly and use a heating pad.  - Sore Throat: treat with throat lozenges and/or gargle with warm salt   water.  - Because air was used during the procedure, expelling large amounts of air   from your rectum or belching is normal.  - If a bowel prep was taken, you may not have a bowel movement for 1-3 days.    This is normal.  SYMPTOMS TO WATCH FOR AND REPORT TO YOUR PHYSICIAN:  1. Abdominal pain or bloating, other than gas cramps.  2. Chest pain.  3. Back pain.  4. Signs of infection such as: chills or fever occurring within 24 hours   after the procedure.  5. Rectal bleeding, which would show as bright red, maroon, or black stools.   (A tablespoon of blood from the rectum is not serious, especially if   hemorrhoids are present.)  6. Vomiting.  7. Weakness or dizziness.  GO DIRECTLY TO THE NEAREST EMERGENCY ROOM IF YOU HAVE ANY OF THE FOLLOWING:      Difficulty breathing              Chills and/or fever over 101 F   Persistent vomiting and/or vomiting blood   Severe abdominal pain   Severe chest pain   Black, tarry stools   Bleeding- more than one  tablespoon   Any other symptom or condition that you feel may need urgent attention  Your doctor recommends these additional instructions:  If any biopsies were taken, your doctors clinic will contact you in 1 to 2   weeks with any results.  - Patient has a contact number available for emergencies.  The signs and   symptoms of potential delayed complications were discussed with the   patient.  Return to normal activities tomorrow.  Written discharge   instructions were provided to the patient.   - Resume previous diet.   - Continue present medications.   - No aspirin, ibuprofen, naproxen, or other non-steroidal anti-inflammatory   drugs.   - Repeat upper endoscopy in 3 weeks for retreatment.   - Discharge patient to home (ambulatory).   - Return to my office after studies are complete.  For questions, problems or results please call your physician - Justin Saldana MD at Work:  [phone].  [facility], EMERGENCY ROOM PHONE NUMBER: [phone]  IF A COMPLICATION OR EMERGENCY SITUATION ARISES AND YOU ARE UNABLE TO REACH   YOUR PHYSICIAN - GO DIRECTLY TO THE EMERGENCY ROOM.  Justin Saldana MD  10/30/2019 11:45:32 AM  This report has been verified and signed electronically.  [employer]

## 2019-10-30 NOTE — TRANSFER OF CARE
"Anesthesia Transfer of Care Note    Patient: Karen Villarreal    Procedure(s) Performed: Procedure(s) (LRB):  EGD (ESOPHAGOGASTRODUODENOSCOPY) - varices surveillance (N/A)    Patient location: GI    Anesthesia Type: general    Transport from OR: Transported from OR on room air with adequate spontaneous ventilation    Post pain: adequate analgesia    Post assessment: no apparent anesthetic complications    Post vital signs: stable    Level of consciousness: awake    Nausea/Vomiting: no nausea/vomiting    Complications: none    Transfer of care protocol was followed      Last vitals:   Visit Vitals  BP (!) 113/54   Pulse 96   Temp 37.1 °C (98.8 °F) (Skin)   Resp 19   Ht 5' 1" (1.549 m)   Wt 108 kg (238 lb)   LMP 08/06/2017 (Approximate)   SpO2 98%   Breastfeeding? No   BMI 44.97 kg/m²     "

## 2019-10-30 NOTE — ANESTHESIA PREPROCEDURE EVALUATION
10/30/2019  Karen Villarreal is a 54 y.o., female.    Pre-op Assessment    I have reviewed the Patient Summary Reports.     I have reviewed the Nursing Notes.   I have reviewed the Medications.     Review of Systems  Anesthesia Hx:  No problems with previous Anesthesia Denies Hx of Anesthetic complications    Social:  Non-Smoker Smoking Status: Current Some Day Smoker - 16.5 pack years  Smokeless Tobacco Status: Never Used  Alcohol use: No  Drug use: No       Cardiovascular:   Denies Hypertension.  Denies MI.  Denies CAD.    Denies CABG/stent.   Denies Angina.    Pulmonary:   Denies COPD.  Denies Asthma.  Denies Recent URI.    Renal/:   Denies Chronic Renal Disease.   Kidney Function/Disease    Hepatic/GI:   Hiatal Hernia, GERD Liver Disease, Hepatitis, C Cirrhosis with esophageal varices Liver Disease, Hepatitis , Cirrhosis Portal Hypertension, Ascites, Esophageal Varices    Neurological:   Denies TIA. Denies CVA. Denies Seizures.    Endocrine:   Denies Diabetes. Denies Hypothyroidism.    Psych:   Psychiatric History anxiety depression          Physical Exam  General:  Morbid Obesity    Airway/Jaw/Neck:  Airway Findings: Mouth Opening: Small, but > 3cm Tongue: Normal  General Airway Assessment: Adult  Mallampati: IV  Improves to IV with phonation.  Jaw/Neck Findings:  Neck ROM: Normal ROM      Dental:  Dental Findings: Prominent Incisors, In tact        Mental Status:  Mental Status Findings:  Cooperative, Alert and Oriented         Anesthesia Plan  Type of Anesthesia, risks & benefits discussed:  Anesthesia Type:  general  Patient's Preference:   Intra-op Monitoring Plan: standard ASA monitors  Intra-op Monitoring Plan Comments:   Post Op Pain Control Plan:   Post Op Pain Control Plan Comments:   Induction:   IV  Beta Blocker:  Patient is not currently on a Beta-Blocker (No further documentation  required).       Informed Consent: Patient understands risks and agrees with Anesthesia plan.  Questions answered. Anesthesia consent signed with patient.  ASA Score: 3     Day of Surgery Review of History & Physical: I have interviewed and examined the patient. I have reviewed the patient's H&P dated:  There are no significant changes.          Ready For Surgery From Anesthesia Perspective.

## 2019-10-30 NOTE — H&P
CC: Surveillance of esophageal varices    54 year old female with above. States that symptoms are absent, no alleviating/exacerbating factors. No family history of CA. No bleeding or weight loss.     ROS:  No headache, no fever/chills, no chest pain/SOB, no nausea/vomiting/diarrhea/constipation/GI bleeding/abdominal pain, no dysuria/hematuria.    VSSAF   Exam:   Alert and oriented x 3; no apparent distress   PERRLA, sclera anicteric  CV: Regular rate/rhythm, normal PMI   Lungs: Clear bilaterally with no wheeze/rales   Abdomen: Soft, NT/ND, normal bowel sounds   Ext: No cyanosis, clubbing     Impression:   As above    Plan:   Proceed with endoscopy. Further recs to follow.

## 2019-10-30 NOTE — ANESTHESIA POSTPROCEDURE EVALUATION
Anesthesia Post Evaluation    Patient: Karen Villarreal    Procedure(s) Performed: Procedure(s) (LRB):  EGD (ESOPHAGOGASTRODUODENOSCOPY) - varices surveillance (N/A)    Final Anesthesia Type: general  Patient location during evaluation: PACU  Patient participation: Yes- Able to Participate  Level of consciousness: awake and alert  Post-procedure vital signs: reviewed and stable  Pain management: adequate  Airway patency: patent  PONV status at discharge: No PONV  Anesthetic complications: no      Cardiovascular status: hemodynamically stable  Respiratory status: unassisted and room air  Hydration status: euvolemic  Follow-up not needed.          Vitals Value Taken Time   /74 10/30/2019 12:15 PM   Temp 37.1 °C (98.8 °F) 10/30/2019  9:30 AM   Pulse 100 10/30/2019 12:15 PM   Resp 19 10/30/2019 11:44 AM   SpO2 99 % 10/30/2019 12:15 PM         No case tracking events are documented in the log.      Pain/Kang Score: Kang Score: 10 (10/30/2019 12:18 PM)

## 2019-11-08 PROBLEM — I85.01 BLEEDING ESOPHAGEAL VARICES: Status: RESOLVED | Noted: 2019-04-29 | Resolved: 2019-01-01
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
## 2019-11-08 NOTE — NURSING
Ultrasound guided paracentesis performed by Dr. Denny-9 L removed- Patient received 25g albumin IV- VS remained stable for duration of procedure-  IV d/c'd, with tip intact. Access site cleaned with peroxide, derma bond and steri-strips applied, then covered with sterile gauze and tape. Pt discharge home.

## 2019-11-20 NOTE — DISCHARGE INSTRUCTIONS

## 2019-11-20 NOTE — PLAN OF CARE
Vss, jovon po fluids, denies pain, ambulates easily. IV removed, catheter intact. Discharge instructions provided and states understanding. States ready to go home.  Discharged from facility with family per wheelchair.

## 2019-11-20 NOTE — TRANSFER OF CARE
"Anesthesia Transfer of Care Note    Patient: Karen Villarreal    Procedure(s) Performed: Procedure(s) (LRB):  EGD (ESOPHAGOGASTRODUODENOSCOPY) (N/A)    Patient location: GI    Anesthesia Type: general    Transport from OR: Transported from OR on room air with adequate spontaneous ventilation    Post pain: adequate analgesia    Post assessment: no apparent anesthetic complications    Post vital signs: stable    Level of consciousness: awake    Nausea/Vomiting: no nausea/vomiting    Complications: none    Transfer of care protocol was followed      Last vitals:   Visit Vitals  /60 (BP Location: Left arm, Patient Position: Lying)   Pulse 97   Temp 37.1 °C (98.8 °F) (Skin)   Resp 18   Ht 5' 1" (1.549 m)   Wt 117.9 kg (260 lb)   LMP 08/06/2017 (Approximate)   SpO2 98%   Breastfeeding? No   BMI 49.13 kg/m²     "

## 2019-11-20 NOTE — ANESTHESIA POSTPROCEDURE EVALUATION
Anesthesia Post Evaluation    Patient: Karen Villarreal    Procedure(s) Performed: Procedure(s) (LRB):  EGD (ESOPHAGOGASTRODUODENOSCOPY) (N/A)    Final Anesthesia Type: general    Patient location during evaluation: PACU  Patient participation: Yes- Able to Participate  Level of consciousness: awake and alert  Post-procedure vital signs: reviewed and stable  Pain management: adequate  Airway patency: patent    PONV status at discharge: No PONV  Anesthetic complications: no      Cardiovascular status: hemodynamically stable  Respiratory status: unassisted and room air  Hydration status: euvolemic  Follow-up not needed.          Vitals Value Taken Time   /85 11/20/2019 11:30 AM   Temp 37.1 °C (98.7 °F) 11/20/2019 11:10 AM   Pulse 95 11/20/2019 11:30 AM   Resp 18 11/20/2019 11:30 AM   SpO2 99 % 11/20/2019 11:30 AM         Event Time     Out of Recovery 11:35:13          Pain/Kang Score: Kang Score: 10 (11/20/2019 11:30 AM)

## 2019-11-20 NOTE — [employer] PATIENT INSTRUCTIONS
Discharge Summary/Instructions after an Endoscopic Procedure  Patient Name: Karen Villarreal  Patient MRN: [id number]  Patient YOB: 1965  Wednesday, November 20, 2019  Justin Saldana MD  RESTRICTIONS:  During your procedure today, you received medications for sedation.  These   medications may affect your judgment, balance and coordination.  Therefore,   for 24 hours, you have the following restrictions:   - DO NOT drive a car, operate machinery, make legal/financial decisions,   sign important papers or drink alcohol.    ACTIVITY:  Today: no heavy lifting, straining or running due to procedural   sedation/anesthesia.  The following day: return to full activity including work.  DIET:  Eat and drink normally unless instructed otherwise.     TREATMENT FOR COMMON SIDE EFFECTS:  - Mild abdominal pain, nausea, belching, bloating or excessive gas:  rest,   eat lightly and use a heating pad.  - Sore Throat: treat with throat lozenges and/or gargle with warm salt   water.  - Because air was used during the procedure, expelling large amounts of air   from your rectum or belching is normal.  - If a bowel prep was taken, you may not have a bowel movement for 1-3 days.    This is normal.  SYMPTOMS TO WATCH FOR AND REPORT TO YOUR PHYSICIAN:  1. Abdominal pain or bloating, other than gas cramps.  2. Chest pain.  3. Back pain.  4. Signs of infection such as: chills or fever occurring within 24 hours   after the procedure.  5. Rectal bleeding, which would show as bright red, maroon, or black stools.   (A tablespoon of blood from the rectum is not serious, especially if   hemorrhoids are present.)  6. Vomiting.  7. Weakness or dizziness.  GO DIRECTLY TO THE NEAREST EMERGENCY ROOM IF YOU HAVE ANY OF THE FOLLOWING:      Difficulty breathing              Chills and/or fever over 101 F   Persistent vomiting and/or vomiting blood   Severe abdominal pain   Severe chest pain   Black, tarry stools   Bleeding- more than one  tablespoon   Any other symptom or condition that you feel may need urgent attention  Your doctor recommends these additional instructions:  If any biopsies were taken, your doctors clinic will contact you in 1 to 2   weeks with any results.  - Patient has a contact number available for emergencies.  The signs and   symptoms of potential delayed complications were discussed with the   patient.  Return to normal activities tomorrow.  Written discharge   instructions were provided to the patient.   - Resume previous diet.   - Continue present medications.   - No aspirin, ibuprofen, naproxen, or other non-steroidal anti-inflammatory   drugs.   - Await pathology results.   - Repeat upper endoscopy in 2-4 weeks for retreatment.   - Discharge patient to home (ambulatory).   - Return to my office after studies are complete.  For questions, problems or results please call your physician - Justin Saldana MD at Work:  [phone].  [facility], EMERGENCY ROOM PHONE NUMBER: [phone]  IF A COMPLICATION OR EMERGENCY SITUATION ARISES AND YOU ARE UNABLE TO REACH   YOUR PHYSICIAN - GO DIRECTLY TO THE EMERGENCY ROOM.  Justin Saldana MD  11/20/2019 11:04:41 AM  This report has been verified and signed electronically.  [employer]

## 2019-11-20 NOTE — ANESTHESIA PREPROCEDURE EVALUATION
11/20/2019  Karen Villarreal is a 54 y.o., female.    Pre-op Assessment    I have reviewed the Patient Summary Reports.     I have reviewed the Nursing Notes.   I have reviewed the Medications.     Review of Systems  Anesthesia Hx:  No problems with previous Anesthesia Denies Hx of Anesthetic complications    Social:  Smoker    Cardiovascular:  Cardiovascular Normal     Pulmonary:  Pulmonary Normal    Renal/:   Denies Chronic Renal Disease.   Kidney Function/Disease    Hepatic/GI:   Hiatal Hernia, GERD Liver Disease, Hepatitis, C Cirrhosis with esophageal varices Liver Disease, Hepatitis , Cirrhosis Portal Hypertension, Ascites, Esophageal Varices    Neurological:  Neurology Normal    Endocrine:  Endocrine Normal    Psych:   Psychiatric History anxiety depression          Physical Exam  General:  Morbid Obesity    Airway/Jaw/Neck:  Airway Findings: Mouth Opening: Small, but > 3cm Tongue: Normal  General Airway Assessment: Adult  Mallampati: IV  Improves to IV with phonation.  Jaw/Neck Findings:  Neck ROM: Normal ROM      Dental:  Dental Findings: Prominent Incisors, In tact   Chest/Lungs:  Chest/Lungs Findings: Clear to auscultation, Normal Respiratory Rate     Heart/Vascular:  Heart Findings: Rate: Normal  Rhythm: Regular Rhythm        Mental Status:  Mental Status Findings:  Cooperative, Alert and Oriented         Anesthesia Plan  Type of Anesthesia, risks & benefits discussed:  Anesthesia Type:  general  Patient's Preference:   Intra-op Monitoring Plan: standard ASA monitors  Intra-op Monitoring Plan Comments:   Post Op Pain Control Plan:   Post Op Pain Control Plan Comments:   Induction:   IV  Beta Blocker:  Patient is not currently on a Beta-Blocker (No further documentation required).       Informed Consent: Patient understands risks and agrees with Anesthesia plan.  Questions answered. Anesthesia  consent signed with patient.  ASA Score: 3     Day of Surgery Review of History & Physical: I have interviewed and examined the patient. I have reviewed the patient's H&P dated:    H&P update referred to the provider.         Ready For Surgery From Anesthesia Perspective.

## 2019-11-22 NOTE — NURSING
Ultrasound guided paracentesis performed by Dr. Hong- 4320 L removed- Patient received 75g albumin IV- VS remained stable for duration of procedure- IV d/c'd, with tip intact. Access site cleaned with peroxide, derma bond and steri-strips applied, then covered with sterile gauze and tape. Pt discharge home.

## 2019-12-02 NOTE — TELEPHONE ENCOUNTER
----- Message from Macy Vizcaino sent at 12/2/2019 10:03 AM CST -----  Contact: pt [phone]  Appt patient called and asked if you had an earlier appt available on the same day she is scheduled, please call back [phone]

## 2019-12-02 NOTE — TELEPHONE ENCOUNTER
----- Message from Yemi Jones sent at 12/2/2019  9:44 AM CST -----  Contact: Patient @ [phone]  She would like to cancel and reschedule her appt scheduled on 12/03.  It won't let me reschedule.  Also needs to schedule lab work prior.

## 2019-12-02 NOTE — TELEPHONE ENCOUNTER
MA spoke with the pt and let her know that there is currently o earlier available times on her apt day. I added her to the waitlist and let her know that seh can come in 30 minutes earlier and if we have a room available, we can take her back.

## 2019-12-02 NOTE — TELEPHONE ENCOUNTER
Patient states Dr. aVlencia suggested changing protonix from BID to QD. Please review and advise if you would like to change frequency. Thank you. ----- Message from Yemi Jones sent at 12/2/2019  9:45 AM CST -----  Contact: Patient @ [phone]  Needs to schedule and EGD.  Also needs a refill on pantoprazole (PROTONIX) 40 MG tablet  She needs to change the quantity to 1 per day. Her insurance is giving her problems with 2 per day, also Dr. Valencia said to go back to 1 per day.  [id number] - [place], [place] - [address] AT [facility] [address] [phone] (Phone)  [phone] (Fax)

## 2019-12-04 PROBLEM — Z12.11 SCREENING FOR COLON CANCER: Status: ACTIVE | Noted: 2019-01-01

## 2019-12-04 NOTE — TRANSFER OF CARE
"Anesthesia Transfer of Care Note    Patient: Karen Villarreal    Procedure(s) Performed: Procedure(s) (LRB):  COLONOSCOPY (N/A)    Patient location: GI    Anesthesia Type: general    Transport from OR: Transported from OR on 2-3 L/min O2 by NC with adequate spontaneous ventilation    Post pain: adequate analgesia    Post assessment: no apparent anesthetic complications    Post vital signs: stable    Level of consciousness: awake    Nausea/Vomiting: no nausea/vomiting    Complications: none    Transfer of care protocol was followed      Last vitals:   Visit Vitals  /86 (BP Location: Left arm, Patient Position: Lying)   Pulse 76   Temp 36.8 °C (98.2 °F) (Skin)   Resp 16   Ht 5' 1" (1.549 m)   Wt 113.4 kg (250 lb)   LMP 08/06/2017 (Approximate)   SpO2 97%   Breastfeeding? No   BMI 47.24 kg/m²     "

## 2019-12-04 NOTE — ANESTHESIA POSTPROCEDURE EVALUATION
Anesthesia Post Evaluation    Patient: Karen Villarreal    Procedure(s) Performed: Procedure(s) (LRB):  COLONOSCOPY (N/A)    Final Anesthesia Type: general    Patient location during evaluation: PACU  Patient participation: Yes- Able to Participate  Level of consciousness: awake and alert and oriented  Post-procedure vital signs: reviewed and stable  Pain management: adequate  Airway patency: patent    PONV status at discharge: No PONV  Anesthetic complications: no      Cardiovascular status: blood pressure returned to baseline  Respiratory status: unassisted, spontaneous ventilation and room air  Hydration status: euvolemic  Follow-up not needed.          Vitals Value Taken Time   /65 12/4/2019 11:35 AM   Temp 36.8 °C (98.2 °F) 12/4/2019 11:35 AM   Pulse 95 12/4/2019 11:35 AM   Resp 16 12/4/2019 11:35 AM   SpO2 99 % 12/4/2019 11:35 AM         No case tracking events are documented in the log.      Pain/Kang Score: Kang Score: 9 (12/4/2019 11:35 AM)

## 2019-12-04 NOTE — DISCHARGE INSTRUCTIONS

## 2019-12-04 NOTE — [employer]ATION PATIENT INSTRUCTIONS
Discharge Summary/Instructions after an Endoscopic Procedure  Patient Name: Karen Villarreal  Patient MRN: [id number]  Patient YOB: 1965  Wednesday, December 04, 2019  Justin Saldana MD  RESTRICTIONS:  During your procedure today, you received medications for sedation.  These   medications may affect your judgment, balance and coordination.  Therefore,   for 24 hours, you have the following restrictions:   - DO NOT drive a car, operate machinery, make legal/financial decisions,   sign important papers or drink alcohol.    ACTIVITY:  Today: no heavy lifting, straining or running due to procedural   sedation/anesthesia.  The following day: return to full activity including work.  DIET:  Eat and drink normally unless instructed otherwise.     TREATMENT FOR COMMON SIDE EFFECTS:  - Mild abdominal pain, nausea, belching, bloating or excessive gas:  rest,   eat lightly and use a heating pad.  - Sore Throat: treat with throat lozenges and/or gargle with warm salt   water.  - Because air was used during the procedure, expelling large amounts of air   from your rectum or belching is normal.  - If a bowel prep was taken, you may not have a bowel movement for 1-3 days.    This is normal.  SYMPTOMS TO WATCH FOR AND REPORT TO YOUR PHYSICIAN:  1. Abdominal pain or bloating, other than gas cramps.  2. Chest pain.  3. Back pain.  4. Signs of infection such as: chills or fever occurring within 24 hours   after the procedure.  5. Rectal bleeding, which would show as bright red, maroon, or black stools.   (A tablespoon of blood from the rectum is not serious, especially if   hemorrhoids are present.)  6. Vomiting.  7. Weakness or dizziness.  GO DIRECTLY TO THE NEAREST EMERGENCY ROOM IF YOU HAVE ANY OF THE FOLLOWING:      Difficulty breathing              Chills and/or fever over 101 F   Persistent vomiting and/or vomiting blood   Severe abdominal pain   Severe chest pain   Black, tarry stools   Bleeding- more than one  tablespoon   Any other symptom or condition that you feel may need urgent attention  Your doctor recommends these additional instructions:  If any biopsies were taken, your doctors clinic will contact you in 1 to 2   weeks with any results.  - Patient has a contact number available for emergencies.  The signs and   symptoms of potential delayed complications were discussed with the   patient.  Return to normal activities tomorrow.  Written discharge   instructions were provided to the patient.   - Resume previous diet.   - Continue present medications.   - No aspirin, ibuprofen, naproxen, or other non-steroidal anti-inflammatory   drugs.   - Await pathology results.   - Repeat colonoscopy in 5-10 years for surveillance.   - Discharge patient to home (ambulatory).   - Return to my office as previously scheduled.  For questions, problems or results please call your physician - Justin Saldana MD at Work:  [phone].  [facility], EMERGENCY ROOM PHONE NUMBER: [phone]  IF A COMPLICATION OR EMERGENCY SITUATION ARISES AND YOU ARE UNABLE TO REACH   YOUR PHYSICIAN - GO DIRECTLY TO THE EMERGENCY ROOM.  Justin Saldana MD  12/4/2019 11:32:23 AM  This report has been verified and signed electronically.  [employer]

## 2019-12-04 NOTE — ANESTHESIA PREPROCEDURE EVALUATION
12/04/2019  Karen Villarreal is a 54 y.o., female.    Pre-op Assessment    I have reviewed the Patient Summary Reports.     I have reviewed the Nursing Notes.   I have reviewed the Medications.     Review of Systems  Anesthesia Hx:  No problems with previous Anesthesia Denies Hx of Anesthetic complications    Social:  Smoker    Cardiovascular:  Cardiovascular Normal     Pulmonary:  Pulmonary Normal    Renal/:   Denies Chronic Renal Disease.   Kidney Function/Disease    Hepatic/GI:   Hiatal Hernia, GERD Liver Disease, Hepatitis, C Cirrhosis with esophageal varices Liver Disease, Hepatitis , Cirrhosis Portal Hypertension, Ascites, Esophageal Varices    Neurological:  Neurology Normal    Endocrine:  Endocrine Normal    Psych:   Psychiatric History anxiety depression Major depressive disorder with current active episode         Physical Exam  General:  Morbid Obesity    Airway/Jaw/Neck:  Airway Findings: Mouth Opening: Small, but > 3cm Tongue: Normal  General Airway Assessment: Adult  Mallampati: IV  Improves to IV with phonation.  Jaw/Neck Findings:  Neck ROM: Normal ROM      Dental:  Dental Findings: Prominent Incisors, In tact   Chest/Lungs:  Chest/Lungs Findings: Clear to auscultation, Normal Respiratory Rate     Heart/Vascular:  Heart Findings: Rate: Normal  Rhythm: Regular Rhythm        Mental Status:  Mental Status Findings:  Cooperative, Alert and Oriented         Anesthesia Plan  Type of Anesthesia, risks & benefits discussed:  Anesthesia Type:  general  Patient's Preference:   Intra-op Monitoring Plan: standard ASA monitors  Intra-op Monitoring Plan Comments:   Post Op Pain Control Plan:   Post Op Pain Control Plan Comments:   Induction:   IV  Beta Blocker:  Patient is not currently on a Beta-Blocker (No further documentation required).       Informed Consent: Patient understands risks and agrees  with Anesthesia plan.  Questions answered. Anesthesia consent signed with patient.  ASA Score: 3     Day of Surgery Review of History & Physical: I have interviewed and examined the patient. I have reviewed the patient's H&P dated:    H&P update referred to the provider.         Ready For Surgery From Anesthesia Perspective.

## 2019-12-06 NOTE — NURSING
Ultrasound guided paracentesis performed by Dr. Boone- 8250 mLs removed- Patient received 25g albumin IV- VS remained stable for duration of procedure- IV d/c'd, with tip intact. Access site cleaned with peroxide, derma bond, then covered with sterile gauze and tape. Pt discharge home.

## 2019-12-09 NOTE — TELEPHONE ENCOUNTER
----- Message from Justin Montenegro MD sent at 12/6/2019  4:55 PM CST -----  Regarding: RE: next EGD  No we got it covered.    Thanks,    B      -- jessica Dillard make sure Ms. Villarreal has repeat EGD scheduled for banding again as recommended on my last scope.      ----- Message -----  From: Jennifer B. Scheuermann, PA  Sent: 12/6/2019   4:54 PM CST  To: Justin Montenegro MD, Moni Anderson Staff  Subject: next EGD                                         Hey!  Just touching base on this pt... Did you need me to do something to get the next EGD ordered. It looks like you were recommending a repeat EGD after 2-4 weeks.    Thanks!  Radha

## 2019-12-10 NOTE — TELEPHONE ENCOUNTER
EGD scheduled 12/17 at 8am arrive for 7am  instructions reviewed and patient states understanding.

## 2019-12-10 NOTE — TELEPHONE ENCOUNTER
----- Message from Fatimah Marie sent at 12/10/2019 10:40 AM CST -----  Contact: self  Type:  Patient Returning Call    Who Called:  Patient   Who Left Message for Patient:  Nishant  Does the patient know what this is regarding?:  Scheduling a EGD  Best Call Back Number:  [phone] (home)   Additional Information:  na

## 2019-12-13 NOTE — TELEPHONE ENCOUNTER
----- Message from Mahesh Pierce sent at 12/13/2019 12:00 PM CST -----  Contact: pt  Calling to cancel/reschedule 12/13 appt with     Call back: [phone]

## 2019-12-13 NOTE — TELEPHONE ENCOUNTER
MA spoke to the pt and cancelled her apt for today. I created a remind me to call her once Feb opens

## 2019-12-14 NOTE — TELEPHONE ENCOUNTER
12/2019 labs reviewed  MELD-Na score: 10 at 12/11/2019  2:50 PM  MELD score: 10 at 12/11/2019  2:50 PM  Calculated from:  Serum Creatinine: 0.8 mg/dL (Rounded to 1 mg/dL) at 12/11/2019  2:50 PM  Serum Sodium: 134 mmol/L at 12/11/2019  2:50 PM  Total Bilirubin: 2.2 mg/dL at 12/11/2019  2:50 PM  INR(ratio): 1.1 at 12/11/2019  2:50 PM  Age: 54 years      pls tell pt labs 12/11/19 were reviewed. They are stable.  · Continue with paracentesis as needed  · Proceed w/ EGD in Dec  · Proceed w/ u/s in Jan. Add CBC,CMP, INR to be done in Jan.  · Proceed w/ plans to see Dr Webster at [facility]. (Looks like she had to r/s recent Dec appt)

## 2019-12-16 NOTE — PROGRESS NOTES
CC: I feel ok     Karen Villarreal is a 54 y.o.  Pt is here for evaluation of thrombocytopenia and iron deficiency with no further vaginal bleeding. The patient has been having menometrorrhagia with low platelets.  She has a history of hepatitis C with documented cirrhosis and splenomegaly.  She is seeing a GI specialist Dr Saldana for EGD and banding of varices  She is taking Protonix to help with gastritis and remains on Aldactone for control of fluid retention.     She has completed her Hep c meds Dr Whitehead  She is tolerating  zoloft for depression  She continues to require  paracentesis regularly   Pt saw a vascular doctor for leg wound   She thinks she had plaque psoriasis    Past Medical History:   Diagnosis Date    Acid reflux     Anemia     Arthritis     Ascites 03/08/2017    Cataract     Cirrhosis     Depression     Encounter for blood transfusion     Hiatal hernia     History of hepatitis C, s/p successful Rx w/ SVR12 (cure) - 10/2019 2/16/2017    S/p mavyret w/ SVR12    Ovary removal, prophylactic     Renal cyst 03/08/2017    Thrombocytopenia     Varices, esophageal        Current Outpatient Medications:     albuterol (VENTOLIN HFA) 90 mcg/actuation inhaler, Ventolin HFA 90 mcg/actuation aerosol inhaler  take 2 puffs every 4hrs as needed for cough, Disp: , Rfl:     aluminum & magnesium hydroxide-simethicone (MAALOX MAXIMUM STRENGTH) 400-400-40 mg/5 mL suspension, Take by mouth every 6 (six) hours as needed for Indigestion., Disp: , Rfl:     cyclobenzaprine (FLEXERIL) 10 MG tablet, Take 10 mg by mouth 3 (three) times daily as needed for Muscle spasms., Disp: , Rfl:     ferrous sulfate 325 (65 FE) MG EC tablet, Take 325 mg by mouth once daily. , Disp: , Rfl:     furosemide (LASIX) 80 MG tablet, TAKE 2 TABLETS(160 MG) BY MOUTH ONCE DAILY, Disp: 60 tablet, Rfl: 0    gabapentin (NEURONTIN) 300 MG capsule, Take 300 mg by mouth 3 (three) times daily., Disp: , Rfl:     pantoprazole  "(PROTONIX) 40 MG tablet, Take 1 tablet (40 mg total) by mouth once daily., Disp: 90 tablet, Rfl: 3    sertraline (ZOLOFT) 25 MG tablet, Take 1 tablet by mouth once daily., Disp: , Rfl: 1    spironolactone (ALDACTONE) 100 MG tablet, TAKE 3 TABLETS(300 MG) BY MOUTH ONCE DAILY, Disp: 90 tablet, Rfl: 0    She is tolerating aldactone for edema and neurontin for paresthesias    CONSTITUTIONAL: No fevers, chills, night sweats, wt. loss  SKINfacial redness  ENT: No headaches, head trauma, or eye pain  LYMPH NODES: None noticed   CV: No chest pain, palpitations.   RESP: + sob and dyspnea on exertion, no cough, wheezing  GI: No nausea, emesis, diarrhea, constipation, melena, hematochezia  : distention unchanged   : No dysuria, hematuria, urgency, or frequency   HEME: Continued  easy bruising, history of bleeding problems  PSYCHIATRIC: Positive depression, no anxiety  NEURO: No seizures, memory loss, dizziness or difficulty sleeping  MSK: No arthralgias or joint swelling  Legs   No further bleeding   Cervical abnormality on pap smear followed by gyn        /75   Pulse 99   Temp 98.3 °F (36.8 °C) (Oral)   Resp 12   Ht 5' 1" (1.549 m)   Wt 109.7 kg (241 lb 13.5 oz)   LMP 08/06/2017 (Approximate)   SpO2 99%   BMI 45.70 kg/m²   Gen: NAD, A and O x3,  Conversant LOW grade temp   Psych: pleasant yet somewhat flat affect, normal thought process  Eyes: Pupils round and non dilated, EOM intact  Nose: Nares patent  OP clear, mucosa patent  Neck: suppple, no JVD, trachea midline, no adenopathy  Lungs: decreased BS bilateral bases No fremitus  CV: S1S2 with RR Tachycardia   Abd: soft, NTND, obese + ascites Distention  Extr: No CC positive  3 + pitting edema  POSITIVE distention , hard   Left leg with bandaid but erythema evident   Neuro: steady gait, CNs grossly intact  Skin: raised plaques  And buterfly facial rash   Chronic non healing lesion on her left leg   Rheum: No joint swelling    genotype 1 a HCV  Lab Results "   Component Value Date    WBC 5.29 12/11/2019    WBC 5.29 12/11/2019    RBC 4.10 12/11/2019    RBC 4.10 12/11/2019    HGB 11.3 (L) 12/11/2019    HGB 11.3 (L) 12/11/2019    HCT 35.5 (L) 12/11/2019    HCT 35.5 (L) 12/11/2019    MCV 87 12/11/2019    MCV 87 12/11/2019    MCH 27.6 12/11/2019    MCH 27.6 12/11/2019    MCHC 31.8 (L) 12/11/2019    MCHC 31.8 (L) 12/11/2019    RDW 17.0 (H) 12/11/2019    RDW 17.0 (H) 12/11/2019     (L) 12/11/2019     (L) 12/11/2019    MPV 10.9 12/11/2019    MPV 10.9 12/11/2019    GRAN 3.9 12/11/2019    GRAN 73.3 (H) 12/11/2019    GRAN 3.9 12/11/2019    GRAN 73.3 (H) 12/11/2019    LYMPH 0.7 (L) 12/11/2019    LYMPH 13.2 (L) 12/11/2019    LYMPH 0.7 (L) 12/11/2019    LYMPH 13.2 (L) 12/11/2019    MONO 0.4 12/11/2019    MONO 7.8 12/11/2019    MONO 0.4 12/11/2019    MONO 7.8 12/11/2019    EOS 0.2 12/11/2019    EOS 0.2 12/11/2019    BASO 0.06 12/11/2019    BASO 0.06 12/11/2019    EOSINOPHIL 4.2 12/11/2019    EOSINOPHIL 4.2 12/11/2019    BASOPHIL 1.1 12/11/2019    BASOPHIL 1.1 12/11/2019       Ref Range & Dbqac1a ago  Iron30 - 160 ug/dL16 Abnormally low  Jlvltgldgir032 - 375 mg/dL230 YSLZ712 - 450 ug/dL340 Saturated Iron20 - 50 %5 Abnormally low      Myalgia  -     CBC auto differential; Standing  -     Sjogrens syndrome-A extractable nuclear antibody; Future; Expected date: 12/16/2019  -     Sjogrens syndrome-B extractable nuclear antibody; Future; Expected date: 12/16/2019  -     CMP; Future; Expected date: 12/16/2019    Cytopenia  -     CBC auto differential; Standing  -     Sjogrens syndrome-A extractable nuclear antibody; Future; Expected date: 12/16/2019  -     Sjogrens syndrome-B extractable nuclear antibody; Future; Expected date: 12/16/2019  -     CMP; Future; Expected date: 12/16/2019    History of iron deficiency    Hepatic cirrhosis, unspecified hepatic cirrhosis type, unspecified whether ascites present    Esophageal varices without bleeding, unspecified esophageal varices  type    Major depressive disorder with current active episode, unspecified depression episode severity, unspecified whether recurrent      1.  Cirrhosis and splenomegaly   2.  Normocytic anemia from chronic disease  3.  Hepatitis C completed  treatment  See Dr harvey   4.  Thrombocytopenia with varices with no recent bleeding   5.  ascites cont paracentesis and see liver doctor : discussed pigtail cath and she should consider whether this would help her   6.  Chronic stasis on left leg with lesions   7.  Positive SSA: recheck     High risk for thromboembolism due to morbid obesity and sedentary lifestyle   To GI for monitoring of varices today and vaices   treatment for her hep C followed by Dr Harvey  RTC  3 weeks for continued f/U for cytopenias related to Gi disorder  No need for Iv iron ior any transfusion at this time   Continue  protonix to once a day   No pain today  No falls  No depression  Thank you for allowing me to evaluate and participate in the care of this pleasant patient. Please fell free to call me with any questions or concerns.    Elizabet Goel MD    This note was dictated with Dragon and briefly proofread. Please excuse any sentences that may be unclear or words misspelled

## 2019-12-16 NOTE — TELEPHONE ENCOUNTER
----- Message from Justin Montenegro MD sent at 12/16/2019  4:50 PM CST -----  Please advise patient that polyp pathology was reviewed and is benign and is the nonadenomatous type which is not precancerous/risk factor for cancer.  Repeat colonoscopy recommended in 10 years.  Place reminder in system for repeat colonoscopy.

## 2019-12-17 PROBLEM — Z87.19 HX OF ESOPHAGEAL VARICES: Status: ACTIVE | Noted: 2019-01-01

## 2019-12-17 NOTE — DISCHARGE INSTRUCTIONS

## 2019-12-17 NOTE — TRANSFER OF CARE
"Anesthesia Transfer of Care Note    Patient: Karen Villarreal    Procedure(s) Performed: Procedure(s) (LRB):  EGD (ESOPHAGOGASTRODUODENOSCOPY) (N/A)    Patient location: PACU    Anesthesia Type: general    Transport from OR: Transported from OR on 2-3 L/min O2 by NC with adequate spontaneous ventilation    Post pain: adequate analgesia    Post assessment: no apparent anesthetic complications and tolerated procedure well    Post vital signs: stable    Level of consciousness: awake, alert and oriented    Nausea/Vomiting: no nausea/vomiting    Complications: none    Transfer of care protocol was followed      Last vitals:   Visit Vitals  /68   Pulse 87   Temp 36.9 °C (98.4 °F) (Skin)   Resp 19   Ht 5' 1" (1.549 m)   Wt 108.9 kg (240 lb)   LMP 08/06/2017 (Approximate)   SpO2 98%   Breastfeeding? No   BMI 45.35 kg/m²     "

## 2019-12-17 NOTE — ANESTHESIA POSTPROCEDURE EVALUATION
Anesthesia Post Evaluation    Patient: Karen Villarreal    Procedure(s) Performed: Procedure(s) (LRB):  EGD (ESOPHAGOGASTRODUODENOSCOPY) (N/A)    Final Anesthesia Type: general    Patient location during evaluation: PACU  Patient participation: Yes- Able to Participate  Level of consciousness: awake and alert and oriented  Post-procedure vital signs: reviewed and stable  Pain management: adequate  Airway patency: patent    PONV status at discharge: No PONV  Anesthetic complications: no      Cardiovascular status: blood pressure returned to baseline and stable  Respiratory status: unassisted and spontaneous ventilation  Hydration status: euvolemic  Follow-up not needed.          Vitals Value Taken Time   /71 12/17/2019  8:40 AM   Temp 36.7 °C (98.1 °F) 12/17/2019  8:20 AM   Pulse 92 12/17/2019  8:40 AM   Resp 17 12/17/2019  8:40 AM   SpO2 98 % 12/17/2019  8:40 AM         Event Time     Out of Recovery 08:40:36          Pain/Kang Score: Kang Score: 10 (12/17/2019  8:40 AM)

## 2019-12-17 NOTE — ANESTHESIA PREPROCEDURE EVALUATION
12/17/2019  Karen Villarreal is a 54 y.o., female.    Anesthesia Evaluation    I have reviewed the Patient Summary Reports.    I have reviewed the Nursing Notes.   I have reviewed the Medications.     Review of Systems  Anesthesia Hx:  No problems with previous Anesthesia    Social:  Non-Smoker    Hematology/Oncology:         -- Anemia: Hematology Comments: Thrombocytopenia     Cardiovascular:  Cardiovascular Normal     Pulmonary:  Pulmonary Normal    Renal/:   Chronic Renal Disease    Hepatic/GI:   Hiatal Hernia, GERD Liver Disease, Hepatitis, C Varices  Ascites  Cirrhosis   Neurological:  Neurology Normal    Endocrine:  Endocrine Normal    Psych:   Psychiatric History depression          Physical Exam  General:  Well nourished, Morbid Obesity    Airway/Jaw/Neck:  Airway Findings: Mouth Opening: Normal Tongue: Normal  General Airway Assessment: Adult  Oropharynx Findings:  Mallampati: II  Jaw/Neck Findings:  Neck ROM: Normal ROM     Eyes/Ears/Nose:  Eyes/Ears/Nose Findings:    Dental:  Dental Findings:   Chest/Lungs:  Chest/Lungs Findings: Normal Respiratory Rate     Heart/Vascular:  Heart Findings: Rate: Normal  Rhythm: Regular Rhythm        Mental Status:  Mental Status Findings:  Cooperative, Alert and Oriented         Anesthesia Plan  Type of Anesthesia, risks & benefits discussed:  Anesthesia Type:  general  Patient's Preference:   Intra-op Monitoring Plan: standard ASA monitors  Intra-op Monitoring Plan Comments:   Post Op Pain Control Plan: multimodal analgesia  Post Op Pain Control Plan Comments:   Induction:   IV  Beta Blocker:  Patient is not currently on a Beta-Blocker (No further documentation required).       Informed Consent: Patient understands risks and agrees with Anesthesia plan.  Questions answered. Anesthesia consent signed with patient.  ASA Score: 4     Day of Surgery Review of  History & Physical:  There are no significant changes.   H&P completed by Anesthesiologist.       Ready For Surgery From Anesthesia Perspective.

## 2019-12-17 NOTE — [employer] PATIENT INSTRUCTIONS
Discharge Summary/Instructions after an Endoscopic Procedure  Patient Name: Karen Villarreal  Patient MRN: [id number]  Patient YOB: 1965  Tuesday, December 17, 2019  Justin Saldana MD  RESTRICTIONS:  During your procedure today, you received medications for sedation.  These   medications may affect your judgment, balance and coordination.  Therefore,   for 24 hours, you have the following restrictions:   - DO NOT drive a car, operate machinery, make legal/financial decisions,   sign important papers or drink alcohol.    ACTIVITY:  Today: no heavy lifting, straining or running due to procedural   sedation/anesthesia.  The following day: return to full activity including work.  DIET:  Eat and drink normally unless instructed otherwise.     TREATMENT FOR COMMON SIDE EFFECTS:  - Mild abdominal pain, nausea, belching, bloating or excessive gas:  rest,   eat lightly and use a heating pad.  - Sore Throat: treat with throat lozenges and/or gargle with warm salt   water.  - Because air was used during the procedure, expelling large amounts of air   from your rectum or belching is normal.  - If a bowel prep was taken, you may not have a bowel movement for 1-3 days.    This is normal.  SYMPTOMS TO WATCH FOR AND REPORT TO YOUR PHYSICIAN:  1. Abdominal pain or bloating, other than gas cramps.  2. Chest pain.  3. Back pain.  4. Signs of infection such as: chills or fever occurring within 24 hours   after the procedure.  5. Rectal bleeding, which would show as bright red, maroon, or black stools.   (A tablespoon of blood from the rectum is not serious, especially if   hemorrhoids are present.)  6. Vomiting.  7. Weakness or dizziness.  GO DIRECTLY TO THE NEAREST EMERGENCY ROOM IF YOU HAVE ANY OF THE FOLLOWING:      Difficulty breathing              Chills and/or fever over 101 F   Persistent vomiting and/or vomiting blood   Severe abdominal pain   Severe chest pain   Black, tarry stools   Bleeding- more than one  tablespoon   Any other symptom or condition that you feel may need urgent attention  Your doctor recommends these additional instructions:  If any biopsies were taken, your doctors clinic will contact you in 1 to 2   weeks with any results.  - Patient has a contact number available for emergencies.  The signs and   symptoms of potential delayed complications were discussed with the   patient.  Return to normal activities tomorrow.  Written discharge   instructions were provided to the patient.   - Resume previous diet.   - Continue present medications.   - No aspirin, ibuprofen, naproxen, or other non-steroidal anti-inflammatory   drugs.   - Await pathology results.   - Repeat upper endoscopy in 2 weeks for retreatment.   - Discharge patient to home (ambulatory).   - Follow an antireflux regimen.   - Return to my office after studies are complete.  For questions, problems or results please call your physician - Justin Saldana MD at Work:  [phone].  [facility], EMERGENCY ROOM PHONE NUMBER: [phone]  IF A COMPLICATION OR EMERGENCY SITUATION ARISES AND YOU ARE UNABLE TO REACH   YOUR PHYSICIAN - GO DIRECTLY TO THE EMERGENCY ROOM.  Justin Saldana MD  12/17/2019 8:18:13 AM  This report has been verified and signed electronically.  [employer]

## 2019-12-20 NOTE — NURSING
Ultrasound guided paracentesis performed by Dr. Mastres- 10.5 L removed- Patient received 75g albumin IV- VS remained stable for duration of procedure-   IV d/c'd, with tip intact. Access site cleaned with peroxide, derma bond and steri-strips applied, then covered with sterile gauze and tape. Pt discharge home.

## 2019-12-23 NOTE — TELEPHONE ENCOUNTER
I attempted to contact the pt again to schedule her f/u with an MD and have been unsuccessful. I went ahead and sent a letter requesting that she call us to schedule her f/u with an MD in our clinic.    ----- Message from Vianey Phillips MA sent at 12/13/2019 12:44 PM CST -----  Regarding: feb schedule  Schedule in feb

## 2019-12-23 NOTE — TELEPHONE ENCOUNTER
MA attempted to contact the pt on both of her numbers. I left a  Voicemail on her cell number asking her to call me yo schedule her apt with Dr. Cortez and remind her that her orders for para will be running out mid February if she doesn't get scheduled with a Dr. Smith.

## 2019-12-26 NOTE — TELEPHONE ENCOUNTER
----- Message from Garland Mai sent at 12/26/2019 12:54 PM CST -----  Contact: Pt  Pt was returning phone call.    Pt# [phone]

## 2019-12-30 NOTE — TELEPHONE ENCOUNTER
----- Message from Catarina Chemo sent at 12/30/2019 11:29 AM CST -----  Contact: pt  Type: Needs Medical Advice    Who Called:      Best Call Back Number:     Additional Information: Requesting a call back from Nurse, regarding scheduling a EDG and medications questions ,please call back with advice

## 2019-12-30 NOTE — TELEPHONE ENCOUNTER
EGD scheduled 1/8 at 830am arrive for 730am  instructions reviewed and patient states understanding.

## 2020-01-01 ENCOUNTER — TELEPHONE (OUTPATIENT)
Dept: HEPATOLOGY | Facility: CLINIC | Age: 55
End: 2020-01-01

## 2020-01-01 ENCOUNTER — PATIENT MESSAGE (OUTPATIENT)
Dept: TRANSPLANT | Facility: CLINIC | Age: 55
End: 2020-01-01

## 2020-01-01 ENCOUNTER — LAB VISIT (OUTPATIENT)
Dept: LAB | Facility: HOSPITAL | Age: 55
End: 2020-01-01
Attending: INTERNAL MEDICINE
Payer: MEDICAID

## 2020-01-01 ENCOUNTER — LAB VISIT (OUTPATIENT)
Dept: PRIMARY CARE CLINIC | Facility: CLINIC | Age: 55
End: 2020-01-01
Payer: MEDICAID

## 2020-01-01 ENCOUNTER — TELEPHONE (OUTPATIENT)
Dept: INTERVENTIONAL RADIOLOGY/VASCULAR | Facility: CLINIC | Age: 55
End: 2020-01-01

## 2020-01-01 ENCOUNTER — PATIENT MESSAGE (OUTPATIENT)
Dept: HEPATOLOGY | Facility: CLINIC | Age: 55
End: 2020-01-01

## 2020-01-01 ENCOUNTER — HOSPITAL ENCOUNTER (OUTPATIENT)
Dept: RADIOLOGY | Facility: HOSPITAL | Age: 55
Discharge: HOME | End: 2020-03-25
Attending: INTERNAL MEDICINE
Payer: MEDICAID

## 2020-01-01 ENCOUNTER — LAB VISIT (OUTPATIENT)
Dept: LAB | Facility: HOSPITAL | Age: 55
End: 2020-01-01
Payer: MEDICAID

## 2020-01-01 ENCOUNTER — OFFICE VISIT (OUTPATIENT)
Dept: HEMATOLOGY/ONCOLOGY | Facility: CLINIC | Age: 55
End: 2020-01-01
Payer: MEDICAID

## 2020-01-01 ENCOUNTER — TELEPHONE (OUTPATIENT)
Dept: GASTROENTEROLOGY | Facility: CLINIC | Age: 55
End: 2020-01-01

## 2020-01-01 ENCOUNTER — HOSPITAL ENCOUNTER (OUTPATIENT)
Dept: RADIOLOGY | Facility: HOSPITAL | Age: 55
Discharge: HOME | End: 2020-04-08
Attending: INTERNAL MEDICINE
Payer: MEDICAID

## 2020-01-01 ENCOUNTER — HOSPITAL ENCOUNTER (OUTPATIENT)
Dept: RADIOLOGY | Facility: HOSPITAL | Age: 55
Discharge: HOME | End: 2020-06-02
Attending: INTERNAL MEDICINE
Payer: MEDICAID

## 2020-01-01 ENCOUNTER — TELEPHONE (OUTPATIENT)
Dept: INTERVENTIONAL RADIOLOGY/VASCULAR | Facility: HOSPITAL | Age: 55
End: 2020-01-01

## 2020-01-01 ENCOUNTER — OFFICE VISIT (OUTPATIENT)
Dept: INTERVENTIONAL RADIOLOGY/VASCULAR | Facility: CLINIC | Age: 55
End: 2020-01-01
Payer: MEDICAID

## 2020-01-01 ENCOUNTER — HOSPITAL ENCOUNTER (OUTPATIENT)
Facility: HOSPITAL | Age: 55
DRG: 208 | End: 2020-07-14
Attending: EMERGENCY MEDICINE | Admitting: INTERNAL MEDICINE
Payer: MEDICAID

## 2020-01-01 ENCOUNTER — ANESTHESIA (OUTPATIENT)
Dept: ENDOSCOPY | Facility: HOSPITAL | Age: 55
End: 2020-01-01
Payer: MEDICAID

## 2020-01-01 ENCOUNTER — LAB VISIT (OUTPATIENT)
Dept: LAB | Facility: HOSPITAL | Age: 55
End: 2020-01-01
Attending: RADIOLOGY
Payer: MEDICAID

## 2020-01-01 ENCOUNTER — HOSPITAL ENCOUNTER (OUTPATIENT)
Dept: RADIOLOGY | Facility: HOSPITAL | Age: 55
Discharge: HOME | End: 2020-01-31
Attending: PHYSICIAN ASSISTANT
Payer: MEDICAID

## 2020-01-01 ENCOUNTER — HOSPITAL ENCOUNTER (OUTPATIENT)
Dept: RADIOLOGY | Facility: HOSPITAL | Age: 55
Discharge: HOME | End: 2020-01-17
Attending: PHYSICIAN ASSISTANT
Payer: MEDICAID

## 2020-01-01 ENCOUNTER — HOSPITAL ENCOUNTER (OUTPATIENT)
Dept: RADIOLOGY | Facility: HOSPITAL | Age: 55
Discharge: HOME | End: 2020-02-12
Attending: INTERNAL MEDICINE
Payer: MEDICAID

## 2020-01-01 ENCOUNTER — HOSPITAL ENCOUNTER (OUTPATIENT)
Dept: RADIOLOGY | Facility: HOSPITAL | Age: 55
Discharge: HOME | End: 2020-05-08
Attending: INTERNAL MEDICINE
Payer: MEDICAID

## 2020-01-01 ENCOUNTER — HOSPITAL ENCOUNTER (OUTPATIENT)
Facility: HOSPITAL | Age: 55
Discharge: HOME | End: 2020-01-08
Attending: INTERNAL MEDICINE | Admitting: INTERNAL MEDICINE
Payer: MEDICAID

## 2020-01-01 ENCOUNTER — HOSPITAL ENCOUNTER (OUTPATIENT)
Dept: RADIOLOGY | Facility: HOSPITAL | Age: 55
Discharge: HOME | End: 2020-02-14
Attending: PHYSICIAN ASSISTANT
Payer: MEDICAID

## 2020-01-01 ENCOUNTER — ANESTHESIA (OUTPATIENT)
Dept: ENDOSCOPY | Facility: HOSPITAL | Age: 55
DRG: 406 | End: 2020-01-01
Payer: MEDICAID

## 2020-01-01 ENCOUNTER — HOSPITAL ENCOUNTER (OUTPATIENT)
Dept: RADIOLOGY | Facility: HOSPITAL | Age: 55
Discharge: HOME | End: 2020-03-13
Attending: INTERNAL MEDICINE
Payer: MEDICAID

## 2020-01-01 ENCOUNTER — ANESTHESIA EVENT (OUTPATIENT)
Dept: ENDOSCOPY | Facility: HOSPITAL | Age: 55
End: 2020-01-01
Payer: MEDICAID

## 2020-01-01 ENCOUNTER — HOSPITAL ENCOUNTER (OUTPATIENT)
Dept: RADIOLOGY | Facility: HOSPITAL | Age: 55
Discharge: HOME | End: 2020-01-03
Attending: PHYSICIAN ASSISTANT
Payer: MEDICAID

## 2020-01-01 ENCOUNTER — NURSE TRIAGE (OUTPATIENT)
Dept: ADMINISTRATIVE | Facility: CLINIC | Age: 55
End: 2020-01-01

## 2020-01-01 ENCOUNTER — ANESTHESIA EVENT (OUTPATIENT)
Dept: ENDOSCOPY | Facility: HOSPITAL | Age: 55
DRG: 406 | End: 2020-01-01
Payer: MEDICAID

## 2020-01-01 ENCOUNTER — HOSPITAL ENCOUNTER (OUTPATIENT)
Dept: RADIOLOGY | Facility: HOSPITAL | Age: 55
Discharge: HOME | End: 2020-07-02
Attending: INTERNAL MEDICINE
Payer: MEDICAID

## 2020-01-01 ENCOUNTER — HOSPITAL ENCOUNTER (OUTPATIENT)
Dept: RADIOLOGY | Facility: HOSPITAL | Age: 55
Discharge: HOME | End: 2020-02-28
Attending: PHYSICIAN ASSISTANT
Payer: MEDICAID

## 2020-01-01 ENCOUNTER — HOSPITAL ENCOUNTER (OUTPATIENT)
Dept: RADIOLOGY | Facility: HOSPITAL | Age: 55
Discharge: HOME | End: 2020-05-22
Attending: INTERNAL MEDICINE
Payer: MEDICAID

## 2020-01-01 ENCOUNTER — OFFICE VISIT (OUTPATIENT)
Dept: HEPATOLOGY | Facility: CLINIC | Age: 55
End: 2020-01-01
Payer: MEDICAID

## 2020-01-01 ENCOUNTER — HOSPITAL ENCOUNTER (OUTPATIENT)
Dept: RADIOLOGY | Facility: HOSPITAL | Age: 55
Discharge: HOME | End: 2020-04-24
Attending: INTERNAL MEDICINE
Payer: MEDICAID

## 2020-01-01 ENCOUNTER — HOSPITAL ENCOUNTER (INPATIENT)
Facility: HOSPITAL | Age: 55
LOS: 4 days | Discharge: HOME | DRG: 406 | End: 2020-06-13
Attending: RADIOLOGY | Admitting: HOSPITALIST
Payer: MEDICAID

## 2020-01-01 ENCOUNTER — HOSPITAL ENCOUNTER (OUTPATIENT)
Facility: HOSPITAL | Age: 55
Discharge: HOME | End: 2020-02-05
Attending: INTERNAL MEDICINE | Admitting: INTERNAL MEDICINE
Payer: MEDICAID

## 2020-01-01 VITALS
HEART RATE: 101 BPM | OXYGEN SATURATION: 100 % | HEIGHT: 61 IN | SYSTOLIC BLOOD PRESSURE: 113 MMHG | DIASTOLIC BLOOD PRESSURE: 51 MMHG | BODY MASS INDEX: 40.87 KG/M2 | OXYGEN SATURATION: 98 % | TEMPERATURE: 98 F | SYSTOLIC BLOOD PRESSURE: 102 MMHG | WEIGHT: 216.5 LBS | RESPIRATION RATE: 16 BRPM | DIASTOLIC BLOOD PRESSURE: 60 MMHG | HEART RATE: 94 BPM

## 2020-01-01 VITALS — OXYGEN SATURATION: 97 % | DIASTOLIC BLOOD PRESSURE: 60 MMHG | SYSTOLIC BLOOD PRESSURE: 117 MMHG | HEART RATE: 97 BPM

## 2020-01-01 VITALS — HEART RATE: 99 BPM | DIASTOLIC BLOOD PRESSURE: 58 MMHG | SYSTOLIC BLOOD PRESSURE: 123 MMHG | OXYGEN SATURATION: 100 %

## 2020-01-01 VITALS
OXYGEN SATURATION: 100 % | DIASTOLIC BLOOD PRESSURE: 57 MMHG | SYSTOLIC BLOOD PRESSURE: 110 MMHG | DIASTOLIC BLOOD PRESSURE: 55 MMHG | HEART RATE: 102 BPM | SYSTOLIC BLOOD PRESSURE: 105 MMHG | OXYGEN SATURATION: 100 % | HEART RATE: 99 BPM

## 2020-01-01 VITALS
HEART RATE: 99 BPM | RESPIRATION RATE: 18 BRPM | SYSTOLIC BLOOD PRESSURE: 125 MMHG | DIASTOLIC BLOOD PRESSURE: 70 MMHG | OXYGEN SATURATION: 99 %

## 2020-01-01 VITALS — SYSTOLIC BLOOD PRESSURE: 123 MMHG | DIASTOLIC BLOOD PRESSURE: 60 MMHG | OXYGEN SATURATION: 100 % | HEART RATE: 96 BPM

## 2020-01-01 VITALS
SYSTOLIC BLOOD PRESSURE: 113 MMHG | DIASTOLIC BLOOD PRESSURE: 66 MMHG | WEIGHT: 223.13 LBS | HEIGHT: 61 IN | BODY MASS INDEX: 42.13 KG/M2 | HEART RATE: 100 BPM

## 2020-01-01 VITALS
SYSTOLIC BLOOD PRESSURE: 140 MMHG | HEART RATE: 101 BPM | HEIGHT: 61 IN | RESPIRATION RATE: 18 BRPM | DIASTOLIC BLOOD PRESSURE: 64 MMHG | SYSTOLIC BLOOD PRESSURE: 121 MMHG | WEIGHT: 240.31 LBS | OXYGEN SATURATION: 99 % | WEIGHT: 240 LBS | TEMPERATURE: 99 F | DIASTOLIC BLOOD PRESSURE: 55 MMHG | HEIGHT: 61 IN | BODY MASS INDEX: 45.37 KG/M2 | OXYGEN SATURATION: 99 % | TEMPERATURE: 98 F | HEART RATE: 95 BPM | RESPIRATION RATE: 12 BRPM | BODY MASS INDEX: 45.31 KG/M2

## 2020-01-01 VITALS
OXYGEN SATURATION: 100 % | WEIGHT: 222 LBS | WEIGHT: 224.19 LBS | BODY MASS INDEX: 42.33 KG/M2 | RESPIRATION RATE: 19 BRPM | HEIGHT: 61 IN | BODY MASS INDEX: 41.91 KG/M2 | OXYGEN SATURATION: 95 % | RESPIRATION RATE: 18 BRPM | SYSTOLIC BLOOD PRESSURE: 108 MMHG | HEART RATE: 104 BPM | HEART RATE: 99 BPM | TEMPERATURE: 99 F | DIASTOLIC BLOOD PRESSURE: 54 MMHG | HEIGHT: 61 IN | SYSTOLIC BLOOD PRESSURE: 130 MMHG | HEART RATE: 97 BPM | OXYGEN SATURATION: 100 % | SYSTOLIC BLOOD PRESSURE: 124 MMHG | DIASTOLIC BLOOD PRESSURE: 65 MMHG | RESPIRATION RATE: 18 BRPM | DIASTOLIC BLOOD PRESSURE: 61 MMHG

## 2020-01-01 VITALS — DIASTOLIC BLOOD PRESSURE: 59 MMHG | OXYGEN SATURATION: 99 % | HEART RATE: 95 BPM | SYSTOLIC BLOOD PRESSURE: 111 MMHG

## 2020-01-01 VITALS — OXYGEN SATURATION: 99 % | SYSTOLIC BLOOD PRESSURE: 129 MMHG | HEART RATE: 97 BPM | DIASTOLIC BLOOD PRESSURE: 56 MMHG

## 2020-01-01 VITALS — DIASTOLIC BLOOD PRESSURE: 61 MMHG | OXYGEN SATURATION: 100 % | SYSTOLIC BLOOD PRESSURE: 123 MMHG | HEART RATE: 98 BPM

## 2020-01-01 VITALS — SYSTOLIC BLOOD PRESSURE: 106 MMHG | OXYGEN SATURATION: 100 % | HEART RATE: 95 BPM | DIASTOLIC BLOOD PRESSURE: 56 MMHG

## 2020-01-01 VITALS — HEART RATE: 94 BPM | OXYGEN SATURATION: 100 % | SYSTOLIC BLOOD PRESSURE: 105 MMHG | DIASTOLIC BLOOD PRESSURE: 57 MMHG

## 2020-01-01 DIAGNOSIS — K74.60 CIRRHOSIS OF LIVER WITH ASCITES, UNSPECIFIED HEPATIC CIRRHOSIS TYPE: ICD-10-CM

## 2020-01-01 DIAGNOSIS — R18.8 OTHER ASCITES: Primary | ICD-10-CM

## 2020-01-01 DIAGNOSIS — R18.8 OTHER ASCITES: ICD-10-CM

## 2020-01-01 DIAGNOSIS — R18.8 CIRRHOSIS OF LIVER WITH ASCITES, UNSPECIFIED HEPATIC CIRRHOSIS TYPE: ICD-10-CM

## 2020-01-01 DIAGNOSIS — D75.9 CYTOPENIA: Primary | ICD-10-CM

## 2020-01-01 DIAGNOSIS — R06.02 SOB (SHORTNESS OF BREATH): ICD-10-CM

## 2020-01-01 DIAGNOSIS — D75.9 CYTOPENIA: ICD-10-CM

## 2020-01-01 DIAGNOSIS — Z86.19 HISTORY OF HEPATITIS C: Primary | ICD-10-CM

## 2020-01-01 DIAGNOSIS — K74.69 DECOMPENSATED HCV CIRRHOSIS: Primary | ICD-10-CM

## 2020-01-01 DIAGNOSIS — Z95.828 S/P TIPS (TRANSJUGULAR INTRAHEPATIC PORTOSYSTEMIC SHUNT): ICD-10-CM

## 2020-01-01 DIAGNOSIS — Z87.19 HISTORY OF CHRONIC HEPATITIS: ICD-10-CM

## 2020-01-01 DIAGNOSIS — I85.00 ESOPHAGEAL VARICES WITHOUT BLEEDING, UNSPECIFIED ESOPHAGEAL VARICES TYPE: ICD-10-CM

## 2020-01-01 DIAGNOSIS — B19.20 DECOMPENSATED HCV CIRRHOSIS: ICD-10-CM

## 2020-01-01 DIAGNOSIS — R79.89 ELEVATED LFTS: ICD-10-CM

## 2020-01-01 DIAGNOSIS — I85.00 ESOPHAGEAL VARICES WITHOUT BLEEDING, UNSPECIFIED ESOPHAGEAL VARICES TYPE: Primary | ICD-10-CM

## 2020-01-01 DIAGNOSIS — B18.2 CHRONIC HEPATITIS C WITHOUT HEPATIC COMA: ICD-10-CM

## 2020-01-01 DIAGNOSIS — Z86.19 HISTORY OF HEPATITIS C: ICD-10-CM

## 2020-01-01 DIAGNOSIS — U07.1 COVID-19: Primary | ICD-10-CM

## 2020-01-01 DIAGNOSIS — K74.69 DECOMPENSATED CIRRHOSIS RELATED TO HEPATITIS C VIRUS (HCV): ICD-10-CM

## 2020-01-01 DIAGNOSIS — R18.8 CIRRHOSIS OF LIVER WITH ASCITES, UNSPECIFIED HEPATIC CIRRHOSIS TYPE: Primary | ICD-10-CM

## 2020-01-01 DIAGNOSIS — U07.1 COVID-19: ICD-10-CM

## 2020-01-01 DIAGNOSIS — K74.69 DECOMPENSATED HCV CIRRHOSIS: ICD-10-CM

## 2020-01-01 DIAGNOSIS — B19.20 DECOMPENSATED HCV CIRRHOSIS: Primary | ICD-10-CM

## 2020-01-01 DIAGNOSIS — Z87.19 HISTORY OF CHRONIC HEPATITIS: Primary | ICD-10-CM

## 2020-01-01 DIAGNOSIS — E44.0 MALNUTRITION OF MODERATE DEGREE: ICD-10-CM

## 2020-01-01 DIAGNOSIS — B18.2 CHRONIC HEPATITIS C WITHOUT HEPATIC COMA: Primary | ICD-10-CM

## 2020-01-01 DIAGNOSIS — E87.1 HYPONATREMIA: ICD-10-CM

## 2020-01-01 DIAGNOSIS — I85.00 VARICES, ESOPHAGEAL: ICD-10-CM

## 2020-01-01 DIAGNOSIS — R60.1 ANASARCA: ICD-10-CM

## 2020-01-01 DIAGNOSIS — R07.9 CHEST PAIN: ICD-10-CM

## 2020-01-01 DIAGNOSIS — Z87.19 HX OF ESOPHAGEAL VARICES: ICD-10-CM

## 2020-01-01 DIAGNOSIS — K74.60 CIRRHOSIS OF LIVER WITH ASCITES, UNSPECIFIED HEPATIC CIRRHOSIS TYPE: Primary | ICD-10-CM

## 2020-01-01 DIAGNOSIS — B19.20 DECOMPENSATED CIRRHOSIS RELATED TO HEPATITIS C VIRUS (HCV): ICD-10-CM

## 2020-01-01 DIAGNOSIS — I46.9 CARDIAC ARREST: Primary | ICD-10-CM

## 2020-01-01 DIAGNOSIS — D69.6 THROMBOCYTOPENIA: ICD-10-CM

## 2020-01-01 LAB
AFP SERPL-MCNC: 4.6 NG/ML (ref 0–8.4)
ALBUMIN FLD-MCNC: 0.9 G/DL
ALBUMIN SERPL BCP-MCNC: 2.5 G/DL (ref 3.5–5.2)
ALBUMIN SERPL BCP-MCNC: 2.5 G/DL (ref 3.5–5.2)
ALBUMIN SERPL BCP-MCNC: 2.6 G/DL (ref 3.5–5.2)
ALBUMIN SERPL BCP-MCNC: 2.7 G/DL (ref 3.5–5.2)
ALBUMIN SERPL BCP-MCNC: 2.8 G/DL (ref 3.5–5.2)
ALBUMIN SERPL BCP-MCNC: 2.9 G/DL (ref 3.5–5.2)
ALBUMIN SERPL BCP-MCNC: 3.1 G/DL (ref 3.5–5.2)
ALLENS TEST: ABNORMAL
ALP SERPL-CCNC: 123 U/L (ref 55–135)
ALP SERPL-CCNC: 158 U/L (ref 55–135)
ALP SERPL-CCNC: 164 U/L (ref 55–135)
ALP SERPL-CCNC: 164 U/L (ref 55–135)
ALP SERPL-CCNC: 166 U/L (ref 55–135)
ALP SERPL-CCNC: 222 U/L (ref 55–135)
ALP SERPL-CCNC: 274 U/L (ref 55–135)
ALP SERPL-CCNC: 275 U/L (ref 55–135)
ALP SERPL-CCNC: 284 U/L (ref 55–135)
ALT SERPL W/O P-5'-P-CCNC: 117 U/L (ref 10–44)
ALT SERPL W/O P-5'-P-CCNC: 143 U/L (ref 10–44)
ALT SERPL W/O P-5'-P-CCNC: 15 U/L (ref 10–44)
ALT SERPL W/O P-5'-P-CCNC: 168 U/L (ref 10–44)
ALT SERPL W/O P-5'-P-CCNC: 18 U/L (ref 10–44)
ALT SERPL W/O P-5'-P-CCNC: 259 U/L (ref 10–44)
ALT SERPL W/O P-5'-P-CCNC: 306 U/L (ref 10–44)
ALT SERPL W/O P-5'-P-CCNC: 316 U/L (ref 10–44)
ALT SERPL W/O P-5'-P-CCNC: 44 U/L (ref 10–44)
ANION GAP SERPL CALC-SCNC: 24 MMOL/L (ref 8–16)
ANION GAP SERPL CALC-SCNC: 6 MMOL/L (ref 8–16)
ANION GAP SERPL CALC-SCNC: 7 MMOL/L (ref 8–16)
ANION GAP SERPL CALC-SCNC: 7 MMOL/L (ref 8–16)
ANION GAP SERPL CALC-SCNC: 8 MMOL/L (ref 8–16)
ANION GAP SERPL CALC-SCNC: 8 MMOL/L (ref 8–16)
ANISOCYTOSIS BLD QL SMEAR: ABNORMAL
APPEARANCE FLD: CLEAR
APPEARANCE FLD: NORMAL
AST SERPL-CCNC: 28 U/L (ref 10–40)
AST SERPL-CCNC: 291 U/L (ref 10–40)
AST SERPL-CCNC: 33 U/L (ref 10–40)
AST SERPL-CCNC: 379 U/L (ref 10–40)
AST SERPL-CCNC: 409 U/L (ref 10–40)
AST SERPL-CCNC: 573 U/L (ref 10–40)
AST SERPL-CCNC: 60 U/L (ref 10–40)
AST SERPL-CCNC: 648 U/L (ref 10–40)
AST SERPL-CCNC: 89 U/L (ref 10–40)
AV INDEX (PROSTH): 0.66
AV MEAN GRADIENT: 7 MMHG
AV PEAK GRADIENT: 11 MMHG
AV VALVE AREA: 2.02 CM2
AV VELOCITY RATIO: 0.73
B-HCG UR QL: NEGATIVE
BACTERIA #/AREA URNS AUTO: ABNORMAL /HPF
BACTERIA FLD CULT: NORMAL
BASOPHILS # BLD AUTO: 0.03 K/UL (ref 0–0.2)
BASOPHILS # BLD AUTO: 0.04 K/UL (ref 0–0.2)
BASOPHILS # BLD AUTO: 0.05 K/UL (ref 0–0.2)
BASOPHILS # BLD AUTO: 0.06 K/UL (ref 0–0.2)
BASOPHILS # BLD AUTO: 0.07 K/UL (ref 0–0.2)
BASOPHILS # BLD AUTO: 0.07 K/UL (ref 0–0.2)
BASOPHILS NFR BLD: 0 % (ref 0–1.9)
BASOPHILS NFR BLD: 0.4 % (ref 0–1.9)
BASOPHILS NFR BLD: 0.4 % (ref 0–1.9)
BASOPHILS NFR BLD: 0.5 % (ref 0–1.9)
BASOPHILS NFR BLD: 0.7 % (ref 0–1.9)
BASOPHILS NFR BLD: 0.8 % (ref 0–1.9)
BASOPHILS NFR BLD: 0.9 % (ref 0–1.9)
BASOPHILS NFR BLD: 0.9 % (ref 0–1.9)
BASOPHILS NFR BLD: 1 % (ref 0–1.9)
BILIRUB SERPL-MCNC: 1.3 MG/DL (ref 0.1–1)
BILIRUB SERPL-MCNC: 14.6 MG/DL (ref 0.1–1)
BILIRUB SERPL-MCNC: 2.5 MG/DL (ref 0.1–1)
BILIRUB SERPL-MCNC: 2.8 MG/DL (ref 0.1–1)
BILIRUB SERPL-MCNC: 2.8 MG/DL (ref 0.1–1)
BILIRUB SERPL-MCNC: 2.9 MG/DL (ref 0.1–1)
BILIRUB SERPL-MCNC: 3.9 MG/DL (ref 0.1–1)
BILIRUB SERPL-MCNC: 4.3 MG/DL (ref 0.1–1)
BILIRUB SERPL-MCNC: 5.6 MG/DL (ref 0.1–1)
BILIRUB UR QL STRIP: NEGATIVE
BNP SERPL-MCNC: 152 PG/ML (ref 0–99)
BNP SERPL-MCNC: 483 PG/ML (ref 0–99)
BODY FLD TYPE: NORMAL
BODY FLD TYPE: NORMAL
BODY FLUID SOURCE, LDH: NORMAL
BSA FOR ECHO PROCEDURE: 2.05 M2
BUN SERPL-MCNC: 14 MG/DL (ref 6–20)
BUN SERPL-MCNC: 6 MG/DL (ref 6–20)
BUN SERPL-MCNC: 7 MG/DL (ref 6–20)
BUN SERPL-MCNC: 8 MG/DL (ref 6–20)
BUN SERPL-MCNC: 9 MG/DL (ref 6–20)
BURR CELLS BLD QL SMEAR: ABNORMAL
CALCIUM SERPL-MCNC: 7.5 MG/DL (ref 8.7–10.5)
CALCIUM SERPL-MCNC: 7.5 MG/DL (ref 8.7–10.5)
CALCIUM SERPL-MCNC: 7.6 MG/DL (ref 8.7–10.5)
CALCIUM SERPL-MCNC: 7.7 MG/DL (ref 8.7–10.5)
CALCIUM SERPL-MCNC: 7.8 MG/DL (ref 8.7–10.5)
CALCIUM SERPL-MCNC: 8.1 MG/DL (ref 8.7–10.5)
CALCIUM SERPL-MCNC: 8.3 MG/DL (ref 8.7–10.5)
CALCIUM SERPL-MCNC: 8.4 MG/DL (ref 8.7–10.5)
CALCIUM SERPL-MCNC: 8.5 MG/DL (ref 8.7–10.5)
CHLORIDE SERPL-SCNC: 100 MMOL/L (ref 95–110)
CHLORIDE SERPL-SCNC: 102 MMOL/L (ref 95–110)
CHLORIDE SERPL-SCNC: 103 MMOL/L (ref 95–110)
CHLORIDE SERPL-SCNC: 103 MMOL/L (ref 95–110)
CHLORIDE SERPL-SCNC: 97 MMOL/L (ref 95–110)
CHLORIDE SERPL-SCNC: 98 MMOL/L (ref 95–110)
CHLORIDE SERPL-SCNC: 98 MMOL/L (ref 95–110)
CHLORIDE SERPL-SCNC: 99 MMOL/L (ref 95–110)
CHLORIDE SERPL-SCNC: 99 MMOL/L (ref 95–110)
CLARITY UR REFRACT.AUTO: CLEAR
CO2 SERPL-SCNC: 23 MMOL/L (ref 23–29)
CO2 SERPL-SCNC: 23 MMOL/L (ref 23–29)
CO2 SERPL-SCNC: 24 MMOL/L (ref 23–29)
CO2 SERPL-SCNC: 25 MMOL/L (ref 23–29)
CO2 SERPL-SCNC: 27 MMOL/L (ref 23–29)
CO2 SERPL-SCNC: 27 MMOL/L (ref 23–29)
CO2 SERPL-SCNC: 9 MMOL/L (ref 23–29)
COLOR FLD: YELLOW
COLOR FLD: YELLOW
COLOR UR AUTO: YELLOW
CREAT SERPL-MCNC: 0.8 MG/DL (ref 0.5–1.4)
CREAT SERPL-MCNC: 0.9 MG/DL (ref 0.5–1.4)
CREAT SERPL-MCNC: 0.9 MG/DL (ref 0.5–1.4)
CREAT SERPL-MCNC: 2.1 MG/DL (ref 0.5–1.4)
CRP SERPL-MCNC: 28.1 MG/L (ref 0–8.2)
CTP QC/QA: YES
CV ECHO LV RWT: 0.19 CM
DELSYS: ABNORMAL
DIFFERENTIAL METHOD: ABNORMAL
DOP CALC AO PEAK VEL: 1.68 M/S
DOP CALC AO VTI: 31.76 CM
DOP CALC LVOT AREA: 3 CM2
DOP CALC LVOT DIAMETER: 1.97 CM
DOP CALC LVOT PEAK VEL: 1.22 M/S
DOP CALC LVOT STROKE VOLUME: 64.04 CM3
DOP CALCLVOT PEAK VEL VTI: 21.02 CM
E WAVE DECELERATION TIME: 190.74 MSEC
E/A RATIO: 1.01
E/E' RATIO: 10.48 M/S
ECHO LV POSTERIOR WALL: 0.5 CM (ref 0.6–1.1)
EOSINOPHIL # BLD AUTO: 0.2 K/UL (ref 0–0.5)
EOSINOPHIL # BLD AUTO: 0.3 K/UL (ref 0–0.5)
EOSINOPHIL # BLD AUTO: 0.4 K/UL (ref 0–0.5)
EOSINOPHIL NFR BLD: 1.8 % (ref 0–8)
EOSINOPHIL NFR BLD: 2.1 % (ref 0–8)
EOSINOPHIL NFR BLD: 2.3 % (ref 0–8)
EOSINOPHIL NFR BLD: 2.4 % (ref 0–8)
EOSINOPHIL NFR BLD: 2.6 % (ref 0–8)
EOSINOPHIL NFR BLD: 3 % (ref 0–8)
EOSINOPHIL NFR BLD: 3 % (ref 0–8)
EOSINOPHIL NFR BLD: 3.4 % (ref 0–8)
EOSINOPHIL NFR BLD: 3.6 % (ref 0–8)
EOSINOPHIL NFR BLD: 4.1 % (ref 0–8)
EOSINOPHIL NFR BLD: 4.7 % (ref 0–8)
ERYTHROCYTE [DISTWIDTH] IN BLOOD BY AUTOMATED COUNT: 16.2 % (ref 11.5–14.5)
ERYTHROCYTE [DISTWIDTH] IN BLOOD BY AUTOMATED COUNT: 16.5 % (ref 11.5–14.5)
ERYTHROCYTE [DISTWIDTH] IN BLOOD BY AUTOMATED COUNT: 16.6 % (ref 11.5–14.5)
ERYTHROCYTE [DISTWIDTH] IN BLOOD BY AUTOMATED COUNT: 16.7 % (ref 11.5–14.5)
ERYTHROCYTE [DISTWIDTH] IN BLOOD BY AUTOMATED COUNT: 17.4 % (ref 11.5–14.5)
ERYTHROCYTE [DISTWIDTH] IN BLOOD BY AUTOMATED COUNT: 17.5 % (ref 11.5–14.5)
ERYTHROCYTE [DISTWIDTH] IN BLOOD BY AUTOMATED COUNT: 17.6 % (ref 11.5–14.5)
ERYTHROCYTE [DISTWIDTH] IN BLOOD BY AUTOMATED COUNT: 17.9 % (ref 11.5–14.5)
ERYTHROCYTE [DISTWIDTH] IN BLOOD BY AUTOMATED COUNT: 18 % (ref 11.5–14.5)
ERYTHROCYTE [DISTWIDTH] IN BLOOD BY AUTOMATED COUNT: 18.9 % (ref 11.5–14.5)
ERYTHROCYTE [DISTWIDTH] IN BLOOD BY AUTOMATED COUNT: 19.6 % (ref 11.5–14.5)
ERYTHROCYTE [SEDIMENTATION RATE] IN BLOOD BY WESTERGREN METHOD: 16 MM/H
ERYTHROCYTE [SEDIMENTATION RATE] IN BLOOD BY WESTERGREN METHOD: <2 MM/HR (ref 0–36)
EST. GFR  (AFRICAN AMERICAN): 30 ML/MIN/1.73 M^2
EST. GFR  (AFRICAN AMERICAN): >60 ML/MIN/1.73 M^2
EST. GFR  (NON AFRICAN AMERICAN): 26 ML/MIN/1.73 M^2
EST. GFR  (NON AFRICAN AMERICAN): >60 ML/MIN/1.73 M^2
FINAL PATHOLOGIC DIAGNOSIS: NORMAL
FIO2: 100
FRACTIONAL SHORTENING: 35 % (ref 28–44)
FUNGUS SPEC CULT: NORMAL
GLUCOSE SERPL-MCNC: 118 MG/DL (ref 70–110)
GLUCOSE SERPL-MCNC: 119 MG/DL (ref 70–110)
GLUCOSE SERPL-MCNC: 80 MG/DL (ref 70–110)
GLUCOSE SERPL-MCNC: 81 MG/DL (ref 70–110)
GLUCOSE SERPL-MCNC: 82 MG/DL (ref 70–110)
GLUCOSE SERPL-MCNC: 85 MG/DL (ref 70–110)
GLUCOSE SERPL-MCNC: 92 MG/DL (ref 70–110)
GLUCOSE SERPL-MCNC: 97 MG/DL (ref 70–110)
GLUCOSE SERPL-MCNC: <5 MG/DL (ref 70–110)
GLUCOSE UR QL STRIP: NEGATIVE
GRAM STN SPEC: NORMAL
GRAM STN SPEC: NORMAL
GROSS: NORMAL
HCO3 UR-SCNC: 8.7 MMOL/L (ref 24–28)
HCT VFR BLD AUTO: 29.5 % (ref 37–48.5)
HCT VFR BLD AUTO: 29.9 % (ref 37–48.5)
HCT VFR BLD AUTO: 30.1 % (ref 37–48.5)
HCT VFR BLD AUTO: 32.4 % (ref 37–48.5)
HCT VFR BLD AUTO: 33.8 % (ref 37–48.5)
HCT VFR BLD AUTO: 34.1 % (ref 37–48.5)
HCT VFR BLD AUTO: 34.5 % (ref 37–48.5)
HCT VFR BLD AUTO: 35.5 % (ref 37–48.5)
HCT VFR BLD AUTO: 35.6 % (ref 37–48.5)
HCT VFR BLD AUTO: 35.7 % (ref 37–48.5)
HCT VFR BLD AUTO: 37 % (ref 37–48.5)
HCV RNA SERPL NAA+PROBE-LOG IU: <1.08 LOG (10) IU/ML
HCV RNA SERPL QL NAA+PROBE: NOT DETECTED IU/ML
HCV RNA SPEC NAA+PROBE-ACNC: <12 IU/ML
HGB BLD-MCNC: 10.8 G/DL (ref 12–16)
HGB BLD-MCNC: 11.2 G/DL (ref 12–16)
HGB BLD-MCNC: 11.2 G/DL (ref 12–16)
HGB BLD-MCNC: 11.4 G/DL (ref 12–16)
HGB BLD-MCNC: 11.4 G/DL (ref 12–16)
HGB BLD-MCNC: 11.5 G/DL (ref 12–16)
HGB BLD-MCNC: 11.6 G/DL (ref 12–16)
HGB BLD-MCNC: 11.7 G/DL (ref 12–16)
HGB BLD-MCNC: 9.2 G/DL (ref 12–16)
HGB BLD-MCNC: 9.8 G/DL (ref 12–16)
HGB BLD-MCNC: 9.8 G/DL (ref 12–16)
HGB UR QL STRIP: ABNORMAL
HYALINE CASTS UR QL AUTO: 1 /LPF
HYPOCHROMIA BLD QL SMEAR: ABNORMAL
IMM GRANULOCYTES # BLD AUTO: 0.01 K/UL (ref 0–0.04)
IMM GRANULOCYTES # BLD AUTO: 0.02 K/UL (ref 0–0.04)
IMM GRANULOCYTES # BLD AUTO: 0.03 K/UL (ref 0–0.04)
IMM GRANULOCYTES # BLD AUTO: 0.04 K/UL (ref 0–0.04)
IMM GRANULOCYTES # BLD AUTO: 0.04 K/UL (ref 0–0.04)
IMM GRANULOCYTES # BLD AUTO: 0.05 K/UL (ref 0–0.04)
IMM GRANULOCYTES # BLD AUTO: 0.05 K/UL (ref 0–0.04)
IMM GRANULOCYTES # BLD AUTO: 0.07 K/UL (ref 0–0.04)
IMM GRANULOCYTES # BLD AUTO: 0.07 K/UL (ref 0–0.04)
IMM GRANULOCYTES # BLD AUTO: 0.09 K/UL (ref 0–0.04)
IMM GRANULOCYTES # BLD AUTO: ABNORMAL K/UL (ref 0–0.04)
IMM GRANULOCYTES NFR BLD AUTO: 0.3 % (ref 0–0.5)
IMM GRANULOCYTES NFR BLD AUTO: 0.5 % (ref 0–0.5)
IMM GRANULOCYTES NFR BLD AUTO: 0.6 % (ref 0–0.5)
IMM GRANULOCYTES NFR BLD AUTO: 0.7 % (ref 0–0.5)
IMM GRANULOCYTES NFR BLD AUTO: 0.8 % (ref 0–0.5)
IMM GRANULOCYTES NFR BLD AUTO: 0.9 % (ref 0–0.5)
IMM GRANULOCYTES NFR BLD AUTO: ABNORMAL % (ref 0–0.5)
INR PPP: 1.1 (ref 0.8–1.2)
INR PPP: 1.3 (ref 0.8–1.2)
INR PPP: 1.5 (ref 0.8–1.2)
INR PPP: 2.8 (ref 0.8–1.2)
INTERVENTRICULAR SEPTUM: 0.31 CM (ref 0.6–1.1)
IRON SERPL-MCNC: 23 UG/DL (ref 30–160)
KETONES UR QL STRIP: NEGATIVE
LA MAJOR: 5.15 CM
LA WIDTH: 2.9 CM
LDH FLD L TO P-CCNC: 126 U/L
LEFT ATRIUM SIZE: 3.23 CM
LEFT INTERNAL DIMENSION IN SYSTOLE: 3.38 CM (ref 2.1–4)
LEFT VENTRICLE DIASTOLIC VOLUME INDEX: 67.03 ML/M2
LEFT VENTRICLE DIASTOLIC VOLUME: 130.39 ML
LEFT VENTRICLE MASS INDEX: 33 G/M2
LEFT VENTRICLE SYSTOLIC VOLUME INDEX: 24 ML/M2
LEFT VENTRICLE SYSTOLIC VOLUME: 46.7 ML
LEFT VENTRICULAR INTERNAL DIMENSION IN DIASTOLE: 5.22 CM (ref 3.5–6)
LEFT VENTRICULAR MASS: 64.68 G
LEUKOCYTE ESTERASE UR QL STRIP: NEGATIVE
LV LATERAL E/E' RATIO: 9.17 M/S
LV SEPTAL E/E' RATIO: 12.22 M/S
LYMPHOCYTES # BLD AUTO: 0.6 K/UL (ref 1–4.8)
LYMPHOCYTES # BLD AUTO: 0.7 K/UL (ref 1–4.8)
LYMPHOCYTES # BLD AUTO: 0.7 K/UL (ref 1–4.8)
LYMPHOCYTES # BLD AUTO: 0.8 K/UL (ref 1–4.8)
LYMPHOCYTES # BLD AUTO: 0.8 K/UL (ref 1–4.8)
LYMPHOCYTES # BLD AUTO: 0.9 K/UL (ref 1–4.8)
LYMPHOCYTES NFR BLD: 10.6 % (ref 18–48)
LYMPHOCYTES NFR BLD: 10.6 % (ref 18–48)
LYMPHOCYTES NFR BLD: 11.5 % (ref 18–48)
LYMPHOCYTES NFR BLD: 11.8 % (ref 18–48)
LYMPHOCYTES NFR BLD: 14.5 % (ref 18–48)
LYMPHOCYTES NFR BLD: 5.2 % (ref 18–48)
LYMPHOCYTES NFR BLD: 6.2 % (ref 18–48)
LYMPHOCYTES NFR BLD: 7 % (ref 18–48)
LYMPHOCYTES NFR BLD: 7 % (ref 18–48)
LYMPHOCYTES NFR BLD: 7.5 % (ref 18–48)
LYMPHOCYTES NFR BLD: 9.3 % (ref 18–48)
LYMPHOCYTES NFR FLD MANUAL: 51 %
MAGNESIUM SERPL-MCNC: 1.7 MG/DL (ref 1.6–2.6)
MAGNESIUM SERPL-MCNC: 1.9 MG/DL (ref 1.6–2.6)
MAGNESIUM SERPL-MCNC: 2 MG/DL (ref 1.6–2.6)
MCH RBC QN AUTO: 27.9 PG (ref 27–31)
MCH RBC QN AUTO: 28.9 PG (ref 27–31)
MCH RBC QN AUTO: 30.4 PG (ref 27–31)
MCH RBC QN AUTO: 31 PG (ref 27–31)
MCH RBC QN AUTO: 31.6 PG (ref 27–31)
MCH RBC QN AUTO: 31.7 PG (ref 27–31)
MCH RBC QN AUTO: 31.8 PG (ref 27–31)
MCH RBC QN AUTO: 32.1 PG (ref 27–31)
MCH RBC QN AUTO: 32.7 PG (ref 27–31)
MCH RBC QN AUTO: 33 PG (ref 27–31)
MCH RBC QN AUTO: 33.2 PG (ref 27–31)
MCHC RBC AUTO-ENTMCNC: 31.2 G/DL (ref 32–36)
MCHC RBC AUTO-ENTMCNC: 31.6 G/DL (ref 32–36)
MCHC RBC AUTO-ENTMCNC: 32 G/DL (ref 32–36)
MCHC RBC AUTO-ENTMCNC: 32.2 G/DL (ref 32–36)
MCHC RBC AUTO-ENTMCNC: 32.6 G/DL (ref 32–36)
MCHC RBC AUTO-ENTMCNC: 32.7 G/DL (ref 32–36)
MCHC RBC AUTO-ENTMCNC: 32.8 G/DL (ref 32–36)
MCHC RBC AUTO-ENTMCNC: 32.8 G/DL (ref 32–36)
MCHC RBC AUTO-ENTMCNC: 33 G/DL (ref 32–36)
MCHC RBC AUTO-ENTMCNC: 33.1 G/DL (ref 32–36)
MCHC RBC AUTO-ENTMCNC: 33.3 G/DL (ref 32–36)
MCV RBC AUTO: 100 FL (ref 82–98)
MCV RBC AUTO: 107 FL (ref 82–98)
MCV RBC AUTO: 88 FL (ref 82–98)
MCV RBC AUTO: 90 FL (ref 82–98)
MCV RBC AUTO: 93 FL (ref 82–98)
MCV RBC AUTO: 95 FL (ref 82–98)
MCV RBC AUTO: 97 FL (ref 82–98)
MCV RBC AUTO: 98 FL (ref 82–98)
MCV RBC AUTO: 99 FL (ref 82–98)
METAMYELOCYTES NFR BLD MANUAL: 18 %
METHYLMALONATE SERPL-SCNC: 0.35 UMOL/L
MICROSCOPIC COMMENT: ABNORMAL
MODE: ABNORMAL
MONOCYTES # BLD AUTO: 0.5 K/UL (ref 0.3–1)
MONOCYTES # BLD AUTO: 0.5 K/UL (ref 0.3–1)
MONOCYTES # BLD AUTO: 0.7 K/UL (ref 0.3–1)
MONOCYTES # BLD AUTO: 0.7 K/UL (ref 0.3–1)
MONOCYTES # BLD AUTO: 0.8 K/UL (ref 0.3–1)
MONOCYTES # BLD AUTO: 0.9 K/UL (ref 0.3–1)
MONOCYTES # BLD AUTO: 1 K/UL (ref 0.3–1)
MONOCYTES # BLD AUTO: 1.2 K/UL (ref 0.3–1)
MONOCYTES # BLD AUTO: 1.2 K/UL (ref 0.3–1)
MONOCYTES # BLD AUTO: 1.3 K/UL (ref 0.3–1)
MONOCYTES NFR BLD: 1 % (ref 4–15)
MONOCYTES NFR BLD: 10.4 % (ref 4–15)
MONOCYTES NFR BLD: 10.5 % (ref 4–15)
MONOCYTES NFR BLD: 10.7 % (ref 4–15)
MONOCYTES NFR BLD: 10.8 % (ref 4–15)
MONOCYTES NFR BLD: 11.6 % (ref 4–15)
MONOCYTES NFR BLD: 11.9 % (ref 4–15)
MONOCYTES NFR BLD: 12 % (ref 4–15)
MONOCYTES NFR BLD: 13.9 % (ref 4–15)
MONOCYTES NFR BLD: 8.8 % (ref 4–15)
MONOCYTES NFR BLD: 9 % (ref 4–15)
MONOS+MACROS NFR FLD MANUAL: 15 %
MONOS+MACROS NFR FLD MANUAL: 46 %
MV PEAK A VEL: 1.09 M/S
MV PEAK E VEL: 1.1 M/S
MYELOCYTES NFR BLD MANUAL: 11 %
NEUTROPHILS # BLD AUTO: 3 K/UL (ref 1.8–7.7)
NEUTROPHILS # BLD AUTO: 4.7 K/UL (ref 1.8–7.7)
NEUTROPHILS # BLD AUTO: 5 K/UL (ref 1.8–7.7)
NEUTROPHILS # BLD AUTO: 5.3 K/UL (ref 1.8–7.7)
NEUTROPHILS # BLD AUTO: 5.4 K/UL (ref 1.8–7.7)
NEUTROPHILS # BLD AUTO: 5.6 K/UL (ref 1.8–7.7)
NEUTROPHILS # BLD AUTO: 6.9 K/UL (ref 1.8–7.7)
NEUTROPHILS # BLD AUTO: 7.8 K/UL (ref 1.8–7.7)
NEUTROPHILS # BLD AUTO: 8 K/UL (ref 1.8–7.7)
NEUTROPHILS # BLD AUTO: 9 K/UL (ref 1.8–7.7)
NEUTROPHILS NFR BLD: 37 % (ref 38–73)
NEUTROPHILS NFR BLD: 69.2 % (ref 38–73)
NEUTROPHILS NFR BLD: 71.3 % (ref 38–73)
NEUTROPHILS NFR BLD: 73.3 % (ref 38–73)
NEUTROPHILS NFR BLD: 73.9 % (ref 38–73)
NEUTROPHILS NFR BLD: 75.3 % (ref 38–73)
NEUTROPHILS NFR BLD: 76 % (ref 38–73)
NEUTROPHILS NFR BLD: 76.3 % (ref 38–73)
NEUTROPHILS NFR BLD: 78.5 % (ref 38–73)
NEUTROPHILS NFR BLD: 78.6 % (ref 38–73)
NEUTROPHILS NFR BLD: 81.3 % (ref 38–73)
NEUTROPHILS NFR FLD MANUAL: 3 %
NEUTROPHILS NFR FLD MANUAL: 85 %
NEUTS BAND NFR BLD MANUAL: 21 %
NITRITE UR QL STRIP: NEGATIVE
NRBC BLD-RTO: 0 /100 WBC
OVALOCYTES BLD QL SMEAR: ABNORMAL
PCO2 BLDA: 46.8 MMHG (ref 35–45)
PEEP: 8
PH SMN: 6.88 [PH] (ref 7.35–7.45)
PH UR STRIP: 6 [PH] (ref 5–8)
PHOSPHATE SERPL-MCNC: 1.9 MG/DL (ref 2.7–4.5)
PHOSPHATE SERPL-MCNC: 2 MG/DL (ref 2.7–4.5)
PHOSPHATE SERPL-MCNC: 2.2 MG/DL (ref 2.7–4.5)
PHOSPHATE SERPL-MCNC: 2.3 MG/DL (ref 2.7–4.5)
PISA TR MAX VEL: 2.48 M/S
PLATELET # BLD AUTO: 101 K/UL (ref 150–350)
PLATELET # BLD AUTO: 104 K/UL (ref 150–350)
PLATELET # BLD AUTO: 112 K/UL (ref 150–350)
PLATELET # BLD AUTO: 69 K/UL (ref 150–350)
PLATELET # BLD AUTO: 74 K/UL (ref 150–350)
PLATELET # BLD AUTO: 77 K/UL (ref 150–350)
PLATELET # BLD AUTO: 80 K/UL (ref 150–350)
PLATELET # BLD AUTO: 81 K/UL (ref 150–350)
PLATELET # BLD AUTO: 81 K/UL (ref 150–350)
PLATELET # BLD AUTO: 90 K/UL (ref 150–350)
PLATELET # BLD AUTO: 90 K/UL (ref 150–350)
PLATELET BLD QL SMEAR: ABNORMAL
PMV BLD AUTO: 10.7 FL (ref 9.2–12.9)
PMV BLD AUTO: 10.7 FL (ref 9.2–12.9)
PMV BLD AUTO: 10.8 FL (ref 9.2–12.9)
PMV BLD AUTO: 10.9 FL (ref 9.2–12.9)
PMV BLD AUTO: 11 FL (ref 9.2–12.9)
PMV BLD AUTO: 11.1 FL (ref 9.2–12.9)
PMV BLD AUTO: 11.2 FL (ref 9.2–12.9)
PMV BLD AUTO: 11.3 FL (ref 9.2–12.9)
PMV BLD AUTO: 11.4 FL (ref 9.2–12.9)
PMV BLD AUTO: 12.4 FL (ref 9.2–12.9)
PMV BLD AUTO: 12.6 FL (ref 9.2–12.9)
PO2 BLDA: 63 MMHG (ref 80–100)
POC BE: -24 MMOL/L
POC SATURATED O2: 71 % (ref 95–100)
POC TCO2: 10 MMOL/L (ref 23–27)
POCT GLUCOSE: 70 MG/DL (ref 70–110)
POIKILOCYTOSIS BLD QL SMEAR: SLIGHT
POTASSIUM SERPL-SCNC: 3.4 MMOL/L (ref 3.5–5.1)
POTASSIUM SERPL-SCNC: 3.6 MMOL/L (ref 3.5–5.1)
POTASSIUM SERPL-SCNC: 3.7 MMOL/L (ref 3.5–5.1)
POTASSIUM SERPL-SCNC: 3.9 MMOL/L (ref 3.5–5.1)
POTASSIUM SERPL-SCNC: 4 MMOL/L (ref 3.5–5.1)
PREALB SERPL-MCNC: 4 MG/DL (ref 20–43)
PROT FLD-MCNC: 1.4 G/DL
PROT SERPL-MCNC: 4.4 G/DL (ref 6–8.4)
PROT SERPL-MCNC: 4.8 G/DL (ref 6–8.4)
PROT SERPL-MCNC: 4.8 G/DL (ref 6–8.4)
PROT SERPL-MCNC: 5.1 G/DL (ref 6–8.4)
PROT SERPL-MCNC: 5.3 G/DL (ref 6–8.4)
PROT SERPL-MCNC: 5.5 G/DL (ref 6–8.4)
PROT SERPL-MCNC: 5.5 G/DL (ref 6–8.4)
PROT SERPL-MCNC: 6.1 G/DL (ref 6–8.4)
PROT SERPL-MCNC: 6.2 G/DL (ref 6–8.4)
PROT UR QL STRIP: NEGATIVE
PROTHROMBIN TIME: 10.9 SEC (ref 9–12.5)
PROTHROMBIN TIME: 11.4 SEC (ref 9–12.5)
PROTHROMBIN TIME: 11.5 SEC (ref 9–12.5)
PROTHROMBIN TIME: 11.9 SEC (ref 9–12.5)
PROTHROMBIN TIME: 13.2 SEC (ref 9–12.5)
PROTHROMBIN TIME: 13.8 SEC (ref 9–12.5)
PROTHROMBIN TIME: 13.8 SEC (ref 9–12.5)
PROTHROMBIN TIME: 14.5 SEC (ref 9–12.5)
PROTHROMBIN TIME: 14.8 SEC (ref 9–12.5)
PROTHROMBIN TIME: 15 SEC (ref 9–12.5)
PROTHROMBIN TIME: 29.8 SEC (ref 9–12.5)
PYRIDOXAL SERPL-MCNC: <2 UG/L (ref 5–50)
RA MAJOR: 4.71 CM
RA PRESSURE: 3 MMHG
RA WIDTH: 3.1 CM
RBC # BLD AUTO: 2.77 M/UL (ref 4–5.4)
RBC # BLD AUTO: 3.05 M/UL (ref 4–5.4)
RBC # BLD AUTO: 3.1 M/UL (ref 4–5.4)
RBC # BLD AUTO: 3.4 M/UL (ref 4–5.4)
RBC # BLD AUTO: 3.43 M/UL (ref 4–5.4)
RBC # BLD AUTO: 3.45 M/UL (ref 4–5.4)
RBC # BLD AUTO: 3.53 M/UL (ref 4–5.4)
RBC # BLD AUTO: 3.68 M/UL (ref 4–5.4)
RBC # BLD AUTO: 3.81 M/UL (ref 4–5.4)
RBC # BLD AUTO: 3.98 M/UL (ref 4–5.4)
RBC # BLD AUTO: 4.19 M/UL (ref 4–5.4)
RBC #/AREA URNS AUTO: 49 /HPF (ref 0–4)
SAMPLE: ABNORMAL
SARS-COV-2 RDRP RESP QL NAA+PROBE: NEGATIVE
SARS-COV-2 RNA RESP QL NAA+PROBE: NOT DETECTED
SATURATED IRON: 8 % (ref 20–50)
SINUS: 2.77 CM
SITE: ABNORMAL
SODIUM SERPL-SCNC: 128 MMOL/L (ref 136–145)
SODIUM SERPL-SCNC: 129 MMOL/L (ref 136–145)
SODIUM SERPL-SCNC: 131 MMOL/L (ref 136–145)
SODIUM SERPL-SCNC: 131 MMOL/L (ref 136–145)
SODIUM SERPL-SCNC: 132 MMOL/L (ref 136–145)
SODIUM SERPL-SCNC: 133 MMOL/L (ref 136–145)
SODIUM SERPL-SCNC: 133 MMOL/L (ref 136–145)
SODIUM SERPL-SCNC: 134 MMOL/L (ref 136–145)
SODIUM SERPL-SCNC: 134 MMOL/L (ref 136–145)
SP GR UR STRIP: 1.01 (ref 1–1.03)
SP02: 98
SPECIMEN SOURCE: NORMAL
SPECIMEN SOURCE: NORMAL
SQUAMOUS #/AREA URNS AUTO: 2 /HPF
STJ: 2.62 CM
TDI LATERAL: 0.12 M/S
TDI SEPTAL: 0.09 M/S
TDI: 0.11 M/S
TOTAL IRON BINDING CAPACITY: 305 UG/DL (ref 250–450)
TR MAX PG: 25 MMHG
TRANSFERRIN SERPL-MCNC: 206 MG/DL (ref 200–375)
TRICUSPID ANNULAR PLANE SYSTOLIC EXCURSION: 2.11 CM
TROPONIN I SERPL DL<=0.01 NG/ML-MCNC: 0.01 NG/ML (ref 0–0.03)
TROPONIN I SERPL DL<=0.01 NG/ML-MCNC: 0.04 NG/ML (ref 0–0.03)
TV REST PULMONARY ARTERY PRESSURE: 28 MMHG
URN SPEC COLLECT METH UR: ABNORMAL
VIT B1 BLD-MCNC: 20 UG/L (ref 38–122)
VT: 550
WBC # BLD AUTO: 10.21 K/UL (ref 3.9–12.7)
WBC # BLD AUTO: 11.03 K/UL (ref 3.9–12.7)
WBC # BLD AUTO: 4.4 K/UL (ref 3.9–12.7)
WBC # BLD AUTO: 4.6 K/UL (ref 3.9–12.7)
WBC # BLD AUTO: 6.12 K/UL (ref 3.9–12.7)
WBC # BLD AUTO: 6.57 K/UL (ref 3.9–12.7)
WBC # BLD AUTO: 7.32 K/UL (ref 3.9–12.7)
WBC # BLD AUTO: 7.47 K/UL (ref 3.9–12.7)
WBC # BLD AUTO: 7.66 K/UL (ref 3.9–12.7)
WBC # BLD AUTO: 9.14 K/UL (ref 3.9–12.7)
WBC # BLD AUTO: 9.91 K/UL (ref 3.9–12.7)
WBC # FLD: 1052 /CU MM
WBC # FLD: 87 /CU MM
WBC #/AREA URNS AUTO: 2 /HPF (ref 0–5)
WBC OTHER NFR BLD MANUAL: 2 %

## 2020-01-01 PROCEDURE — 82042 OTHER SOURCE ALBUMIN QUAN EA: CPT

## 2020-01-01 PROCEDURE — 85610 PROTHROMBIN TIME: CPT

## 2020-01-01 PROCEDURE — 76705 ECHO EXAM OF ABDOMEN: CPT | Mod: 26,59,, | Performed by: RADIOLOGY

## 2020-01-01 PROCEDURE — 93010 ELECTROCARDIOGRAM REPORT: CPT | Mod: ,,, | Performed by: INTERNAL MEDICINE

## 2020-01-01 PROCEDURE — 36415 COLL VENOUS BLD VENIPUNCTURE: CPT

## 2020-01-01 PROCEDURE — P9047 ALBUMIN (HUMAN), 25%, 50ML: HCPCS | Performed by: RADIOLOGY

## 2020-01-01 PROCEDURE — 99232 PR SUBSEQUENT HOSPITAL CARE,LEVL II: ICD-10-PCS | Mod: ,,, | Performed by: HOSPITALIST

## 2020-01-01 PROCEDURE — 43752 NASAL/OROGASTRIC W/TUBE PLMT: CPT

## 2020-01-01 PROCEDURE — 25000003 PHARM REV CODE 250: Performed by: INTERNAL MEDICINE

## 2020-01-01 PROCEDURE — 71000039 HC RECOVERY, EACH ADD'L HOUR

## 2020-01-01 PROCEDURE — 85025 COMPLETE CBC W/AUTO DIFF WBC: CPT

## 2020-01-01 PROCEDURE — 84100 ASSAY OF PHOSPHORUS: CPT

## 2020-01-01 PROCEDURE — 99214 PR OFFICE/OUTPT VISIT, EST, LEVL IV, 30-39 MIN: ICD-10-PCS | Mod: S$PBB,,, | Performed by: INTERNAL MEDICINE

## 2020-01-01 PROCEDURE — 25000003 PHARM REV CODE 250: Performed by: HOSPITALIST

## 2020-01-01 PROCEDURE — G0378 HOSPITAL OBSERVATION PER HR: HCPCS

## 2020-01-01 PROCEDURE — C1874 STENT, COATED/COV W/DEL SYS: HCPCS

## 2020-01-01 PROCEDURE — 99233 SBSQ HOSP IP/OBS HIGH 50: CPT | Mod: ,,, | Performed by: INTERNAL MEDICINE

## 2020-01-01 PROCEDURE — 63600175 PHARM REV CODE 636 W HCPCS: Mod: JG | Performed by: NURSE ANESTHETIST, CERTIFIED REGISTERED

## 2020-01-01 PROCEDURE — 93975 VASCULAR STUDY: CPT | Mod: TC

## 2020-01-01 PROCEDURE — 99203 PR OFFICE/OUTPT VISIT, NEW, LEVL III, 30-44 MIN: ICD-10-PCS | Mod: S$PBB,,, | Performed by: PHYSICIAN ASSISTANT

## 2020-01-01 PROCEDURE — D9220A PRA ANESTHESIA: Mod: CRNA,,, | Performed by: NURSE ANESTHETIST, CERTIFIED REGISTERED

## 2020-01-01 PROCEDURE — D9220A PRA ANESTHESIA: ICD-10-PCS | Mod: CRNA,,, | Performed by: REGISTERED NURSE

## 2020-01-01 PROCEDURE — 37000009 HC ANESTHESIA EA ADD 15 MINS: Performed by: INTERNAL MEDICINE

## 2020-01-01 PROCEDURE — 99999 PR PBB SHADOW E&M-EST. PATIENT-LVL III: ICD-10-PCS | Mod: PBBFAC,,, | Performed by: PHYSICIAN ASSISTANT

## 2020-01-01 PROCEDURE — 63600175 PHARM REV CODE 636 W HCPCS: Performed by: REGISTERED NURSE

## 2020-01-01 PROCEDURE — 94761 N-INVAS EAR/PLS OXIMETRY MLT: CPT

## 2020-01-01 PROCEDURE — U0003 INFECTIOUS AGENT DETECTION BY NUCLEIC ACID (DNA OR RNA); SEVERE ACUTE RESPIRATORY SYNDROME CORONAVIRUS 2 (SARS-COV-2) (CORONAVIRUS DISEASE [COVID-19]), AMPLIFIED PROBE TECHNIQUE, MAKING USE OF HIGH THROUGHPUT TECHNOLOGIES AS DESCRIBED BY CMS-2020-01-R: HCPCS

## 2020-01-01 PROCEDURE — 89051 BODY FLUID CELL COUNT: CPT

## 2020-01-01 PROCEDURE — 93005 ELECTROCARDIOGRAM TRACING: CPT

## 2020-01-01 PROCEDURE — 96375 TX/PRO/DX INJ NEW DRUG ADDON: CPT | Mod: 59

## 2020-01-01 PROCEDURE — 80053 COMPREHEN METABOLIC PANEL: CPT

## 2020-01-01 PROCEDURE — 99213 OFFICE O/P EST LOW 20 MIN: CPT | Mod: PBBFAC | Performed by: PHYSICIAN ASSISTANT

## 2020-01-01 PROCEDURE — 49082 ABD PARACENTESIS: CPT

## 2020-01-01 PROCEDURE — 49083 ABD PARACENTESIS W/IMAGING: CPT

## 2020-01-01 PROCEDURE — 25000003 PHARM REV CODE 250: Performed by: NURSE ANESTHETIST, CERTIFIED REGISTERED

## 2020-01-01 PROCEDURE — 63600175 PHARM REV CODE 636 W HCPCS: Performed by: RADIOLOGY

## 2020-01-01 PROCEDURE — 99214 OFFICE O/P EST MOD 30 MIN: CPT | Mod: PBBFAC | Performed by: INTERNAL MEDICINE

## 2020-01-01 PROCEDURE — 49083 US GUIDED PARACENTESIS INC IMAGING: ICD-10-PCS | Mod: ,,, | Performed by: RADIOLOGY

## 2020-01-01 PROCEDURE — 87186 SC STD MICRODIL/AGAR DIL: CPT

## 2020-01-01 PROCEDURE — 99214 OFFICE O/P EST MOD 30 MIN: CPT | Mod: S$PBB,,, | Performed by: INTERNAL MEDICINE

## 2020-01-01 PROCEDURE — 25000003 PHARM REV CODE 250: Performed by: EMERGENCY MEDICINE

## 2020-01-01 PROCEDURE — 84157 ASSAY OF PROTEIN OTHER: CPT

## 2020-01-01 PROCEDURE — 63600175 PHARM REV CODE 636 W HCPCS: Performed by: INTERNAL MEDICINE

## 2020-01-01 PROCEDURE — 99239 PR HOSPITAL DISCHARGE DAY,>30 MIN: ICD-10-PCS | Mod: ,,, | Performed by: HOSPITALIST

## 2020-01-01 PROCEDURE — 99223 1ST HOSP IP/OBS HIGH 75: CPT | Mod: ,,, | Performed by: HOSPITALIST

## 2020-01-01 PROCEDURE — 11000001 HC ACUTE MED/SURG PRIVATE ROOM

## 2020-01-01 PROCEDURE — D9220A PRA ANESTHESIA: Mod: CRNA,,, | Performed by: REGISTERED NURSE

## 2020-01-01 PROCEDURE — 49083 ABD PARACENTESIS W/IMAGING: CPT | Mod: ,,, | Performed by: RADIOLOGY

## 2020-01-01 PROCEDURE — 00731 ANES UPR GI NDSC PX NOS: CPT | Performed by: INTERNAL MEDICINE

## 2020-01-01 PROCEDURE — 81001 URINALYSIS AUTO W/SCOPE: CPT

## 2020-01-01 PROCEDURE — G0444 DEPRESSION SCREEN ANNUAL: HCPCS | Mod: PBBFAC,PO | Performed by: INTERNAL MEDICINE

## 2020-01-01 PROCEDURE — 31500 INSERT EMERGENCY AIRWAY: CPT | Mod: 59

## 2020-01-01 PROCEDURE — 83735 ASSAY OF MAGNESIUM: CPT

## 2020-01-01 PROCEDURE — 99233 PR SUBSEQUENT HOSPITAL CARE,LEVL III: ICD-10-PCS | Mod: ,,, | Performed by: HOSPITALIST

## 2020-01-01 PROCEDURE — 99999 PR PBB SHADOW E&M-EST. PATIENT-LVL IV: CPT | Mod: PBBFAC,,, | Performed by: INTERNAL MEDICINE

## 2020-01-01 PROCEDURE — 86140 C-REACTIVE PROTEIN: CPT

## 2020-01-01 PROCEDURE — 71000033 HC RECOVERY, INTIAL HOUR

## 2020-01-01 PROCEDURE — 87522 HEPATITIS C REVRS TRNSCRPJ: CPT

## 2020-01-01 PROCEDURE — 99233 SBSQ HOSP IP/OBS HIGH 50: CPT | Mod: ,,, | Performed by: HOSPITALIST

## 2020-01-01 PROCEDURE — 99999 PR PBB SHADOW E&M-EST. PATIENT-LVL III: CPT | Mod: PBBFAC,,, | Performed by: INTERNAL MEDICINE

## 2020-01-01 PROCEDURE — P9047 ALBUMIN (HUMAN), 25%, 50ML: HCPCS | Performed by: EMERGENCY MEDICINE

## 2020-01-01 PROCEDURE — 43244 EGD VARICES LIGATION: CPT | Mod: ,,, | Performed by: INTERNAL MEDICINE

## 2020-01-01 PROCEDURE — 76705 US LIVER WITH DOPPLER: ICD-10-PCS | Mod: 26,59,, | Performed by: RADIOLOGY

## 2020-01-01 PROCEDURE — D9220A PRA ANESTHESIA: Mod: ANES,,, | Performed by: ANESTHESIOLOGY

## 2020-01-01 PROCEDURE — 99233 PR SUBSEQUENT HOSPITAL CARE,LEVL III: ICD-10-PCS | Mod: ,,, | Performed by: INTERNAL MEDICINE

## 2020-01-01 PROCEDURE — 85652 RBC SED RATE AUTOMATED: CPT

## 2020-01-01 PROCEDURE — 99223 PR INITIAL HOSPITAL CARE,LEVL III: ICD-10-PCS | Mod: ,,, | Performed by: HOSPITALIST

## 2020-01-01 PROCEDURE — 96365 THER/PROPH/DIAG IV INF INIT: CPT

## 2020-01-01 PROCEDURE — 63600175 PHARM REV CODE 636 W HCPCS

## 2020-01-01 PROCEDURE — 83880 ASSAY OF NATRIURETIC PEPTIDE: CPT

## 2020-01-01 PROCEDURE — D9220A PRA ANESTHESIA: ICD-10-PCS | Mod: ANES,,, | Performed by: ANESTHESIOLOGY

## 2020-01-01 PROCEDURE — 99292 CRITICAL CARE ADDL 30 MIN: CPT

## 2020-01-01 PROCEDURE — 74178 CT ABD&PLV WO CNTR FLWD CNTR: CPT | Mod: TC

## 2020-01-01 PROCEDURE — 37000008 HC ANESTHESIA 1ST 15 MINUTES: Performed by: INTERNAL MEDICINE

## 2020-01-01 PROCEDURE — 99203 OFFICE O/P NEW LOW 30 MIN: CPT | Mod: S$PBB,,, | Performed by: PHYSICIAN ASSISTANT

## 2020-01-01 PROCEDURE — 83921 ORGANIC ACID SINGLE QUANT: CPT

## 2020-01-01 PROCEDURE — 93975 VASCULAR STUDY: CPT | Mod: 26,,, | Performed by: RADIOLOGY

## 2020-01-01 PROCEDURE — 99232 SBSQ HOSP IP/OBS MODERATE 35: CPT | Mod: ,,, | Performed by: HOSPITALIST

## 2020-01-01 PROCEDURE — 76705 US ABDOMEN LIMITED: ICD-10-PCS | Mod: 26,,, | Performed by: RADIOLOGY

## 2020-01-01 PROCEDURE — P9047 ALBUMIN (HUMAN), 25%, 50ML: HCPCS | Mod: JG | Performed by: NURSE ANESTHETIST, CERTIFIED REGISTERED

## 2020-01-01 PROCEDURE — 63600175 PHARM REV CODE 636 W HCPCS: Performed by: HOSPITALIST

## 2020-01-01 PROCEDURE — 87205 SMEAR GRAM STAIN: CPT

## 2020-01-01 PROCEDURE — 43235 EGD DIAGNOSTIC BRUSH WASH: CPT | Mod: ,,, | Performed by: INTERNAL MEDICINE

## 2020-01-01 PROCEDURE — 43244 EGD VARICES LIGATION: CPT | Performed by: INTERNAL MEDICINE

## 2020-01-01 PROCEDURE — 82962 GLUCOSE BLOOD TEST: CPT

## 2020-01-01 PROCEDURE — 87070 CULTURE OTHR SPECIMN AEROBIC: CPT

## 2020-01-01 PROCEDURE — 85610 PROTHROMBIN TIME: CPT | Mod: 91

## 2020-01-01 PROCEDURE — 43235 PR EGD, FLEX, DIAGNOSTIC: ICD-10-PCS | Mod: ,,, | Performed by: INTERNAL MEDICINE

## 2020-01-01 PROCEDURE — 63600175 PHARM REV CODE 636 W HCPCS: Performed by: FAMILY MEDICINE

## 2020-01-01 PROCEDURE — U0002 COVID-19 LAB TEST NON-CDC: HCPCS

## 2020-01-01 PROCEDURE — 36600 WITHDRAWAL OF ARTERIAL BLOOD: CPT | Mod: 59

## 2020-01-01 PROCEDURE — 74178 CT ABDOMEN PELVIS W WO CONTRAST: ICD-10-PCS | Mod: 26,,, | Performed by: RADIOLOGY

## 2020-01-01 PROCEDURE — 85025 COMPLETE CBC W/AUTO DIFF WBC: CPT | Mod: 91

## 2020-01-01 PROCEDURE — 25000003 PHARM REV CODE 250: Performed by: FAMILY MEDICINE

## 2020-01-01 PROCEDURE — 12000002 HC ACUTE/MED SURGE SEMI-PRIVATE ROOM

## 2020-01-01 PROCEDURE — 81025 URINE PREGNANCY TEST: CPT | Performed by: ANESTHESIOLOGY

## 2020-01-01 PROCEDURE — A7048 VACUUM DRAIN BOTTLE/TUBE KIT: HCPCS

## 2020-01-01 PROCEDURE — 94002 VENT MGMT INPAT INIT DAY: CPT

## 2020-01-01 PROCEDURE — 76705 ECHO EXAM OF ABDOMEN: CPT | Mod: TC

## 2020-01-01 PROCEDURE — 99900035 HC TECH TIME PER 15 MIN (STAT)

## 2020-01-01 PROCEDURE — 99291 CRITICAL CARE FIRST HOUR: CPT | Mod: 25

## 2020-01-01 PROCEDURE — 99999 PR PBB SHADOW E&M-EST. PATIENT-LVL III: CPT | Mod: PBBFAC,,, | Performed by: PHYSICIAN ASSISTANT

## 2020-01-01 PROCEDURE — 63600175 PHARM REV CODE 636 W HCPCS: Performed by: ANESTHESIOLOGY

## 2020-01-01 PROCEDURE — 25500020 PHARM REV CODE 255

## 2020-01-01 PROCEDURE — 99239 HOSP IP/OBS DSCHRG MGMT >30: CPT | Mod: ,,, | Performed by: HOSPITALIST

## 2020-01-01 PROCEDURE — 84484 ASSAY OF TROPONIN QUANT: CPT

## 2020-01-01 PROCEDURE — 99999 PR PBB SHADOW E&M-EST. PATIENT-LVL IV: ICD-10-PCS | Mod: PBBFAC,,, | Performed by: INTERNAL MEDICINE

## 2020-01-01 PROCEDURE — 25500020 PHARM REV CODE 255: Performed by: RADIOLOGY

## 2020-01-01 PROCEDURE — 37000009 HC ANESTHESIA EA ADD 15 MINS

## 2020-01-01 PROCEDURE — 43235 EGD DIAGNOSTIC BRUSH WASH: CPT | Performed by: INTERNAL MEDICINE

## 2020-01-01 PROCEDURE — 27201110 US GUIDED PARACENTESIS INC IMAGING

## 2020-01-01 PROCEDURE — 82803 BLOOD GASES ANY COMBINATION: CPT

## 2020-01-01 PROCEDURE — 82105 ALPHA-FETOPROTEIN SERUM: CPT

## 2020-01-01 PROCEDURE — 63600175 PHARM REV CODE 636 W HCPCS: Performed by: NURSE ANESTHETIST, CERTIFIED REGISTERED

## 2020-01-01 PROCEDURE — 96366 THER/PROPH/DIAG IV INF ADDON: CPT | Mod: 59

## 2020-01-01 PROCEDURE — D9220A PRA ANESTHESIA: ICD-10-PCS | Mod: CRNA,,, | Performed by: NURSE ANESTHETIST, CERTIFIED REGISTERED

## 2020-01-01 PROCEDURE — 87077 CULTURE AEROBIC IDENTIFY: CPT

## 2020-01-01 PROCEDURE — 76705 ECHO EXAM OF ABDOMEN: CPT | Mod: 26,,, | Performed by: RADIOLOGY

## 2020-01-01 PROCEDURE — 99999 PR PBB SHADOW E&M-EST. PATIENT-LVL III: ICD-10-PCS | Mod: PBBFAC,,, | Performed by: INTERNAL MEDICINE

## 2020-01-01 PROCEDURE — 37000008 HC ANESTHESIA 1ST 15 MINUTES

## 2020-01-01 PROCEDURE — 25000003 PHARM REV CODE 250: Performed by: REGISTERED NURSE

## 2020-01-01 PROCEDURE — 93010 EKG 12-LEAD: ICD-10-PCS | Mod: ,,, | Performed by: INTERNAL MEDICINE

## 2020-01-01 PROCEDURE — 43244 PR EGD, FLEX, W/BAND LIGATION, ESOPH/GASTR VARICES: ICD-10-PCS | Mod: ,,, | Performed by: INTERNAL MEDICINE

## 2020-01-01 PROCEDURE — 84425 ASSAY OF VITAMIN B-1: CPT

## 2020-01-01 PROCEDURE — 99213 OFFICE O/P EST LOW 20 MIN: CPT | Mod: PBBFAC,PO | Performed by: INTERNAL MEDICINE

## 2020-01-01 PROCEDURE — 83615 LACTATE (LD) (LDH) ENZYME: CPT

## 2020-01-01 PROCEDURE — 97161 PT EVAL LOW COMPLEX 20 MIN: CPT

## 2020-01-01 PROCEDURE — 97165 OT EVAL LOW COMPLEX 30 MIN: CPT

## 2020-01-01 PROCEDURE — 87102 FUNGUS ISOLATION CULTURE: CPT

## 2020-01-01 PROCEDURE — 84207 ASSAY OF VITAMIN B-6: CPT

## 2020-01-01 PROCEDURE — 27201022: Performed by: INTERNAL MEDICINE

## 2020-01-01 PROCEDURE — 84134 ASSAY OF PREALBUMIN: CPT

## 2020-01-01 PROCEDURE — 74178 CT ABD&PLV WO CNTR FLWD CNTR: CPT | Mod: 26,,, | Performed by: RADIOLOGY

## 2020-01-01 PROCEDURE — 83540 ASSAY OF IRON: CPT

## 2020-01-01 PROCEDURE — 63600175 PHARM REV CODE 636 W HCPCS: Performed by: EMERGENCY MEDICINE

## 2020-01-01 PROCEDURE — 93975 US LIVER WITH DOPPLER: ICD-10-PCS | Mod: 26,,, | Performed by: RADIOLOGY

## 2020-01-01 RX ORDER — SODIUM,POTASSIUM PHOSPHATES 280-250MG
1 POWDER IN PACKET (EA) ORAL
Status: DISCONTINUED | OUTPATIENT
Start: 2020-01-01 | End: 2020-01-01 | Stop reason: HOSPADM

## 2020-01-01 RX ORDER — ALBUMIN HUMAN 250 G/1000ML
25 SOLUTION INTRAVENOUS
Status: DISCONTINUED | OUTPATIENT
Start: 2020-01-01 | End: 2020-01-01 | Stop reason: HOSPADM

## 2020-01-01 RX ORDER — AMOXICILLIN 250 MG
1 CAPSULE ORAL 2 TIMES DAILY
Status: DISCONTINUED | OUTPATIENT
Start: 2020-01-01 | End: 2020-01-01 | Stop reason: HOSPADM

## 2020-01-01 RX ORDER — MAGNESIUM SULFATE HEPTAHYDRATE 40 MG/ML
2 INJECTION, SOLUTION INTRAVENOUS
Status: CANCELLED | OUTPATIENT
Start: 2020-01-01

## 2020-01-01 RX ORDER — DOPAMINE HYDROCHLORIDE 160 MG/100ML
INJECTION, SOLUTION INTRAVENOUS
Status: COMPLETED
Start: 2020-01-01 | End: 2020-01-01

## 2020-01-01 RX ORDER — CIPROFLOXACIN 500 MG/1
500 TABLET ORAL EVERY 12 HOURS
Qty: 7 TABLET | Refills: 0 | Status: CANCELLED | OUTPATIENT
Start: 2020-01-01

## 2020-01-01 RX ORDER — POTASSIUM CHLORIDE 14.9 MG/ML
60 INJECTION INTRAVENOUS
Status: CANCELLED | OUTPATIENT
Start: 2020-01-01

## 2020-01-01 RX ORDER — FUROSEMIDE 80 MG/1
160 TABLET ORAL DAILY
Status: DISCONTINUED | OUTPATIENT
Start: 2020-01-01 | End: 2020-01-01

## 2020-01-01 RX ORDER — POTASSIUM CHLORIDE 29.8 MG/ML
40 INJECTION INTRAVENOUS
Status: CANCELLED | OUTPATIENT
Start: 2020-01-01

## 2020-01-01 RX ORDER — FENTANYL CITRATE 50 UG/ML
INJECTION, SOLUTION INTRAMUSCULAR; INTRAVENOUS
Status: DISCONTINUED | OUTPATIENT
Start: 2020-01-01 | End: 2020-01-01

## 2020-01-01 RX ORDER — LIDOCAINE HCL/PF 100 MG/5ML
SYRINGE (ML) INTRAVENOUS
Status: DISCONTINUED | OUTPATIENT
Start: 2020-01-01 | End: 2020-01-01

## 2020-01-01 RX ORDER — ROCURONIUM BROMIDE 10 MG/ML
INJECTION, SOLUTION INTRAVENOUS
Status: DISCONTINUED | OUTPATIENT
Start: 2020-01-01 | End: 2020-01-01

## 2020-01-01 RX ORDER — PANTOPRAZOLE SODIUM 40 MG/1
40 TABLET, DELAYED RELEASE ORAL DAILY
Qty: 30 TABLET | Refills: 11 | Status: ON HOLD | OUTPATIENT
Start: 2020-01-01 | End: 2020-01-01 | Stop reason: SDUPTHER

## 2020-01-01 RX ORDER — ACETAMINOPHEN 325 MG/1
650 TABLET ORAL EVERY 4 HOURS PRN
Status: DISCONTINUED | OUTPATIENT
Start: 2020-01-01 | End: 2020-01-01 | Stop reason: HOSPADM

## 2020-01-01 RX ORDER — FUROSEMIDE 80 MG/1
TABLET ORAL
Qty: 60 TABLET | Refills: 1 | Status: ON HOLD | OUTPATIENT
Start: 2020-01-01 | End: 2020-01-01 | Stop reason: SDUPTHER

## 2020-01-01 RX ORDER — MAGNESIUM 30 MG
30 TABLET ORAL DAILY PRN
COMMUNITY

## 2020-01-01 RX ORDER — GLYCOPYRROLATE 0.2 MG/ML
INJECTION INTRAMUSCULAR; INTRAVENOUS
Status: DISCONTINUED | OUTPATIENT
Start: 2020-01-01 | End: 2020-01-01

## 2020-01-01 RX ORDER — FUROSEMIDE 40 MG/1
80 TABLET ORAL ONCE
Status: COMPLETED | OUTPATIENT
Start: 2020-01-01 | End: 2020-01-01

## 2020-01-01 RX ORDER — SPIRONOLACTONE 25 MG/1
100 TABLET ORAL DAILY
Status: DISCONTINUED | OUTPATIENT
Start: 2020-01-01 | End: 2020-01-01

## 2020-01-01 RX ORDER — SPIRONOLACTONE 100 MG/1
TABLET, FILM COATED ORAL
Qty: 90 TABLET | Refills: 1 | Status: ON HOLD | OUTPATIENT
Start: 2020-01-01 | End: 2020-01-01 | Stop reason: SDUPTHER

## 2020-01-01 RX ORDER — GABAPENTIN 300 MG/1
300 CAPSULE ORAL 3 TIMES DAILY
OUTPATIENT
Start: 2020-01-01

## 2020-01-01 RX ORDER — MIDAZOLAM HYDROCHLORIDE 1 MG/ML
INJECTION INTRAMUSCULAR; INTRAVENOUS
Status: DISCONTINUED | OUTPATIENT
Start: 2020-01-01 | End: 2020-01-01

## 2020-01-01 RX ORDER — SODIUM CHLORIDE 9 MG/ML
INJECTION, SOLUTION INTRAVENOUS CONTINUOUS
Status: DISCONTINUED | OUTPATIENT
Start: 2020-01-01 | End: 2020-01-01 | Stop reason: HOSPADM

## 2020-01-01 RX ORDER — PROPOFOL 10 MG/ML
VIAL (ML) INTRAVENOUS
Status: DISCONTINUED | OUTPATIENT
Start: 2020-01-01 | End: 2020-01-01

## 2020-01-01 RX ORDER — LORAZEPAM/0.9% SODIUM CHLORIDE 100MG/0.1L
2 PLASTIC BAG, INJECTION (ML) INTRAVENOUS ONCE
Status: COMPLETED | OUTPATIENT
Start: 2020-01-01 | End: 2020-01-01

## 2020-01-01 RX ORDER — CIPROFLOXACIN 500 MG/1
500 TABLET ORAL EVERY 12 HOURS
Status: DISCONTINUED | OUTPATIENT
Start: 2020-01-01 | End: 2020-01-01

## 2020-01-01 RX ORDER — CYCLOBENZAPRINE HCL 5 MG
10 TABLET ORAL 3 TIMES DAILY PRN
Status: DISCONTINUED | OUTPATIENT
Start: 2020-01-01 | End: 2020-01-01 | Stop reason: HOSPADM

## 2020-01-01 RX ORDER — ESOMEPRAZOLE MAGNESIUM 20 MG/1
20 GRANULE, DELAYED RELEASE ORAL
COMMUNITY

## 2020-01-01 RX ORDER — IBUPROFEN 200 MG
24 TABLET ORAL
Status: DISCONTINUED | OUTPATIENT
Start: 2020-01-01 | End: 2020-01-01 | Stop reason: HOSPADM

## 2020-01-01 RX ORDER — OXYCODONE HYDROCHLORIDE 5 MG/1
5 TABLET ORAL EVERY 6 HOURS PRN
Status: DISCONTINUED | OUTPATIENT
Start: 2020-01-01 | End: 2020-01-01

## 2020-01-01 RX ORDER — SODIUM CHLORIDE 0.9 % (FLUSH) 0.9 %
10 SYRINGE (ML) INJECTION
Status: DISCONTINUED | OUTPATIENT
Start: 2020-01-01 | End: 2020-01-01 | Stop reason: HOSPADM

## 2020-01-01 RX ORDER — FUROSEMIDE 40 MG/1
40 TABLET ORAL 2 TIMES DAILY
Status: DISCONTINUED | OUTPATIENT
Start: 2020-01-01 | End: 2020-01-01

## 2020-01-01 RX ORDER — LIDOCAINE 50 MG/G
1 PATCH TOPICAL
Status: DISCONTINUED | OUTPATIENT
Start: 2020-01-01 | End: 2020-01-01 | Stop reason: HOSPADM

## 2020-01-01 RX ORDER — MAGNESIUM SULFATE HEPTAHYDRATE 40 MG/ML
4 INJECTION, SOLUTION INTRAVENOUS
Status: CANCELLED | OUTPATIENT
Start: 2020-01-01

## 2020-01-01 RX ORDER — TRAMADOL HYDROCHLORIDE 50 MG/1
50 TABLET ORAL EVERY 6 HOURS PRN
Status: DISCONTINUED | OUTPATIENT
Start: 2020-01-01 | End: 2020-01-01 | Stop reason: HOSPADM

## 2020-01-01 RX ORDER — OXYCODONE HYDROCHLORIDE 10 MG/1
10 TABLET ORAL EVERY 6 HOURS PRN
Status: DISCONTINUED | OUTPATIENT
Start: 2020-01-01 | End: 2020-01-01

## 2020-01-01 RX ORDER — LACTULOSE 10 G/15ML
10 SOLUTION ORAL; RECTAL 2 TIMES DAILY PRN
Qty: 300 ML | Refills: 0 | Status: SHIPPED | OUTPATIENT
Start: 2020-01-01 | End: 2020-01-01

## 2020-01-01 RX ORDER — POTASSIUM CHLORIDE 14.9 MG/ML
20 INJECTION INTRAVENOUS
Status: CANCELLED | OUTPATIENT
Start: 2020-01-01

## 2020-01-01 RX ORDER — PANTOPRAZOLE SODIUM 40 MG/1
40 TABLET, DELAYED RELEASE ORAL DAILY
Status: DISCONTINUED | OUTPATIENT
Start: 2020-01-01 | End: 2020-01-01 | Stop reason: HOSPADM

## 2020-01-01 RX ORDER — TAMSULOSIN HYDROCHLORIDE 0.4 MG/1
0.4 CAPSULE ORAL DAILY
Status: DISCONTINUED | OUTPATIENT
Start: 2020-01-01 | End: 2020-01-01 | Stop reason: HOSPADM

## 2020-01-01 RX ORDER — MIDODRINE HYDROCHLORIDE 5 MG/1
10 TABLET ORAL 3 TIMES DAILY
Status: DISCONTINUED | OUTPATIENT
Start: 2020-01-01 | End: 2020-01-01 | Stop reason: HOSPADM

## 2020-01-01 RX ORDER — ALBUMIN HUMAN 250 G/1000ML
25 SOLUTION INTRAVENOUS ONCE
Status: COMPLETED | OUTPATIENT
Start: 2020-01-01 | End: 2020-01-01

## 2020-01-01 RX ORDER — SPIRONOLACTONE 100 MG/1
TABLET, FILM COATED ORAL
Qty: 90 TABLET | Refills: 1 | Status: SHIPPED | OUTPATIENT
Start: 2020-01-01 | End: 2020-01-01 | Stop reason: SDUPTHER

## 2020-01-01 RX ORDER — ALBUMIN HUMAN 250 G/1000ML
25 SOLUTION INTRAVENOUS
Status: DISCONTINUED | OUTPATIENT
Start: 2020-01-01 | End: 2020-01-01

## 2020-01-01 RX ORDER — MIDODRINE HYDROCHLORIDE 5 MG/1
5 TABLET ORAL ONCE
Status: DISCONTINUED | OUTPATIENT
Start: 2020-01-01 | End: 2020-01-01 | Stop reason: HOSPADM

## 2020-01-01 RX ORDER — BUSPIRONE HYDROCHLORIDE 10 MG/1
30 TABLET ORAL ONCE
Status: CANCELLED | OUTPATIENT
Start: 2020-01-01 | End: 2020-01-01

## 2020-01-01 RX ORDER — IBUPROFEN 200 MG
16 TABLET ORAL
Status: DISCONTINUED | OUTPATIENT
Start: 2020-01-01 | End: 2020-01-01 | Stop reason: HOSPADM

## 2020-01-01 RX ORDER — MIDODRINE HYDROCHLORIDE 5 MG/1
10 TABLET ORAL 3 TIMES DAILY
Qty: 180 TABLET | Refills: 2 | Status: SHIPPED | OUTPATIENT
Start: 2020-01-01 | End: 2020-01-01 | Stop reason: DRUGHIGH

## 2020-01-01 RX ORDER — CEFTRIAXONE 1 G/1
1 INJECTION, POWDER, FOR SOLUTION INTRAMUSCULAR; INTRAVENOUS
Status: COMPLETED | OUTPATIENT
Start: 2020-01-01 | End: 2020-01-01

## 2020-01-01 RX ORDER — FUROSEMIDE 80 MG/1
80 TABLET ORAL DAILY
Qty: 60 TABLET | Refills: 1
Start: 2020-01-01

## 2020-01-01 RX ORDER — GLUCAGON 1 MG
1 KIT INJECTION
Status: DISCONTINUED | OUTPATIENT
Start: 2020-01-01 | End: 2020-01-01 | Stop reason: HOSPADM

## 2020-01-01 RX ORDER — LIDOCAINE HYDROCHLORIDE 20 MG/ML
INJECTION INTRAVENOUS
Status: DISCONTINUED | OUTPATIENT
Start: 2020-01-01 | End: 2020-01-01

## 2020-01-01 RX ORDER — DEXTROSE MONOHYDRATE 100 MG/ML
1000 INJECTION, SOLUTION INTRAVENOUS
Status: COMPLETED | OUTPATIENT
Start: 2020-01-01 | End: 2020-01-01

## 2020-01-01 RX ORDER — SODIUM CHLORIDE 9 MG/ML
500 INJECTION, SOLUTION INTRAVENOUS ONCE
Status: COMPLETED | OUTPATIENT
Start: 2020-01-01 | End: 2020-01-01

## 2020-01-01 RX ORDER — ONDANSETRON 2 MG/ML
8 INJECTION INTRAMUSCULAR; INTRAVENOUS EVERY 6 HOURS PRN
Status: DISCONTINUED | OUTPATIENT
Start: 2020-01-01 | End: 2020-01-01 | Stop reason: HOSPADM

## 2020-01-01 RX ORDER — POTASSIUM CHLORIDE 20 MEQ/1
40 TABLET, EXTENDED RELEASE ORAL ONCE
Status: COMPLETED | OUTPATIENT
Start: 2020-01-01 | End: 2020-01-01

## 2020-01-01 RX ORDER — BUSPIRONE HYDROCHLORIDE 10 MG/1
30 TABLET ORAL EVERY 8 HOURS
Status: CANCELLED | OUTPATIENT
Start: 2020-07-15 | End: 2020-07-16

## 2020-01-01 RX ORDER — AMIODARONE HYDROCHLORIDE 150 MG/3ML
INJECTION, SOLUTION INTRAVENOUS
Status: DISCONTINUED
Start: 2020-01-01 | End: 2020-07-15 | Stop reason: HOSPADM

## 2020-01-01 RX ORDER — HYDROMORPHONE HYDROCHLORIDE 1 MG/ML
0.2 INJECTION, SOLUTION INTRAMUSCULAR; INTRAVENOUS; SUBCUTANEOUS EVERY 5 MIN PRN
Status: COMPLETED | OUTPATIENT
Start: 2020-01-01 | End: 2020-01-01

## 2020-01-01 RX ORDER — ALBUMIN HUMAN 250 G/1000ML
SOLUTION INTRAVENOUS CONTINUOUS PRN
Status: DISCONTINUED | OUTPATIENT
Start: 2020-01-01 | End: 2020-01-01

## 2020-01-01 RX ORDER — GABAPENTIN 300 MG/1
300 CAPSULE ORAL 3 TIMES DAILY
Status: DISCONTINUED | OUTPATIENT
Start: 2020-01-01 | End: 2020-01-01 | Stop reason: HOSPADM

## 2020-01-01 RX ORDER — FUROSEMIDE 80 MG/1
TABLET ORAL
Qty: 60 TABLET | Refills: 1 | Status: SHIPPED | OUTPATIENT
Start: 2020-01-01 | End: 2020-01-01 | Stop reason: SDUPTHER

## 2020-01-01 RX ORDER — ACETAMINOPHEN 650 MG/20.3ML
650 LIQUID ORAL EVERY 6 HOURS
Status: CANCELLED | OUTPATIENT
Start: 2020-07-15

## 2020-01-01 RX ORDER — POLYETHYLENE GLYCOL 3350 17 G/17G
17 POWDER, FOR SOLUTION ORAL 2 TIMES DAILY PRN
Status: DISCONTINUED | OUTPATIENT
Start: 2020-01-01 | End: 2020-01-01 | Stop reason: HOSPADM

## 2020-01-01 RX ORDER — MIDODRINE HYDROCHLORIDE 5 MG/1
5 TABLET ORAL 3 TIMES DAILY
Qty: 270 TABLET | Refills: 2 | Status: SHIPPED | OUTPATIENT
Start: 2020-01-01 | End: 2021-06-13

## 2020-01-01 RX ORDER — ONDANSETRON HYDROCHLORIDE 2 MG/ML
INJECTION, SOLUTION INTRAMUSCULAR; INTRAVENOUS
Status: DISCONTINUED | OUTPATIENT
Start: 2020-01-01 | End: 2020-01-01

## 2020-01-01 RX ORDER — LACTULOSE 10 G/15ML
10 SOLUTION ORAL 2 TIMES DAILY
Status: DISCONTINUED | OUTPATIENT
Start: 2020-01-01 | End: 2020-01-01 | Stop reason: HOSPADM

## 2020-01-01 RX ORDER — SERTRALINE HYDROCHLORIDE 25 MG/1
25 TABLET, FILM COATED ORAL DAILY
Status: DISCONTINUED | OUTPATIENT
Start: 2020-01-01 | End: 2020-01-01 | Stop reason: HOSPADM

## 2020-01-01 RX ORDER — MAG HYDROX/ALUMINUM HYD/SIMETH 200-200-20
30 SUSPENSION, ORAL (FINAL DOSE FORM) ORAL
Status: DISCONTINUED | OUTPATIENT
Start: 2020-01-01 | End: 2020-01-01 | Stop reason: HOSPADM

## 2020-01-01 RX ORDER — DOPAMINE HYDROCHLORIDE 160 MG/100ML
INJECTION, SOLUTION INTRAVENOUS
Status: DISCONTINUED
Start: 2020-01-01 | End: 2020-07-15 | Stop reason: HOSPADM

## 2020-01-01 RX ORDER — NEOSTIGMINE METHYLSULFATE 1 MG/ML
INJECTION, SOLUTION INTRAVENOUS
Status: DISCONTINUED | OUTPATIENT
Start: 2020-01-01 | End: 2020-01-01

## 2020-01-01 RX ORDER — CYCLOBENZAPRINE HCL 10 MG
10 TABLET ORAL 3 TIMES DAILY PRN
OUTPATIENT
Start: 2020-01-01

## 2020-01-01 RX ORDER — ONDANSETRON 8 MG/1
8 TABLET, ORALLY DISINTEGRATING ORAL EVERY 8 HOURS PRN
Status: DISCONTINUED | OUTPATIENT
Start: 2020-01-01 | End: 2020-01-01

## 2020-01-01 RX ORDER — SPIRONOLACTONE 100 MG/1
100 TABLET, FILM COATED ORAL DAILY
Qty: 90 TABLET | Refills: 1
Start: 2020-01-01

## 2020-01-01 RX ORDER — SPIRONOLACTONE 100 MG/1
300 TABLET, FILM COATED ORAL DAILY
Status: DISCONTINUED | OUTPATIENT
Start: 2020-01-01 | End: 2020-01-01

## 2020-01-01 RX ORDER — PANTOPRAZOLE SODIUM 40 MG/1
40 TABLET, DELAYED RELEASE ORAL DAILY
Qty: 90 TABLET | Refills: 3 | Status: SHIPPED | OUTPATIENT
Start: 2020-01-01 | End: 2020-01-01 | Stop reason: CLARIF

## 2020-01-01 RX ORDER — CYCLOBENZAPRINE HCL 5 MG
10 TABLET ORAL 3 TIMES DAILY PRN
Status: DISCONTINUED | OUTPATIENT
Start: 2020-01-01 | End: 2020-01-01

## 2020-01-01 RX ADMIN — GABAPENTIN 300 MG: 300 CAPSULE ORAL at 08:06

## 2020-01-01 RX ADMIN — PROPOFOL 50 MG: 10 INJECTION, EMULSION INTRAVENOUS at 08:02

## 2020-01-01 RX ADMIN — ALBUMIN (HUMAN) 25 G: 12.5 SOLUTION INTRAVENOUS at 12:02

## 2020-01-01 RX ADMIN — ONDANSETRON 8 MG: 8 TABLET, ORALLY DISINTEGRATING ORAL at 04:06

## 2020-01-01 RX ADMIN — FUROSEMIDE 160 MG: 80 TABLET ORAL at 09:06

## 2020-01-01 RX ADMIN — SENNOSIDES AND DOCUSATE SODIUM 1 TABLET: 8.6; 5 TABLET ORAL at 09:06

## 2020-01-01 RX ADMIN — FUROSEMIDE 40 MG: 40 TABLET ORAL at 05:06

## 2020-01-01 RX ADMIN — GABAPENTIN 300 MG: 300 CAPSULE ORAL at 04:06

## 2020-01-01 RX ADMIN — SODIUM CHLORIDE 1000 ML: 0.9 INJECTION, SOLUTION INTRAVENOUS at 08:02

## 2020-01-01 RX ADMIN — MIDODRINE HYDROCHLORIDE 10 MG: 5 TABLET ORAL at 09:06

## 2020-01-01 RX ADMIN — ALBUMIN (HUMAN) 25 G: 12.5 SOLUTION INTRAVENOUS at 09:04

## 2020-01-01 RX ADMIN — LIDOCAINE HYDROCHLORIDE 100 MG: 20 INJECTION, SOLUTION INTRAVENOUS at 09:01

## 2020-01-01 RX ADMIN — PANTOPRAZOLE SODIUM 40 MG: 40 TABLET, DELAYED RELEASE ORAL at 08:06

## 2020-01-01 RX ADMIN — ALBUMIN (HUMAN) 25 G: 12.5 SOLUTION INTRAVENOUS at 02:01

## 2020-01-01 RX ADMIN — ALBUMIN (HUMAN) 25 G: 12.5 SOLUTION INTRAVENOUS at 10:01

## 2020-01-01 RX ADMIN — MIDAZOLAM HYDROCHLORIDE 2 MG: 1 INJECTION, SOLUTION INTRAMUSCULAR; INTRAVENOUS at 10:06

## 2020-01-01 RX ADMIN — FUROSEMIDE 40 MG: 40 TABLET ORAL at 09:06

## 2020-01-01 RX ADMIN — TAMSULOSIN HYDROCHLORIDE 0.4 MG: 0.4 CAPSULE ORAL at 07:06

## 2020-01-01 RX ADMIN — SODIUM CHLORIDE: 0.9 INJECTION, SOLUTION INTRAVENOUS at 10:06

## 2020-01-01 RX ADMIN — MIDODRINE HYDROCHLORIDE 10 MG: 5 TABLET ORAL at 07:06

## 2020-01-01 RX ADMIN — PROPOFOL 100 MG: 10 INJECTION, EMULSION INTRAVENOUS at 09:01

## 2020-01-01 RX ADMIN — ALBUMIN (HUMAN) 25 G: 12.5 SOLUTION INTRAVENOUS at 12:04

## 2020-01-01 RX ADMIN — HYDROMORPHONE HYDROCHLORIDE 0.2 MG: 1 INJECTION, SOLUTION INTRAMUSCULAR; INTRAVENOUS; SUBCUTANEOUS at 01:06

## 2020-01-01 RX ADMIN — ALUMINUM HYDROXIDE, MAGNESIUM HYDROXIDE, AND SIMETHICONE 30 ML: 200; 200; 20 SUSPENSION ORAL at 09:06

## 2020-01-01 RX ADMIN — LIDOCAINE HYDROCHLORIDE 100 MG: 20 INJECTION, SOLUTION INTRAVENOUS at 08:02

## 2020-01-01 RX ADMIN — GABAPENTIN 300 MG: 300 CAPSULE ORAL at 09:06

## 2020-01-01 RX ADMIN — LIDOCAINE 1 PATCH: 50 PATCH TOPICAL at 04:06

## 2020-01-01 RX ADMIN — ALBUMIN (HUMAN) 25 G: 25 SOLUTION INTRAVENOUS at 09:01

## 2020-01-01 RX ADMIN — ALBUMIN (HUMAN) 25 G: 12.5 SOLUTION INTRAVENOUS at 12:07

## 2020-01-01 RX ADMIN — ONDANSETRON HYDROCHLORIDE 8 MG: 2 SOLUTION INTRAMUSCULAR; INTRAVENOUS at 02:06

## 2020-01-01 RX ADMIN — ALBUMIN (HUMAN) 25 G: 12.5 SOLUTION INTRAVENOUS at 04:07

## 2020-01-01 RX ADMIN — IOHEXOL 100 ML: 350 INJECTION, SOLUTION INTRAVENOUS at 11:02

## 2020-01-01 RX ADMIN — GLYCOPYRROLATE 0.4 MG: 0.2 INJECTION, SOLUTION INTRAMUSCULAR; INTRAVENOUS at 12:06

## 2020-01-01 RX ADMIN — IOHEXOL 80 ML: 300 INJECTION, SOLUTION INTRAVENOUS at 12:06

## 2020-01-01 RX ADMIN — CYCLOBENZAPRINE HYDROCHLORIDE 10 MG: 5 TABLET, FILM COATED ORAL at 09:06

## 2020-01-01 RX ADMIN — FENTANYL CITRATE 50 MCG: 50 INJECTION, SOLUTION INTRAMUSCULAR; INTRAVENOUS at 12:06

## 2020-01-01 RX ADMIN — PROPOFOL 50 MG: 10 INJECTION, EMULSION INTRAVENOUS at 09:01

## 2020-01-01 RX ADMIN — ALBUMIN (HUMAN) 25 G: 25 SOLUTION INTRAVENOUS at 10:01

## 2020-01-01 RX ADMIN — TRAMADOL HYDROCHLORIDE 50 MG: 50 TABLET, FILM COATED ORAL at 09:06

## 2020-01-01 RX ADMIN — TAMSULOSIN HYDROCHLORIDE 0.4 MG: 0.4 CAPSULE ORAL at 09:06

## 2020-01-01 RX ADMIN — ALBUMIN (HUMAN) 25 G: 12.5 SOLUTION INTRAVENOUS at 10:03

## 2020-01-01 RX ADMIN — DOPAMINE HYDROCHLORIDE IN DEXTROSE: 1.6 INJECTION, SOLUTION INTRAVENOUS at 08:07

## 2020-01-01 RX ADMIN — SERTRALINE 25 MG: 25 TABLET, FILM COATED ORAL at 09:06

## 2020-01-01 RX ADMIN — SPIRONOLACTONE 300 MG: 100 TABLET ORAL at 09:06

## 2020-01-01 RX ADMIN — ROCURONIUM BROMIDE 10 MG: 10 INJECTION, SOLUTION INTRAVENOUS at 10:06

## 2020-01-01 RX ADMIN — ALBUMIN (HUMAN): 25 SOLUTION INTRAVENOUS at 12:06

## 2020-01-01 RX ADMIN — MIDODRINE HYDROCHLORIDE 10 MG: 5 TABLET ORAL at 08:06

## 2020-01-01 RX ADMIN — ALBUMIN (HUMAN) 25 G: 12.5 SOLUTION INTRAVENOUS at 09:03

## 2020-01-01 RX ADMIN — MIDODRINE HYDROCHLORIDE 10 MG: 5 TABLET ORAL at 05:06

## 2020-01-01 RX ADMIN — POTASSIUM BICARBONATE 50 MEQ: 978 TABLET, EFFERVESCENT ORAL at 05:06

## 2020-01-01 RX ADMIN — FUROSEMIDE 80 MG: 40 TABLET ORAL at 05:06

## 2020-01-01 RX ADMIN — ALBUMIN (HUMAN) 25 G: 12.5 SOLUTION INTRAVENOUS at 11:02

## 2020-01-01 RX ADMIN — PROPOFOL 70 MG: 10 INJECTION, EMULSION INTRAVENOUS at 08:02

## 2020-01-01 RX ADMIN — POTASSIUM CHLORIDE 40 MEQ: 1500 TABLET, EXTENDED RELEASE ORAL at 09:06

## 2020-01-01 RX ADMIN — ALBUMIN (HUMAN) 25 G: 12.5 SOLUTION INTRAVENOUS at 02:06

## 2020-01-01 RX ADMIN — ROCURONIUM BROMIDE 50 MG: 10 INJECTION, SOLUTION INTRAVENOUS at 10:06

## 2020-01-01 RX ADMIN — ALBUMIN (HUMAN): 25 SOLUTION INTRAVENOUS at 11:06

## 2020-01-01 RX ADMIN — ALBUMIN (HUMAN) 25 G: 12.5 SOLUTION INTRAVENOUS at 10:05

## 2020-01-01 RX ADMIN — MIDODRINE HYDROCHLORIDE 10 MG: 5 TABLET ORAL at 02:06

## 2020-01-01 RX ADMIN — GABAPENTIN 300 MG: 300 CAPSULE ORAL at 11:06

## 2020-01-01 RX ADMIN — CEFTRIAXONE SODIUM 1 G: 1 INJECTION, POWDER, FOR SOLUTION INTRAMUSCULAR; INTRAVENOUS at 10:06

## 2020-01-01 RX ADMIN — EPINEPHRINE 1 MG: 0.1 INJECTION, SOLUTION ENDOTRACHEAL; INTRACARDIAC; INTRAVENOUS at 06:07

## 2020-01-01 RX ADMIN — NEOSTIGMINE METHYLSULFATE 4 MG: 1 INJECTION INTRAVENOUS at 12:06

## 2020-01-01 RX ADMIN — OXYCODONE 5 MG: 5 TABLET ORAL at 09:06

## 2020-01-01 RX ADMIN — CIPROFLOXACIN HYDROCHLORIDE 500 MG: 500 TABLET, FILM COATED ORAL at 09:06

## 2020-01-01 RX ADMIN — TRAMADOL HYDROCHLORIDE 50 MG: 50 TABLET, FILM COATED ORAL at 03:06

## 2020-01-01 RX ADMIN — CIPROFLOXACIN HYDROCHLORIDE 500 MG: 500 TABLET, FILM COATED ORAL at 11:06

## 2020-01-01 RX ADMIN — SODIUM CHLORIDE: 0.9 INJECTION, SOLUTION INTRAVENOUS at 07:01

## 2020-01-01 RX ADMIN — OXYCODONE HYDROCHLORIDE 10 MG: 10 TABLET ORAL at 11:06

## 2020-01-01 RX ADMIN — LACTULOSE 10 G: 20 SOLUTION ORAL at 09:06

## 2020-01-01 RX ADMIN — PROMETHAZINE HYDROCHLORIDE 12.5 MG: 25 INJECTION INTRAMUSCULAR; INTRAVENOUS at 05:06

## 2020-01-01 RX ADMIN — ALBUMIN (HUMAN) 25 G: 12.5 SOLUTION INTRAVENOUS at 09:05

## 2020-01-01 RX ADMIN — SPIRONOLACTONE 100 MG: 25 TABLET ORAL at 09:06

## 2020-01-01 RX ADMIN — OXYCODONE HYDROCHLORIDE 10 MG: 10 TABLET ORAL at 05:06

## 2020-01-01 RX ADMIN — ONDANSETRON 4 MG: 2 INJECTION, SOLUTION INTRAMUSCULAR; INTRAVENOUS at 12:06

## 2020-01-01 RX ADMIN — SODIUM CHLORIDE 1000 ML: 0.9 INJECTION, SOLUTION INTRAVENOUS at 05:07

## 2020-01-01 RX ADMIN — MAGNESIUM SULFATE HEPTAHYDRATE 2 G: 40 INJECTION, SOLUTION INTRAVENOUS at 09:06

## 2020-01-01 RX ADMIN — DEXTROSE 1000 ML: 10 SOLUTION INTRAVENOUS at 07:07

## 2020-01-01 RX ADMIN — MIDODRINE HYDROCHLORIDE 10 MG: 5 TABLET ORAL at 04:06

## 2020-01-01 RX ADMIN — OXYCODONE HYDROCHLORIDE 10 MG: 10 TABLET ORAL at 09:06

## 2020-01-01 RX ADMIN — LIDOCAINE HYDROCHLORIDE 100 MG: 20 INJECTION, SOLUTION INTRAVENOUS at 10:06

## 2020-01-01 RX ADMIN — LIDOCAINE 1 PATCH: 50 PATCH TOPICAL at 05:06

## 2020-01-01 RX ADMIN — GABAPENTIN 300 MG: 300 CAPSULE ORAL at 05:06

## 2020-01-01 RX ADMIN — HYDROMORPHONE HYDROCHLORIDE 0.2 MG: 1 INJECTION, SOLUTION INTRAMUSCULAR; INTRAVENOUS; SUBCUTANEOUS at 12:06

## 2020-01-01 RX ADMIN — TRAMADOL HYDROCHLORIDE 50 MG: 50 TABLET, FILM COATED ORAL at 08:06

## 2020-01-01 RX ADMIN — ALBUMIN (HUMAN) 25 G: 12.5 SOLUTION INTRAVENOUS at 03:06

## 2020-01-01 RX ADMIN — ALBUMIN (HUMAN) 25 G: 12.5 SOLUTION INTRAVENOUS at 11:03

## 2020-01-01 RX ADMIN — ALBUMIN (HUMAN) 25 G: 12.5 SOLUTION INTRAVENOUS at 09:01

## 2020-01-01 RX ADMIN — FENTANYL CITRATE 50 MCG: 50 INJECTION, SOLUTION INTRAMUSCULAR; INTRAVENOUS at 10:06

## 2020-01-01 RX ADMIN — LACTULOSE 10 G: 20 SOLUTION ORAL at 08:06

## 2020-01-01 RX ADMIN — PROPOFOL 150 MG: 10 INJECTION, EMULSION INTRAVENOUS at 10:06

## 2020-01-01 RX ADMIN — PANTOPRAZOLE SODIUM 40 MG: 40 TABLET, DELAYED RELEASE ORAL at 09:06

## 2020-01-01 RX ADMIN — CYCLOBENZAPRINE HYDROCHLORIDE 10 MG: 5 TABLET, FILM COATED ORAL at 08:06

## 2020-01-01 RX ADMIN — GLYCOPYRROLATE 0.1 MG: 0.2 INJECTION, SOLUTION INTRAMUSCULAR; INTRAVENOUS at 08:02

## 2020-01-03 NOTE — NURSING
Ultrasound guided paracentesis performed by Dr. Hong- 10.25 L removed- Patient received 75g albumin IV- VS remained stable for duration of procedure- IV d/c'd, with tip intact. Access site cleaned with peroxide, derma bond and steri-strips applied, then covered with sterile gauze and tape. Pt discharge home.

## 2020-01-07 NOTE — PROGRESS NOTES
CC: I feel like my abdomen is swollen again     Karen Villarreal is a 54 y.o.  Pt is here for evaluation of thrombocytopenia and iron deficiency with no further vaginal bleeding. The patient has been having menometrorrhagia with low platelets.  She has a history of hepatitis C with documented cirrhosis and splenomegaly.  She is seeing a GI specialist Dr Saldana for EGD and banding of varices  She is taking Protonix to help with gastritis and remains on Aldactone for control of fluid retention.     She has completed her Hep c meds Dr Whitehead  She is tolerating  zoloft for depression  She continues to require  paracentesis regularly     She is here for workup of possible autoimmune disease and this is negative       Past Medical History:   Diagnosis Date    Acid reflux     Anemia     Arthritis     Ascites 03/08/2017    Cataract     Cirrhosis     Depression     Encounter for blood transfusion     Hiatal hernia     History of hepatitis C, s/p successful Rx w/ SVR12 (cure) - 10/2019 2/16/2017    S/p mavyret w/ SVR12    Ovary removal, prophylactic     Renal cyst 03/08/2017    Thrombocytopenia     Varices, esophageal        Current Outpatient Medications:     albuterol (VENTOLIN HFA) 90 mcg/actuation inhaler, Ventolin HFA 90 mcg/actuation aerosol inhaler  take 2 puffs every 4hrs as needed for cough, Disp: , Rfl:     aluminum & magnesium hydroxide-simethicone (MAALOX MAXIMUM STRENGTH) 400-400-40 mg/5 mL suspension, Take by mouth every 6 (six) hours as needed for Indigestion., Disp: , Rfl:     cyclobenzaprine (FLEXERIL) 10 MG tablet, Take 10 mg by mouth 3 (three) times daily as needed for Muscle spasms., Disp: , Rfl:     ferrous sulfate 325 (65 FE) MG EC tablet, Take 325 mg by mouth once daily. , Disp: , Rfl:     furosemide (LASIX) 80 MG tablet, TAKE 2 TABLETS(160 MG) BY MOUTH ONCE DAILY, Disp: 60 tablet, Rfl: 0    gabapentin (NEURONTIN) 300 MG capsule, Take 300 mg by mouth 3 (three) times daily.,  "Disp: , Rfl:     pantoprazole (PROTONIX) 40 MG tablet, Take 1 tablet (40 mg total) by mouth once daily., Disp: 90 tablet, Rfl: 3    sertraline (ZOLOFT) 25 MG tablet, Take 1 tablet by mouth once daily., Disp: , Rfl: 1    spironolactone (ALDACTONE) 100 MG tablet, TAKE 3 TABLETS(300 MG) BY MOUTH ONCE DAILY, Disp: 90 tablet, Rfl: 0    She is tolerating aldactone for edema and neurontin for paresthesias    CONSTITUTIONAL: No fevers, chills, night sweats, wt. loss  SKINfacial redness  ENT: No headaches, head trauma, or eye pain  LYMPH NODES: None noticed   CV: No chest pain, palpitations.   RESP: + sob and dyspnea on exertion, no cough, wheezing  GI: No nausea, emesis, diarrhea, constipation, melena, hematochezia  : distention unchanged   : No dysuria, hematuria, urgency, or frequency   HEME: Continued  easy bruising, history of bleeding problems  PSYCHIATRIC: Positive depression, no anxiety  NEURO: No seizures, memory loss, dizziness or difficulty sleeping  MSK: No arthralgias or joint swelling  Legs   No further bleeding   Cervical abnormality on pap smear followed by gyn        LMP 08/06/2017 (Approximate)    BP (!) 121/55   Pulse 101   Temp 98.9 °F (37.2 °C) (Oral)   Resp 12   Ht 5' 1" (1.549 m)   Wt 109 kg (240 lb 4.8 oz)   LMP 08/06/2017 (Approximate)   SpO2 99%   BMI 45.40 kg/m²     Gen: NAD, A and O x3,  Conversant LOW grade temp   Psych: pleasant yet somewhat flat affect, normal thought process  Eyes: Pupils round and non dilated, EOM intact  Nose: Nares patent  OP clear, mucosa patent  Neck: suppple, no JVD, trachea midline, no adenopathy  Lungs: decreased BS bilateral bases No fremitus  CV: S1S2 with RR Tachycardia   Abd: soft, NTND, obese + ascites Distention  Extr: No CC positive  3 + pitting edema  POSITIVE distention , hard   Left leg with bandaid but erythema evident   Neuro: steady gait, CNs grossly intact  Skin: raised plaques  And buterfly facial rash   Chronic non healing lesion on her " left leg   Rheum: No joint swelling    genotype 1 a HCV  Lab Results   Component Value Date    WBC 5.20 12/20/2019    RBC 3.90 (L) 12/20/2019    HGB 10.8 (L) 12/20/2019    HCT 34.2 (L) 12/20/2019    MCV 88 12/20/2019    MCH 27.7 12/20/2019    MCHC 31.6 (L) 12/20/2019    RDW 17.2 (H) 12/20/2019    PLT 83 (L) 12/20/2019    MPV 11.6 12/20/2019    GRAN 3.7 12/20/2019    GRAN 71.1 12/20/2019    LYMPH 0.7 (L) 12/20/2019    LYMPH 13.3 (L) 12/20/2019    MONO 0.5 12/20/2019    MONO 9.8 12/20/2019    EOS 0.2 12/20/2019    BASO 0.05 12/20/2019    EOSINOPHIL 4.6 12/20/2019    BASOPHIL 1.0 12/20/2019       Ref Range & Hmmdl9s ago  Iron30 - 160 ug/dL16 Abnormally low  Vupzdgvmvfc758 - 375 mg/dL230 PDIB045 - 450 ug/dL340 Saturated Iron20 - 50 %5 Abnormally low      Cytopenia  -     CBC auto differential; Future; Expected date: 01/07/2020  -     Iron and TIBC; Future; Expected date: 01/07/2020  -     Vitamin B1; Future; Expected date: 01/07/2020  -     Vitamin B6; Future; Expected date: 01/07/2020  -     MMA; Future; Expected date: 01/07/2020      1.  Cirrhosis and splenomegaly cont obs alone   2.  Normocytic anemia from chronic disease  3.  Hepatitis C completed  treatment  See Dr harvey   4.  Thrombocytopenia with varices with no recent bleeding   5.  ascites cont paracentesis and see liver doctor : discussed pigtail cath and she should consider whether this would help her   6.  Chronic stasis on left leg with lesions   7.  Positive SSA: recheck   EGD tomorrow   High risk for thromboembolism due to morbid obesity and sedentary lifestyle   To GI for monitoring of varices today and vaices   treatment for her hep C followed by Dr Harvey  RTC  2 months  for continued f/U for cytopenias related to Gi disorder  No need for Iv iron ior any transfusion at this time   Continue  protonix to once a day   No pain today  No falls  No depression  Thank you for allowing me to evaluate and participate in the care of this pleasant patient.  Please fell free to call me with any questions or concerns.    Warmly,  Elizabet Valencia MD    This note was dictated with Dragon and briefly proofread. Please excuse any sentences that may be unclear or words misspelled

## 2020-01-08 NOTE — INTERVAL H&P NOTE
The patient has been examined and the H&P has been reviewed:    I concur with the findings and changes have been noted since the H&P was written: No new events.  Patient presents today for repeat/serial banding session.    Anesthesia/Surgery risks, benefits and alternative options discussed and understood by patient/family.          There are no hospital problems to display for this patient.

## 2020-01-08 NOTE — ANESTHESIA PREPROCEDURE EVALUATION
01/08/2020  Karen Villarreal is a 54 y.o., female.    Anesthesia Evaluation    I have reviewed the Patient Summary Reports.    I have reviewed the Nursing Notes.      Review of Systems  Anesthesia Hx:  No problems with previous Anesthesia    Hepatic/GI:   Hiatal Hernia, GERD, well controlled Liver Disease, Hepatitis, C        Physical Exam  General:  Morbid Obesity    Airway/Jaw/Neck:  Airway Findings: Mallampati: II                Anesthesia Plan  Type of Anesthesia, risks & benefits discussed:  Anesthesia Type:  general  Patient's Preference:   Intra-op Monitoring Plan:   Intra-op Monitoring Plan Comments:   Post Op Pain Control Plan:   Post Op Pain Control Plan Comments:   Induction:   IV  Beta Blocker:  Patient is not currently on a Beta-Blocker (No further documentation required).       Informed Consent: Patient understands risks and agrees with Anesthesia plan.  Questions answered. Anesthesia consent signed with patient.  ASA Score: 2     Day of Surgery Review of History & Physical:    H&P update referred to the surgeon.         Ready For Surgery From Anesthesia Perspective.

## 2020-01-08 NOTE — DISCHARGE INSTRUCTIONS
"Discharge Instructions: After Your Surgery/Procedure  Youve just had surgery. During surgery you were given medicine called anesthesia to keep you relaxed and free of pain. After surgery you may have some pain or nausea. This is common. Here are some tips for feeling better and getting well after surgery.     Stay on schedule with your medication.   Going home  Your doctor or nurse will show you how to take care of yourself when you go home. He or she will also answer your questions. Have an adult family member or friend drive you home.      For your safety we recommend these precaution for the first 24 hours after your procedure:  · Do not drive or use heavy equipment.  · Do not make important decisions or sign legal papers.  · Do not drink alcohol.  · Have someone stay with you, if needed. He or she can watch for problems and help keep you safe.  · Your concentration, balance, coordination, and judgement may be impaired for many hours after anesthesia.  Use caution when ambulating or standing up.     · You may feel weak and "washed out" after anesthesia and surgery.      Subtle residual effects of general anesthesia or sedation with regional / local anesthesia can last more than 24 hours.  Rest for the remainder of the day or longer if your Doctor/Surgeon has advised you to do so.  Although you may feel normal within the first 24 hours, your reflexes and mental ability may be impaired without you realizing it.  You may feel dizzy, lightheaded or sleepy for 24 hours or longer.      Be sure to go to all follow-up visits with your doctor. And rest after your surgery for as long as your doctor tells you to.  Coping with pain  If you have pain after surgery, pain medicine will help you feel better. Take it as told, before pain becomes severe. Also, ask your doctor or pharmacist about other ways to control pain. This might be with heat, ice, or relaxation. And follow any other instructions your surgeon or nurse gives " you.  Tips for taking pain medicine  To get the best relief possible, remember these points:  · Pain medicines can upset your stomach. Taking them with a little food may help.  · Most pain relievers taken by mouth need at least 20 to 30 minutes to start to work.  · Taking medicine on a schedule can help you remember to take it. Try to time your medicine so that you can take it before starting an activity. This might be before you get dressed, go for a walk, or sit down for dinner.  · Constipation is a common side effect of pain medicines. Call your doctor before taking any medicines such as laxatives or stool softeners to help ease constipation. Also ask if you should skip any foods. Drinking lots of fluids and eating foods such as fruits and vegetables that are high in fiber can also help. Remember, do not take laxatives unless your surgeon has prescribed them.  · Drinking alcohol and taking pain medicine can cause dizziness and slow your breathing. It can even be deadly. Do not drink alcohol while taking pain medicine.  · Pain medicine can make you react more slowly to things. Do not drive or run machinery while taking pain medicine.  Your health care provider may tell you to take acetaminophen to help ease your pain. Ask him or her how much you are supposed to take each day. Acetaminophen or other pain relievers may interact with your prescription medicines or other over-the-counter (OTC) drugs. Some prescription medicines have acetaminophen and other ingredients. Using both prescription and OTC acetaminophen for pain can cause you to overdose. Read the labels on your OTC medicines with care. This will help you to clearly know the list of ingredients, how much to take, and any warnings. It may also help you not take too much acetaminophen. If you have questions or do not understand the information, ask your pharmacist or health care provider to explain it to you before you take the OTC medicine.  Managing  nausea  Some people have an upset stomach after surgery. This is often because of anesthesia, pain, or pain medicine, or the stress of surgery. These tips will help you handle nausea and eat healthy foods as you get better. If you were on a special food plan before surgery, ask your doctor if you should follow it while you get better. These tips may help:  · Do not push yourself to eat. Your body will tell you when to eat and how much.  · Start off with clear liquids and soup. They are easier to digest.  · Next try semi-solid foods, such as mashed potatoes, applesauce, and gelatin, as you feel ready.  · Slowly move to solid foods. Dont eat fatty, rich, or spicy foods at first.  · Do not force yourself to have 3 large meals a day. Instead eat smaller amounts more often.  · Take pain medicines with a small amount of solid food, such as crackers or toast, to avoid nausea.     Call your surgeon if  · You still have pain an hour after taking medicine. The medicine may not be strong enough.  · You feel too sleepy, dizzy, or groggy. The medicine may be too strong.  · You have side effects like nausea, vomiting, or skin changes, such as rash, itching, or hives.       If you have obstructive sleep apnea  You were given anesthesia medicine during surgery to keep you comfortable and free of pain. After surgery, you may have more apnea spells because of this medicine and other medicines you were given. The spells may last longer than usual.   At home:  · Keep using the continuous positive airway pressure (CPAP) device when you sleep. Unless your health care provider tells you not to, use it when you sleep, day or night. CPAP is a common device used to treat obstructive sleep apnea.  · Talk with your provider before taking any pain medicine, muscle relaxants, or sedatives. Your provider will tell you about the possible dangers of taking these medicines.  © 2970-1142 [employer]. [address] [place] [address]  [place] [address]. All rights reserved. This information is not intended as a substitute for professional medical care. Always follow your healthcare professional's instructions.    Esophageal Varices     With esophageal varices, blood vessels in the esophagus become abnormally enlarged. They may then burst (rupture) and bleed.   Esophageal varices are enlarged veins at the lower end of the esophagus. The esophagus is the tube that carries food from your mouth to your stomach. Varices most often occur because of problems with blood flow in the liver caused by chronic liver disease. Normally, a blood vessel called the portal vein carries blood from the digestive organs to the liver. But with liver disease, blood flow can be blocked due to scarring of the liver. This increases the blood pressure in the portal vein (a condition known as portal hypertension). Blood then backs up in nearby veins in the esophagus and stomach, causing varices. Varices are a serious and deadly problem. Treatment is needed to prevent them from bursting (rupturing) and bleeding. If bleeding occurs, it can be fatal.  Symptoms of esophageal varices  Symptoms do not occur unless the varices are bleeding. This is an emergency problem. If you have any of the following symptoms, get medical attention right away:  · Vomiting blood  · Black, tarry, or bloody stools  · Feeling lightheaded, or fainting (loss of consciousness)  Diagnosing esophageal \varices  Youll likely be checked for varices if you have liver disease or other health problems that can cause them. Your healthcare provider will ask about your symptoms and health history. Youll also be examined. Tests are then done to confirm the problem. Tests can include:  · Upper endoscopy. This is done to see inside the upper digestive tract. During the test, an endoscope is used. This is a thin, flexible tube with a tiny camera on the end. Its inserted through your mouth. Its then guided down through  your esophagus, stomach, and first part of your small intestine. This allows the provider to check for varices and find any bleeding.  · Imaging tests. These provide pictures of the liver or blood flow in the liver. They allow the provider to check for enlarged veins around the liver and assess the risk of bleeding. Common imaging tests done include ultrasound and CT scans.  Treating esophageal varices  The goal of treatment is to reduce the risk of bleeding or to control bleeding. Treatment can include 1 or more of the following:  · Medicines. These may be prescribed to lower the blood pressure inside the enlarged veins. This reduces the risk of bleeding. Beta-blockers are the most common medicine used.  · Endoscopic therapy. These are treatments for enlarged or bleeding veins that are done using an endoscope. With ligation, small rubber bands are placed around the veins to close them off and stop any bleeding. With sclerotherapy, a blood-clotting medicine is injected into the veins to cause scarring and shrink them.  · Balloon tamponade. A tube with a balloon is guided down into your esophagus and stomach. The balloon is then filled with air. This puts pressure on enlarged or bleeding veins to control bleeding. This is a short-term (temporary) way to control bleeding until other treatments are available.   · Surgery. This may be done to place a tubelike device (stent) in the liver. The stent helps redirect blood flow in the liver to lower the blood pressure in enlarged veins. Sometimes, the enlarged veins may be connected to other nearby veins to redirect blood flow. In severe cases, a liver transplant may be needed. For this surgery, a diseased liver is replaced with a healthy liver from another person.   Follow-up  Regular visits with your provider are needed to check for bleeding of the varices. If bleeding occurs, it is likely to occur again. More treatments will then be needed in the future. Once endoscopic  therapy (banding) is performed, regular follow-up endoscopic scans with banding are done to completely get rid of the varices. If you are given medicines to take by mouth, be sure to take them as directed. Work closely with your provider to manage your condition. Know when to seek emergency care.  Date Last Reviewed: 7/1/2016  © 1175-9678 [employer]. [address] [place] [address], [place] [address]. All rights reserved. This information is not intended as a substitute for professional medical care. Always follow your healthcare professional's instructions.

## 2020-01-08 NOTE — TRANSFER OF CARE
"Anesthesia Transfer of Care Note    Patient: Karen Villarreal    Procedure(s) Performed: Procedure(s) (LRB):  EGD (ESOPHAGOGASTRODUODENOSCOPY) (N/A)    Patient location: GI    Anesthesia Type: general    Transport from OR: Transported from OR on room air with adequate spontaneous ventilation    Post pain: adequate analgesia    Post assessment: no apparent anesthetic complications    Post vital signs: stable    Level of consciousness: awake    Nausea/Vomiting: no nausea/vomiting    Complications: none    Transfer of care protocol was followed      Last vitals:   Visit Vitals  BP (!) 190/85   Pulse 91   Temp 36.9 °C (98.4 °F) (Skin)   Resp 18   Ht 5' 1" (1.549 m)   Wt 108.9 kg (240 lb)   LMP 08/06/2017 (Approximate)   SpO2 100%   Breastfeeding? No   BMI 45.35 kg/m²     "

## 2020-01-08 NOTE — ANESTHESIA POSTPROCEDURE EVALUATION
Anesthesia Post Evaluation    Patient: Karen Villarreal    Procedure(s) Performed: Procedure(s) (LRB):  EGD (ESOPHAGOGASTRODUODENOSCOPY) (N/A)    Final Anesthesia Type: general    Patient location during evaluation: PACU  Patient participation: Yes- Able to Participate  Level of consciousness: awake and alert  Post-procedure vital signs: reviewed and stable  Pain management: adequate  Airway patency: patent    PONV status at discharge: No PONV  Anesthetic complications: no      Cardiovascular status: blood pressure returned to baseline and hemodynamically stable  Respiratory status: unassisted  Hydration status: euvolemic  Follow-up not needed.          Vitals Value Taken Time   /59 1/8/2020  9:52 AM   Temp 36.9 °C (98.4 °F) 1/8/2020  7:49 AM   Pulse 98 1/8/2020  9:52 AM   Resp 16 1/8/2020  9:52 AM   SpO2 97 % 1/8/2020  9:52 AM         No case tracking events are documented in the log.      Pain/Kang Score: Kang Score: 9 (1/8/2020  9:52 AM)

## 2020-01-08 NOTE — TELEPHONE ENCOUNTER
----- Message from Cindy Garcia RN sent at 1/8/2020  7:57 AM CST -----  Patient scheduled for para on 1/17/20.  [facility] staff called asking that additional standing orders be entered for michelle so that they can setup additional procedures.

## 2020-01-08 NOTE — [employer] PATIENT INSTRUCTIONS
Discharge Summary/Instructions after an Endoscopic Procedure  Patient Name: Karen Villarreal  Patient MRN: [id number]  Patient YOB: 1965  Wednesday, January 08, 2020  Justin Saldana MD  RESTRICTIONS:  During your procedure today, you received medications for sedation.  These   medications may affect your judgment, balance and coordination.  Therefore,   for 24 hours, you have the following restrictions:   - DO NOT drive a car, operate machinery, make legal/financial decisions,   sign important papers or drink alcohol.    ACTIVITY:  Today: no heavy lifting, straining or running due to procedural   sedation/anesthesia.  The following day: return to full activity including work.  DIET:  Eat and drink normally unless instructed otherwise.     TREATMENT FOR COMMON SIDE EFFECTS:  - Mild abdominal pain, nausea, belching, bloating or excessive gas:  rest,   eat lightly and use a heating pad.  - Sore Throat: treat with throat lozenges and/or gargle with warm salt   water.  - Because air was used during the procedure, expelling large amounts of air   from your rectum or belching is normal.  - If a bowel prep was taken, you may not have a bowel movement for 1-3 days.    This is normal.  SYMPTOMS TO WATCH FOR AND REPORT TO YOUR PHYSICIAN:  1. Abdominal pain or bloating, other than gas cramps.  2. Chest pain.  3. Back pain.  4. Signs of infection such as: chills or fever occurring within 24 hours   after the procedure.  5. Rectal bleeding, which would show as bright red, maroon, or black stools.   (A tablespoon of blood from the rectum is not serious, especially if   hemorrhoids are present.)  6. Vomiting.  7. Weakness or dizziness.  GO DIRECTLY TO THE NEAREST EMERGENCY ROOM IF YOU HAVE ANY OF THE FOLLOWING:      Difficulty breathing              Chills and/or fever over 101 F   Persistent vomiting and/or vomiting blood   Severe abdominal pain   Severe chest pain   Black, tarry stools   Bleeding- more than one  tablespoon   Any other symptom or condition that you feel may need urgent attention  Your doctor recommends these additional instructions:  If any biopsies were taken, your doctors clinic will contact you in 1 to 2   weeks with any results.  - Patient has a contact number available for emergencies.  The signs and   symptoms of potential delayed complications were discussed with the   patient.  Return to normal activities tomorrow.  Written discharge   instructions were provided to the patient.   - Resume previous diet.   - Continue present medications.   - No aspirin, ibuprofen, naproxen, or other non-steroidal anti-inflammatory   drugs.   - Repeat upper endoscopy in 4 weeks for surveillance.   - Discharge patient to home (ambulatory).   - Follow an antireflux regimen.   - Return to my office after studies are complete.  For questions, problems or results please call your physician - Justin Saldana MD at Work:  [phone].  [facility], EMERGENCY ROOM PHONE NUMBER: [phone]  IF A COMPLICATION OR EMERGENCY SITUATION ARISES AND YOU ARE UNABLE TO REACH   YOUR PHYSICIAN - GO DIRECTLY TO THE EMERGENCY ROOM.  Justin Saldana MD  1/8/2020 9:42:34 AM  This report has been verified and signed electronically.  [employer]

## 2020-01-17 NOTE — NURSING
Ultrasound guided paracentesis performed by Dr. Boone- 10 L removed- Patient received 50g albumin IV- VS remained stable for duration of procedure- IV d/c'd, with tip intact. Access site cleaned with peroxide, derma bond, then covered with sterile gauze and tape. Pt discharge home.

## 2020-01-17 NOTE — TELEPHONE ENCOUNTER
1/2020 u/s reviewed - stable    Please notify patient:  I have reviewed the recent ultrasound.  Liver cancer screening looked fine. There are no tumors in the liver.  Next liver monitoring is due in 6 months.    Keep appt w/ Dr Webster    Thanks

## 2020-01-20 NOTE — TELEPHONE ENCOUNTER
----- Message from Cindy Garcia RN sent at 1/20/2020  2:39 PM CST -----  Patient phoned requesting refills on spironolactone and furosemide.  Dosing (no changes) and preferred pharmacy confirmed.

## 2020-01-22 NOTE — TELEPHONE ENCOUNTER
----- Message from Jhoana Cooper sent at 1/22/2020 11:18 AM CST -----  Contact: self [phone]  Please call her to schedule the EGD and discuss medication.  Thank you!

## 2020-01-31 NOTE — NURSING
Ultrasound guided paracentesis performed by - 8.75 L removed- Patient received  25g albumin IV- VS remained stable for duration of procedure-  IV d/c'd, with tip intact. Access site cleaned with peroxide, derma bond and steri-strips applied, then covered with sterile gauze and tape. Pt discharge home.

## 2020-02-04 NOTE — PROGRESS NOTES
Subjective:       Patient ID: Karen Villarreal is a 54 y.o. female.    Chief Complaint: Cirrhosis and Ascites    HPI  I saw this 54 y.o. lady with/ decompensated HCV cirrhosis (s/p treatment w/ SVR4 - 7/2019; needs labs for SVR12) complicated by portal HTN w/ refractory ascites requiring large volume paracentesis every 2 weeks, here for f/u     Still on radu 300 & lasix 160mg, undergoing large volume paracentesis every ~ 2 weeks.   No real decrease in volume of fluid removed: 6-10 L based on time between paracenteses.     C/o tiredness  Down ~ 30 lbs since last visit. Swelling in legs has decreased. Still w/ ascites per above.  No melena, hematemesis, confusion, jaundice        Cirrhosis history:  Decompensated, complicated by portal HTN w/ variceal bleed 2/2019, s/p banding       MELD up to 17 during variceal bleed (2/2019)  MELD 5/2019 - 10/2019 - 10     -- Refractory ascites requiring large volume paracenteses  / DOLORES / heme notes mentioned anasarca? No SBP or evidence of malignancy  -- Mild TBili elevations up to 1.5 - 2.3  -- Plt decreased 70s-120s  -- INR up to 1.5 at time of variceal bleed; most recent down to 1.1  -- Albumin low - 2.7  No HE     MELD-Na score: 10 at 10/7/2019  3:04 PM  MELD score: 10 at 10/7/2019  3:04 PM  Calculated from:  Serum Creatinine: 0.8 mg/dL (Rounded to 1 mg/dL) at 10/7/2019  3:03 PM  Serum Sodium: 135 mmol/L at 10/7/2019  3:03 PM  Total Bilirubin: 1.7 mg/dL at 10/7/2019  3:03 PM  INR(ratio): 1.1 at 10/7/2019  3:04 PM  Age: 54 years        Cirrhosis maintenance:  - HCC screening - U/S w/ no lesions and normal AFP 7/2019  - Varices screening - Variceal bleed / banding 2/2019, s/p several EGDs w/ repeat banding               6/25/2019 EGD (Dr Saldana) - grade II esoph varices, banded w/ complete eradication.                EGD recommended after 6 weeks - not scheduled yet  - HAV immunity - (+) immunity 4/19/17  - HBV immunity - s/p HBV vaccine: 8/28/2018, 7/26/2018,  6/27/2018        HCV history:  Completed 12 weeks Mavyret 6/2019 (Dr Whitehead) - SVR4 documented 7/2019  Genotype 1a  Prior treatments:               SIFN + RBV x 6 months - nonresponder.                Changed to PegIFN monotherapy x 9 months - nonresponder.     Liver US: 1/17/2020  Hepatic cirrhosis without focal lesion.  Small volume ascites.  Moderate splenomegaly.  Cholelithiasis.    Review of Systems   Constitutional: Negative for activity change, appetite change, chills, fatigue, fever and unexpected weight change.   HENT: Negative for ear pain, hearing loss, nosebleeds, sore throat and trouble swallowing.    Eyes: Negative for redness and visual disturbance.   Respiratory: Negative for cough, chest tightness, shortness of breath and wheezing.    Cardiovascular: Negative for chest pain and palpitations.   Gastrointestinal: Negative for abdominal distention, abdominal pain, blood in stool, constipation, diarrhea, nausea and vomiting.   Genitourinary: Negative for difficulty urinating, dysuria, frequency, hematuria and urgency.   Musculoskeletal: Negative for arthralgias, back pain, gait problem, joint swelling and myalgias.   Skin: Negative for rash.   Neurological: Negative for tremors, seizures, speech difficulty, weakness and headaches.   Hematological: Negative for adenopathy.   Psychiatric/Behavioral: Negative for confusion, decreased concentration and sleep disturbance. The patient is not nervous/anxious.            Lab Results   Component Value Date    ALT 15 02/04/2020    AST 33 02/04/2020    GGT 29 09/24/2018    ALKPHOS 274 (H) 02/04/2020    BILITOT 1.3 (H) 02/04/2020     Past Medical History:   Diagnosis Date    Acid reflux     Anemia     Arthritis     Ascites 03/08/2017    Cataract     Cirrhosis     Depression     Encounter for blood transfusion     Hiatal hernia     History of hepatitis C, s/p successful Rx w/ SVR12 (cure) - 10/2019 2/16/2017    S/p mavyret w/ SVR12    Ovary removal,  prophylactic     Renal cyst 03/08/2017    Thrombocytopenia     Varices, esophageal      Past Surgical History:   Procedure Laterality Date    ANKLE SURGERY      APPENDECTOMY      CATARACT EXTRACTION      COLONOSCOPY N/A 12/4/2019    Procedure: COLONOSCOPY;  Surgeon: Justin Montenegro MD;  Location: [facility] ENDO;  Service: Endoscopy;  Laterality: N/A;    ESOPHAGOGASTRODUODENOSCOPY N/A 2/2/2019    Procedure: EGD (ESOPHAGOGASTRODUODENOSCOPY);  Surgeon: Leigha Whitehead MD;  Location: [facility] ENDO;  Service: Endoscopy;  Laterality: N/A;  Procedure for 1100AM 2/2/2019    ESOPHAGOGASTRODUODENOSCOPY N/A 2/5/2019    Procedure: EGD (ESOPHAGOGASTRODUODENOSCOPY);  Surgeon: Leigha Whitehead MD;  Location: [facility] ENDO;  Service: Endoscopy;  Laterality: N/A;    ESOPHAGOGASTRODUODENOSCOPY N/A 4/29/2019    Procedure: EGD (ESOPHAGOGASTRODUODENOSCOPY);  Surgeon: Justin Montenegro MD;  Location: [facility] ENDO;  Service: Endoscopy;  Laterality: N/A;    ESOPHAGOGASTRODUODENOSCOPY N/A 5/22/2019    Procedure: EGD (ESOPHAGOGASTRODUODENOSCOPY);  Surgeon: Justin Montenegro MD;  Location: [facility] ENDO;  Service: Endoscopy;  Laterality: N/A;    ESOPHAGOGASTRODUODENOSCOPY N/A 6/4/2019    Procedure: EGD (ESOPHAGOGASTRODUODENOSCOPY);  Surgeon: Justin Montenegro MD;  Location: [facility] ENDO;  Service: Endoscopy;  Laterality: N/A;    ESOPHAGOGASTRODUODENOSCOPY N/A 6/25/2019    Procedure: EGD (ESOPHAGOGASTRODUODENOSCOPY);  Surgeon: Justin Montenegro MD;  Location: [facility] ENDO;  Service: Endoscopy;  Laterality: N/A;    ESOPHAGOGASTRODUODENOSCOPY N/A 10/30/2019    Procedure: EGD (ESOPHAGOGASTRODUODENOSCOPY) - varices surveillance;  Surgeon: Justin Montenegro MD;  Location: [facility] ENDO;  Service: Endoscopy;  Laterality: N/A;    ESOPHAGOGASTRODUODENOSCOPY N/A 11/20/2019    Procedure: EGD (ESOPHAGOGASTRODUODENOSCOPY);  Surgeon: Justin Montenegro MD;  Location: [facility];  Service: Endoscopy;  Laterality: N/A;    ESOPHAGOGASTRODUODENOSCOPY N/A 12/17/2019     Procedure: EGD (ESOPHAGOGASTRODUODENOSCOPY);  Surgeon: Justin Montenegro MD;  Location: [facility] ENDO;  Service: Endoscopy;  Laterality: N/A;    ESOPHAGOGASTRODUODENOSCOPY N/A 1/8/2020    Procedure: EGD (ESOPHAGOGASTRODUODENOSCOPY);  Surgeon: Justin Montenegro MD;  Location: [facility] ENDO;  Service: Endoscopy;  Laterality: N/A;    ESOPHAGOGASTRODUODENOSCOPY N/A 2/5/2020    Procedure: EGD (ESOPHAGOGASTRODUODENOSCOPY);  Surgeon: Justin Montenegro MD;  Location: [facility] ENDO;  Service: Endoscopy;  Laterality: N/A;    OOPHORECTOMY       Current Outpatient Medications   Medication Sig    albuterol (VENTOLIN HFA) 90 mcg/actuation inhaler Ventolin HFA 90 mcg/actuation aerosol inhaler   take 2 puffs every 4hrs as needed for cough    aluminum & magnesium hydroxide-simethicone (MAALOX MAXIMUM STRENGTH) 400-400-40 mg/5 mL suspension Take by mouth every 6 (six) hours as needed for Indigestion.    cyclobenzaprine (FLEXERIL) 10 MG tablet Take 10 mg by mouth 3 (three) times daily as needed for Muscle spasms.    ferrous sulfate 325 (65 FE) MG EC tablet Take 325 mg by mouth once daily.     furosemide (LASIX) 80 MG tablet TAKE 2 TABLETS(160 MG) BY MOUTH ONCE DAILY    gabapentin (NEURONTIN) 300 MG capsule Take 300 mg by mouth 3 (three) times daily.    magnesium 30 mg Tab Take by mouth once.    sertraline (ZOLOFT) 25 MG tablet Take 1 tablet by mouth once daily.    spironolactone (ALDACTONE) 100 MG tablet TAKE 3 TABLETS(300 MG) BY MOUTH ONCE DAILY    pantoprazole (PROTONIX) 40 MG tablet Take 1 tablet (40 mg total) by mouth once daily.     No current facility-administered medications for this visit.        Objective:      Physical Exam   Constitutional: She appears well-nourished. No distress.   HENT:   Head: Normocephalic.   Eyes: Pupils are equal, round, and reactive to light.   Neck: No JVD present. No tracheal deviation present. No thyromegaly present.   Cardiovascular: Normal rate, regular rhythm and normal heart sounds.   No  murmur heard.  Pulmonary/Chest: Effort normal and breath sounds normal. No stridor.   Abdominal: Soft.   Lymphadenopathy:        Head (right side): No submental, no submandibular, no tonsillar, no preauricular, no posterior auricular and no occipital adenopathy present.        Head (left side): No submental, no submandibular, no tonsillar, no preauricular, no posterior auricular and no occipital adenopathy present.     She has no cervical adenopathy.     She has no axillary adenopathy.   Neurological: She is alert. She has normal strength. She is not disoriented. No cranial nerve deficit or sensory deficit.   Skin: Skin is intact. No cyanosis.       Assessment:       1. Decompensated HCV cirrhosis        Plan:   Low MELD but continues to need paracentesis every 2 weeks.    On diuretics  No confusion  No heart issues    - needs EGD  - continue with [place] 300 + 160 furosemide  - needs stress echo  - CT abdo  - labs    Then I will refer for TIPS evaluation by BENITO

## 2020-02-05 NOTE — TELEPHONE ENCOUNTER
Spoke with Anita and protonix was approved went through insurance and patient can pick it up, called to endo to notify patient

## 2020-02-05 NOTE — DISCHARGE INSTRUCTIONS
Gastritis (Adult)    Gastritis is inflammation and irritation of the stomach lining. It can be present for a short time (acute) or be long lasting (chronic). Gastritis is often caused by infection with bacteria called H pylori. More than a third of people in the [place] have this bacteria in their bodies. In many cases, H pylori causes no problems or symptoms. In some people, though, the infection irritates the stomach lining and causes gastritis. Other causes of stomach irritation include drinking alcohol or taking pain-relieving medicines called NSAIDs (such as aspirin or ibuprofen).   Symptoms of gastritis can include:  · Abdominal pain or bloating  · Loss of appetite  · Nausea or vomiting  · Vomiting blood or having black stools  · Feeling more tired than usual  An inflamed and irritated stomach lining is more likely to develop a sore called an ulcer. To help prevent this, gastritis should be treated.  Home care  If needed, medicines may be prescribed. If you have H pylori infection, treating it will likely relieve your symptoms. Other changes can help reduce stomach irritation and help it heal.  · If you have been prescribed medicines for H pylori infection, take them as directed. Take all of the medicine until it is finished or your healthcare provider tells you to stop, even if you feel better.  · Your healthcare provider may recommend avoiding NSAIDs. If you take daily aspirin for your heart or other medical reasons, do not stop without talking to your healthcare provider first.  · Avoid drinking alcohol.  · Stop smoking. Smoking can irritate the stomach and delay healing. As much as possible, stay away from second hand smoke.  Follow-up care  Follow up with your healthcare provider, or as advised by our staff. Testing may be needed to check for inflammation or an ulcer.  When to seek medical advice  Call your healthcare provider for any of the following:  · Stomach pain that gets worse or moves to the lower  "right abdomen (appendix area)  · Chest pain that appears or gets worse, or spreads to the back, neck, shoulder, or arm  · Frequent vomiting (cant keep down liquids)  · Blood in the stool or vomit (red or black in color)  · Feeling weak or dizzy  · Fever of 100.4ºF (38ºC) or higher, or as directed by your healthcare provider  Date Last Reviewed: 6/22/2015  © 1923-0581 [employer]. [address] [place] [address], [place] [address]. All rights reserved. This information is not intended as a substitute for professional medical care. Always follow your healthcare professional's instructions.      Discharge Instructions: After Your Surgery/Procedure  Youve just had surgery. During surgery you were given medicine called anesthesia to keep you relaxed and free of pain. After surgery you may have some pain or nausea. This is common. Here are some tips for feeling better and getting well after surgery.     Stay on schedule with your medication.   Going home  Your doctor or nurse will show you how to take care of yourself when you go home. He or she will also answer your questions. Have an adult family member or friend drive you home.      For your safety we recommend these precaution for the first 24 hours after your procedure:  · Do not drive or use heavy equipment.  · Do not make important decisions or sign legal papers.  · Do not drink alcohol.  · Have someone stay with you, if needed. He or she can watch for problems and help keep you safe.  · Your concentration, balance, coordination, and judgement may be impaired for many hours after anesthesia.  Use caution when ambulating or standing up.     · You may feel weak and "washed out" after anesthesia and surgery.      Subtle residual effects of general anesthesia or sedation with regional / local anesthesia can last more than 24 hours.  Rest for the remainder of the day or longer if your Doctor/Surgeon has advised you to do so.  Although you may feel normal within " the first 24 hours, your reflexes and mental ability may be impaired without you realizing it.  You may feel dizzy, lightheaded or sleepy for 24 hours or longer.      Be sure to go to all follow-up visits with your doctor. And rest after your surgery for as long as your doctor tells you to.  Coping with pain  If you have pain after surgery, pain medicine will help you feel better. Take it as told, before pain becomes severe. Also, ask your doctor or pharmacist about other ways to control pain. This might be with heat, ice, or relaxation. And follow any other instructions your surgeon or nurse gives you.  Tips for taking pain medicine  To get the best relief possible, remember these points:  · Pain medicines can upset your stomach. Taking them with a little food may help.  · Most pain relievers taken by mouth need at least 20 to 30 minutes to start to work.  · Taking medicine on a schedule can help you remember to take it. Try to time your medicine so that you can take it before starting an activity. This might be before you get dressed, go for a walk, or sit down for dinner.  · Constipation is a common side effect of pain medicines. Call your doctor before taking any medicines such as laxatives or stool softeners to help ease constipation. Also ask if you should skip any foods. Drinking lots of fluids and eating foods such as fruits and vegetables that are high in fiber can also help. Remember, do not take laxatives unless your surgeon has prescribed them.  · Drinking alcohol and taking pain medicine can cause dizziness and slow your breathing. It can even be deadly. Do not drink alcohol while taking pain medicine.  · Pain medicine can make you react more slowly to things. Do not drive or run machinery while taking pain medicine.  Your health care provider may tell you to take acetaminophen to help ease your pain. Ask him or her how much you are supposed to take each day. Acetaminophen or other pain relievers may  interact with your prescription medicines or other over-the-counter (OTC) drugs. Some prescription medicines have acetaminophen and other ingredients. Using both prescription and OTC acetaminophen for pain can cause you to overdose. Read the labels on your OTC medicines with care. This will help you to clearly know the list of ingredients, how much to take, and any warnings. It may also help you not take too much acetaminophen. If you have questions or do not understand the information, ask your pharmacist or health care provider to explain it to you before you take the OTC medicine.  Managing nausea  Some people have an upset stomach after surgery. This is often because of anesthesia, pain, or pain medicine, or the stress of surgery. These tips will help you handle nausea and eat healthy foods as you get better. If you were on a special food plan before surgery, ask your doctor if you should follow it while you get better. These tips may help:  · Do not push yourself to eat. Your body will tell you when to eat and how much.  · Start off with clear liquids and soup. They are easier to digest.  · Next try semi-solid foods, such as mashed potatoes, applesauce, and gelatin, as you feel ready.  · Slowly move to solid foods. Dont eat fatty, rich, or spicy foods at first.  · Do not force yourself to have 3 large meals a day. Instead eat smaller amounts more often.  · Take pain medicines with a small amount of solid food, such as crackers or toast, to avoid nausea.     Call your surgeon if  · You still have pain an hour after taking medicine. The medicine may not be strong enough.  · You feel too sleepy, dizzy, or groggy. The medicine may be too strong.  · You have side effects like nausea, vomiting, or skin changes, such as rash, itching, or hives.       If you have obstructive sleep apnea  You were given anesthesia medicine during surgery to keep you comfortable and free of pain. After surgery, you may have more apnea  spells because of this medicine and other medicines you were given. The spells may last longer than usual.   At home:  · Keep using the continuous positive airway pressure (CPAP) device when you sleep. Unless your health care provider tells you not to, use it when you sleep, day or night. CPAP is a common device used to treat obstructive sleep apnea.  · Talk with your provider before taking any pain medicine, muscle relaxants, or sedatives. Your provider will tell you about the possible dangers of taking these medicines.  © 9585-2711 [employer]. [address] [place] [address]. All rights reserved. This information is not intended as a substitute for professional medical care. Always follow your healthcare professional's instructions.

## 2020-02-05 NOTE — ANESTHESIA PREPROCEDURE EVALUATION
02/05/2020  Karen Villarreal is a 54 y.o., female.    Pre-op Assessment    I have reviewed the Patient Summary Reports.     I have reviewed the Nursing Notes.      Review of Systems  Anesthesia Hx:  No problems with previous Anesthesia    Pulmonary:  Pulmonary Normal    Renal/:   Chronic Renal Disease    Hepatic/GI:   Hiatal Hernia, GERD, well controlled Liver Disease, Hepatitis, C ascites   Endocrine:  Endocrine Normal    Psych:   Psychiatric History          Physical Exam  General:  Morbid Obesity, Obesity    Airway/Jaw/Neck:  Airway Findings: Mallampati: III Improves to II with phonation.  TM Distance: Normal, at least 6 cm       Chest/Lungs:  Chest/Lungs Clear    Heart/Vascular:  Heart Findings: Normal            Anesthesia Plan  Type of Anesthesia, risks & benefits discussed:  Anesthesia Type:  general  Patient's Preference:   Intra-op Monitoring Plan: standard ASA monitors  Intra-op Monitoring Plan Comments:   Post Op Pain Control Plan:   Post Op Pain Control Plan Comments:   Induction:   IV  Beta Blocker:  Patient is not currently on a Beta-Blocker (No further documentation required).       Informed Consent: Patient understands risks and agrees with Anesthesia plan.  Questions answered. Anesthesia consent signed with patient.  ASA Score: 2     Day of Surgery Review of History & Physical:    H&P update referred to the surgeon.         Ready For Surgery From Anesthesia Perspective.

## 2020-02-05 NOTE — [employer] PATIENT INSTRUCTIONS
Discharge Summary/Instructions after an Endoscopic Procedure  Patient Name: Karen Villarreal  Patient MRN: [id number]  Patient YOB: 1965  Wednesday, February 05, 2020  Justin Saldana MD  RESTRICTIONS:  During your procedure today, you received medications for sedation.  These   medications may affect your judgment, balance and coordination.  Therefore,   for 24 hours, you have the following restrictions:   - DO NOT drive a car, operate machinery, make legal/financial decisions,   sign important papers or drink alcohol.    ACTIVITY:  Today: no heavy lifting, straining or running due to procedural   sedation/anesthesia.  The following day: return to full activity including work.  DIET:  Eat and drink normally unless instructed otherwise.     TREATMENT FOR COMMON SIDE EFFECTS:  - Mild abdominal pain, nausea, belching, bloating or excessive gas:  rest,   eat lightly and use a heating pad.  - Sore Throat: treat with throat lozenges and/or gargle with warm salt   water.  - Because air was used during the procedure, expelling large amounts of air   from your rectum or belching is normal.  - If a bowel prep was taken, you may not have a bowel movement for 1-3 days.    This is normal.  SYMPTOMS TO WATCH FOR AND REPORT TO YOUR PHYSICIAN:  1. Abdominal pain or bloating, other than gas cramps.  2. Chest pain.  3. Back pain.  4. Signs of infection such as: chills or fever occurring within 24 hours   after the procedure.  5. Rectal bleeding, which would show as bright red, maroon, or black stools.   (A tablespoon of blood from the rectum is not serious, especially if   hemorrhoids are present.)  6. Vomiting.  7. Weakness or dizziness.  GO DIRECTLY TO THE NEAREST EMERGENCY ROOM IF YOU HAVE ANY OF THE FOLLOWING:      Difficulty breathing              Chills and/or fever over 101 F   Persistent vomiting and/or vomiting blood   Severe abdominal pain   Severe chest pain   Black, tarry stools   Bleeding- more than one  tablespoon   Any other symptom or condition that you feel may need urgent attention  Your doctor recommends these additional instructions:  If any biopsies were taken, your doctors clinic will contact you in 1 to 2   weeks with any results.  - Patient has a contact number available for emergencies.  The signs and   symptoms of potential delayed complications were discussed with the   patient.  Return to normal activities tomorrow.  Written discharge   instructions were provided to the patient.   - Resume previous diet.   - Continue present medications.   - No aspirin, ibuprofen, naproxen, or other non-steroidal anti-inflammatory   drugs.   - Repeat upper endoscopy in 3 months for surveillance.   - Discharge patient to home (ambulatory).   - Follow an antireflux regimen.   - Use Protonix (pantoprazole) 40 mg PO daily.   - Return to my office PRN.  For questions, problems or results please call your physician - Justin Saldana MD at Work:  [phone].  [facility], EMERGENCY ROOM PHONE NUMBER: [phone]  IF A COMPLICATION OR EMERGENCY SITUATION ARISES AND YOU ARE UNABLE TO REACH   YOUR PHYSICIAN - GO DIRECTLY TO THE EMERGENCY ROOM.  Justin Saldana MD  2/5/2020 8:56:05 AM  This report has been verified and signed electronically.  [employer]

## 2020-02-05 NOTE — TELEPHONE ENCOUNTER
----- Message from Justin Montenegro MD sent at 2/5/2020  8:57 AM CST -----  Patient states that she has not been able to get protonix bc pharmacy states she needs a prior auth.  I am giving her an electronic refill and also giving her a paper scrip.  Please help her with prior auth.  Thx.

## 2020-02-05 NOTE — ANESTHESIA POSTPROCEDURE EVALUATION
Anesthesia Post Evaluation    Patient: Karen Villarreal    Procedure(s) Performed: Procedure(s) (LRB):  EGD (ESOPHAGOGASTRODUODENOSCOPY) (N/A)    Final Anesthesia Type: general    Patient location during evaluation: PACU  Patient participation: Yes- Able to Participate  Level of consciousness: awake and alert and oriented  Post-procedure vital signs: reviewed and stable  Pain management: adequate  Airway patency: patent    PONV status at discharge: No PONV  Anesthetic complications: no      Cardiovascular status: blood pressure returned to baseline  Respiratory status: unassisted, spontaneous ventilation and room air  Hydration status: euvolemic  Follow-up not needed.          Vitals Value Taken Time   /62 2/5/2020  9:05 AM   Temp 36.9 °C (98.5 °F) 2/5/2020  9:00 AM   Pulse 99 2/5/2020  9:05 AM   Resp 17 2/5/2020  9:05 AM   SpO2 99 % 2/5/2020  9:05 AM         No case tracking events are documented in the log.      Pain/Kang Score: Kang Score: 10 (2/5/2020  9:05 AM)

## 2020-02-05 NOTE — TRANSFER OF CARE
"Anesthesia Transfer of Care Note    Patient: Karen Villarreal    Procedure(s) Performed: Procedure(s) (LRB):  EGD (ESOPHAGOGASTRODUODENOSCOPY) (N/A)    Patient location: PACU    Anesthesia Type: general    Transport from OR: Transported from OR on room air with adequate spontaneous ventilation    Post pain: adequate analgesia    Post assessment: tolerated procedure well and no apparent anesthetic complications    Post vital signs: stable    Level of consciousness: awake, oriented and alert    Nausea/Vomiting: no nausea/vomiting    Complications: none    Transfer of care protocol was followed      Last vitals:   Visit Vitals  /63   Pulse 93   Temp 36.9 °C (98.4 °F) (Skin)   Resp (!) 22   Ht 5' 1" (1.549 m)   Wt 100.7 kg (222 lb)   LMP 08/06/2017 (Approximate)   SpO2 100%   Breastfeeding? No   BMI 41.95 kg/m²     "

## 2020-02-07 NOTE — TELEPHONE ENCOUNTER
Spoke with patient and advised that I sent refills to Dr Valencia for approval.           ----- Message from Mirtha Denton sent at 2/7/2020 10:49 AM CST -----  Contact: patient  Type: Needs Medical Advice    Who Called:  patient  Pharmacy name and phone #:  [facility]  Best Call Back Number:[phone] (home)   Additional Information: Patient needs Gabapentin 300 mg refilled, and Cyclobenzaprine 10 mg, patient said she spoke to the Dr and was told that she would refill her Rx if she needed anything until she finds a PCP.

## 2020-02-10 NOTE — OR NURSING
Pharmacist called today for a PA_prior auth.- for pt stomach meds. I instructed pharmacist from [facility] to contact Nishant at office because Dr Montenegro stated she will take care of that. Given contact # [phone].  Pharmacist called back to endoscopy and I explained Nishant in different building and that Dr Montenegro reports pt may take OTC stomach meds as discussed with her before because of insurance only allowing so much coverage.  The pharmacist gave me # [phone] for Nishant to call. Nishant reports has called this # and spoke to pt and pt ws to take OTC medications as discussed this am.

## 2020-02-10 NOTE — TELEPHONE ENCOUNTER
Patient notified and understands Pa has been done and approved for her protonix but her insuracne still will not fill because they only pay for 180days in a 365 rolling year. Patient will need to get OTC PPI and take 2= to 40mg.   Patient will call back to schedule 3 month repeat EGD recall placed.

## 2020-02-14 NOTE — NURSING
Ultrasound guided paracentesis performed by Dr. Masters- 8.5 L removed- Patient received 50g albumin IV- VS remained stable for duration of procedure-  IV d/c'd, with tip intact. Access site cleaned with peroxide, derma bond and steri-strips applied, then covered with sterile gauze and tape. Pt discharge home.

## 2020-02-18 NOTE — TELEPHONE ENCOUNTER
"MA spoke to the pt. She was upset that nobody notified her to tell her that insurance wouldn't approve her Stress Echo test. Lupe from [facility] had contacted us and told us to cancel the appointment. I assumed she had reached out to the pt and told her insurance denied the services, but the pt stated that [facility] never once contacted her. I apologized for the confusion and let her know that I will send Dr. PARIKH a message.    ----- Message from Allegra Mack MA sent at 2/18/2020  1:42 PM CST -----  Contact: SHAHBAZ      ----- Message -----  From: Maria R Paz  Sent: 2/18/2020   1:19 PM CST  To: [facility] Hepatitis C Staff    Name of caller: Karen   Provider name: Eleanor Merino MD   Contact Preference: Ph: [phone]  Is this regarding current patient or new patient?: current   What is the nature of the call?    - pt states that the echo appt for tomorrow wasn't supposed to be cancelled. She didn't cancel it. Please call and discuss/address the concern.     Additional Notes:   "Thank you for all that you do for our patients'"       "

## 2020-02-27 NOTE — TELEPHONE ENCOUNTER
MA spoke to the pt and she said her insurance said she needs to do an EKG before stress echo test in order for it to get approved. She is requesting to get this done.

## 2020-02-28 NOTE — NURSING
Ultrasound guided paracentesis performed by Dr. Hong- 4698 mLs removed- Patient received 50g albumin IV- VS remained stable for duration of procedure- IV d/c'd, with tip intact. Access site cleaned with peroxide, derma bond and steri-strips applied, then covered with sterile gauze and tape. Pt discharge home.

## 2020-03-13 NOTE — NURSING
Ultrasound guided paracentesis performed by - 9800 mL removed- Patient received 50 g albumin IV- review vs- patient denies dizziness or light headedness. IV d/c'd, with tip intact. Access site cleaned with peroxide, derma bond and steri-strips applied, then covered with sterile gauze and tape. Pt discharge home in stable condition.

## 2020-03-25 NOTE — NURSING
Ultrasound guided paracentesis performed by Dr. Boone- 10 L removed- Patient received 50 g albumin IV- VS remained stable for duration of procedure-IV d/c'd, with tip intact. Access site cleaned with peroxide, derma bond and steri-strips applied, then covered with sterile gauze and tape. Pt discharge home.

## 2020-04-08 NOTE — NURSING
Ultrasound guided paracentesis performed by Dr. Boone- 8.5 L removed- Patient received 25g albumin IV- VS remained stable for duration of procedure- IV d/c'd, with tip intact. Access site cleaned with peroxide, derma bond and steri-strips applied, then covered with sterile gauze and tape. Pt discharge home.

## 2020-04-13 NOTE — TELEPHONE ENCOUNTER
I have attempted to contact this patient by phone to schedule IR virtual visit consult for TIPS. No answer/Left message to return call to [phone]. . Please forward call.

## 2020-04-17 NOTE — TELEPHONE ENCOUNTER
----- Message from Vianey Phillips MA sent at 4/17/2020  9:07 AM CDT -----      ----- Message -----  From: Catarina Ly  Sent: 4/17/2020   8:49 AM CDT  To: Eleanor Merino Staff    Good morning! I need to see about getting a standing order for a covid screening. Pt will be required to have a covid screening 48 hours prior to each para appt.  Thanking you in advance   Catarina Ly  [phone]

## 2020-04-17 NOTE — TELEPHONE ENCOUNTER
Call placed to Bushra (Catarina Ly) [phone] with [facility].  The patient is scheduled for weekly Para's.  Will need routine Covid 19 screening orders in the chart.  Orders entered.

## 2020-04-24 NOTE — NURSING
Ultrasound guided paracentesis performed by Dr. Denny - 10.5  L removed- VS remained stable for duration of procedure. Received 50g of Albumin via IV. PIV removed. Access site cleaned with peroxide, derma bond and steri-strips applied, then covered with sterile gauze and tape. Pt discharge home.

## 2020-05-04 NOTE — TELEPHONE ENCOUNTER
Left message for pt to return my call. Need to schedule IR consultation. Please forward call to [phone]. Thanks

## 2020-05-08 NOTE — NURSING
Ultrasound guided paracentesis performed by Dr. Denny; procedure explained and consent obtained by Dr. Denny. 11.5 L removed- Patient received 50 g albumin IV- VS remained stable for duration of procedure. IV d/c'd, with tip intact. Access site cleaned with peroxide, derma bond and steri-strips applied, then covered with sterile gauze and tape. Pt discharge home.

## 2020-05-22 NOTE — NURSING
Ultrasound guided paracentesis performed by Dr. Denny; procedure explained and consent obtained by Dr. Denny. 10.9 L removed- Patient received 50 g albumin IV- VS remained stable for duration of procedure. IV d/c'd, with tip intact. Access site cleaned with peroxide, derma bond and steri-strips applied, then covered with sterile gauze and tape. Pt discharge home.

## 2020-05-27 NOTE — LETTER
May 27, 2020    Karen Simeon [place]  [address]  [place] [address]             [facility] Interventional [facility]  [address]  [place] [address]  Phone: [phone] PRE-PROCEDURE INSTRUCTIONS    Your procedure with Interventional Radiology is scheduled for June 9 2020. Please arrive by 8:00am.    You must check-in and receive a wristband before going to your procedure. Your check-in location is 2nd floor.    **Do not eat or drink anything between midnight and the time of your procedure. This includes gum, mints, and candy lemon drops.    **Do not smoke or drink alcoholic beverages 24 hours prior to your procedure.    **If you wear contact lenses, dentures, hearing aids, or glasses, bring a container to put them in during the procedure and give them to a family member for safekeeping.    **If you have been diagnosed with sleep apnea please bring your CPAP machine.    **If your doctor has scheduled you for an overnight stay, bring a small overnight bag with any personal items that you may need.    **Make arrangements in advance for transportation home by a responsible adult. It is not safe to drive a vehicle during the 24 hours following the procedure.    **All [facility] facilities and properties are tobacco free. Smoking is NOT allowed.    PLEASE NOTE: The procedure schedule has many variables which affect the time of your procedure. Family members should be available if your surgery time changes.    If you have any questions about these instructions call Interventional Radiology at [phone] Monday - Friday between 8:00am and 4:00pm or [phone] (ask for interventional radiology resident) for after hours.

## 2020-05-27 NOTE — PROGRESS NOTES
Subjective:       Patient ID: Karen Villarreal is a 55 y.o. female.    Chief Complaint: Ascites    Ms. Villarreal is seen at the request of Dr. Webster to discuss TIPS procedure for recurrent large volume ascites.  She reports she is having paracentesis every 10 days to 2 weeks at this point in time.  A volume of nearly 10 liters is removed with each paracentesis.  She has a history of esophageal varices with bleeding.  She reports she hasn't had any problems since the varices were banded via endoscopy.      She reports abdominal discomfort and distention today.  Denies fevers, chills, cough congestion.       Review of Systems   Constitutional: Positive for activity change and appetite change. Negative for chills, fatigue, fever and unexpected weight change.   HENT: Negative for congestion, rhinorrhea, sinus pressure and sinus pain.    Respiratory: Negative for cough, shortness of breath and wheezing.    Cardiovascular: Positive for palpitations. Negative for chest pain and leg swelling.   Gastrointestinal: Positive for abdominal distention and abdominal pain. Negative for constipation and diarrhea.       Objective:      Physical Exam   Constitutional: She is oriented to person, place, and time. She appears well-developed and well-nourished. No distress.   HENT:   Head: Normocephalic and atraumatic.   Right Ear: External ear normal.   Left Ear: External ear normal.   Nose: Nose normal.   Eyes: EOM are normal. Right eye exhibits no discharge. Left eye exhibits no discharge.   Neck: Normal range of motion.   Pulmonary/Chest: Effort normal. No respiratory distress.   Abdominal: She exhibits distension. There is no tenderness. There is no guarding.   Musculoskeletal: Normal range of motion.   Neurological: She is alert and oriented to person, place, and time.   Skin: Skin is warm and dry. She is not diaphoretic.   Psychiatric: She has a normal mood and affect. Her behavior is normal. Judgment and thought content  normal.       Assessment:       1. Other ascites    2. Esophageal varices without bleeding, unspecified esophageal varices type    3. Cirrhosis of liver with ascites, unspecified hepatic cirrhosis type        Plan:       Case discussed with Sebas Serna MD.  Plan: TIPS procedure.   Discussed how the procedure will be performed, risks (including, but not limited to, pain, bleeding, infection, damage to nearby structures, and the need for additional procedures), benefits, possible complications, pre-post procedure expectations, and alternatives. Discussed the risk of volume overload and heart failure and encephalopathy post procedure; death and bleeding during the procedure.  The patient voices understanding and all questions have been answered.  The patient agrees to proceed as planned. Patient scheduled for June 8, 2020. Pre-procedure handout with clinic phone number provided.    CBC, INR ordered today.

## 2020-06-02 NOTE — NURSING
Ultrasound guided paracentesis performed by Dr. Masters; procedure explained and consent obtained by Dr. Masters. __ L removed- Patient received 75g albumin IV- VS remained stable for duration of procedure. IV d/c'd, with tip intact. Access site cleaned with peroxide, derma bond and steri-strips applied, then covered with sterile gauze and tape. Pt discharge home.

## 2020-06-03 NOTE — DISCHARGE SUMMARY
Radiology Discharge Summary      Hospital Course: No complications    Admit Date: 6/2/2020  Discharge Date: 06/03/2020     Instructions Given to Patient: Yes  Diet: Resume prior diet  Activity: activity as tolerated    Description of Condition on Discharge: Stable  Vital Signs (Most Recent): Pulse: 101 (06/02/20 1500)  BP: 113/60 (06/02/20 1500)  SpO2: 100 % (06/02/20 1500)    Discharge Disposition: Home    Discharge Diagnosis: ascites Sp US paracentesis     Follow-up: as per referring provider    @SIG@

## 2020-06-07 NOTE — PROGRESS NOTES
Pt presented for Pre-procedure drive thru testing. Patient identified using two patient identifiers prior to specimen collection. Questions answered prior to departure and education given.

## 2020-06-08 NOTE — PRE-PROCEDURE INSTRUCTIONS
PREOP INSTRUCTIONS:No solid food ,milk or milk products for 8 hours prior to procedure.Clear liquids are allowed up to 2 hours before procedure.Clear liquids are:water,apple juice,gatorade & powerade.Shower instructions as well as directions to the [facility] Center were given.Patient encouraged to wear loose fitting,comfortable clothing.Medication instructions for pm prior to and am of procedure reviewed.Instructed patient to avoid taking vitamins,supplements,aspirin and ibuprofen the morning of surgery. Patient stated an understanding.Patient instructed to take temperature the night before surgery as well as the morning of surgery and to notify [facility] at [phone] if it is 100.4 or above.Patient also informed of the current visitor policy and advised patient that one visitor may accompany them into the hospital and wait (socially distanced) .When they enter the hospital both patient and visitor will have their temperature checked,provided a mask and provided assistance to their destination. Inpatients are allowed 1 visitor/day from 10 am until 6 pm.     Covid screening completed 6/7/2020 and resulted negative.  Patient denies any side effects or issues with anesthesia or sedation.    Sister Karissa Lira WILL BE PROVIDING TRANSPORTATION HOME UPON DISCHARGE.

## 2020-06-08 NOTE — TELEPHONE ENCOUNTER
Unable to reach patient to deliver the following test results:    Your test was NEGATIVE for COVID-19 (coronavirus).  If you still have symptoms, treat with rest, fluids, and over-the-counter medications.  Continue to follow local guidelines and practice proper handwashing.     If your symptoms worsen or if you have any other concerns, please contact [facility] at [phone].     Sincerely,    Nely Valadez PA-C

## 2020-06-09 PROBLEM — B19.20 DECOMPENSATED CIRRHOSIS RELATED TO HEPATITIS C VIRUS (HCV): Status: ACTIVE | Noted: 2019-01-01

## 2020-06-09 PROBLEM — E87.1 HYPONATREMIA: Status: ACTIVE | Noted: 2020-01-01

## 2020-06-09 PROBLEM — Z95.828 S/P TIPS (TRANSJUGULAR INTRAHEPATIC PORTOSYSTEMIC SHUNT): Status: ACTIVE | Noted: 2020-01-01

## 2020-06-09 PROBLEM — K74.69 DECOMPENSATED CIRRHOSIS RELATED TO HEPATITIS C VIRUS (HCV): Status: ACTIVE | Noted: 2019-01-01

## 2020-06-09 NOTE — PLAN OF CARE
TIPS Procedure Pressures are as follows:    Pre procedure  RA 11  Main Portal 33    Post procedure   RA 21  Main Portal 25

## 2020-06-09 NOTE — PLAN OF CARE
Para completed, pt tolerated well. N 7.8 Liters removed from left abd, mepore applied CDI. Albumin 250 ml given per protocol.

## 2020-06-09 NOTE — PROCEDURES
Vascular and Interventional Radiology Post-Procedure Note    Pre Op Diagnosis:  Recurrent ascites  Post Op Diagnosis:  Same    Procedure(s) Performed: TIPS; paracentesis  Complications:  None    Interventionalist(s):  Mathieu Orlando MD (attending)  Assistant(s):  Mendel Albright MD (resident)    Written Informed Consent Obtained?:  Yes  Specimen Removed:  YES 7.8 L ascites  Estimated Blood Loss:  Less than 50 mL    Description of Procedure/Findings:             US guided paracentesis performed under US guidance using 5F one-step.    RIJV access under US guidance. TIPS created using Rosch-Uchida kit. 10 x 4 x 2 (x2 with 2 cm overlap). Venograms showed no large varices. No embolization performed.     Pre procedure  RA 11  Main Portal 33     Post procedure   RA 21  Main Portal 25    Pre PS  Post PS    Patient tolerated procedures well. No immediate complications. See imaging report for details.    Plan:  -PACU, possibly home pending post op eval  -Follow up as scheduled      Mendel Albright MD  R[facility], Diagnostic and Interventional Radiology  Pager: [phone]

## 2020-06-09 NOTE — TRANSFER OF CARE
Anesthesia Transfer of Care Note    Patient: Karen Villarreal    Procedure(s) Performed: Procedure(s) (LRB):  INSERTION, SHUNT, TRANSJUGULAR, INTRAHEPATIC PORTOSYSTEMIC (N/A)    Patient location: PACU    Anesthesia Type: general    Transport from OR: Transported from OR on 6-10 L/min O2 by face mask with adequate spontaneous ventilation    Post pain: adequate analgesia    Post assessment: no apparent anesthetic complications and tolerated procedure well    Post vital signs: stable    Level of consciousness: awake and responds to stimulation    Nausea/Vomiting: no nausea/vomiting    Complications: none    Transfer of care protocol was followed      Last vitals:   Visit Vitals  BP (!) 119/56 (BP Location: Left arm, Patient Position: Lying)   Pulse 99   Temp 36.4 °C (97.5 °F) (Temporal)   Resp 20   Wt 102.1 kg (225 lb)   LMP 08/06/2017 (Approximate)   SpO2 100%   Breastfeeding? No   BMI 42.51 kg/m²

## 2020-06-09 NOTE — H&P
Vascular and Interventional Radiology History & Physical    Date:  6/9/2020    Chief Complaint:   Recurrent ascites    History of Present Illness:  Karen Villarreal is a 55 y.o. female who presents for TIPS and paracentesis.    Past Medical History:  Past Medical History:   Diagnosis Date    Acid reflux     Anemia     Arthritis     Ascites 03/08/2017    Cataract     Cirrhosis     Depression     Encounter for blood transfusion     Hiatal hernia     History of hepatitis C, s/p successful Rx w/ SVR12 (cure) - 10/2019 2/16/2017    S/p mavyret w/ SVR12    Ovary removal, prophylactic     Renal cyst 03/08/2017    Thrombocytopenia     Varices, esophageal        Past Surgical History:  Past Surgical History:   Procedure Laterality Date    ANKLE SURGERY      APPENDECTOMY      CATARACT EXTRACTION      COLONOSCOPY N/A 12/4/2019    Procedure: COLONOSCOPY;  Surgeon: Justin Montenegro MD;  Location: [facility] ENDO;  Service: Endoscopy;  Laterality: N/A;    ESOPHAGOGASTRODUODENOSCOPY N/A 2/2/2019    Procedure: EGD (ESOPHAGOGASTRODUODENOSCOPY);  Surgeon: Leigha Whitehead MD;  Location: [facility] ENDO;  Service: Endoscopy;  Laterality: N/A;  Procedure for 1100AM 2/2/2019    ESOPHAGOGASTRODUODENOSCOPY N/A 2/5/2019    Procedure: EGD (ESOPHAGOGASTRODUODENOSCOPY);  Surgeon: Leigha Whitehead MD;  Location: [facility] ENDO;  Service: Endoscopy;  Laterality: N/A;    ESOPHAGOGASTRODUODENOSCOPY N/A 4/29/2019    Procedure: EGD (ESOPHAGOGASTRODUODENOSCOPY);  Surgeon: Justin Montenegro MD;  Location: [facility] ENDO;  Service: Endoscopy;  Laterality: N/A;    ESOPHAGOGASTRODUODENOSCOPY N/A 5/22/2019    Procedure: EGD (ESOPHAGOGASTRODUODENOSCOPY);  Surgeon: Justin Montenegro MD;  Location: [facility] ENDO;  Service: Endoscopy;  Laterality: N/A;    ESOPHAGOGASTRODUODENOSCOPY N/A 6/4/2019    Procedure: EGD (ESOPHAGOGASTRODUODENOSCOPY);  Surgeon: Justin Montenegro MD;  Location: [facility] ENDO;  Service: Endoscopy;  Laterality: N/A;     ESOPHAGOGASTRODUODENOSCOPY N/A 6/25/2019    Procedure: EGD (ESOPHAGOGASTRODUODENOSCOPY);  Surgeon: Justin Montenegro MD;  Location: [facility] ENDO;  Service: Endoscopy;  Laterality: N/A;    ESOPHAGOGASTRODUODENOSCOPY N/A 10/30/2019    Procedure: EGD (ESOPHAGOGASTRODUODENOSCOPY) - varices surveillance;  Surgeon: Justin Montenegro MD;  Location: [facility] ENDO;  Service: Endoscopy;  Laterality: N/A;    ESOPHAGOGASTRODUODENOSCOPY N/A 11/20/2019    Procedure: EGD (ESOPHAGOGASTRODUODENOSCOPY);  Surgeon: Justin Montenegro MD;  Location: [facility] ENDO;  Service: Endoscopy;  Laterality: N/A;    ESOPHAGOGASTRODUODENOSCOPY N/A 12/17/2019    Procedure: EGD (ESOPHAGOGASTRODUODENOSCOPY);  Surgeon: Justin Montenegro MD;  Location: [facility] ENDO;  Service: Endoscopy;  Laterality: N/A;    ESOPHAGOGASTRODUODENOSCOPY N/A 1/8/2020    Procedure: EGD (ESOPHAGOGASTRODUODENOSCOPY);  Surgeon: Justin Montenegro MD;  Location: [facility] ENDO;  Service: Endoscopy;  Laterality: N/A;    ESOPHAGOGASTRODUODENOSCOPY N/A 2/5/2020    Procedure: EGD (ESOPHAGOGASTRODUODENOSCOPY);  Surgeon: Justin Montenegro MD;  Location: [facility] ENDO;  Service: Endoscopy;  Laterality: N/A;    OOPHORECTOMY          Sedation History:    Denies any adverse reactions.  Denies problems laying flat.    Social History:  Social History     Tobacco Use    Smoking status: Current Some Day Smoker     Packs/day: 0.25     Years: 33.00     Pack years: 8.25     Types: Cigarettes    Smokeless tobacco: Never Used   Substance Use Topics    Alcohol use: No     Frequency: Never     Comment: quit 2017    Drug use: No        Home Medications:   Prior to Admission medications    Medication Sig Start Date End Date Taking? Authorizing Provider   albuterol (VENTOLIN HFA) 90 mcg/actuation inhaler Ventolin HFA 90 mcg/actuation aerosol inhaler   take 2 puffs every 4hrs as needed for cough   Yes Historical Provider, MD   aluminum & magnesium hydroxide-simethicone (MAALOX MAXIMUM STRENGTH) 400-400-40 mg/5 mL  suspension Take by mouth every 6 (six) hours as needed for Indigestion.   Yes Historical Provider, MD   cyclobenzaprine (FLEXERIL) 10 MG tablet Take 10 mg by mouth 3 (three) times daily as needed for Muscle spasms.   Yes Historical Provider, MD   esomeprazole (NEXIUM) 20 mg GrPS Take 20 mg by mouth before breakfast.   Yes Historical Provider, MD   ferrous sulfate 325 (65 FE) MG EC tablet Take 325 mg by mouth once daily.    Yes Historical Provider, MD   furosemide (LASIX) 80 MG tablet TAKE 2 TABLETS(160 MG) BY MOUTH ONCE DAILY 4/24/20  Yes Wilber Webster MD   gabapentin (NEURONTIN) 300 MG capsule Take 300 mg by mouth 3 (three) times daily.   Yes Historical Provider, MD   sertraline (ZOLOFT) 25 MG tablet Take 1 tablet by mouth once daily. 9/12/18  Yes Historical Provider, MD   spironolactone (ALDACTONE) 100 MG tablet TAKE 3 TABLETS(300 MG) BY MOUTH ONCE DAILY 4/24/20  Yes Wilber Webster MD   magnesium 30 mg Tab Take 30 mg by mouth daily as needed.     Historical Provider, MD       Inpatient Medications:    Current Facility-Administered Medications:     0.9%  NaCl infusion, 500 mL, Intravenous, Once, Lupe Wadsworth NP    cefTRIAXone injection 1 g, 1 g, Intravenous, Once Pre-Op, Lupe Wadsworth NP     Anticoagulants/Antiplatelets:   None    Allergies:   Review of patient's allergies indicates:   Allergen Reactions    Adhesive Blisters     Clear tape leaves blisters, paper tape can be used, Tegaderm okay to use.       Review of Systems:   As documented in primary provider H&P.    Vital Signs (Most Recent):  Temp: 98.6 °F (37 °C) (06/09/20 0853)  Pulse: 102 (06/09/20 0853)  Resp: 18 (06/09/20 0853)  BP: 133/68 (06/09/20 0853)  SpO2: 100 % (06/09/20 0853)    Physical Exam:  No acute distress, laying comfortably in bed, pleasant and cooperative  Regular rate and rhythm  Breathing unlabored  Abdomen benign  Extremities warm and well perfused    Sedation Exam:  ASA and Mallampati per  anesthesia    Laboratory:  Lab Results   Component Value Date    INR 1.1 05/27/2020       Lab Results   Component Value Date    WBC 6.57 05/27/2020    HGB 11.4 (L) 05/27/2020    HCT 35.6 (L) 05/27/2020    MCV 97 05/27/2020     (L) 05/27/2020      Lab Results   Component Value Date    GLU 91 03/06/2020     (L) 03/06/2020    K 3.8 03/06/2020     03/06/2020    CO2 24 03/06/2020    BUN 7 03/06/2020    CREATININE 0.8 03/06/2020    CALCIUM 8.2 (L) 03/06/2020    MG 1.8 05/04/2019    ALT 16 03/06/2020    AST 29 03/06/2020    ALBUMIN 2.7 (L) 03/06/2020    BILITOT 1.3 (H) 03/06/2020       Imaging:  Reviewed.      ASSESSMENT/PLAN:   TIPS, possible paracentesis. General anesthesia. Consent left with patient.                          Mendel Albright MD  R4, Diagnostic and Interventional Radiology  Pager: [phone]

## 2020-06-09 NOTE — PLAN OF CARE
Patient is AAOx4. Family present at bedside and participative in patient's care. Dry dressings present to rt neck and left side of stomach. Rec'd Zofran due to feeling nauseated and emesis x1. Per patient, Zofran did not control emesis as patient had another episode of emesis. MD notified. Phenergan 12.5mg IV ordered. Patient denied pain associated with vomiting. No other issues present. Call bell placed within reach. Bed locked and placed within lowest position. [facility]

## 2020-06-09 NOTE — PLAN OF CARE
Pt arrived to IR room 188 for TIPS, possible paracentesis, no acute distress noted. Orders, consents and labs reviewed on chart. Anesthesia at bedside.

## 2020-06-09 NOTE — ANESTHESIA PREPROCEDURE EVALUATION
06/09/2020  Karen Villarreal is a 55 y.o., female.  Patient Active Problem List   Diagnosis    Morbid obesity    History of hepatitis C, s/p successful Rx w/ SVR12 (cure) - 10/2019    Ascites    Age-related nuclear cataract, right eye    Anemia    Melena    History of iron deficiency    History of anemia    Thrombocytopenia    Symptomatic anemia    Malnutrition of moderate degree    Major depressive disorder with current active episode    Acute blood loss anemia    Varices, esophageal    Cirrhosis    Screening for colon cancer    Hx of esophageal varices     No current facility-administered medications on file prior to encounter.      Current Outpatient Medications on File Prior to Encounter   Medication Sig Dispense Refill    albuterol (VENTOLIN HFA) 90 mcg/actuation inhaler Ventolin HFA 90 mcg/actuation aerosol inhaler   take 2 puffs every 4hrs as needed for cough      aluminum & magnesium hydroxide-simethicone (MAALOX MAXIMUM STRENGTH) 400-400-40 mg/5 mL suspension Take by mouth every 6 (six) hours as needed for Indigestion.      cyclobenzaprine (FLEXERIL) 10 MG tablet Take 10 mg by mouth 3 (three) times daily as needed for Muscle spasms.      esomeprazole (NEXIUM) 20 mg GrPS Take 20 mg by mouth before breakfast.      ferrous sulfate 325 (65 FE) MG EC tablet Take 325 mg by mouth once daily.       furosemide (LASIX) 80 MG tablet TAKE 2 TABLETS(160 MG) BY MOUTH ONCE DAILY 60 tablet 1    gabapentin (NEURONTIN) 300 MG capsule Take 300 mg by mouth 3 (three) times daily.      sertraline (ZOLOFT) 25 MG tablet Take 1 tablet by mouth once daily.  1    spironolactone (ALDACTONE) 100 MG tablet TAKE 3 TABLETS(300 MG) BY MOUTH ONCE DAILY 90 tablet 1    magnesium 30 mg Tab Take 30 mg by mouth daily as needed.          Anesthesia Evaluation    I have reviewed the Patient Summary Reports.     I have reviewed the Nursing Notes.       Review of Systems  Anesthesia Hx:  No problems with previous Anesthesia    Pulmonary:  Pulmonary Normal    Renal/:   Chronic Renal Disease    Hepatic/GI:   Hiatal Hernia, GERD, well controlled Liver Disease, Hepatitis, C ascites   Endocrine:  Endocrine Normal    Psych:   Psychiatric History          Physical Exam  General:  Morbid Obesity, Obesity    Airway/Jaw/Neck:  Airway Findings: Mallampati: III Improves to II with phonation.  TM Distance: Normal, at least 6 cm       Chest/Lungs:  Chest/Lungs Clear    Heart/Vascular:  Heart Findings: Normal            Anesthesia Plan  Type of Anesthesia, risks & benefits discussed:  Anesthesia Type:  general  Patient's Preference:   Intra-op Monitoring Plan: standard ASA monitors  Intra-op Monitoring Plan Comments:   Post Op Pain Control Plan: multimodal analgesia  Post Op Pain Control Plan Comments:   Induction:   IV  Beta Blocker:  Patient is not currently on a Beta-Blocker (No further documentation required).       Informed Consent: Patient understands risks and agrees with Anesthesia plan.  Questions answered. Anesthesia consent signed with patient.  ASA Score: 3     Day of Surgery Review of History & Physical:    H&P update referred to the surgeon.         Ready For Surgery From Anesthesia Perspective.

## 2020-06-09 NOTE — PLAN OF CARE
TIPS and paracentesis completed, pt tolerated well. No apparent distress noted. Pt to be transferred to PACU 16, accompanied by anesthesia and IR team. Report to be given at bedside.    no

## 2020-06-09 NOTE — NURSING
Patient arrived to unit. AAOx4. Denies pain or discomfort. Ambulates with standby assistance. No concerns expressed at this time. Oriented to room. Call bell placed within reach. Instructed to call staff for assistance. Will cont to monitor

## 2020-06-09 NOTE — H&P
History and Physical  Hospital Medicine       Patient Name: Karen Villarreal  MRN:  [id number]  Hospital Medicine Team: Networked reference to record PCT  Giancarlo Wright MD  Date of Admission:  6/9/2020     Principal Problem:  Ascites   Primary Care Physician: Shama Mccoy MD      History of Present Illness:     Ms. Villarreal is a 54 yo female with a PMH of Decompensated Hep C cirrhosis s/p treatment with SVR who suffers from recurrent ascites and presented for. TIPS procedure.  She reports she is having paracentesis every 10 days to 2 weeks at this point in time.  A volume of nearly 10 liters is removed with each paracentesis.  She has a history of esophageal varices with bleeding.  She reports she hasn't had any problems since the varices were banded via endoscopy.    Patient had her TIPs procedure today and did well during procedure.  Afterwards, patient suffered from two episodes of N/V, but denies any abdominal pain.  Patient had 7.5L drained during the procedure as well.    Review of Systems   Constitutional: Negative for chills, fatigue, fever.   HENT: Negative for sore throat, trouble swallowing.    Eyes: Negative for photophobia, visual disturbance.   Respiratory: Negative for cough, shortness of breath.    Cardiovascular: Negative for chest pain, palpitations, leg swelling.   Gastrointestinal: Negative for abdominal pain, constipation, diarrhea, positive nausea, vomiting.   Endocrine: Negative for cold intolerance, heat intolerance.   Genitourinary: Negative for dysuria, frequency.   Musculoskeletal: Negative for arthralgias, myalgias.   Skin: Negative for rash, wound, erythema   Neurological: Negative for dizziness, syncope, weakness, light-headedness.   Psychiatric/Behavioral: Negative for confusion, hallucinations, anxiety  All other systems reviewed and are negative.      Past Medical History: Patient has a past medical history of Acid reflux, Anemia, Arthritis, Ascites (03/08/2017),  Cataract, Cirrhosis, Depression, Encounter for blood transfusion, Hiatal hernia, History of hepatitis C, s/p successful Rx w/ SVR12 (cure) - 10/2019 (2/16/2017), Ovary removal, prophylactic, Renal cyst (03/08/2017), Thrombocytopenia, and Varices, esophageal.    Past Surgical History: Patient has a past surgical history that includes Appendectomy; Ankle surgery; Cataract extraction; Esophagogastroduodenoscopy (N/A, 2/2/2019); Esophagogastroduodenoscopy (N/A, 2/5/2019); Esophagogastroduodenoscopy (N/A, 4/29/2019); Oophorectomy; Esophagogastroduodenoscopy (N/A, 5/22/2019); Esophagogastroduodenoscopy (N/A, 6/4/2019); Esophagogastroduodenoscopy (N/A, 6/25/2019); Esophagogastroduodenoscopy (N/A, 10/30/2019); Esophagogastroduodenoscopy (N/A, 11/20/2019); Colonoscopy (N/A, 12/4/2019); Esophagogastroduodenoscopy (N/A, 12/17/2019); Esophagogastroduodenoscopy (N/A, 1/8/2020); and Esophagogastroduodenoscopy (N/A, 2/5/2020).    Social History: Patient reports that she has been smoking cigarettes. She has a 8.25 pack-year smoking history. She has never used smokeless tobacco. She reports that she does not drink alcohol or use drugs.    Family History: family history includes Arthritis in her mother; Brain cancer in her father; COPD in her mother; Cancer in her father; Diabetes in her father; Diabetes type II in her father; Heart failure in her mother; Vision loss in her mother.    Medications: Scheduled Meds:   [START ON 6/10/2020] ciprofloxacin HCl  500 mg Oral Q12H    furosemide  40 mg Oral BID    gabapentin  300 mg Oral TID    potassium bicarbonate  50 mEq Oral Once    [START ON 6/10/2020] sertraline  25 mg Oral Daily    [START ON 6/10/2020] spironolactone  100 mg Oral Daily     Continuous Infusions:  PRN Meds:.acetaminophen, cyclobenzaprine, Dextrose 10% Bolus, Dextrose 10% Bolus, glucagon (human recombinant), glucose, glucose, ondansetron, oxyCODONE, sodium chloride 0.9%, traMADoL    Allergies: Patient is allergic to  adhesive.    Physical Exam:     Vital Signs (Most Recent):  Temp: 97.8 °F (36.6 °C) (06/09/20 1529)  Pulse: 98 (06/09/20 1529)  Resp: 18 (06/09/20 1529)  BP: (!) 118/58 (06/09/20 1529)  SpO2: 98 % (06/09/20 1529) Vital Signs Range (Last 24H):  Temp:  [97.4 °F (36.3 °C)-98.6 °F (37 °C)]   Pulse:  []   Resp:  [16-21]   BP: ()/(46-68)   SpO2:  [95 %-100 %]    Body mass index is 42.51 kg/m².     Physical Exam:  Constitutional: appears weak and ill  Head: Normocephalic and atraumatic.   Mouth/Throat: Oropharynx is clear and moist.   Eyes: EOM are normal. Pupils are equal, round, and reactive to light. No scleral icterus.   Neck: Normal range of motion. Neck supple.   Cardiovascular: Normal rate and regular rhythm.  No murmur heard.  Pulmonary/Chest: Effort normal and breath sounds normal. No respiratory distress. No wheezes, rales, or rhonchi  Abdominal: Soft. Bowel sounds are normal.  No distension or tenderness  Musculoskeletal: Normal range of motion. No edema.   Neurological: Alert and oriented to person, place, and time.   Skin: Skin is warm and dry.   Psychiatric: Normal mood and affect. Behavior is normal.   Vitals reviewed.    No results for input(s): WBC, HGB, HCT, PLT in the last 168 hours.    Recent Labs   Lab 06/09/20  0950   *   K 3.4*      CO2 23   BUN 8   CREATININE 0.9   GLU 85   CALCIUM 8.3*     Recent Labs   Lab 06/09/20  0950   ALKPHOS 222*   ALT 18   AST 28   ALBUMIN 2.9*   PROT 6.1   BILITOT 2.8*      No results for input(s): POCTGLUCOSE in the last 168 hours.      Assessment and Plan:     Ms. Karen Villarreal is a 55 y.o. female who presented to [facility] on 6/9/2020 with     Active Hospital Problems    Diagnosis  POA    *Ascites [R18.8]  Yes    S/P TIPS (transjugular intrahepatic portosystemic shunt) [Z95.828]  Not Applicable    Hyponatremia [E87.1]  Yes    Decompensated cirrhosis related to hepatitis C virus (HCV) [B19.20, K74.69]  Yes    Major depressive disorder  with current active episode [F32.9]  Yes    Malnutrition of moderate degree [E44.0]  Yes    Thrombocytopenia [D69.6]  Yes    History of hepatitis C, s/p successful Rx w/ SVR12 (cure) - 10/2019 [Z86.19]  Yes     S/p mavyret w/ SVR12        Resolved Hospital Problems   No resolved problems to display.     # Decompensated Hep C cirrhosis with ascites  # S/P TIPS  # H/O Esophageal Varices  MELD-Na score: 8 at 3/6/2020  2:28 PM  MELD score: 8 at 3/6/2020  2:28 PM  Calculated from:  Serum Creatinine: 0.8 mg/dL (Rounded to 1 mg/dL) at 3/6/2020  2:28 PM  Serum Sodium: 135 mmol/L at 3/6/2020  2:28 PM  Total Bilirubin: 1.3 mg/dL at 3/6/2020  2:28 PM  INR(ratio): 1.1 at 3/6/2020  2:28 PM  Age: 54 years   - s/p treatment with SVR  - s/p TIPs today  - monitor LFTs  - starting on cipro 500 mg PO BID for 5 days  - s/p paracentesis with 7.5 L removed  - cont diuretics     # Hyponatremia  - fluid restrict to 1.5 L    # Hypokalemia  - replace     # Moderate protein denny malnutrition  - PAB, boost, dietary       Diet:  Low sodium  GI PPx:    DVT PPx:    Goals of Care:  full      Disposition:  Plan for tomorrow with IR follow up in 2 weeks with an U/S    Giancarlo Wright MD  Medical Director [facility]  Spectra:  [id number]  Pager: [phone]

## 2020-06-09 NOTE — ANESTHESIA POSTPROCEDURE EVALUATION
Anesthesia Post Evaluation    Patient: Karen Villarreal    Procedure(s) Performed: Procedure(s) (LRB):  INSERTION, SHUNT, TRANSJUGULAR, INTRAHEPATIC PORTOSYSTEMIC (N/A)    Final Anesthesia Type: general    Patient location during evaluation: PACU  Patient participation: Yes- Able to Participate  Level of consciousness: awake and alert  Post-procedure vital signs: reviewed and stable  Pain management: adequate  Airway patency: patent    PONV status at discharge: No PONV  Anesthetic complications: no      Cardiovascular status: stable  Respiratory status: spontaneous ventilation and room air  Hydration status: euvolemic  Follow-up not needed.          Vitals Value Taken Time   BP 89/55 6/9/2020  2:31 PM   Temp 36.4 °C (97.5 °F) 6/9/2020 12:28 PM   Pulse 96 6/9/2020  2:45 PM   Resp 25 6/9/2020  2:45 PM   SpO2 94 % 6/9/2020  2:45 PM   Vitals shown include unvalidated device data.      No case tracking events are documented in the log.      Pain/Kang Score: Pain Rating Prior to Med Admin: 7 (6/9/2020  1:59 PM)  Kang Score: 8 (6/9/2020 12:30 PM)

## 2020-06-09 NOTE — NURSING TRANSFER
Nursing Transfer Note      6/9/2020     Transfer To: 705    Transfer via stretcher    Transfer with None    Transported by PCT    Medicines sent: None    Chart send with patient: Yes    Notified: Pt sister    Patient reassessed at: 6/9/20 1430    Upon arrival to floor: patient oriented to room, call bell in reach and bed in lowest position

## 2020-06-10 PROBLEM — R79.89 ELEVATED LFTS: Status: ACTIVE | Noted: 2020-01-01

## 2020-06-10 PROBLEM — R33.9 URINARY RETENTION: Status: ACTIVE | Noted: 2020-01-01

## 2020-06-10 NOTE — NURSING
Pt reports tried to urinate since straight cath and cannot go. Pt expresses concern with not being able to urinate on own. Day shift nurse aware.

## 2020-06-10 NOTE — PLAN OF CARE
Pt is AAOX4,VSS. Pt is progressing towards plan of care goals. Pain management has been assessed. Pt reports pain at a 6-8. PRN meds are given and pain is reassessed. Dressing to neck clean, dry,and intact. Due to void at 0950. Fall precautions in place, no report of falls. Safety measures are in place bed in lowest position, wheels locked, call light within reach.

## 2020-06-10 NOTE — PROGRESS NOTES
Progress Note   Hospital Medicine         Patient Name: Karen Villarreal  MRN:  [id number]  Hospital Medicine Team: Networked reference to record PCT  Giancarlo Wright MD  Date of Admission:  6/9/2020     Length of Stay:  LOS: 1 day   Expected Discharge Date: 6/11/2020  Principal Problem:  Ascites       Subjective:     Interval History/Overnight Events:  Patient was complaining of new chest pain and bilateral flank pain; feels like she is having some urinary retention and over night had to have an in an out cath; EKG done shows NSR, troponin negative, BNP elevated, will get 2d echo   - trying to get patient to give a urine sample, states she does not want an in an out cath and will try to do it on her own;   - told patient the narcotics and anesthesia yesterday could be leading to this; if no improvement tomorrow could consider imaging; states having chronic low back pain, getting ESR, CRP and T/L spine; LFTs did rise, will monitor    Review of Systems   Constitutional: Negative for chills, fatigue, fever.   HENT: Negative for sore throat, trouble swallowing.    Eyes: Negative for photophobia, visual disturbance.   Respiratory: Negative for cough, shortness of breath.    Cardiovascular: Negative for chest pain, palpitations, leg swelling.   Gastrointestinal: Negative for abdominal pain, constipation, diarrhea, nausea, vomiting.   Endocrine: Negative for cold intolerance, heat intolerance.   Genitourinary: Negative for dysuria, frequency.   Musculoskeletal: Negative for arthralgias, myalgias.   Skin: Negative for rash, wound, erythema   Neurological: Negative for dizziness, syncope, weakness, light-headedness.   Psychiatric/Behavioral: Negative for confusion, hallucinations, anxiety  All other systems reviewed and are negative.    Objective:     Temp:  [96.6 °F (35.9 °C)-97.8 °F (36.6 °C)]   Pulse:  []   Resp:  [14-16]   BP: (100-111)/(50-58)   SpO2:  [94 %-100 %]       Physical Exam:  Constitutional: appears  weak and ill  Head: Normocephalic and atraumatic.   Mouth/Throat: Oropharynx is clear and moist.   Eyes: EOM are normal. Pupils are equal, round, and reactive to light. No scleral icterus.   Neck: Normal range of motion. Neck supple.   Cardiovascular: Normal rate and regular rhythm.  No murmur heard.  Pulmonary/Chest: Effort normal and breath sounds normal. No respiratory distress. No wheezes, rales, or rhonchi  Abdominal: Soft. Bowel sounds are normal.  positive distension, no tenderness  Musculoskeletal: Normal range of motion. No edema.   Neurological: Alert and oriented to person, place, and time.   Skin: Skin is warm and dry.   Psychiatric: Normal mood and affect. Behavior is normal.     Recent Labs   Lab 06/10/20  0657   WBC 9.91   HGB 10.8*   HCT 32.4*   PLT 90*     Recent Labs   Lab 06/09/20  0950 06/10/20  0657   * 131*   K 3.4* 4.0    100   CO2 23 25   BUN 8 6   CREATININE 0.9 0.8   GLU 85 92   CALCIUM 8.3* 8.1*   MG  --  1.9   PHOS  --  2.2*     Recent Labs   Lab 06/09/20  0950 06/10/20  0657   ALKPHOS 222* 166*   ALT 18 143*   AST 28 291*   ALBUMIN 2.9* 3.1*   PROT 6.1 5.5*   BILITOT 2.8* 3.9*   INR  --  1.3*     No results for input(s): POCTGLUCOSE in the last 168 hours.     ciprofloxacin HCl  500 mg Oral Q12H    [START ON 6/11/2020] furosemide  160 mg Oral Daily    gabapentin  300 mg Oral TID    midodrine  10 mg Oral TID    sertraline  25 mg Oral Daily    [START ON 6/11/2020] spironolactone  300 mg Oral Daily       Assessment and Plan     Ms. Karen Villarreal is a 55 y.o. female who presented to [facility] on 6/9/2020 with     Hospital Course:    Ms. 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     # Decompensated Hep C cirrhosis with ascites  # S/P TIPS  # H/O Esophageal Varices  # Elevated LFTs  MELD-Na score: 20 at 6/10/2020  6:57 AM  MELD score: 15 at 6/10/2020  6:57 AM  Calculated from:  Serum Creatinine: 0.8 mg/dL (Rounded to 1 mg/dL) at 6/10/2020  6:57 AM  Serum Sodium: 131 mmol/L at 6/10/2020  6:57 AM  Total Bilirubin: 3.9 mg/dL at 6/10/2020  6:57 AM  INR(ratio): 1.3 at 6/10/2020  6:57 AM  Age: 55 years    - s/p treatment with SVR  - s/p TIPs  - LFTs rising, will continue to monitor   - starting on cipro 500 mg PO BID for 5 days  - s/p paracentesis with 7.5 L removed  - cont diuretics from home    # Urinary Retention  - likely from anesthesia and narcotics  - had multiple in an out caths and bladder scan showed 450 cc   - getting U/A  - placing cerrato cath  - flomax      # Hyponatremia  - fluid restrict to 1.5 L     # Hypokalemia  - replace      # Moderate protein denny malnutrition  - PAB, boost, dietary    # Morbid Obesity  Body mass index is 42.51 kg/m².  - PT/OT dietary      # Anemia of chronic disease  # Thrombocytopenia  - monitor      Diet:  Low sodium  GI PPx:    DVT PPx:    Goals of Care:  full        Disposition:  Plan for tomorrow with IR follow up in 2 weeks with an U/S if able to pull saqib Wright MD  Medical Director [facility]  Spectra:  [phone]  Pager: [phone]

## 2020-06-10 NOTE — PLAN OF CARE
CM called the patient to discuss discharge planning assessment. Pt lives with family and has transportation home at discharge. Pt verified PCP and Pharmacy. CM will continue to follow for discharge needs.       06/10/20 5619   Discharge Assessment   Assessment Type Discharge Planning Assessment   Confirmed/corrected address and phone number on facesheet? Yes   Assessment information obtained from? Patient   Expected Length of Stay (days) 2   Communicated expected length of stay with patient/caregiver yes   Prior to hospitilization cognitive status: Alert/Oriented   Prior to hospitalization functional status: Independent   Current cognitive status: Alert/Oriented   Current Functional Status: Independent   Lives With sibling(s)   Able to Return to Prior Arrangements yes   Is patient able to care for self after discharge? Yes   Patient's perception of discharge disposition home or selfcare   Readmission Within the Last 30 Days no previous admission in last 30 days   Patient currently being followed by outpatient case management? No   Patient currently receives any other outside agency services? No   Equipment Currently Used at Home none   Do you have any problems affording any of your prescribed medications? No   Is the patient taking medications as prescribed? yes   Does the patient have transportation home? Yes   Transportation Anticipated family or friend will provide   Does the patient receive services at the [facility]? No   Discharge Plan A Home with family   Discharge Plan B Home Health   DME Needed Upon Discharge  none   Patient/Family in Agreement with Plan yes

## 2020-06-10 NOTE — NURSING
"Pt reports not being able to urinate since procedure, pt states," usually I go a lot right after", felt urge to urinate only put out 100ml.  bladder scanned, scan showed 880ml, paged MD Abdi, ordered straight cath.   "

## 2020-06-11 NOTE — PLAN OF CARE
Para completed, pt tolerated well. No apparent distress noted. 3 Liters removed from left abd, dressing applied CDI. Labs collected and sent. Report called to primary nurse, pt awaiting transport.

## 2020-06-11 NOTE — PLAN OF CARE
Problem: Physical Therapy Goal  Goal: Physical Therapy Goal  Outcome: Met     PT evaluation completed- see note for details. No goals established 2/2 no acute therapy needs.     Rebecca Christine PT, DPT   6/11/2020  Pager: [phone]

## 2020-06-11 NOTE — PROGRESS NOTES
Progress Note  Hospital Medicine  [facility]       Patient Name: Karen Villarreal  MRN:  [id number]  Hospital Medicine Team: [facility] Muriel Pinon MD  Date of Admission:  6/9/2020     Length of Stay:  LOS: 2 days   Expected Discharge Date: 6/11/2020  Principal Problem:  Elevated LFTs     Subjective:     Interval History/Overnight Events:    No acute events   notes decreased UOP compared to her baseline  On PO diuretics.    Stopping oxy as patient is becoming over-sedated and oxycodone may be contributing to urinary retention.  Per nursing report, patient has had adequate urine output.  Patient has back pain.  X-rays are showing arthritis.  Lidocaine patch ordered.  As well as Flexeril and tramadol.  Advised patient to sit in the chair today  Patient does have some ascites as well as lower extremity edema.  LFTs are going up today, 2 days in a row.  No fevers or chills or abdominal pain.  Will order diagnostic and therapeutic paracentesis, to investigate for SBP.  Hepatology is consulted for tomorrow     ciprofloxacin HCl  500 mg Oral Q12H    furosemide  160 mg Oral Daily    gabapentin  300 mg Oral TID    lidocaine  1 patch Transdermal Q24H    midodrine  10 mg Oral TID    sertraline  25 mg Oral Daily    spironolactone  300 mg Oral Daily    tamsulosin  0.4 mg Oral Daily           acetaminophen, cyclobenzaprine, Dextrose 10% Bolus, Dextrose 10% Bolus, glucagon (human recombinant), glucose, glucose, ondansetron, promethazine (PHENERGAN) IVPB, sodium chloride 0.9%, traMADoL    Review of Systems   Constitutional: Negative for chills, fatigue, fever.   HENT: Negative for sore throat, trouble swallowing.    Eyes: Negative for photophobia, visual disturbance.   Respiratory: Negative for cough, shortness of breath.    Cardiovascular: Negative for chest pain, palpitations, leg swelling.   Gastrointestinal: Negative for abdominal pain, constipation, diarrhea, nausea,  vomiting.   Endocrine: Negative for cold intolerance, heat intolerance.   Genitourinary: Negative for dysuria, frequency.   Musculoskeletal: Negative for arthralgias, myalgias.   Skin: Negative for rash, wound, erythema   Neurological: Negative for dizziness, syncope, weakness, light-headedness.   Psychiatric/Behavioral: Negative for confusion, hallucinations, anxiety  All other systems reviewed and are negative.    Objective:     Temp:  [97.6 °F (36.4 °C)-98.3 °F (36.8 °C)]   Pulse:  []   Resp:  [14-18]   BP: ()/(51-58)   SpO2:  [94 %-100 %]     Vitals:  Temp:  [97.6 °F (36.4 °C)-98.3 °F (36.8 °C)]   Pulse:  []   Resp:  [14-18]   BP: ()/(51-58)   SpO2:  [94 %-100 %]   I/O's:    Intake/Output Summary (Last 24 hours) at 6/11/2020 1343  Last data filed at 6/11/2020 1300  Gross per 24 hour   Intake --   Output 1700 ml   Net -1700 ml      Weight change: -3.859 kg (-8 lb 8.1 oz)   Body mass index is 41.41 kg/m².       Physical Exam:  Constitutional: chronically ill looking,  non-distressed, not diaphoretic.   HENT: NC/AT, external ears normal, oropharynx clear, MMM w/o exudates.   Eyes: PERRL, EOMI, conjunctiva normal, no discharge b/l, no scleral icterus   Neck: normal ROM, supple  CV: RRR, no m/r/g, no carotid bruits, +2 peripheral pulses.  Pulmonary/Chest wall: Breathing comfortably w/o distress, CTAB, no w/r/r, no crackles.    GI: Soft, non-tender, (+) BS, (+) BM   +distended  Musculoskeletal: Normal ROM, no atrophy,  + pitting edema in LE bilaterally   Neurological: AAO x 4, CN II-XI in tact, nl sensation, nl strength/tone  No asterixis   Skin: warm, dry   +pallor  Psych: normal mood and affect, normal behavior, thought content and judgement.    Labs:    Chemistries:   Recent Labs   Lab 06/09/20  0950 06/10/20  0657 06/11/20  0521   * 131* 129*   K 3.4* 4.0 3.7    100 98   CO2 23 25 24   BUN 8 6 6   CREATININE 0.9 0.8 0.8   CALCIUM 8.3* 8.1* 7.5*   PROT 6.1 5.5* 5.1*   BILITOT  2.8* 3.9* 2.8*   ALKPHOS 222* 166* 164*   ALT 18 143* 306*   AST 28 291* 648*   MG  --  1.9 1.7   PHOS  --  2.2* 2.3*        WBC:   Recent Labs   Lab 06/10/20  0657 06/11/20  0521   WBC 9.91 11.03     Bands:     CBC/Anemia Labs: Coags:    Recent Labs   Lab 06/10/20  0657 06/11/20  0521   WBC 9.91 11.03   HGB 10.8* 11.2*   HCT 32.4* 33.8*   PLT 90* 69*   MCV 95 99*   RDW 16.6* 16.5*    Recent Labs   Lab 06/10/20  0657 06/11/20  0521   INR 1.3* 1.5*          Assessment and Plan     Hospital Course:    Ms. 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        # Decompensated Hep C cirrhosis with ascites  # S/P TIPS  # H/O Esophageal Varices  # Elevated LFTs  MELD-Na score: 20 at 6/10/2020  6:57 AM  MELD score: 15 at 6/10/2020  6:57 AM  Calculated from:  Serum Creatinine: 0.8 mg/dL (Rounded to 1 mg/dL) at 6/10/2020  6:57 AM  Serum Sodium: 131 mmol/L at 6/10/2020  6:57 AM  Total Bilirubin: 3.9 mg/dL at 6/10/2020  6:57 AM  INR(ratio): 1.3 at 6/10/2020  6:57 AM  Age: 55 years     - s/p treatment with SVR  - s/p TIPs on 6/9/20   - started on cipro 500 mg PO BID for 5 days on 6/10   - s/p paracentesis on 6/11 with no evidence of SBP  - cont diuretics from home  - LFTs rising . inr up to  1.5. MELD up to 20  - hepatology consulted for 6/12     # Urinary Retention  - likely from anesthesia and narcotics  - had multiple in an out caths and bladder scan showed 450 cc   - UA with no UTI  - cerrato cath placed   - flomax started on 6/10  - narcotics stopped      # Hyponatremia  - fluid restrict to 1.5 L     # Moderate protein denny malnutrition  - PAB, boost, dietary     # Morbid Obesity  Body mass index is 42.51 kg/m².  - PT/OT dietary      # Anemia of chronic disease  # Thrombocytopenia  - monitor   - stable     Diet:  Low Na   GI PPx:  PO PPI  DVT PPx:   Goals of Care:  Full     High Risk Conditions:  Elevated LFTs    Disposition:    Pending improvement in LFTs      Signing Physician:     Muriel Pinon MD  Department of Hospital Medicine   [facility]  Pager [phone]  Spectra [phone]  6/11/2020

## 2020-06-11 NOTE — PT/OT/SLP EVAL
"Physical Therapy Evaluation  & Discharge Note     Patient Name:  Karen Villarreal  MRN: [id number]    Recommendations:     Discharge Recommendations: Home, no PT needs anticipated  Equipment recommendations: none  Barriers to discharge: no barriers identified     Assessment:     Karen Villarreal is a 55 y.o. female admitted to [facility] on 2020 with medical diagnosis of Elevated LFTs. At this time, patient is functioning at their prior level of function and does not require further acute PT services. Pt safe to ambulate with nursing staff supervision during acute hospital stay to prevent deconditioning. Once medically stable, recommending pt discharge home with no PT needs anticipated.     Plan:     During this hospitalization, patient does not require further acute PT services. PT orders discontinued. Please re-consult if situation changes.      Subjective     Communicated with RN prior to session.  Patient agreeable to participate.     Patient comments/goals: "I was weak after the procedure, but now I'm better"     Pain/Comfort:  · Location: no pain reported  · Pain Ratin/10   · Pain Rating Post-Intervention: 0/10     Patients cultural, spiritual, Alevism conflicts given the current situation: No cultural, spiritual, or educational needs identified.    Patient History: information obtained from pt      Living Environment: Pt lives with mother and sister in [facility] with no PIERRE. Bathroom set-up: walk-in shower  Prior Level of Function: independent with mobility and ADLs  DME owned: shower chair  Caregiver Assistance: (A) from sister as needed    Objective:     Patient found standing up in bathroom with: all lines intact    Recent Surgery: Procedure(s) (LRB):  INSERTION, SHUNT, TRANSJUGULAR, INTRAHEPATIC PORTOSYSTEMIC (N/A) 2 Days Post-Op  General Precautions: Standard, fall   Orthopedic Precautions:N/A   Braces: N/A   Body mass index is 41.41 kg/m².  Oxygen Device: room air    Exams:     Cognition:  o Pt is " alert and oriented x 4  o Pt's ability to follow single step commands: intact     Sensation:   o Light touch sensation: Intact BLEs     Edema: mild BLEs      Postural examination/scapula alignment: Rounded shoulder     Lower Extremity Range of Motion:  o Right Lower Extremity: WFL  o Left Lower Extremity: WFL     Lower Extremity Strength:  o Right Lower Extremity: WFL  o Left Lower Extremity: WFL    Functional Mobility:    Bed Mobility:  · N/T 2/2 pt sitting up in chair upon PT arrival     Transfers:   · Sit <> Stand Transfer: Independent x 1 trials from EOB with no AD              Gait:  · Distance: ~30 ft. Within room   · Assistance level: Independent  · Assistive Device: none  · Gait Assessment: reciprocal gait pattern with no acute gait deficits identified; no LOB     Balance:  · Static Sit: Independent at EOB   · Static Stand: Independent with no AD    Outcome Measure: AM-PAC 6 CLICK MOBILITY  Total Score:24     Patient/Caregiver Education:     Therapist educated pt/caregiver regarding:    PT POC and discharge from therapy/therapy recommendations    Safety with mobility and fall risk    Instruction on use of call button and importance of calling nursing staff for assistance with mobility     Patient/caregiver able to verbalize understanding; will follow-up with pt/caregiver during current admit for additional questions/concerns within scope of practice.     White board updated.     Patient left sitting EOB with all lines intact, call button in reach and RN notified.    GOALS:   Multidisciplinary Problems     Physical Therapy Goals     Not on file          Multidisciplinary Problems (Resolved)        Problem: Physical Therapy Goal    Goal Priority Disciplines Outcome Goal Variances Interventions   Physical Therapy Goal   (Resolved)     PT, PT/OT Met                       History:     Past Medical History:   Diagnosis Date    Acid reflux     Anemia     Arthritis     Ascites 03/08/2017    Cataract      Cirrhosis     Depression     Encounter for blood transfusion     Hiatal hernia     History of hepatitis C, s/p successful Rx w/ SVR12 (cure) - 10/2019 2/16/2017    S/p mavyret w/ SVR12    Ovary removal, prophylactic     Renal cyst 03/08/2017    Thrombocytopenia     Varices, esophageal        Past Surgical History:   Procedure Laterality Date    ANKLE SURGERY      APPENDECTOMY      CATARACT EXTRACTION      COLONOSCOPY N/A 12/4/2019    Procedure: COLONOSCOPY;  Surgeon: Justin Montenegro MD;  Location: [facility] ENDO;  Service: Endoscopy;  Laterality: N/A;    ESOPHAGOGASTRODUODENOSCOPY N/A 2/2/2019    Procedure: EGD (ESOPHAGOGASTRODUODENOSCOPY);  Surgeon: Leigha Whitehead MD;  Location: [facility] ENDO;  Service: Endoscopy;  Laterality: N/A;  Procedure for 1100AM 2/2/2019    ESOPHAGOGASTRODUODENOSCOPY N/A 2/5/2019    Procedure: EGD (ESOPHAGOGASTRODUODENOSCOPY);  Surgeon: Leigha Whitehead MD;  Location: [facility] ENDO;  Service: Endoscopy;  Laterality: N/A;    ESOPHAGOGASTRODUODENOSCOPY N/A 4/29/2019    Procedure: EGD (ESOPHAGOGASTRODUODENOSCOPY);  Surgeon: Justin Montenegro MD;  Location: [facility] ENDO;  Service: Endoscopy;  Laterality: N/A;    ESOPHAGOGASTRODUODENOSCOPY N/A 5/22/2019    Procedure: EGD (ESOPHAGOGASTRODUODENOSCOPY);  Surgeon: Justin Montenegro MD;  Location: [facility] ENDO;  Service: Endoscopy;  Laterality: N/A;    ESOPHAGOGASTRODUODENOSCOPY N/A 6/4/2019    Procedure: EGD (ESOPHAGOGASTRODUODENOSCOPY);  Surgeon: Justin Montenegro MD;  Location: [facility] ENDO;  Service: Endoscopy;  Laterality: N/A;    ESOPHAGOGASTRODUODENOSCOPY N/A 6/25/2019    Procedure: EGD (ESOPHAGOGASTRODUODENOSCOPY);  Surgeon: Justin Montenegro MD;  Location: [facility] ENDO;  Service: Endoscopy;  Laterality: N/A;    ESOPHAGOGASTRODUODENOSCOPY N/A 10/30/2019    Procedure: EGD (ESOPHAGOGASTRODUODENOSCOPY) - varices surveillance;  Surgeon: Justin Montenegro MD;  Location: [facility];  Service: Endoscopy;  Laterality: N/A;     ESOPHAGOGASTRODUODENOSCOPY N/A 11/20/2019    Procedure: EGD (ESOPHAGOGASTRODUODENOSCOPY);  Surgeon: Justin Montenegro MD;  Location: [facility] ENDO;  Service: Endoscopy;  Laterality: N/A;    ESOPHAGOGASTRODUODENOSCOPY N/A 12/17/2019    Procedure: EGD (ESOPHAGOGASTRODUODENOSCOPY);  Surgeon: Justin Montenegro MD;  Location: [facility] ENDO;  Service: Endoscopy;  Laterality: N/A;    ESOPHAGOGASTRODUODENOSCOPY N/A 1/8/2020    Procedure: EGD (ESOPHAGOGASTRODUODENOSCOPY);  Surgeon: Justin Montenegro MD;  Location: [facility] ENDO;  Service: Endoscopy;  Laterality: N/A;    ESOPHAGOGASTRODUODENOSCOPY N/A 2/5/2020    Procedure: EGD (ESOPHAGOGASTRODUODENOSCOPY);  Surgeon: Justin Montenegro MD;  Location: [facility] ENDO;  Service: Endoscopy;  Laterality: N/A;    INSERTION OF TRANSJUGULAR INTRAHEPATIC PORTOSYSTEMIC SHUNT (TIPS) N/A 6/9/2020    Procedure: INSERTION, SHUNT, TRANSJUGULAR, INTRAHEPATIC PORTOSYSTEMIC;  Surgeon: Slama Surgeon;  Location: [facility];  Service: Anesthesiology;  Laterality: N/A;  188 Tips 2.5 hours     OOPHORECTOMY         Time Tracking:     PT Received On: 06/11/20  PT Start Time: 0838     PT Stop Time: 0849  PT Total Time (min): 11 min     Billable Minutes: Evaluation 11 min    Rebecca Christine, PT  06/11/2020

## 2020-06-11 NOTE — PROCEDURES
Radiology Post-Procedure Note    Pre Op Diagnosis: Ascites  Post Op Diagnosis: Same    Procedure: Ultrasound Guided Paracentesis    Procedure performed by: Nely Valadez PA-C    Written Informed Consent Obtained: Yes  Specimen Removed: YES clear, yellow  Estimated Blood Loss: Minimal    Findings:   Successful paracentesis.  Albumin administered PRN per protocol.    Patient tolerated procedure well.    Nely Valadez PA-C  Interventional Radiology  Clinic [phone]

## 2020-06-11 NOTE — PLAN OF CARE
Patient's VSS, AAO, UA specimen collected and sent to lab. Patient urinated total of 300cc, yellow with bright red due to menstrual cycle. Intermediate Nausea. Attending provider notified of urinary retention, encouraged ambulation throughout day per MD. Bladder scan completed, <491 & 619, provider notified. Requested cerrato until PT assessment completed. Cerrato attempted but unsuccessful, PM RN notified, MD notified. Call light in reach. Continuing to monitor pt's urine output, pain and N/V.

## 2020-06-11 NOTE — H&P
Inpatient Radiology Pre-procedure Note    History of Present Illness:  Karen Villarreal is a 55 y.o. female who presents for ultrasound guided paracentesis.  Admission H&P reviewed.  Past Medical History:   Diagnosis Date    Acid reflux     Anemia     Arthritis     Ascites 03/08/2017    Cataract     Cirrhosis     Depression     Encounter for blood transfusion     Hiatal hernia     History of hepatitis C, s/p successful Rx w/ SVR12 (cure) - 10/2019 2/16/2017    S/p mavyret w/ SVR12    Ovary removal, prophylactic     Renal cyst 03/08/2017    Thrombocytopenia     Varices, esophageal      Past Surgical History:   Procedure Laterality Date    ANKLE SURGERY      APPENDECTOMY      CATARACT EXTRACTION      COLONOSCOPY N/A 12/4/2019    Procedure: COLONOSCOPY;  Surgeon: Justin Montenegro MD;  Location: [facility] ENDO;  Service: Endoscopy;  Laterality: N/A;    ESOPHAGOGASTRODUODENOSCOPY N/A 2/2/2019    Procedure: EGD (ESOPHAGOGASTRODUODENOSCOPY);  Surgeon: Leigha Whitehead MD;  Location: [facility] ENDO;  Service: Endoscopy;  Laterality: N/A;  Procedure for 1100AM 2/2/2019    ESOPHAGOGASTRODUODENOSCOPY N/A 2/5/2019    Procedure: EGD (ESOPHAGOGASTRODUODENOSCOPY);  Surgeon: Leigha Whitehead MD;  Location: [facility] ENDO;  Service: Endoscopy;  Laterality: N/A;    ESOPHAGOGASTRODUODENOSCOPY N/A 4/29/2019    Procedure: EGD (ESOPHAGOGASTRODUODENOSCOPY);  Surgeon: Justin Montenegro MD;  Location: [facility] ENDO;  Service: Endoscopy;  Laterality: N/A;    ESOPHAGOGASTRODUODENOSCOPY N/A 5/22/2019    Procedure: EGD (ESOPHAGOGASTRODUODENOSCOPY);  Surgeon: Justin Montenegro MD;  Location: [facility] ENDO;  Service: Endoscopy;  Laterality: N/A;    ESOPHAGOGASTRODUODENOSCOPY N/A 6/4/2019    Procedure: EGD (ESOPHAGOGASTRODUODENOSCOPY);  Surgeon: Justin Montenegro MD;  Location: [facility] ENDO;  Service: Endoscopy;  Laterality: N/A;    ESOPHAGOGASTRODUODENOSCOPY N/A 6/25/2019    Procedure: EGD (ESOPHAGOGASTRODUODENOSCOPY);  Surgeon: Justin ISAAC  MD Moni;  Location: [facility] ENDO;  Service: Endoscopy;  Laterality: N/A;    ESOPHAGOGASTRODUODENOSCOPY N/A 10/30/2019    Procedure: EGD (ESOPHAGOGASTRODUODENOSCOPY) - varices surveillance;  Surgeon: Justin Montenegro MD;  Location: [facility] ENDO;  Service: Endoscopy;  Laterality: N/A;    ESOPHAGOGASTRODUODENOSCOPY N/A 11/20/2019    Procedure: EGD (ESOPHAGOGASTRODUODENOSCOPY);  Surgeon: Justin Montenegro MD;  Location: [facility] ENDO;  Service: Endoscopy;  Laterality: N/A;    ESOPHAGOGASTRODUODENOSCOPY N/A 12/17/2019    Procedure: EGD (ESOPHAGOGASTRODUODENOSCOPY);  Surgeon: Justin Montenegro MD;  Location: [facility] ENDO;  Service: Endoscopy;  Laterality: N/A;    ESOPHAGOGASTRODUODENOSCOPY N/A 1/8/2020    Procedure: EGD (ESOPHAGOGASTRODUODENOSCOPY);  Surgeon: Justin Montenegro MD;  Location: [facility] ENDO;  Service: Endoscopy;  Laterality: N/A;    ESOPHAGOGASTRODUODENOSCOPY N/A 2/5/2020    Procedure: EGD (ESOPHAGOGASTRODUODENOSCOPY);  Surgeon: Justin Montenegro MD;  Location: [facility] ENDO;  Service: Endoscopy;  Laterality: N/A;    INSERTION OF TRANSJUGULAR INTRAHEPATIC PORTOSYSTEMIC SHUNT (TIPS) N/A 6/9/2020    Procedure: INSERTION, SHUNT, TRANSJUGULAR, INTRAHEPATIC PORTOSYSTEMIC;  Surgeon: Salma Surgeon;  [facility];  Service: Anesthesiology;  Laterality: N/A;  188 Tips 2.5 hours     OOPHORECTOMY         Review of Systems:   As documented in primary team H&P    Home Meds:   Prior to Admission medications    Medication Sig Start Date End Date Taking? Authorizing Provider   albuterol (VENTOLIN HFA) 90 mcg/actuation inhaler Ventolin HFA 90 mcg/actuation aerosol inhaler   take 2 puffs every 4hrs as needed for cough   Yes Historical Provider, MD   aluminum & magnesium hydroxide-simethicone (MAALOX MAXIMUM STRENGTH) 400-400-40 mg/5 mL suspension Take by mouth every 6 (six) hours as needed for Indigestion.   Yes Historical Provider, MD   cyclobenzaprine (FLEXERIL) 10 MG tablet Take 10 mg by mouth 3 (three) times daily as needed  for Muscle spasms.   Yes Historical Provider, MD   esomeprazole (NEXIUM) 20 mg GrPS Take 20 mg by mouth before breakfast.   Yes Historical Provider, MD   ferrous sulfate 325 (65 FE) MG EC tablet Take 325 mg by mouth once daily.    Yes Historical Provider, MD   furosemide (LASIX) 80 MG tablet TAKE 2 TABLETS(160 MG) BY MOUTH ONCE DAILY 4/24/20  Yes Wilber Webster MD   gabapentin (NEURONTIN) 300 MG capsule Take 300 mg by mouth 3 (three) times daily.   Yes Historical Provider, MD   sertraline (ZOLOFT) 25 MG tablet Take 1 tablet by mouth once daily. 9/12/18  Yes Historical Provider, MD   spironolactone (ALDACTONE) 100 MG tablet TAKE 3 TABLETS(300 MG) BY MOUTH ONCE DAILY 4/24/20  Yes Wilber Webster MD   magnesium 30 mg Tab Take 30 mg by mouth daily as needed.     Historical Provider, MD     Scheduled Meds:    ciprofloxacin HCl  500 mg Oral Q12H    furosemide  160 mg Oral Daily    gabapentin  300 mg Oral TID    lidocaine  1 patch Transdermal Q24H    midodrine  10 mg Oral TID    sertraline  25 mg Oral Daily    spironolactone  300 mg Oral Daily    tamsulosin  0.4 mg Oral Daily     Continuous Infusions:   PRN Meds:acetaminophen, cyclobenzaprine, Dextrose 10% Bolus, Dextrose 10% Bolus, glucagon (human recombinant), glucose, glucose, ondansetron, promethazine (PHENERGAN) IVPB, sodium chloride 0.9%, traMADoL  Anticoagulants/Antiplatelets: no anticoagulation    Allergies:   Review of patient's allergies indicates:   Allergen Reactions    Adhesive Blisters     Clear tape leaves blisters, paper tape can be used, Tegaderm okay to use.     Sedation Hx: have not been any systemic reactions    Vitals:  Temp: 98.2 °F (36.8 °C) (06/11/20 1128)  Pulse: 99 (06/11/20 1128)  Resp: 18 (06/11/20 1128)  BP: (!) 110/56 (06/11/20 1128)  SpO2: 98 % (06/11/20 1147)     Physical Exam:  ASA: 3  Mallampati: n/a    General: no acute distress  Mental Status: alert and oriented to person, place and time  HEENT: normocephalic,  atraumatic  Chest: unlabored breathing  Heart: regular heart rate  Abdomen: distended  Extremity: moves all extremities    Plan: ultrasound guided paracentesis  Sedation Plan: local    Nely Valadez PA-C  Interventional Radiology  Clinic [phone]

## 2020-06-11 NOTE — PLAN OF CARE
Problem: Adult Inpatient Plan of Care  Goal: Plan of Care Review  Outcome: Ongoing, Progressing  Goal: Patient-Specific Goal (Individualization)  Outcome: Ongoing, Progressing  Goal: Absence of Hospital-Acquired Illness or Injury  Outcome: Ongoing, Progressing  Goal: Optimal Comfort and Wellbeing  Outcome: Ongoing, Progressing  Goal: Readiness for Transition of Care  Outcome: Ongoing, Progressing  Goal: Rounds/Family Conference  Outcome: Ongoing, Progressing     Problem: Bariatric Environmental Safety  Goal: Safety Maintained with Care  Outcome: Ongoing, Progressing     Problem: Fall Injury Risk  Goal: Absence of Fall and Fall-Related Injury  Outcome: Ongoing, Progressing     Problem: Infection  Goal: Infection Symptom Resolution  Outcome: Ongoing, Progressing   Patient rested this shift without any distress. No complaints of pain/discomfort. Up to void per self. Remains afebrile and free from falls. Will monitor. CB near.

## 2020-06-11 NOTE — NURSING
Report given to RN assigned to room 8073, transport booked, patient informed, patient belongings gathered by patient.

## 2020-06-11 NOTE — PLAN OF CARE
Problem: Occupational Therapy Goal  Goal: Occupational Therapy Goal  Description   Pt is currently performing self care tasks, functional mobility & t/fs at level requiring no physical assist. Functional strength and balance are appropriate. IPOT services are not recommended at this time .     Outcome: Met   Evaluation completed. Pt at functional baseline. D/c home with no OT needs.  Irma Carbajal, OT  6/11/2020

## 2020-06-11 NOTE — PT/OT/SLP EVAL
Occupational Therapy   Evaluation and Discharge Note    Name: Karen Villarreal  MRN: [id number]  Admitting Diagnosis:  Elevated LFTs 2 Days Post-Op    Recommendations:     Discharge Recommendations: home  Discharge Equipment Recommendations:  none  Barriers to discharge:  None    Assessment:     Karen Villarreal is a 55 y.o. female with a medical diagnosis of Elevated LFTs. At this time, patient is functioning at their prior level of function and does not require further acute OT services.     Plan:     During this hospitalization, patient does not require further acute OT services.  Please re-consult if situation changes.    · Plan of Care Reviewed with: patient    Subjective     Chief Complaint: Skin irritation on hands  Patient/Family Comments/goals: Pt will have assistance from family     Occupational Profile:  Living Environment: Pt lives with mother and sister; bathroom contain tub-shower with shower chair   Previous level of function: PTA, pt reports being independent with self care tasks and functional mobility   Roles and Routines: Home/community dweller, performs grocery shopping/cooking and house hold management  Equipment Used at home:  shower chair  Assistance upon Discharge: Pt will have assistance from family     Pain/Comfort:  · Pain Rating 1: 0/10  · Pain Rating Post-Intervention 1: 0/10    Patients cultural, spiritual, Yarsanism conflicts given the current situation: no    Objective:     Communicated with: RN prior to session.  Patient found in bathroom with (No active lines) upon OT entry to room.    General Precautions: Standard,     Orthopedic Precautions:N/A   Braces: N/A     Occupational Performance:    Bed Mobility:    · NT, pt found in bathroom performing toileting task    Functional Mobility/Transfers:  · Patient completed Sit <> Stand Transfer with independence  with  no assistive device   · Patient completed Toilet Transfer Stand Pivot technique with independence with  no  AD  · Functional Mobility: Pt completed functional mobility within room with independence and no AD.     Activities of Daily Living:  · Grooming: independence to wash hands at sink   · Toileting: independence to complete toileting in bathroom     Cognitive/Visual Perceptual:  Cognitive/Psychosocial Skills:     -       Oriented to: Person, Place, Time and Situation   -       Follows Commands/attention:Follows multistep  commands  -       Communication: clear/fluent  -       Safety awareness/insight to disability: intact   -       Mood/Affect/Coping skills/emotional control: Appropriate to situation  Visual/Perceptual:      -Intact     Physical Exam:  Postural examination/scapula alignment:    -       Rounded shoulders  Skin integrity: Visible skin intact/ skin irritation to hands  Edema:  None noted  Dominant hand:    -       RUE  Upper Extremity Range of Motion:     -       Right Upper Extremity: WFL  -       Left Upper Extremity: WFL  Upper Extremity Strength:    -       Right Upper Extremity: WFL  -       Left Upper Extremity: WFL   Strength:    -       Right Upper Extremity: WFL  -       Left Upper Extremity: WFL    AMPAC 6 Click ADL:  AMPAC Total Score: 24    Treatment & Education:  -Pt edu on OT role/POC-- no family in room   -Importance of OOB activity with staff assistance ( UIC during each meal)   -Safety during functional t/f and mobility  -White board updated  - Multiple self care tasks completed--as noted above  - All questions/concerns answered within OT scope of practice  Education:    Patient left seated EOB with all lines intact, call button in reach and RN notified    GOALS:   Multidisciplinary Problems     Occupational Therapy Goals     Not on file          Multidisciplinary Problems (Resolved)        Problem: Occupational Therapy Goal    Goal Priority Disciplines Outcome Interventions   Occupational Therapy Goal   (Resolved)     OT, PT/OT Met    Description:   Pt is currently performing self  care tasks, functional mobility & t/fs at level requiring no physical assist. Functional strength and balance are appropriate. IPOT services are not recommended at this time .                      History:     Past Medical History:   Diagnosis Date    Acid reflux     Anemia     Arthritis     Ascites 03/08/2017    Cataract     Cirrhosis     Depression     Encounter for blood transfusion     Hiatal hernia     History of hepatitis C, s/p successful Rx w/ SVR12 (cure) - 10/2019 2/16/2017    S/p mavyret w/ SVR12    Ovary removal, prophylactic     Renal cyst 03/08/2017    Thrombocytopenia     Varices, esophageal        Past Surgical History:   Procedure Laterality Date    ANKLE SURGERY      APPENDECTOMY      CATARACT EXTRACTION      COLONOSCOPY N/A 12/4/2019    Procedure: COLONOSCOPY;  Surgeon: Justin Montenegro MD;  Location: [facility] ENDO;  Service: Endoscopy;  Laterality: N/A;    ESOPHAGOGASTRODUODENOSCOPY N/A 2/2/2019    Procedure: EGD (ESOPHAGOGASTRODUODENOSCOPY);  Surgeon: Leigha Whitehead MD;  Location: [facility] ENDO;  Service: Endoscopy;  Laterality: N/A;  Procedure for 1100AM 2/2/2019    ESOPHAGOGASTRODUODENOSCOPY N/A 2/5/2019    Procedure: EGD (ESOPHAGOGASTRODUODENOSCOPY);  Surgeon: Leigha Whitehead MD;  Location: [facility] ENDO;  Service: Endoscopy;  Laterality: N/A;    ESOPHAGOGASTRODUODENOSCOPY N/A 4/29/2019    Procedure: EGD (ESOPHAGOGASTRODUODENOSCOPY);  Surgeon: Justin Montenegro MD;  Location: [facility] ENDO;  Service: Endoscopy;  Laterality: N/A;    ESOPHAGOGASTRODUODENOSCOPY N/A 5/22/2019    Procedure: EGD (ESOPHAGOGASTRODUODENOSCOPY);  Surgeon: Justin Montenegro MD;  Location: [facility] ENDO;  Service: Endoscopy;  Laterality: N/A;    ESOPHAGOGASTRODUODENOSCOPY N/A 6/4/2019    Procedure: EGD (ESOPHAGOGASTRODUODENOSCOPY);  Surgeon: Justin Montenegro MD;  Location: [facility] ENDO;  Service: Endoscopy;  Laterality: N/A;    ESOPHAGOGASTRODUODENOSCOPY N/A 6/25/2019    Procedure: EGD  (ESOPHAGOGASTRODUODENOSCOPY);  Surgeon: Justin Montenegro MD;  Location: [facility] ENDO;  Service: Endoscopy;  Laterality: N/A;    ESOPHAGOGASTRODUODENOSCOPY N/A 10/30/2019    Procedure: EGD (ESOPHAGOGASTRODUODENOSCOPY) - varices surveillance;  Surgeon: Justin Montenegro MD;  Location: [facility] ENDO;  Service: Endoscopy;  Laterality: N/A;    ESOPHAGOGASTRODUODENOSCOPY N/A 11/20/2019    Procedure: EGD (ESOPHAGOGASTRODUODENOSCOPY);  Surgeon: Justin Montenegro MD;  Location: [facility] ENDO;  Service: Endoscopy;  Laterality: N/A;    ESOPHAGOGASTRODUODENOSCOPY N/A 12/17/2019    Procedure: EGD (ESOPHAGOGASTRODUODENOSCOPY);  Surgeon: Justin Montenegro MD;  Location: [facility] ENDO;  Service: Endoscopy;  Laterality: N/A;    ESOPHAGOGASTRODUODENOSCOPY N/A 1/8/2020    Procedure: EGD (ESOPHAGOGASTRODUODENOSCOPY);  Surgeon: Justin Montenegro MD;  Location: [facility] ENDO;  Service: Endoscopy;  Laterality: N/A;    ESOPHAGOGASTRODUODENOSCOPY N/A 2/5/2020    Procedure: EGD (ESOPHAGOGASTRODUODENOSCOPY);  Surgeon: Justin Montenegro MD;  Location: [facility] ENDO;  Service: Endoscopy;  Laterality: N/A;    INSERTION OF TRANSJUGULAR INTRAHEPATIC PORTOSYSTEMIC SHUNT (TIPS) N/A 6/9/2020    Procedure: INSERTION, SHUNT, TRANSJUGULAR, INTRAHEPATIC PORTOSYSTEMIC;  Surgeon: Salma Surgeon;  Location: [facility];  Service: Anesthesiology;  Laterality: N/A;  188 Tips 2.5 hours     OOPHORECTOMY         Time Tracking:     OT Date of Treatment: 06/11/20  OT Start Time: 0838  OT Stop Time: 0849  OT Total Time (min): 11 min    Billable Minutes:Evaluation 11    Irma Carbajal, OT  6/11/2020

## 2020-06-12 NOTE — SUBJECTIVE & OBJECTIVE
Review of Systems   Constitutional: Negative for activity change, appetite change, chills, diaphoresis, fatigue, fever and unexpected weight change.   HENT: Negative for sore throat and trouble swallowing.    Eyes: Negative for visual disturbance.   Respiratory: Negative for chest tightness and shortness of breath.    Cardiovascular: Positive for leg swelling. Negative for chest pain.   Gastrointestinal: Positive for abdominal distention. Negative for abdominal pain, anal bleeding, blood in stool, constipation, diarrhea, nausea and vomiting.   Genitourinary: Positive for difficulty urinating. Negative for dysuria and hematuria.   Musculoskeletal: Positive for back pain. Negative for arthralgias and myalgias.   Skin: Negative for rash.   Neurological: Negative for dizziness, weakness, light-headedness and headaches.   Psychiatric/Behavioral: Negative for agitation and confusion.       Past Medical History:   Diagnosis Date    Acid reflux     Anemia     Arthritis     Ascites 03/08/2017    Cataract     Cirrhosis     Depression     Encounter for blood transfusion     Hiatal hernia     History of hepatitis C, s/p successful Rx w/ SVR12 (cure) - 10/2019 2/16/2017    S/p mavyret w/ SVR12    Ovary removal, prophylactic     Renal cyst 03/08/2017    Thrombocytopenia     Varices, esophageal        Past Surgical History:   Procedure Laterality Date    ANKLE SURGERY      APPENDECTOMY      CATARACT EXTRACTION      COLONOSCOPY N/A 12/4/2019    Procedure: COLONOSCOPY;  Surgeon: Justin Montenegro MD;  Location: [facility];  Service: Endoscopy;  Laterality: N/A;    ESOPHAGOGASTRODUODENOSCOPY N/A 2/2/2019    Procedure: EGD (ESOPHAGOGASTRODUODENOSCOPY);  Surgeon: Leigha Whitehead MD;  Location: [facility];  Service: Endoscopy;  Laterality: N/A;  Procedure for 1100AM 2/2/2019    ESOPHAGOGASTRODUODENOSCOPY N/A 2/5/2019    Procedure: EGD (ESOPHAGOGASTRODUODENOSCOPY);  Surgeon: Leigha Whitehead MD;  Location: [facility]  ENDO;  Service: Endoscopy;  Laterality: N/A;    ESOPHAGOGASTRODUODENOSCOPY N/A 4/29/2019    Procedure: EGD (ESOPHAGOGASTRODUODENOSCOPY);  Surgeon: Justin Montenegro MD;  Location: [facility] ENDO;  Service: Endoscopy;  Laterality: N/A;    ESOPHAGOGASTRODUODENOSCOPY N/A 5/22/2019    Procedure: EGD (ESOPHAGOGASTRODUODENOSCOPY);  Surgeon: Justin Montenegro MD;  Location: [facility] ENDO;  Service: Endoscopy;  Laterality: N/A;    ESOPHAGOGASTRODUODENOSCOPY N/A 6/4/2019    Procedure: EGD (ESOPHAGOGASTRODUODENOSCOPY);  Surgeon: Justin Montenegro MD;  Location: [facility] ENDO;  Service: Endoscopy;  Laterality: N/A;    ESOPHAGOGASTRODUODENOSCOPY N/A 6/25/2019    Procedure: EGD (ESOPHAGOGASTRODUODENOSCOPY);  Surgeon: Justin Montenegro MD;  Location: [facility] ENDO;  Service: Endoscopy;  Laterality: N/A;    ESOPHAGOGASTRODUODENOSCOPY N/A 10/30/2019    Procedure: EGD (ESOPHAGOGASTRODUODENOSCOPY) - varices surveillance;  Surgeon: Justin Montenegro MD;  Location: [facility] ENDO;  Service: Endoscopy;  Laterality: N/A;    ESOPHAGOGASTRODUODENOSCOPY N/A 11/20/2019    Procedure: EGD (ESOPHAGOGASTRODUODENOSCOPY);  Surgeon: Justin Montenegro MD;  Location: [facility] ENDO;  Service: Endoscopy;  Laterality: N/A;    ESOPHAGOGASTRODUODENOSCOPY N/A 12/17/2019    Procedure: EGD (ESOPHAGOGASTRODUODENOSCOPY);  Surgeon: Justin Montenegro MD;  Location: [facility] ENDO;  Service: Endoscopy;  Laterality: N/A;    ESOPHAGOGASTRODUODENOSCOPY N/A 1/8/2020    Procedure: EGD (ESOPHAGOGASTRODUODENOSCOPY);  Surgeon: Justin Montenegro MD;  Location: [facility] ENDO;  Service: Endoscopy;  Laterality: N/A;    ESOPHAGOGASTRODUODENOSCOPY N/A 2/5/2020    Procedure: EGD (ESOPHAGOGASTRODUODENOSCOPY);  Surgeon: Justin Montenegro MD;  Location: [facility];  Service: Endoscopy;  Laterality: N/A;    INSERTION OF TRANSJUGULAR INTRAHEPATIC PORTOSYSTEMIC SHUNT (TIPS) N/A 6/9/2020    Procedure: INSERTION, SHUNT, TRANSJUGULAR, INTRAHEPATIC PORTOSYSTEMIC;  Surgeon: Salma Surgeon;  Location: [facility];   Service: Anesthesiology;  Laterality: N/A;  188 Tips 2.5 hours     OOPHORECTOMY         Family history of liver disease: No    Review of patient's allergies indicates:   Allergen Reactions    Adhesive Blisters     Clear tape leaves blisters, paper tape can be used, Tegaderm okay to use.       Tobacco Use    Smoking status: Current Some Day Smoker     Packs/day: 0.25     Years: 33.00     Pack years: 8.25     Types: Cigarettes    Smokeless tobacco: Never Used   Substance and Sexual Activity    Alcohol use: No     Frequency: Never     Comment: quit 2017    Drug use: No    Sexual activity: Yes       Medications Prior to Admission   Medication Sig Dispense Refill Last Dose    albuterol (VENTOLIN HFA) 90 mcg/actuation inhaler Ventolin HFA 90 mcg/actuation aerosol inhaler   take 2 puffs every 4hrs as needed for cough   Past Month at Unknown time    aluminum & magnesium hydroxide-simethicone (MAALOX MAXIMUM STRENGTH) 400-400-40 mg/5 mL suspension Take by mouth every 6 (six) hours as needed for Indigestion.   Past Week at Unknown time    cyclobenzaprine (FLEXERIL) 10 MG tablet Take 10 mg by mouth 3 (three) times daily as needed for Muscle spasms.   6/8/2020 at Unknown time    esomeprazole (NEXIUM) 20 mg GrPS Take 20 mg by mouth before breakfast.   Past Month at Unknown time    ferrous sulfate 325 (65 FE) MG EC tablet Take 325 mg by mouth once daily.    6/8/2020 at Unknown time    furosemide (LASIX) 80 MG tablet TAKE 2 TABLETS(160 MG) BY MOUTH ONCE DAILY 60 tablet 1 6/8/2020 at Unknown time    gabapentin (NEURONTIN) 300 MG capsule Take 300 mg by mouth 3 (three) times daily.   6/8/2020 at Unknown time    sertraline (ZOLOFT) 25 MG tablet Take 1 tablet by mouth once daily.  1 6/8/2020 at Unknown time    spironolactone (ALDACTONE) 100 MG tablet TAKE 3 TABLETS(300 MG) BY MOUTH ONCE DAILY 90 tablet 1 6/8/2020 at Unknown time    magnesium 30 mg Tab Take 30 mg by mouth daily as needed.    More than a month at Unknown  time       Objective:     Vital Signs (Most Recent):  Temp: 98 °F (36.7 °C) (06/12/20 1127)  Pulse: 96 (06/12/20 1127)  Resp: 17 (06/12/20 1127)  BP: (!) 104/53 (06/12/20 1127)  SpO2: 100 % (06/12/20 1127) Vital Signs (24h Range):  Temp:  [97.4 °F (36.3 °C)-98 °F (36.7 °C)] 98 °F (36.7 °C)  Pulse:  [] 96  Resp:  [17-18] 17  SpO2:  [94 %-100 %] 100 %  BP: (102-116)/(52-61) 104/53     Weight: 98.2 kg (216 lb 7.9 oz) (06/11/20 1030)  Body mass index is 41.41 kg/m².    Physical Exam   Constitutional: She is oriented to person, place, and time. She appears well-developed and well-nourished. No distress.   Pleasant middle-aged female, calm, cooperative no distress.  No cough obvious confusion   HENT:   Head: Normocephalic and atraumatic.   Mouth/Throat: Oropharynx is clear and moist. No oropharyngeal exudate.   Eyes: Conjunctivae are normal. Scleral icterus is present.   Cardiovascular: Normal rate, regular rhythm, normal heart sounds and intact distal pulses.   Pulmonary/Chest: Effort normal and breath sounds normal. No respiratory distress.   Abdominal: Soft. Bowel sounds are normal. She exhibits distension. She exhibits no mass. There is no tenderness. There is no rebound and no guarding.   Distended.  Ascites present.  Soft.   Musculoskeletal: She exhibits no edema or tenderness.   Lymphadenopathy:     She has no cervical adenopathy.   Neurological: She is alert and oriented to person, place, and time.   No confusion or asterixis   Skin: Skin is warm. Capillary refill takes less than 2 seconds. No rash noted. She is not diaphoretic.   Psychiatric: She has a normal mood and affect. Her behavior is normal. Judgment and thought content normal.   Nursing note and vitals reviewed.      MELD-Na score: 22 at 6/12/2020  6:00 AM  MELD score: 14 at 6/12/2020  6:00 AM  Calculated from:  Serum Creatinine: 0.8 mg/dL (Rounded to 1 mg/dL) at 6/12/2020  6:00 AM  Serum Sodium: 128 mmol/L at 6/12/2020  6:00 AM  Total Bilirubin:  2.5 mg/dL at 6/12/2020  6:00 AM  INR(ratio): 1.5 at 6/12/2020  6:00 AM  Age: 55 years    Significant Labs:  Labs within the past month have been reviewed.    Significant Imaging:  Labs: Reviewed

## 2020-06-12 NOTE — ASSESSMENT & PLAN NOTE
Ms Villarreal is a 55 year old female with history of HCV ESLD with decompensations including refractory ascites, bleeding EV, HCV (cured), who presented for elective TIPS; hep consulted for rising enzymes.    Rising enzymes likely secondary to TIPS placement and will resolve with supportive care.  Appears that they peaked on 06/11.  Workup for infectious causes has been negative.    Plan  - TIPS.  Improved gradient from 22-4  - HE.  Mild confusion on history.  Endorses constipation.  Recommend starting lactulose  - volume overload.  Continue home dose of diuretics on discharge.  Will slowly taper as an outpatient.  - HCV.  Status post treatment.  SVR 12 with mavyret 10/2019, will repeat  - H cc screening.  Negative CT and AFP 02/2020  - EGD.  Prior history of bleed, Status post TIPS

## 2020-06-12 NOTE — PLAN OF CARE
Problem: Adult Inpatient Plan of Care  Goal: Plan of Care Review  Outcome: Ongoing, Progressing  Goal: Patient-Specific Goal (Individualization)  Outcome: Ongoing, Progressing  Goal: Absence of Hospital-Acquired Illness or Injury  Outcome: Ongoing, Progressing  Goal: Optimal Comfort and Wellbeing  Outcome: Ongoing, Progressing  Goal: Readiness for Transition of Care  Outcome: Ongoing, Progressing  Goal: Rounds/Family Conference  Outcome: Ongoing, Progressing     Problem: Bariatric Environmental Safety  Goal: Safety Maintained with Care  Outcome: Ongoing, Progressing     Problem: Fall Injury Risk  Goal: Absence of Fall and Fall-Related Injury  Outcome: Ongoing, Progressing     Problem: Infection  Goal: Infection Symptom Resolution  Outcome: Ongoing, Progressing   Patient rested this shift without event.  Remains afebrile and slept through the night without difficulty. Patient did receive a pain pill and muscle relaxer which seemed to help her sleep.  Voids in the toilet without difficulties. Will continue to monitor. CB near.

## 2020-06-12 NOTE — CONSULTS
[facility]  Hepatology  Consult Note    Patient Name: Karen Villarreal  MRN: [id number]  Admission Date: 6/9/2020  Hospital Length of Stay: 3 days  Attending Provider: Muriel Pinon MD   Primary Care Physician: Shama Mccoy MD  Principal Problem:Elevated LFTs    Inpatient consult to Hepatology  Consult performed by: Deon Sims MD  Consult ordered by: Muriel Pinon MD        Subjective:     Transplant status: No    HPI:  Ms Villarreal is a 55 year old female with history of HCV ESLD with decompensations including refractory ascites, bleeding EV, HCV (cured), who presented for elective TIPS; hep consulted for rising enzymes.    She follows with Dr. nina in clinic and was referred for elective TIPS due to refractory ascites and history of EV bleed. Pressure 22 -> 4.    Baseline MELD 10. On 6/10 LFTs [place] with Tb 2.8 -> 3.9 -> 2.8. AST 28 -> 291 -> 648 -> 573, ALT 18 -> 143 -> 306. Diagnostic paracentesis negative.  Echo negative.    She reports she is feeling well and denies a complaints with exception of some forgetfulness this morning.  She reports she was having some difficulty with short-term memory loss overnight.  This is new to her.  Otherwise denies any side confusion.  Denies abdominal pain, nausea or vomiting.  Denies any overt signs of bleeding.  Does know the issues overnight with urinary retention for which she required straight cath which is abnormal for her.  Endorses some back pain (chronic) and had been receiving narcotic but has stopped taking this.            Review of Systems   Constitutional: Negative for activity change, appetite change, chills, diaphoresis, fatigue, fever and unexpected weight change.   HENT: Negative for sore throat and trouble swallowing.    Eyes: Negative for visual disturbance.   Respiratory: Negative for chest tightness and shortness of breath.    Cardiovascular: Positive for leg swelling. Negative for chest pain.    Gastrointestinal: Positive for abdominal distention. Negative for abdominal pain, anal bleeding, blood in stool, constipation, diarrhea, nausea and vomiting.   Genitourinary: Positive for difficulty urinating. Negative for dysuria and hematuria.   Musculoskeletal: Positive for back pain. Negative for arthralgias and myalgias.   Skin: Negative for rash.   Neurological: Negative for dizziness, weakness, light-headedness and headaches.   Psychiatric/Behavioral: Negative for agitation and confusion.       Past Medical History:   Diagnosis Date    Acid reflux     Anemia     Arthritis     Ascites 03/08/2017    Cataract     Cirrhosis     Depression     Encounter for blood transfusion     Hiatal hernia     History of hepatitis C, s/p successful Rx w/ SVR12 (cure) - 10/2019 2/16/2017    S/p mavyret w/ SVR12    Ovary removal, prophylactic     Renal cyst 03/08/2017    Thrombocytopenia     Varices, esophageal        Past Surgical History:   Procedure Laterality Date    ANKLE SURGERY      APPENDECTOMY      CATARACT EXTRACTION      COLONOSCOPY N/A 12/4/2019    Procedure: COLONOSCOPY;  Surgeon: Justin Montenegro MD;  Location: [facility];  Service: Endoscopy;  Laterality: N/A;    ESOPHAGOGASTRODUODENOSCOPY N/A 2/2/2019    Procedure: EGD (ESOPHAGOGASTRODUODENOSCOPY);  Surgeon: Leigha Whitehead MD;  Location: [facility];  Service: Endoscopy;  Laterality: N/A;  Procedure for 1100AM 2/2/2019    ESOPHAGOGASTRODUODENOSCOPY N/A 2/5/2019    Procedure: EGD (ESOPHAGOGASTRODUODENOSCOPY);  Surgeon: Leigha Whitehead MD;  Location: [facility];  Service: Endoscopy;  Laterality: N/A;    ESOPHAGOGASTRODUODENOSCOPY N/A 4/29/2019    Procedure: EGD (ESOPHAGOGASTRODUODENOSCOPY);  Surgeon: Justin Montenegro MD;  Location: [facility];  Service: Endoscopy;  Laterality: N/A;    ESOPHAGOGASTRODUODENOSCOPY N/A 5/22/2019    Procedure: EGD (ESOPHAGOGASTRODUODENOSCOPY);  Surgeon: Justin Montenegro MD;  Location: [facility];  Service:  Endoscopy;  Laterality: N/A;    ESOPHAGOGASTRODUODENOSCOPY N/A 6/4/2019    Procedure: EGD (ESOPHAGOGASTRODUODENOSCOPY);  Surgeon: Justin Montenegro MD;  Location: [facility] ENDO;  Service: Endoscopy;  Laterality: N/A;    ESOPHAGOGASTRODUODENOSCOPY N/A 6/25/2019    Procedure: EGD (ESOPHAGOGASTRODUODENOSCOPY);  Surgeon: Justin Montenegro MD;  Location: [facility] ENDO;  Service: Endoscopy;  Laterality: N/A;    ESOPHAGOGASTRODUODENOSCOPY N/A 10/30/2019    Procedure: EGD (ESOPHAGOGASTRODUODENOSCOPY) - varices surveillance;  Surgeon: Justin Montenegro MD;  Location: [facility] ENDO;  Service: Endoscopy;  Laterality: N/A;    ESOPHAGOGASTRODUODENOSCOPY N/A 11/20/2019    Procedure: EGD (ESOPHAGOGASTRODUODENOSCOPY);  Surgeon: Justin Montenegro MD;  Location: [facility] ENDO;  Service: Endoscopy;  Laterality: N/A;    ESOPHAGOGASTRODUODENOSCOPY N/A 12/17/2019    Procedure: EGD (ESOPHAGOGASTRODUODENOSCOPY);  Surgeon: Justin Montenegro MD;  Location: [facility];  Service: Endoscopy;  Laterality: N/A;    ESOPHAGOGASTRODUODENOSCOPY N/A 1/8/2020    Procedure: EGD (ESOPHAGOGASTRODUODENOSCOPY);  Surgeon: Justin Montenegro MD;  Location: [facility] ENDO;  Service: Endoscopy;  Laterality: N/A;    ESOPHAGOGASTRODUODENOSCOPY N/A 2/5/2020    Procedure: EGD (ESOPHAGOGASTRODUODENOSCOPY);  Surgeon: Justin Montenegro MD;  Location: [facility] ENDO;  Service: Endoscopy;  Laterality: N/A;    INSERTION OF TRANSJUGULAR INTRAHEPATIC PORTOSYSTEMIC SHUNT (TIPS) N/A 6/9/2020    Procedure: INSERTION, SHUNT, TRANSJUGULAR, INTRAHEPATIC PORTOSYSTEMIC;  Surgeon: Salma Surgeon;  Location: [facility];  Service: Anesthesiology;  Laterality: N/A;  188 Tips 2.5 hours     OOPHORECTOMY         Family history of liver disease: No    Review of patient's allergies indicates:   Allergen Reactions    Adhesive Blisters     Clear tape leaves blisters, paper tape can be used, Tegaderm okay to use.       Tobacco Use    Smoking status: Current Some Day Smoker     Packs/day: 0.25     Years: 33.00      Pack years: 8.25     Types: Cigarettes    Smokeless tobacco: Never Used   Substance and Sexual Activity    Alcohol use: No     Frequency: Never     Comment: quit 2017    Drug use: No    Sexual activity: Yes       Medications Prior to Admission   Medication Sig Dispense Refill Last Dose    albuterol (VENTOLIN HFA) 90 mcg/actuation inhaler Ventolin HFA 90 mcg/actuation aerosol inhaler   take 2 puffs every 4hrs as needed for cough   Past Month at Unknown time    aluminum & magnesium hydroxide-simethicone (MAALOX MAXIMUM STRENGTH) 400-400-40 mg/5 mL suspension Take by mouth every 6 (six) hours as needed for Indigestion.   Past Week at Unknown time    cyclobenzaprine (FLEXERIL) 10 MG tablet Take 10 mg by mouth 3 (three) times daily as needed for Muscle spasms.   6/8/2020 at Unknown time    esomeprazole (NEXIUM) 20 mg GrPS Take 20 mg by mouth before breakfast.   Past Month at Unknown time    ferrous sulfate 325 (65 FE) MG EC tablet Take 325 mg by mouth once daily.    6/8/2020 at Unknown time    furosemide (LASIX) 80 MG tablet TAKE 2 TABLETS(160 MG) BY MOUTH ONCE DAILY 60 tablet 1 6/8/2020 at Unknown time    gabapentin (NEURONTIN) 300 MG capsule Take 300 mg by mouth 3 (three) times daily.   6/8/2020 at Unknown time    sertraline (ZOLOFT) 25 MG tablet Take 1 tablet by mouth once daily.  1 6/8/2020 at Unknown time    spironolactone (ALDACTONE) 100 MG tablet TAKE 3 TABLETS(300 MG) BY MOUTH ONCE DAILY 90 tablet 1 6/8/2020 at Unknown time    magnesium 30 mg Tab Take 30 mg by mouth daily as needed.    More than a month at Unknown time       Objective:     Vital Signs (Most Recent):  Temp: 98 °F (36.7 °C) (06/12/20 1127)  Pulse: 96 (06/12/20 1127)  Resp: 17 (06/12/20 1127)  BP: (!) 104/53 (06/12/20 1127)  SpO2: 100 % (06/12/20 1127) Vital Signs (24h Range):  Temp:  [97.4 °F (36.3 °C)-98 °F (36.7 °C)] 98 °F (36.7 °C)  Pulse:  [] 96  Resp:  [17-18] 17  SpO2:  [94 %-100 %] 100 %  BP: (102-116)/(52-61) 104/53      Weight: 98.2 kg (216 lb 7.9 oz) (06/11/20 1030)  Body mass index is 41.41 kg/m².    Physical Exam   Constitutional: She is oriented to person, place, and time. She appears well-developed and well-nourished. No distress.   Pleasant middle-aged female, calm, cooperative no distress.  No cough obvious confusion   HENT:   Head: Normocephalic and atraumatic.   Mouth/Throat: Oropharynx is clear and moist. No oropharyngeal exudate.   Eyes: Conjunctivae are normal. Scleral icterus is present.   Cardiovascular: Normal rate, regular rhythm, normal heart sounds and intact distal pulses.   Pulmonary/Chest: Effort normal and breath sounds normal. No respiratory distress.   Abdominal: Soft. Bowel sounds are normal. She exhibits distension. She exhibits no mass. There is no tenderness. There is no rebound and no guarding.   Distended.  Ascites present.  Soft.   Musculoskeletal: She exhibits no edema or tenderness.   Lymphadenopathy:     She has no cervical adenopathy.   Neurological: She is alert and oriented to person, place, and time.   No confusion or asterixis   Skin: Skin is warm. Capillary refill takes less than 2 seconds. No rash noted. She is not diaphoretic.   Psychiatric: She has a normal mood and affect. Her behavior is normal. Judgment and thought content normal.   Nursing note and vitals reviewed.      MELD-Na score: 22 at 6/12/2020  6:00 AM  MELD score: 14 at 6/12/2020  6:00 AM  Calculated from:  Serum Creatinine: 0.8 mg/dL (Rounded to 1 mg/dL) at 6/12/2020  6:00 AM  Serum Sodium: 128 mmol/L at 6/12/2020  6:00 AM  Total Bilirubin: 2.5 mg/dL at 6/12/2020  6:00 AM  INR(ratio): 1.5 at 6/12/2020  6:00 AM  Age: 55 years    Significant Labs:  Labs within the past month have been reviewed.    Significant Imaging:  Labs: Reviewed    Assessment/Plan:     Decompensated cirrhosis related to hepatitis C virus (HCV)  Ms Villarreal is a 55 year old female with history of HCV ESLD with decompensations including refractory ascites,  bleeding EV, HCV (cured), who presented for elective TIPS; hep consulted for rising enzymes.    Rising enzymes likely secondary to TIPS placement and will resolve with supportive care.  Appears that they peaked on 06/11.  Workup for infectious causes has been negative.    Plan  - TIPS.  Improved gradient from 22-4  - HE.  Mild confusion on history.  Endorses constipation.  Recommend starting lactulose  - volume overload.  Continue home dose of diuretics on discharge.  Will slowly taper as an outpatient.  - HCV.  Status post treatment.  SVR 12 with mavyret 10/2019, will repeat  - H cc screening.  Negative CT and AFP 02/2020  - EGD.  Prior history of bleed, Status post TIPS          Thank you for your consult. I will follow-up with patient. Please contact us if you have any additional questions.    Deon Sims MD  Hepatology  [facility]

## 2020-06-12 NOTE — HPI
Ms Villarreal is a 55 year old female with history of HCV ESLD with decompensations including refractory ascites, bleeding EV, HCV (cured), who presented for elective TIPS; hep consulted for rising enzymes.    She follows with Dr. nina in clinic and was referred for elective TIPS due to refractory ascites and history of EV bleed. Pressure 22 -> 4.    Baseline MELD 10. On 6/10 LFTs [place] with Tb 2.8 -> 3.9 -> 2.8. AST 28 -> 291 -> 648 -> 573, ALT 18 -> 143 -> 306. Diagnostic paracentesis negative.  Echo negative.    She reports she is feeling well and denies a complaints with exception of some forgetfulness this morning.  She reports she was having some difficulty with short-term memory loss overnight.  This is new to her.  Otherwise denies any side confusion.  Denies abdominal pain, nausea or vomiting.  Denies any overt signs of bleeding.  Does know the issues overnight with urinary retention for which she required straight cath which is abnormal for her.  Endorses some back pain (chronic) and had been receiving narcotic but has stopped taking this.

## 2020-06-13 NOTE — PLAN OF CARE
Problem: Adult Inpatient Plan of Care  Goal: Plan of Care Review  Outcome: Ongoing, Progressing  Goal: Patient-Specific Goal (Individualization)  Outcome: Ongoing, Progressing  Goal: Absence of Hospital-Acquired Illness or Injury  Outcome: Ongoing, Progressing  Goal: Optimal Comfort and Wellbeing  Outcome: Ongoing, Progressing     Problem: Fall Injury Risk  Goal: Absence of Fall and Fall-Related Injury  Outcome: Ongoing, Progressing     Problem: Infection  Goal: Infection Symptom Resolution  Outcome: Ongoing, Progressing    Patient had hypotensive episodes albeit asymptomatic. Informed physician who said to continue monitoring. Bp's so far picking. Pain and muscle relaxer medication administered this shift. Patient passing large amounts of urine adequately. Alert and oriented x4. Independent. Safety measures observed, bed in low position, locked. Call button and personal items within reach. No new orders. Will continue to monitor.

## 2020-06-13 NOTE — NURSING
Patient given d/c instructions; medications delivered to bedside; patient IV removed, catheter intact; patient transported via wheelchair with personal belongings.

## 2020-06-13 NOTE — PROGRESS NOTES
Progress Note  Hospital Medicine  [facility]       Patient Name: Karen Villarreal  MRN:  [id number]  Hospital Medicine Team: [facility] MED L Muriel Pinon MD  Date of Admission:  6/9/2020     Length of Stay:  LOS: 3 days   Expected Discharge Date: 6/13/2020  Principal Problem:  Elevated LFTs     Subjective:     Interval History/Overnight Events:    No acute events  Feeling better  Bladder scans with no significant urinary retention   Hepatology consulted - watchful waiting  LFTs down trending now   Stopped abx  No infection   Likely d/c on 6/13     aluminum-magnesium hydroxide-simethicone  30 mL Oral QID (AC & HS)    furosemide  160 mg Oral Daily    gabapentin  300 mg Oral TID    lactulose  10 g Oral BID    lidocaine  1 patch Transdermal Q24H    midodrine  10 mg Oral TID    pantoprazole  40 mg Oral Daily    senna-docusate 8.6-50 mg  1 tablet Oral BID    sertraline  25 mg Oral Daily    spironolactone  300 mg Oral Daily    tamsulosin  0.4 mg Oral Daily           acetaminophen, cyclobenzaprine, Dextrose 10% Bolus, Dextrose 10% Bolus, glucagon (human recombinant), glucose, glucose, ondansetron, polyethylene glycol, promethazine (PHENERGAN) IVPB, sodium chloride 0.9%, traMADoL    Review of Systems   Constitutional: Negative for chills, fatigue, fever.   HENT: Negative for sore throat, trouble swallowing.    Eyes: Negative for photophobia, visual disturbance.   Respiratory: Negative for cough, shortness of breath.    Cardiovascular: Negative for chest pain, palpitations, leg swelling.   Gastrointestinal: Negative for abdominal pain, constipation, diarrhea, nausea, vomiting.   Endocrine: Negative for cold intolerance, heat intolerance.   Genitourinary: Negative for dysuria, frequency.   Musculoskeletal: Negative for arthralgias, myalgias.   Skin: Negative for rash, wound, erythema   Neurological: Negative for dizziness, syncope, weakness, light-headedness.    Psychiatric/Behavioral: Negative for confusion, hallucinations, anxiety  All other systems reviewed and are negative.    Objective:     Temp:  [97.4 °F (36.3 °C)-98.1 °F (36.7 °C)]   Pulse:  [92-98]   Resp:  [17-20]   BP: ()/(51-58)   SpO2:  [94 %-100 %]     Vitals:  Temp:  [97.4 °F (36.3 °C)-98.1 °F (36.7 °C)]   Pulse:  [92-98]   Resp:  [17-20]   BP: ()/(51-58)   SpO2:  [94 %-100 %]   I/O's:    Intake/Output Summary (Last 24 hours) at 6/12/2020 2156  Last data filed at 6/12/2020 1700  Gross per 24 hour   Intake 840 ml   Output 2000 ml   Net -1160 ml      Weight change: 0 kg (0 lb)   Body mass index is 41.41 kg/m².       Physical Exam:  Constitutional: chronically ill looking,  non-distressed, not diaphoretic.   HENT: NC/AT, external ears normal, oropharynx clear, MMM w/o exudates.   Eyes: PERRL, EOMI, conjunctiva normal, no discharge b/l, no scleral icterus   Neck: normal ROM, supple  CV: RRR, no m/r/g, no carotid bruits, +2 peripheral pulses.  Pulmonary/Chest wall: Breathing comfortably w/o distress, CTAB, no w/r/r, no crackles.    GI: Soft, non-tender, (+) BS, (+) BM   +distended  Musculoskeletal: Normal ROM, no atrophy,  + pitting edema in LE bilaterally   Neurological: AAO x 4, CN II-XI in tact, nl sensation, nl strength/tone  No asterixis   Skin: warm, dry   +pallor  Psych: normal mood and affect, normal behavior, thought content and judgement.    Labs:    Chemistries:   Recent Labs   Lab 06/10/20  0657 06/11/20  0521 06/12/20  0600   * 129* 128*   K 4.0 3.7 3.4*    98 97   CO2 25 24 25   BUN 6 6 8   CREATININE 0.8 0.8 0.8   CALCIUM 8.1* 7.5* 7.5*   PROT 5.5* 5.1* 4.8*   BILITOT 3.9* 2.8* 2.5*   ALKPHOS 166* 164* 158*   * 306* 316*   * 648* 573*   MG 1.9 1.7 2.0   PHOS 2.2* 2.3* 2.0*        WBC:   Recent Labs   Lab 06/10/20  0657 06/11/20  0521 06/12/20  0600   WBC 9.91 11.03 10.21     Bands:     CBC/Anemia Labs: Coags:    Recent Labs   Lab 06/10/20  0657 06/11/20  0521  06/12/20  0600   WBC 9.91 11.03 10.21   HGB 10.8* 11.2* 9.8*   HCT 32.4* 33.8* 29.9*   PLT 90* 69* 74*   MCV 95 99* 98   RDW 16.6* 16.5* 16.2*    Recent Labs   Lab 06/10/20  0657 06/11/20  0521 06/12/20  0600   INR 1.3* 1.5* 1.5*          Assessment and Plan     Hospital Course:    Ms. 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        # Decompensated Hep C cirrhosis with ascites  # S/P TIPS  # H/O Esophageal Varices  # Elevated LFTs  MELD-Na score: 22 at 6/12/2020  6:00 AM  MELD score: 14 at 6/12/2020  6:00 AM  Calculated from:  Serum Creatinine: 0.8 mg/dL (Rounded to 1 mg/dL) at 6/12/2020  6:00 AM  Serum Sodium: 128 mmol/L at 6/12/2020  6:00 AM  Total Bilirubin: 2.5 mg/dL at 6/12/2020  6:00 AM  INR(ratio): 1.5 at 6/12/2020  6:00 AM  Age: 55 years     - s/p treatment with SVR  - s/p TIPs on 6/9/20   - started on cipro 500 mg PO BID for 5 days on 6/10   - s/p paracentesis on 6/11 with no evidence of SBP  - cont diuretics from home  - LFTs rising . inr up to 1.5. MELD up to 20  - hepatology consulted - no acute intervention   - LFTs down trending on 6/13  - po cipro stopped      # Urinary Retention  - likely from anesthesia and narcotics  - had multiple in an out caths and  bladder scan showed 450 cc   - UA with no UTI  - cerrato cath placed   - flomax started on 6/10  - narcotics stopped      # Hyponatremia  - fluid restrict to 1.5 L     # Moderate protein denny malnutrition  - PAB, boost, dietary     # Morbid Obesity  Body mass index is 42.51 kg/m².  - PT/OT dietary      # Anemia of chronic disease  # Thrombocytopenia  - monitor   - stable     Diet:  Low Na   GI PPx:  PO PPI  DVT PPx:   Goals of Care:  Full     High Risk Conditions:  Elevated LFTs    Disposition:    Pending improvement in LFTs- d/c on  6/13      Signing Physician:     Muriel Pinon MD  Department of Hospital Medicine   [facility]  Pager [phone] [phone]  6/12/2020

## 2020-06-13 NOTE — NURSING
Spoke with Dr Bermeo about pt's low b/p. Pt is post paracentesis and has received midodrine this shift. No new orders at this time. wctm pt

## 2020-06-13 NOTE — DISCHARGE SUMMARY
DISCHARGE SUMMARY  Hospital Medicine    Team: 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       Discharged Condition: Admit problems have stabilized       HOSPITAL COURSE:      Initial Presentation:    As per admitting provider,     54 yo female with a PMH of Decompensated Hep C cirrhosis s/p treatment with SVR who suffers from recurrent ascites and presented for. TIPS procedure.  She reports she is having paracentesis every 10 days to 2 weeks at this point in time.  A volume of nearly 10 liters is removed with each paracentesis.  She has a history of esophageal varices with bleeding.  She reports she hasn't had any problems since the varices were banded via endoscopy.     Patient had her TIPs procedure today and did well during procedure.  Afterwards, patient suffered from two episodes of N/V, but denies any abdominal pain.  Patient had 7.5L drained during the procedure as well.    Course of Principle Problem for Admission:    # Decompensated Hep C cirrhosis with ascites  # S/P TIPS  # H/O Esophageal Varices  #  Elevated LFTs  MELD-Na score: 22 at 6/12/2020  6:00 AM  MELD score: 14 at 6/12/2020  6:00 AM  Calculated from:  Serum Creatinine: 0.8 mg/dL (Rounded to 1 mg/dL) at 6/12/2020  6:00 AM  Serum Sodium: 128 mmol/L at 6/12/2020  6:00 AM  Total Bilirubin: 2.5 mg/dL at 6/12/2020  6:00 AM  INR(ratio): 1.5 at 6/12/2020  6:00 AM  Age: 55 years     - s/p treatment with SVR  - s/p TIPs on 6/9/20   - started on cipro 500 mg PO BID for 5 days on 6/10   - s/p paracentesis on 6/11 with no evidence of SBP  - was on home diuretics   - LFTs rising . inr up to 1.5. MELD up to 20  - hepatology consulted - no acute intervention   - LFTs down trending on 6/13  - po cipro stopped   -BP on the lower side with some lightheadedness over night . Likely 2/2 decomp cirrhosis post TIPS. Diuretics decreased by half and midodrine increased to 15mg tid.    On the day of d/c, patient was doing well with no acute issues. BP on the lower side with some lightheadedness over night . Likely 2/2 decomp cirrhosis post TIPS. Diuretics decreased by half and midodrine increased to 15mg tid. LFTs trending down  . Discharged in good condition     Physical Exam:  Constitutional: chronically ill looking,  non-distressed, not diaphoretic.   HENT: NC/AT, external ears normal, oropharynx clear, MMM w/o exudates.   Eyes: PERRL, EOMI, conjunctiva normal, no discharge b/l, no scleral icterus   Neck: normal ROM, supple  CV: RRR, no m/r/g, no carotid bruits, +2 peripheral pulses.  Pulmonary/Chest wall: Breathing comfortably w/o distress, CTAB, no w/r/r, no crackles.    GI: Soft, non-tender, (+) BS, (+) BM   +distended  Musculoskeletal: Normal ROM, no atrophy,  + pitting edema in LE bilaterally   Neurological: AAO x 4, CN II-XI in tact, nl sensation, nl strength/tone  No asterixis   Skin: warm, dry   +pallor  Psych: normal mood and affect, normal behavior, thought content and judgement.    Other Medical Problems Addressed in the Hospital:    # Urinary Retention  - likely  from anesthesia and narcotics post TIPS  - had multiple in an out caths and bladder scan showed 450 cc   - UA with no UTI  - cerrato cath placed and since removed   - narcotics stopped   - bladder scans post cerrato with acceptable urine volumes, urinating well prior to d/c     # Hyponatremia  - fluid restrict to 1.5 L     # Moderate protein denny malnutrition  - PAB, boost, dietary     # Morbid Obesity  Body mass index is 42.51 kg/m².  - PT/OT dietary      # Anemia of chronic disease  # Thrombocytopenia  - monitor   - stable     CONSULTS: hepatology     PROCEDURES: TIPS, paracentesis     Labs:    Chemistries:   Recent Labs   Lab 06/10/20  0657 06/11/20  0521 06/12/20  0600 06/13/20  0517   * 129* 128* 132*   K 4.0 3.7 3.4* 3.6    98 97 99   CO2 25 24 25 27   BUN 6 6 8 9   CREATININE 0.8 0.8 0.8 0.8   CALCIUM 8.1* 7.5* 7.5* 7.6*   PROT 5.5* 5.1* 4.8* 4.8*   BILITOT 3.9* 2.8* 2.5* 2.9*   ALKPHOS 166* 164* 158* 164*   * 306* 316* 259*   * 648* 573* 379*   MG 1.9 1.7 2.0  --    PHOS 2.2* 2.3* 2.0* 1.9*        WBC:   Recent Labs   Lab 06/10/20  0657 06/11/20  0521 06/12/20  0600 06/13/20  0517   WBC 9.91 11.03 10.21 9.14     Bands:     CBC/Anemia Labs: Coags:    Recent Labs   Lab 06/11/20  0521 06/12/20  0600 06/13/20  0517   WBC 11.03 10.21 9.14   HGB 11.2* 9.8* 9.8*   HCT 33.8* 29.9* 30.1*   PLT 69* 74* 81*   MCV 99* 98 97   RDW 16.5* 16.2* 16.7*    Recent Labs   Lab 06/11/20  0521 06/12/20  0600 06/13/20  0517   INR 1.5* 1.5* 1.5*          Disposition:  Home       Future Scheduled Appointments:  Future Appointments   Date Time Provider Department Center   6/22/2020  9:00 AM NM [facility]1 [facility]   7/2/2020 11:00 AM [address] [facility]       Follow-up Plans from This Hospitalization:  Hepatology     Discharge Medication List:       Karen Villarreal   Odin Medication Instructions MOISÉS:[id number]    Printed on:06/13/20 0744   Medication Information                       albuterol (VENTOLIN HFA) 90 mcg/actuation inhaler  Ventolin HFA 90 mcg/actuation aerosol inhaler   take 2 puffs every 4hrs as needed for cough             aluminum & magnesium hydroxide-simethicone (MAALOX MAXIMUM STRENGTH) 400-400-40 mg/5 mL suspension  Take by mouth every 6 (six) hours as needed for Indigestion.             cyclobenzaprine (FLEXERIL) 10 MG tablet  Take 10 mg by mouth 3 (three) times daily as needed for Muscle spasms.             esomeprazole (NEXIUM) 20 mg GrPS  Take 20 mg by mouth before breakfast.             ferrous sulfate 325 (65 FE) MG EC tablet  Take 325 mg by mouth once daily.              furosemide (LASIX) 80 MG tablet  TAKE 2 TABLETS(160 MG) BY MOUTH ONCE DAILY             gabapentin (NEURONTIN) 300 MG capsule  Take 300 mg by mouth 3 (three) times daily.             magnesium 30 mg Tab  Take 30 mg by mouth daily as needed.              midodrine (PROAMATINE) 10 MG tablet  Take 1 tablet (10 mg total) by mouth 3 (three) times daily.             sertraline (ZOLOFT) 25 MG tablet  Take 1 tablet by mouth once daily.             spironolactone (ALDACTONE) 100 MG tablet  TAKE 3 TABLETS(300 MG) BY MOUTH ONCE DAILY                   At the time of discharge patient was told to take all medications as prescribed, to keep all followup appointments, and to call their primary care physician or return to the emergency room if they have any worsening or concerning symptoms.    Time spent on the discharge of the patient including review of hospital course with the patient. reviewing discharge medications and arranging follow-up care 45 minutes.  Patient was seen and examined on the date of discharge and determined to be suitable for discharge.        Signing Physician:  Muriel Pinon MD

## 2020-06-16 NOTE — PLAN OF CARE
Future Appointments   Date Time Provider Department Center   6/22/2020  9[facility]   [facility]           06/15/20 1932   Final Note   Assessment Type Final Discharge Note   Anticipated Discharge Disposition Home   What phone number can be called within the next 1-3 days to see how you are doing after discharge? [phone]   Hospital Follow Up  Appt(s) scheduled? Yes   Right Care Referral Info   Post Acute Recommendation No Care   Post-Acute Status   Post-Acute Authorization Other   Other Status No Post-Acute Service Needs

## 2020-06-22 NOTE — TELEPHONE ENCOUNTER
Attempted to contact patient on behalf of [facility]. No answer, day 13. Triage nurse will attempt call back tomorrow.     Reason for Disposition   No answer.  First attempt to contact caller.  Follow-up call scheduled within 15 minutes.    Protocols used: NO CONTACT OR DUPLICATE CONTACT CALL-A-[place]

## 2020-06-23 NOTE — TELEPHONE ENCOUNTER
Pt contacted through the Post Procedural Symptom Tracker. No answer. No additional contact today as per post procedure protocol.   dy 13 and dy 14    Reason for Disposition   No answer.  First attempt to contact caller.  Follow-up call scheduled within 15 minutes.   Second attempt to contact family AND no contact made. Phone number verified.    Protocols used: NO CONTACT OR DUPLICATE CONTACT CALL-A-[place]

## 2020-07-02 NOTE — NURSING
Ultrasound guided paracentesis performed by Dr. Denny; procedure explained and consent obtained by Dr. Denny. 6750 mLs L removed- Patient received 25 g albumin IV- VS remained stable for duration of procedure.  IV d/c'd, with tip intact. Access site cleaned with peroxide, derma bond and steri-strips applied, then covered with sterile gauze and tape. Pt discharge home.

## 2020-07-06 NOTE — TELEPHONE ENCOUNTER
MA spoke to the pt. She said she doesn't feel comfortable coming in right now. I was able to reschedule her to August and mailed her the reminder

## 2020-07-10 NOTE — TELEPHONE ENCOUNTER
----- Message from Mona Segura sent at 7/10/2020 11:15 AM CDT -----  [facility]  Please call Shruti at [phone]    Requesting lab orders to be placed in Epic immediately     Patient currently waiting (20 min already)    Thank you

## 2020-07-10 NOTE — TELEPHONE ENCOUNTER
Call returned to Shae with [facility] [phone]. Orders are in for patient to do her weekly labs.  Shae stated the patient left but will come back in when I call her that the orders are in. Thanks for helping.

## 2020-07-14 PROBLEM — R06.02 SOB (SHORTNESS OF BREATH): Status: ACTIVE | Noted: 2020-01-01

## 2020-07-14 NOTE — ED NOTES
Here for eval of shortness of breath x approx 3 days; presents with obvious ascites (scheduled for paracentesis in 2 days) & jaundice (with scleral involvement).

## 2020-07-14 NOTE — ED PROVIDER NOTES
Encounter Date: 7/14/2020    SCRIBE #1 NOTE: IMarlyn, am scribing for, and in the presence of, Remy Gonzales MD.       History     Chief Complaint   Patient presents with    Shortness of Breath    Ascites     paracentesis scheduled for Thursday      Time seen by provider: 3:26 PM on 07/14/2020    Karen Villarreal is a 55 y.o. female with a PMHx of cirrhosis and ascites who presents to the ED via EMS with an onset of SOB and ascites. The patient has a history of ascites, and every 2 weeks she has an appointment for a paracentesis where ~10 liters is drained each time. The patient has her scheduled appointment in 2 days, but reports she is too SOB to make it. She also reports swelling in her arms and legs as well. The patient denies having to sleep sitting up. The patient denies cough, fever, chest pain, diarrhea, constipation. She does report from focal abdominal pain that is pinpoint in a few areas around the umbilicus but is unsure this is related to ascites. PSHx of EGD and colonoscopy.      The history is provided by the patient.     Review of patient's allergies indicates:   Allergen Reactions    Adhesive Blisters     Clear tape leaves blisters, paper tape can be used, Tegaderm okay to use.     Past Medical History:   Diagnosis Date    Acid reflux     Anemia     Arthritis     Ascites 03/08/2017    Cataract     Cirrhosis     Depression     Encounter for blood transfusion     Hiatal hernia     History of hepatitis C, s/p successful Rx w/ SVR12 (cure) - 10/2019 2/16/2017    S/p mavyret w/ SVR12    Ovary removal, prophylactic     Renal cyst 03/08/2017    Thrombocytopenia     Varices, esophageal      Past Surgical History:   Procedure Laterality Date    ANKLE SURGERY      APPENDECTOMY      CATARACT EXTRACTION      COLONOSCOPY N/A 12/4/2019    Procedure: COLONOSCOPY;  Surgeon: Justin Montenegro MD;  Location: [facility];  Service: Endoscopy;  Laterality: N/A;     ESOPHAGOGASTRODUODENOSCOPY N/A 2/2/2019    Procedure: EGD (ESOPHAGOGASTRODUODENOSCOPY);  Surgeon: Leigha Whitehead MD;  Location: [facility] ENDO;  Service: Endoscopy;  Laterality: N/A;  Procedure for 1100AM 2/2/2019    ESOPHAGOGASTRODUODENOSCOPY N/A 2/5/2019    Procedure: EGD (ESOPHAGOGASTRODUODENOSCOPY);  Surgeon: Leigha Whitehead MD;  Location: [facility] ENDO;  Service: Endoscopy;  Laterality: N/A;    ESOPHAGOGASTRODUODENOSCOPY N/A 4/29/2019    Procedure: EGD (ESOPHAGOGASTRODUODENOSCOPY);  Surgeon: Justin Montenegro MD;  Location: [facility] ENDO;  Service: Endoscopy;  Laterality: N/A;    ESOPHAGOGASTRODUODENOSCOPY N/A 5/22/2019    Procedure: EGD (ESOPHAGOGASTRODUODENOSCOPY);  Surgeon: Justin Montenegro MD;  Location: [facility] ENDO;  Service: Endoscopy;  Laterality: N/A;    ESOPHAGOGASTRODUODENOSCOPY N/A 6/4/2019    Procedure: EGD (ESOPHAGOGASTRODUODENOSCOPY);  Surgeon: Justin Montenegro MD;  Location: [facility] ENDO;  Service: Endoscopy;  Laterality: N/A;    ESOPHAGOGASTRODUODENOSCOPY N/A 6/25/2019    Procedure: EGD (ESOPHAGOGASTRODUODENOSCOPY);  Surgeon: Justin Montenegro MD;  Location: [facility] ENDO;  Service: Endoscopy;  Laterality: N/A;    ESOPHAGOGASTRODUODENOSCOPY N/A 10/30/2019    Procedure: EGD (ESOPHAGOGASTRODUODENOSCOPY) - varices surveillance;  Surgeon: Justin Montenegro MD;  Location: [facility] ENDO;  Service: Endoscopy;  Laterality: N/A;    ESOPHAGOGASTRODUODENOSCOPY N/A 11/20/2019    Procedure: EGD (ESOPHAGOGASTRODUODENOSCOPY);  Surgeon: Justin Montenegro MD;  Location: [facility] ENDO;  Service: Endoscopy;  Laterality: N/A;    ESOPHAGOGASTRODUODENOSCOPY N/A 12/17/2019    Procedure: EGD (ESOPHAGOGASTRODUODENOSCOPY);  Surgeon: Justin Montenegro MD;  Location: [facility];  Service: Endoscopy;  Laterality: N/A;    ESOPHAGOGASTRODUODENOSCOPY N/A 1/8/2020    Procedure: EGD (ESOPHAGOGASTRODUODENOSCOPY);  Surgeon: Justin Montenegro MD;  Location: [facility];  Service: Endoscopy;  Laterality: N/A;    ESOPHAGOGASTRODUODENOSCOPY N/A  2/5/2020    Procedure: EGD (ESOPHAGOGASTRODUODENOSCOPY);  Surgeon: Justin Montenegro MD;  Location: [facility];  Service: Endoscopy;  Laterality: N/A;    INSERTION OF TRANSJUGULAR INTRAHEPATIC PORTOSYSTEMIC SHUNT (TIPS) N/A 6/9/2020    Procedure: INSERTION, SHUNT, TRANSJUGULAR, INTRAHEPATIC PORTOSYSTEMIC;  Surgeon: Salma Surgeon;  Location: [facility];  Service: Anesthesiology;  Laterality: N/A;  188 Tips 2.5 hours     OOPHORECTOMY       Family History   Problem Relation Age of Onset    Heart failure Mother     COPD Mother     Arthritis Mother     Vision loss Mother     Diabetes Father     Cancer Father     Brain cancer Father     Diabetes type II Father      Social History     Tobacco Use    Smoking status: Current Some Day Smoker     Packs/day: 0.25     Years: 33.00     Pack years: 8.25     Types: Cigarettes    Smokeless tobacco: Never Used   Substance Use Topics    Alcohol use: No     Frequency: Never     Comment: quit 2017    Drug use: No     Review of Systems   Constitutional: Negative for fever.   HENT: Negative for congestion.    Eyes: Negative for visual disturbance.   Respiratory: Positive for shortness of breath. Negative for cough and wheezing.    Cardiovascular: Positive for leg swelling. Negative for chest pain.   Gastrointestinal: Positive for abdominal distention. Negative for abdominal pain, constipation, diarrhea, nausea and vomiting.   Genitourinary: Negative for dysuria.   Musculoskeletal: Positive for joint swelling.   Skin: Negative for rash.   Neurological: Negative for syncope.   Hematological: Does not bruise/bleed easily.   Psychiatric/Behavioral: Negative for confusion.       Physical Exam     Initial Vitals [07/14/20 1457]   BP Pulse Resp Temp SpO2   (!) 83/50 (!) 115 (!) 28 98 °F (36.7 °C) 95 %      MAP       --         Physical Exam    Nursing note and vitals reviewed.  Constitutional: She appears well-nourished.   HENT:   Head: Normocephalic and atraumatic.   Eyes:  Conjunctivae and EOM are normal. Scleral icterus is present.   Neck: Normal range of motion. Neck supple. No thyroid mass present.   Cardiovascular: Regular rhythm and normal heart sounds. Tachycardia present.  Exam reveals no gallop and no friction rub.    No murmur heard.  Pulmonary/Chest: Breath sounds normal. Tachypnea noted. She has no wheezes. She has no rhonchi. She has no rales.   Abdominal: Soft. Normal appearance and bowel sounds are normal. She exhibits ascites. There is abdominal tenderness.   Tense ascites. Pinpoint, superficial-appearing pain, R. Periumbilical region. No guarding.   Musculoskeletal: Normal range of motion. Tenderness present.      Comments: Chronic BL LE chronic venous stasis dermatitis without superimposed cellulitis, she denies acute change from baseline   Neurological: She is alert and oriented to person, place, and time. She has normal strength. No cranial nerve deficit or sensory deficit.   Skin: Skin is warm and dry. Rash noted. No erythema.   Jaundice   Psychiatric: She has a normal mood and affect. Her speech is normal. Cognition and memory are normal.         ED Course   Intubation    Date/Time: 7/15/2020 12:48 AM  Location procedure was performed: [facility] EMERGENCY DEPARTMENT  Performed by: Remy Gonzales MD  Authorized by: Remy Gonzales MD   Assisting provider: Yamel Wilkinson RN  Pre-operative diagnosis: Cardiac arrest  Post-operative diagnosis: Cardiac arrest  Consent Done: Emergent Situation  Indications: respiratory failure  Description of findings: Large amount of green vomitus emanating from the stomach and in the airway   Intubation method: direct  Patient status: unconscious  Preoxygenation: BVM  Pretreatment medications: atropine  Paralytic: none  Laryngoscope size: Mac 4  Tube size: 7.5 mm  Tube type: cuffed  Number of attempts: 2 (Initial 8 tube too large to pass)  Ventilation between attempts: BVM  Cricoid pressure: yes  Cords visualized: yes  Post-procedure  assessment: CO2 detector and chest rise  Breath sounds: diminished bilaterally  Cuff inflated: yes  ETT to lip: 24 cm  ETT to teeth: 23 cm  Tube secured with: ETT cancino  Chest x-ray interpreted by me.  Chest x-ray findings: endotracheal tube in appropriate position  Patient tolerance: Patient tolerated the procedure well with no immediate complications  Technical procedures used: Direct laryngoscopy   Significant surgical tasks conducted by the assistant(s): CPR  Complications: Yes (Aspiration)  Estimated blood loss (mL): 0  Specimens: No  Implants: No        Labs Reviewed   CULTURE, BODY FLUID - BACTEC   GRAM STAIN   CBC W/ AUTO DIFFERENTIAL   COMPREHENSIVE METABOLIC PANEL   B-TYPE NATRIURETIC PEPTIDE   TROPONIN I   PROTIME-INR   WBC & DIFF,BODY FLUID     EKG Readings: (Independently Interpreted)   Initial Reading: No STEMI. Rhythm: Sinus Tachycardia. Heart Rate: 111 bpm. Axis: Normal.   Cannot rule out anterior infract, age undetermined. ST and T wave abnormality, consider inferolateral ischemia. T wave now depressed in anterior leads. T wave inversion now evident in inferior leads. T wave inversion now evident in anterior-lateral leads.       Imaging Results          US Guided Paracentesis (In process)                  Medical Decision Making:   History:   Old Medical Records: I decided to obtain old medical records.  Independently Interpreted Test(s):   I have ordered and independently interpreted EKG Reading(s) - see prior notes  Clinical Tests:   Lab Tests: Ordered and Reviewed  Radiological Study: Ordered and Reviewed  Medical Tests: Reviewed and Ordered  ED Management:  This patient was emergently received and assessed upon arrival via EMS. No reported severe distress en route. BG reported to be 85 PTA. Initial VS sig for hypotension, mild tachypnea, tachycardia. Pt reports a history of chronic borderline low BPs. Initial concern is that patient's large volume ascites in restricting venous return, worsening  hypotension, dyspnea. Immediate communication with on-call IR, Dr. Boone, who agreed to take patient to the IR suite for therapeutic and diagnostic paracentesis with ascitic fluid studies to follow.     Patient tolerated procedure with an episode hypotension but reported to both IR staff and ED nurse and myself that abdominal pain, SOB were significantly improved. She was provided albumin, fluid hydration immediately upon return to attempt to improve low BPs while initiating work up with close monitoring.     While this process was ongoing, I noted a HR and sat drop on the track board and immediately noticed her to be unconscious with vomitus in and around her mouth. Code was alerted and she was provided atropine, epi but rapidly deteriorated to arrest. She was intubated with CPR, epi, one shock for Vfib, amio, epi with ROSC. Immediately after ROSC, lab was called regarding any potential resulted lab values. They reported a BG value of 7 but believe this may be errant. Patient immediately provided D50, later D10 infusion. OG placed with multiple canisters of green vomitus. She was initiated on zosyn for aspiration. Patient was stable for a period, not requiring pressor support or sedation. Case d/w admitting NP for ICU admission, cooling protocol. Shortly after this, she progressed rapidly to bradycardia and arrest. She received multiple amps of epi, atropine, bicarb, one additional shock and amio dosing without ROSC and patient was preonouced  at 20:46.   Patient's sister was alerted to her condition and findings after ROSC. Presented to the department where the House supervisor and I spoke with her regarding her critical condition. Toward this end of this conversation is when the patient began to progress with bradycardia. Sister left the department as I went to address the patient's rhythm. She was later called to be informed of her sister's passing.            Scribe Attestation:   Scribe #1: I  performed the above scribed service and the documentation accurately describes the services I performed. I attest to the accuracy of the note.    Attending Attestation:         Attending Critical Care:   Critical Care Times:   Direct Patient Care (initial evaluation, reassessments, and time considering the case)................................................................60 minutes.   Additional History from reviewing old medical records or taking additional history from the family, EMS, PCP, etc.......................5 minutes.   Ordering, Reviewing, and Interpreting Diagnostic Studies...............................................................................................................5 minutes.   Documentation..................................................................................................................................................................................15 minutes.   Consultation with other Physicians. .................................................................................................................................................10 minutes.   Consultation with the patient's family directly relating to the patient's condition, care, and DNR status (when patient unable)......15 minutes.   Other..................................................................................................................................................................................................0 minutes.   ==============================================================  · Total Critical Care Time - exclusive of procedural time: 110 minutes.  ==============================================================  Critical care was necessary to treat or prevent imminent or life-threatening deterioration of the following conditions: cardiac arrest.   The following critical care procedures were done by me (see procedure notes): endotracheal tube placement *, NG or OG tube  placement, CPR * and airway management.   Critical care was time spent personally by me on the following activities: obtaining history from patient or relative, examination of patient, review of old charts, ordering lab, x-rays, and/or EKG, development of treatment plan with patient or relative, ordering and performing treatments and interventions, evaluation of patient's response to treatment, discussions with primary provider, discussion with consultants, interpretation of cardiac measurements, re-evaluation of patient's conition, end of life discussions and ventilator management.   Critical Care Condition: critical       I, Dr. Remy Gonzales, personally performed the services described in this documentation. All medical record entries made by the scribe were at my direction and in my presence.  I have reviewed the chart and agree that the record reflects my personal performance and is accurate and complete. Remy Gonzales MD.  12:48 AM 07/15/2020                        Clinical Impression:       ICD-10-CM ICD-9-CM   1. Cardiac arrest  I46.9 427.5   2. SOB (shortness of breath)  R06.02 786.05         Disposition:   Disposition:   Condition: Critical                        Remy Gonzales MD  07/15/20 0054       Remy Gonzales MD  07/15/20 1112

## 2020-07-14 NOTE — PLAN OF CARE
Procedure complete, patient tolerated well, 9000 ml's removed,, patient s BP running low, patient transported back to ER per Ivonne Jon verbal order for control of low BP,,,

## 2020-07-15 VITALS
HEIGHT: 64 IN | SYSTOLIC BLOOD PRESSURE: 181 MMHG | DIASTOLIC BLOOD PRESSURE: 87 MMHG | TEMPERATURE: 98 F | RESPIRATION RATE: 28 BRPM | BODY MASS INDEX: 40.12 KG/M2 | OXYGEN SATURATION: 59 % | HEART RATE: 115 BPM | WEIGHT: 235 LBS

## 2020-07-15 LAB
GRAM STN SPEC: NORMAL
GRAM STN SPEC: NORMAL

## 2020-07-15 RX ORDER — EPINEPHRINE 0.1 MG/ML
INJECTION INTRAVENOUS CODE/TRAUMA/SEDATION MEDICATION
Status: COMPLETED | OUTPATIENT
Start: 2020-01-01 | End: 2020-01-01

## 2020-07-15 NOTE — ED NOTES
LARRY notified of patient death.  Patient ruled out for all donation r/t medical history.   Reference #[id number]

## 2020-07-15 NOTE — ED NOTES
1mg Epi pushed IV.     Pulse check  BP 63/34 thready.  Sinus dora @ 35 BPM.  Patient ventilated with ambubag.      Dr. Gonzales at bedside.    CLAIRE Wilkinson RN, MARIA TERESA Andino, RN, and PARDEEP Peterson, CLAUDE at bedside.  MULUGETA Salvador, RT at bedside.

## 2020-07-15 NOTE — ED NOTES
While receiving report from PARDEEP Chin RN, called to patient's bedside by Dr. Gonzales.  At this time patient found to be bradycardic and apneic.

## 2020-07-15 NOTE — ED NOTES
Post mortem care completed.  Patient belongings placed in belongings bag, tagged and [place] with [place] in [place] [facility]  Belongings include: shoes, socks, cell phone, shirt and pants.  Security to transport [place] [facility]

## 2020-07-15 NOTE — ED NOTES
Linn Ty, supervisor at [facility] 's office called back.  All information given.  Will call back for release or  to come to scene.

## 2020-07-15 NOTE — ED NOTES
Linn Ty with 's office called back.  [place] [facility]  Case # [id number]

## 2020-07-15 NOTE — CARE UPDATE
07/14/20 1900   PRE-TX-O2   Pulse (!) 0   BP (!) 47/27   Significant Events This Shift   Significant Events Code   Code Blue/Rapid Response   Respiratory Therapist Present Geovany Fernandez   Respiratory Therapist Present Bernadette Salvador   Member Arrival Time 1850   Member Departure Time 1915   Equipment Used Resuscitation Bag with Mask;Other (see comments)  (Peep valve)   $ Chest Compressions Performed? Performed ED;Tech Time 15 min   Impedance Device No   Was ETCO2 obtained? Colorimeter Device   Code Blue Comments Manual bag and mask rescue breathing until ventilator set up.

## 2020-07-15 NOTE — ED NOTES
1 mg of epi IV.   Problem: Spiritual Distress, Risk/Actual (Adult,Obstetrics,Pediatric)  Intervention: Facilitate Personal Exploration/Expression of Spirituality  Provided initial visit prior to discharge. No needs expressed at this time.

## 2020-07-17 LAB
BACTERIA FLD CULT: ABNORMAL
BACTERIA FLD CULT: ABNORMAL
PATH REV BLD -IMP: NORMAL

## 2020-07-20 NOTE — PHYSICIAN QUERY
PT Name: Karen Villarreal  MR #: [id number]     RESPIRATORY CONDITION CLARIFICATION     CDS/: Rona Venegas   RN CCDS           Contact information:[email or web address]  This form is a permanent document in the medical record.     Query Date: July 20, 2020    By submitting this query, we are merely seeking further clarification of documentation.  Please utilize your independent clinical judgment when addressing the question(s) below.  The Medical Record contains the following   Indicators   Supporting Clinical Findings Location in Medical Record   x SOB, GRIMALDO, Wheezing, Productive Cough, Use of Accessory Muscles, etc. SOB, dyspnea ER provider note     x RR         ABGs         O2 sat    7/14/2020 20:03   POC PH 6.879 (LL)   POC PCO2 46.8 (H)   POC PO2 63 (L)   POC BE -24   POC HCO3 8.7 (L)   POC SATURATED O2 71 (L)   POC TCO2 10 (L)   FiO2 100   Vt 550   PEEP 8   Sample ARTERIAL   DelSys Adult Vent   Site RR   Mode AC/PRVC    ABG's 7/14 on vent    Hypoxia/Hypercapnia     x BiPAP/Intubation/Mechanical Ventilation intubation ER provider note    Supplemental O2      Home O2, Oxygen Dependence      Respiratory Distress or Failure     x Radiology Findings Perihilar and bibasilar interstitial and alveolar infiltrates could represent infection/pneumonia. CXR 7/14   x Acute/Chronic Illness Karen Villarreal is a 55 y.o. female with a PMHx of cirrhosis and ascites who presents to the ED via EMS with an onset of SOB and ascites. The patient has a history of ascites, and every 2 weeks she has an appointment for a paracentesis where ~10 liters is drained each time. The patient has her scheduled appointment in 2 days, but reports she is too SOB to make it.  ER provider note   x Treatment Zosyn for aspiration and paracentesis with 9,000 ml taken off ER provider note    Other       Respiratory failure can be acute, chronic or both. It is generally further specified as hypoxic, hypercapnic or both. Lastly, it is  important to identify an etiology, if known or suspected.   References:: https://www.acphospitalist.org/archives/2013/10/coding.htm    http://[employer]/acute-respiratory-failure-know    The noted clinical guidelines are only system guidelines and do not replace the providers clinical judgment.    Provider, please specify diagnosis or diagnoses associated with above clinical findings.     - This ABG was obtained after first cardiac arrest and return of spontaneous circulation, within the first hour of being mechanically ventilated. -  TREVA Gonzales MD    [   X ] Acute Respiratory Failure with Hypoxia - ABG pO2 < 60 mmHg or O2 sat of <91% on room air and respiratory symptoms documented   [    ] Acute Respiratory Failure with Hypercapnia - pCO2 > 50 mmHg with pH < 7.35 and respiratory symptoms documented   [    ] Acute Respiratory Failure with Hypoxia and Hypercapnia - Hypoxia: ABG pO2 < 60 mmHg or O2 sat of <91% on room air and Hypercapnia: pCO2 > 50 mmHg with pH < 7.35 and respiratory symptoms documented   [    ] Other Respiratory Diagnosis (please specify): _________________   [   ] Clinically Undetermined            Please document in your progress notes daily for the duration of treatment until resolved and include in your discharge summary.

## 2020-07-20 NOTE — PHYSICIAN QUERY
PT Name: Karen Villarreal  MR #: [id number]     Documentation Clarification      CDS/: Rona Venegas RN CCDS              Contact information:[email or web address]    This form is a permanent document in the medical record.     Query Date: July 20, 2020    By submitting this query, we are merely seeking further clarification of documentation. Please utilize your independent clinical judgment when addressing the question(s) below.    The Medical Record reflects the following:    Supporting Clinical Findings Location in Medical Record   Karen Villarreal is a 55 y.o. female with a PMHx of cirrhosis and ascites who presents to the ED via EMS with an onset of SOB and ascites. The patient has a history of ascites, and every 2 weeks she has an appointment for a paracentesis where ~10 liters is drained each time. The patient has her scheduled appointment in 2 days, but reports she is too SOB to make it. She also reports swelling in her arms and legs as well. The patient denies having to sleep sitting up. The patient denies cough, fever, chest pain, diarrhea, constipation. She does report from focal abdominal pain that is pinpoint in a few areas around the umbilicus but is unsure this is related to ascites       ER provider note   Procedure complete, patient tolerated well, 9000 ml's removed,, patient s BP running low, patient transported back to ER per Ivonne Jon verbal order for control of low BP    Klebsiella pneumoniae   Radiology RN note 7/14          Paracentesis cultures                                                                             Provider, please provide diagnosis or diagnoses associated with above clinical findings.    [ X  ] SBP with klebsiella pneumoniae   [   ] Other (please specify): ____________   [  ] Clinically undetermined

## 2020-07-21 ENCOUNTER — TELEPHONE (OUTPATIENT)
Dept: HEMATOLOGY/ONCOLOGY | Facility: CLINIC | Age: 55
End: 2020-07-21

## 2020-07-21 NOTE — TELEPHONE ENCOUNTER
Informed Shama per Dr Valencia, death certificate should be signed by pt PCP, Dr. Soto. Shama verbalized understanding.     ----- Message from Princess KATI Montenegro sent at 2020 11:50 AM CDT -----  Type: Needs Medical Advice  Who Called:  Shama Ha  Home   Best Call Back Number:  Additional Information: requesting to speak with someone in regards to the Death Certificate of the patient. Please advise if the provider will sign also patient is going to be cremated. Please advise

## 2022-04-29 NOTE — TELEPHONE ENCOUNTER
56 MA spoke with the pt. She let me know that she can come down on 2/4 at 11:20 to see Dr. PARIKH. She also asked when her next para was, so I verbally let her know. I also mailed the reminder with all of her michelle and her f/u with Dr. PARIKH

## 2022-12-22 NOTE — TELEPHONE ENCOUNTER
Called  pt in regards to cancelling her apt for today with dr morgan. Instructed pt to call . No voicemail available    Refer to Initial Dietitian Evaluation for complete recommendations.

## 2024-08-30 NOTE — MEDICAL/APP STUDENT
Continued Stay SW/CM Assessment/Plan of Care Note       Active Substitute Decision Maker (SDM)       [facility] 1 - Sister   Primary Phone: [phone] (Mobile)  Home Phone: [phone]  Work Phone: [phone]                     Progress note:  Pt discussed with Isa from [employer] ([phone]) their  requested MD add specifics to items #1 and #3 on ITO395. EZEKIEL contacted Dr Melissa who added updates.  EZEKIEL faxed amended Report of Physician ([id number]) to Isa at [employer] (f- [phone])    Disposition Recommendations:  Preliminary discharge destination:    SW/CM recommendation for discharge: Inpatient pyschvs nursing home    Discharge Plan/Needs:     Continued Care and Services - Admitted Since 8/2/2024       Destination        Service Provider Request Status Selected Services Address Phone Fax    [facility] Declined  Patient does not meet the level of care required for admission -- [address] [facility] [address] [phone] --    [facility] Declined  Out of network -- [address] [facility] [address] [phone] --                    Prior To Hospitalization:    Living Situation: Other (comments) and residing at Other (comment) ([facility])    .  Support Systems: Other (Comment)   Home Devices/Equipment: None            Mobility Assist Devices: None   Type of Service Prior to Hospitalization: Partial hospitalization program               Patient/Family discharge goal (s):  Inpatient psych facility vs nursing home    Barriers to Discharge  Identified Barriers to Discharge/Transition Planning: medical/psychiatric stability; placement    Natasha Lyon, [facility]  [id number]               [facility] Medicine  Progress Note    Patient Name: Karen Villarreal  MRN: [id number]  Patient Class: IP- Inpatient   Admission Date: 4/29/2019  Length of Stay: 2 days  Attending Physician: Autumn Barrera MD  Primary Care Provider: Shama Mccoy MD      Subjective:     Principal Problem:Bleeding esophageal varices    HPI: Karen Villarreal is a 53-year-old female with a past medical history of hepatitis C, cirrhosis, thrombocytopenia and ascites who presented to the emergency department for evaluation of 2-3 days of dark stools and hematemesis which began earlier this evening.   Patient states that she began having diarrhea 3 days ago which became tarry yesterday.  She vomited 3 times at home and once in the emergency department describing it as red with blood clots.  She has been out of her Protonix for several days.  She is complaining of abdominal distension, but no fever, chest pain, shortness of breath, or unusual weakness.  She is admitted to the service of hospital Medicine for further treatment.    Hospital Course:   Started on octreotide, protonix, and rocephin. Band ligation performed during EGD with 6 bands. No further hematemesis. Given 1 unit of blood (according to pt, unable to locate on epic).    Interval History: Pt states she is feeling well. Complains of generalized swelling. Tolerating food and liquids. Had dark green, soft BM - no blood present. No difficulty urinating. Pt is able to ambulate. Denies pain, nausea, vomiting, shortness of breath, and chest pain.    Review of Systems   Constitutional: Negative for chills and diaphoresis.   HENT: Negative for sore throat and trouble swallowing.    Respiratory: Positive for shortness of breath. Negative for cough and chest tightness.    Cardiovascular: Positive for leg swelling. Negative for chest pain and palpitations.   Gastrointestinal: Positive for abdominal distention. Negative for blood in stool.    Genitourinary: Negative for difficulty urinating, dysuria and hematuria.   Musculoskeletal: Negative for back pain.   Skin: Negative.    Neurological: Positive for dizziness. Negative for numbness and headaches.   Psychiatric/Behavioral: Negative for confusion and sleep disturbance.     Objective:     Vital Signs (Most Recent):  Temp: 98 °F (36.7 °C) (05/01/19 0731)  Pulse: 91 (05/01/19 0800)  Resp: 16 (05/01/19 0800)  BP: 123/60 (05/01/19 0800)  SpO2: 100 % (05/01/19 0800) Vital Signs (24h Range):  Temp:  [98 °F (36.7 °C)-98.4 °F (36.9 °C)] 98 °F (36.7 °C)  Pulse:  [86-97] 91  Resp:  [15-35] 16  SpO2:  [91 %-100 %] 100 %  BP: ()/(50-71) 123/60     Weight: 119.9 kg (264 lb 5.3 oz)  Body mass index is 49.94 kg/m².    Intake/Output Summary (Last 24 hours) at 5/1/2019 1007  Last data filed at 5/1/2019 0800  Gross per 24 hour   Intake 4130.76 ml   Output 3 ml   Net 4127.76 ml      Physical Exam   Constitutional: She is oriented to person, place, and time. No distress.   HENT:   Mouth/Throat: No oropharyngeal exudate.   Eyes: Pupils are equal, round, and reactive to light. EOM are normal. No scleral icterus.   Neck: No JVD present.   Cardiovascular: Normal rate and regular rhythm.   Pulmonary/Chest: Effort normal and breath sounds normal. She exhibits no tenderness.   Abdominal: Soft. Bowel sounds are normal. She exhibits distension. She exhibits no mass. There is no tenderness.   Musculoskeletal: She exhibits edema.   Neurological: She is oriented to person, place, and time.   Skin: Skin is warm and dry. Capillary refill takes less than 2 seconds. She is not diaphoretic.       Significant Labs:   Bilirubin:   Recent Labs   Lab 04/17/19  1334 04/29/19  0350 04/30/19  0357 05/01/19  0412   BILITOT 1.5* 2.3* 1.5* 1.7*     CBC:   Recent Labs   Lab 04/30/19  0357 04/30/19  1454 05/01/19  0413   WBC 10.20  --  7.90   HGB 7.4* 7.8* 7.8*   HCT 23.2* 24.2* 24.0*   PLT 97*  --  82*     CMP:   Recent Labs   Lab  04/30/19  0357 05/01/19  0412   * 133*   K 4.1 3.9    107   CO2 22* 22*    102   BUN 16 11   CREATININE 0.8 0.7   CALCIUM 7.8* 7.3*   PROT 4.7* 4.5*   ALBUMIN 2.2* 2.1*   BILITOT 1.5* 1.7*   ALKPHOS 133 133   AST 34 34   ALT 18 19   ANIONGAP 5* 4*   EGFRNONAA >60 >60       Lab Results   Component Value Date    IRON 159 04/29/2019    TIBC 246 (L) 04/29/2019    FERRITIN 168 05/16/2018       Assessment/Plan:      Pt is a 54 y/o female with PMH of hepatitis C, cirrhosis, thrombocytopenia, and ascites who is s/p esophageal band ligation for bleeding varices. Pt's current medical problems requiring attention are bleeding esophageal varices, ascites/edema, and iron deficiency anemia; chronic conditions include hypertension, chronic hepatitis C with cirrhosis, and obesity.    Bleeding esophageal varices  - Etiology: portal hypertension secondary to cirrhosis  - IV octreotide, IV Protonix, continuous (72 hour course started 4/29)  - Mechanical soft diet (advanced from clear liquids)  - Monitor H&H, transfuse with goal hemoglobin >8 (per GI consult)  - Consider prophylactic antibiotics    Ascites/Edema (Anasarca)  - Etiology: pt's net I/O is +4L in 24 hours  - Start diuresis (furosemid + spironolactone)  - Monitor BP, electrolytes    Anemia  - Etiology: acute blood loss  - Hgb currently 7.8 with goal of 8.0  - Transfuse 1 unit packed RBC's  - Monitor H&H    Chronic hepatitis C with cirrhosis  - MELD-Na score of 16 (5/1/209 using INR from 4/29)   - 2% Risk of 90-day mortality  - Continue hep C treatment per treating physician      VTE Risk Mitigation (From admission, onward)        Ordered     IP VTE HIGH RISK PATIENT  Once      04/29/19 0611     Reason for No Pharmacological VTE Prophylaxis  Once      04/29/19 0611     Place NICOLE hose  Until discontinued      04/29/19 0611     Place sequential compression device  Until discontinued      04/29/19 0611          Willy Ventura  Department of Hospital Medicine    [facility]

## 2024-09-24 NOTE — ASSESSMENT & PLAN NOTE
Body mass index is 47.86 kg/m². Morbid obesity complicates all aspects of disease management from diagnostic modalities to treatment. Weight loss encouraged and health benefits explained to patient.         3 = A little assistance
